# Patient Record
Sex: FEMALE | Race: WHITE | Employment: OTHER | URBAN - METROPOLITAN AREA
[De-identification: names, ages, dates, MRNs, and addresses within clinical notes are randomized per-mention and may not be internally consistent; named-entity substitution may affect disease eponyms.]

---

## 2017-01-09 ENCOUNTER — TELEPHONE (OUTPATIENT)
Dept: CARDIOLOGY CLINIC | Age: 67
End: 2017-01-09

## 2017-01-09 DIAGNOSIS — R06.02 SOB (SHORTNESS OF BREATH): ICD-10-CM

## 2017-01-09 DIAGNOSIS — I20.8 ANGINA AT REST (HCC): ICD-10-CM

## 2017-01-09 DIAGNOSIS — E78.2 MIXED HYPERLIPIDEMIA: ICD-10-CM

## 2017-01-09 DIAGNOSIS — I25.9 MYOCARDIAL ISCHEMIA: ICD-10-CM

## 2017-01-09 RX ORDER — METOPROLOL TARTRATE 25 MG/1
TABLET, FILM COATED ORAL
Qty: 90 TAB | Refills: 4 | Status: SHIPPED | COMMUNITY
Start: 2017-01-09 | End: 2017-06-09 | Stop reason: SDUPTHER

## 2017-01-09 RX ORDER — ISOSORBIDE MONONITRATE 30 MG/1
15 TABLET, EXTENDED RELEASE ORAL DAILY
Qty: 90 TAB | Refills: 1 | Status: SHIPPED | COMMUNITY
Start: 2017-01-09 | End: 2017-12-21 | Stop reason: SDUPTHER

## 2017-01-09 NOTE — TELEPHONE ENCOUNTER
Called pt,verified pt with two pt identifiers, told pt that I had received two refills from [facility] and just wanted to verify that she wants to get them refilled there. She verbalized she did and said she understood everything. I told her I would send those in.

## 2017-01-10 ENCOUNTER — SURGERY (OUTPATIENT)
Age: 67
End: 2017-01-10

## 2017-01-10 ENCOUNTER — ANESTHESIA EVENT (OUTPATIENT)
Dept: ENDOSCOPY | Age: 67
End: 2017-01-10
Payer: MEDICARE

## 2017-01-10 ENCOUNTER — ANESTHESIA (OUTPATIENT)
Dept: ENDOSCOPY | Age: 67
End: 2017-01-10
Payer: MEDICARE

## 2017-01-10 PROCEDURE — 74011000250 HC RX REV CODE- 250

## 2017-01-10 PROCEDURE — 74011250636 HC RX REV CODE- 250/636

## 2017-01-10 RX ORDER — PROPOFOL 10 MG/ML
INJECTION, EMULSION INTRAVENOUS AS NEEDED
Status: DISCONTINUED | OUTPATIENT
Start: 2017-01-10 | End: 2017-01-10 | Stop reason: HOSPADM

## 2017-01-10 RX ORDER — LIDOCAINE HYDROCHLORIDE 20 MG/ML
INJECTION, SOLUTION EPIDURAL; INFILTRATION; INTRACAUDAL; PERINEURAL AS NEEDED
Status: DISCONTINUED | OUTPATIENT
Start: 2017-01-10 | End: 2017-01-10 | Stop reason: HOSPADM

## 2017-01-10 RX ADMIN — PROPOFOL 50 MG: 10 INJECTION, EMULSION INTRAVENOUS at 11:33

## 2017-01-10 RX ADMIN — PROPOFOL 100 MG: 10 INJECTION, EMULSION INTRAVENOUS at 11:30

## 2017-01-10 RX ADMIN — PROPOFOL 50 MG: 10 INJECTION, EMULSION INTRAVENOUS at 11:36

## 2017-01-10 RX ADMIN — LIDOCAINE HYDROCHLORIDE 40 MG: 20 INJECTION, SOLUTION EPIDURAL; INFILTRATION; INTRACAUDAL; PERINEURAL at 11:30

## 2017-01-10 RX ADMIN — PROPOFOL 50 MG: 10 INJECTION, EMULSION INTRAVENOUS at 11:38

## 2017-01-10 NOTE — ANESTHESIA PREPROCEDURE EVALUATION
Anesthetic History     PONV          Review of Systems / Medical History  Patient summary reviewed, nursing notes reviewed and pertinent labs reviewed    Pulmonary          Shortness of breath (at baseline)  Asthma        Neuro/Psych   Within defined limits           Cardiovascular    Hypertension        Dysrhythmias       Exercise tolerance: >4 METS     GI/Hepatic/Renal     GERD           Endo/Other        Obesity and arthritis     Other Findings            Physical Exam    Airway  Mallampati: II  TM Distance: 4 - 6 cm  Neck ROM: normal range of motion   Mouth opening: Normal     Cardiovascular  Regular rate and rhythm,  S1 and S2 normal,  no murmur, click, rub, or gallop             Dental    Dentition: Edentulous     Pulmonary  Breath sounds clear to auscultation               Abdominal  GI exam deferred       Other Findings            Anesthetic Plan    ASA: 3  Anesthesia type: MAC            Anesthetic plan and risks discussed with: Patient

## 2017-01-10 NOTE — ANESTHESIA POSTPROCEDURE EVALUATION
Post-Anesthesia Evaluation and Assessment    Patient: Ronn Olp MRN: [id number]  SSN: [id number]    YOB: 1950  Age: 77 y.o. Sex: female       Cardiovascular Function/Vital Signs  Visit Vitals    /76    Pulse 64    Temp 36.6 °C (97.9 °F)    Resp 16    Ht 5' 6\" (1.676 m)    Wt 96.8 kg (213 lb 6 oz)    SpO2 96%    Breastfeeding No    BMI 34.44 kg/m2       Patient is status post MAC anesthesia for Procedure(s):  ESOPHAGOGASTRODUODENOSCOPY (EGD)  ESOPHAGEAL DILATION  ESOPHAGOGASTRODUODENAL (EGD) BIOPSY. Nausea/Vomiting: None    Postoperative hydration reviewed and adequate. Pain:  Pain Scale 1: Numeric (0 - 10) (01/10/17 1208)  Pain Intensity 1: 3 (01/10/17 1208)   Managed    Neurological Status: At baseline    Mental Status and Level of Consciousness: Arousable    Pulmonary Status:   O2 Device: Room air (01/10/17 1208)   Adequate oxygenation and airway patent    Complications related to anesthesia: None    Post-anesthesia assessment completed.  No concerns    Signed By: Kiki Smith MD     January 10, 2017

## 2017-01-16 ENCOUNTER — HOSPITAL ENCOUNTER (OUTPATIENT)
Dept: CT IMAGING | Age: 67
Discharge: HOME | End: 2017-01-16
Attending: SPECIALIST
Payer: MEDICARE

## 2017-01-16 DIAGNOSIS — R07.9 CHEST PAIN, UNSPECIFIED: ICD-10-CM

## 2017-01-16 DIAGNOSIS — K21.9 ESOPHAGEAL REFLUX: ICD-10-CM

## 2017-01-16 DIAGNOSIS — K44.9 HERNIA, HIATAL: ICD-10-CM

## 2017-01-16 DIAGNOSIS — R10.816 ABDOMINAL TENDERNESS, EPIGASTRIC: ICD-10-CM

## 2017-01-16 LAB — CREAT BLD-MCNC: 1 MG/DL (ref 0.6–1.3)

## 2017-01-16 PROCEDURE — 74177 CT ABD & PELVIS W/CONTRAST: CPT

## 2017-01-16 PROCEDURE — 74011250636 HC RX REV CODE- 250/636: Performed by: SPECIALIST

## 2017-01-16 PROCEDURE — 74011000255 HC RX REV CODE- 255: Performed by: SPECIALIST

## 2017-01-16 PROCEDURE — 74011636320 HC RX REV CODE- 636/320: Performed by: SPECIALIST

## 2017-01-16 PROCEDURE — 82565 ASSAY OF CREATININE: CPT

## 2017-01-16 RX ORDER — SODIUM CHLORIDE 0.9 % (FLUSH) 0.9 %
10 SYRINGE (ML) INJECTION
Status: COMPLETED | OUTPATIENT
Start: 2017-01-16 | End: 2017-01-16

## 2017-01-16 RX ORDER — SODIUM CHLORIDE 9 MG/ML
50 INJECTION, SOLUTION INTRAVENOUS
Status: COMPLETED | OUTPATIENT
Start: 2017-01-16 | End: 2017-01-16

## 2017-01-16 RX ORDER — BARIUM SULFATE 20 MG/ML
900 SUSPENSION ORAL
Status: COMPLETED | OUTPATIENT
Start: 2017-01-16 | End: 2017-01-16

## 2017-01-16 RX ADMIN — IOPAMIDOL 100 ML: 612 INJECTION, SOLUTION INTRAVENOUS at 12:05

## 2017-01-16 RX ADMIN — BARIUM SULFATE 900 ML: 21 SUSPENSION ORAL at 12:05

## 2017-01-16 RX ADMIN — Medication 10 ML: at 12:05

## 2017-01-16 RX ADMIN — SODIUM CHLORIDE 50 ML/HR: 900 INJECTION, SOLUTION INTRAVENOUS at 12:05

## 2017-02-06 ENCOUNTER — OFFICE VISIT (OUTPATIENT)
Dept: CARDIOLOGY CLINIC | Age: 67
End: 2017-02-06

## 2017-02-06 VITALS
DIASTOLIC BLOOD PRESSURE: 70 MMHG | HEIGHT: 66 IN | HEART RATE: 70 BPM | OXYGEN SATURATION: 97 % | RESPIRATION RATE: 16 BRPM | BODY MASS INDEX: 35.15 KG/M2 | SYSTOLIC BLOOD PRESSURE: 110 MMHG | WEIGHT: 218.7 LBS

## 2017-02-06 DIAGNOSIS — E78.2 MIXED HYPERLIPIDEMIA: ICD-10-CM

## 2017-02-06 DIAGNOSIS — Z01.818 PRE-OP EVALUATION: ICD-10-CM

## 2017-02-06 DIAGNOSIS — I87.322 CHRONIC VENOUS HYPERTENSION (IDIOPATHIC) WITH INFLAMMATION OF LEFT LOWER EXTREMITY: Primary | ICD-10-CM

## 2017-02-06 DIAGNOSIS — I87.2 VENOUS INSUFFICIENCY OF LEFT LEG: ICD-10-CM

## 2017-02-06 RX ORDER — OXYBUTYNIN CHLORIDE 10 MG/1
TABLET, EXTENDED RELEASE ORAL
Status: ON HOLD | COMMUNITY
Start: 2017-01-06 | End: 2018-05-11 | Stop reason: CLARIF

## 2017-02-06 NOTE — PROGRESS NOTES
2/6/2017 3:44 PM      Subjective:     Ariadna Nguyen is here for f/u of LE venous insufficiency and pre op clearance. Remains symptomatic in left leg despite conservative management for last 3 months. Schedule to undergo wrist surgery. She denies chest pain, chest pressure/discomfort, dyspnea, palpitations, irregular heart beats, near-syncope, syncope, fatigue, orthopnea, paroxysmal nocturnal dyspnea. Visit Vitals    /70  Comment: lt/rt/lg    Pulse 70    Resp 16    Ht 5' 6\" (1.676 m)    Wt 218 lb 11.2 oz (99.2 kg)    SpO2 97%    BMI 35.3 kg/m2       Current Outpatient Prescriptions   Medication Sig    oxybutynin chloride XL (DITROPAN XL) 10 mg CR tablet     CYCLOBENZAPRINE HCL (CYCLOBENZAPRINE PO) Take 40 mg by mouth daily.  isosorbide mononitrate ER (IMDUR) 30 mg tablet Take 0.5 Tabs by mouth daily. For Raynauds phenomenon    metoprolol tartrate (LOPRESSOR) 25 mg tablet TAKE ONE TABLET BY MOUTH TWICE DAILY    conjugated estrogens (PREMARIN) 0.625 mg/gram vaginal cream Insert 0.5 g into vagina daily.  raNITIdine (ZANTAC) 150 mg tablet Take 150 mg by mouth two (2) times a day.  aspirin delayed-release 81 mg tablet Take  by mouth daily.  acetaminophen (TYLENOL EXTRA STRENGTH) 500 mg tablet Take 1,000 mg by mouth every six (6) hours as needed for Pain.  omeprazole (PRILOSEC) 20 mg capsule Take 20 mg by mouth two (2) times a day.  OTHER,NON-FORMULARY, 2 Tabs daily. FIBER SUPPLEMENT    allopurinol (ZYLOPRIM) 100 mg tablet Take 100 mg by mouth daily. 100 mg in the AM, 200 mg at dinnertime.  telmisartan-hydrochlorothiazide (MICARDIS HCT) 40-12.5 mg per tablet Take 1 Tab by mouth daily.  pravastatin (PRAVACHOL) 40 mg tablet Take 40 mg by mouth nightly.  cetirizine (ZYRTEC) 10 mg tablet Take 10 mg by mouth daily.  montelukast (SINGULAIR) 10 mg tablet Take 10 mg by mouth nightly.     CARAFATE 100 mg/mL suspension Take 10 mL by mouth three (3) times daily.  meloxicam (MOBIC) 7.5 mg tablet Take 7.5 mg by mouth daily.  albuterol (PROVENTIL HFA, VENTOLIN HFA) 90 mcg/actuation inhaler Take 2 Puffs by inhalation every four (4) hours as needed. No current facility-administered medications for this visit.           Objective:      Visit Vitals    /70    Pulse 70    Resp 16    Ht 5' 6\" (1.676 m)    Wt 218 lb 11.2 oz (99.2 kg)    SpO2 97%    BMI 35.3 kg/m2       Data Review:     EKG: Normal sinus rhythm, no acute st/t changes    Reviewed and/or ordered active problem list, medication list tests    Past Medical History   Diagnosis Date    Adverse effect of anesthesia      woke up during hysterectomy    Arrhythmia      h/o palpitations normal work up 22/2015    Arthritis     Asthma      allergy to weather changing    Chronic pain      bulging disc c4/c5 l4/l5    GERD (gastroesophageal reflux disease)     H/O arthroscopic knee surgery     H/O: hysterectomy     Hypertension     Ill-defined condition      gout    Leg swelling     Bartlett's neuralgia     Musculoskeletal disorder      arthritis    Nausea & vomiting      surgery related    Other ill-defined conditions(799.89) 11/2013     RECTAL PROLASE    S/P appendectomy     Shortness of breath     Unspecified adverse effect of anesthesia      wakes up for anesthesia early      Past Surgical History   Procedure Laterality Date    Colonoscopy  4/20/2011          Hx appendectomy      Hx orthopaedic       left tkr x 2/numereous left knee surgeries    Hx orthopaedic       right knee partial meniscus     Hx orthopaedic       ganglion cyst removed rt wrist    Hx orthopaedic       bilateral CTR, and had ulna nerve release    Hx cholecystectomy      Hx hysterectomy       TOOK LEFT OVARY    Hx gi  11/19/13     Rectocele repair with enterocele repair     Allergies   Allergen Reactions    Benzene Other (comments)     Blisters and burn area Benzene found to be on steri-strips    Betadine [Povidone-Iodine] Other (comments)     Blisters and burning    Naproxen Itching    Percocet [Oxycodone-Acetaminophen] Rash and Itching     irritated    Sulfa (Sulfonamide Antibiotics) Rash    Adhesive Rash     Redness and rash      Family History   Problem Relation Age of Onset    Heart Disease Mother     Hypertension Mother     Diabetes Mother     Cancer Mother      BREAST, LUNG    Heart Disease Father     Hypertension Father     Thyroid Disease Father     Diabetes Brother     Psychiatric Disorder Son      Nancy MCNALLY [place] DEPRESSIVE    Anesth Problems Neg Hx       Social History     Social History    Marital status:      Spouse name: N/A    Number of children: N/A    Years of education: N/A     Occupational History    Not on file. Social History Main Topics    Smoking status: Never Smoker    Smokeless tobacco: Never Used    Alcohol use No    Drug use: Not on file    Sexual activity: Not on file     Other Topics Concern    Not on file     Social History Narrative          Review of Systems     General: Not Present- Anorexia, Chills, Dietary Changes, Fatigue, Fever, Medication Changes, Night Sweats, Weight Gain > 10lbs. and Weight Loss > 10lbs. .  Skin: Not Present- Bruising and Excessive Sweating. HEENT: Not Present- Headache, Visual Loss and Vertigo. Respiratory: Not Present- Cough, Decreased Exercise Tolerance, Difficulty Breathing, Snoring and Wheezing. Cardiovascular: Not Present- Abnormal Blood Pressure, Chest Pain, Difficulty Breathing On Exertion, Fainting / Blacking Out, Irregular Heart Beat, Orthopnea, Palpitations, Paroxysmal Nocturnal Dyspnea, Rapid Heart Rate, Shortness of Breath. Gastrointestinal: Not Present- Black, Tarry Stool, Bloody Stool, Diarrhea, Hematemesis, Rectal Bleeding and Vomiting. Musculoskeletal: Not Present- Muscle Pain and Muscle Weakness. Neurological: Not Present- Dizziness. Psychiatric: Not Present- Depression.   Endocrine: Not Present- Cold Intolerance, Heat Intolerance and Thyroid Problems. Hematology: Not Present- Abnormal Bleeding, Anemia, Blood Clots and Easy Bruising. Physical Exam   The physical exam findings are as follows:     General   Mental Status - Alert. General Appearance - Cooperative and Well groomed. Not in acute distress. Orientation - Oriented to time, Oriented to place and Oriented to person. Build & Nutrition - Well developed. Skin   General: - Normal.      HEENT  Head - Normal.  Eye - Normal.  Mouth & Throat - Normal.      Neck   Carotid Arteries - normal upstroke. No Bruits. Thyroid: Gland - Normal size and consistency. Chest and Lung Exam   Inspection:   Chest Wall: - Normal. Accessory muscles - No use of accessory muscles in breathing. Auscultation:   Breath sounds: - Normal.      Cardiovascular   Inspection: Jugular vein - Bilateral - Inspection Normal.  Palpation/Percussion:   Apical Impulse: - Normal.  Auscultation: Rhythm - Regular. Heart Sounds - S1 WNL and S2 WNL. No S3 or S4. Murmurs & Other Heart Sounds: Auscultation of the heart reveals - No Murmurs. Abdomen   Palpation/Percussion: Palpation and Percussion of the abdomen reveal - No Palpable abdominal masses. Liver: - Normal.  Spleen: - Normal.  Auscultation: Auscultation of the abdomen reveals - Bowel sounds normal.      Neurologic   Mental Status: Affect - normal.  Motor: - Normal. Gait - Normal.      PHYSICIAN TO COMPLETE    Date of Physician Reevaluation:___________11/22/2016___________  (To review results of trial of conservative therapy-lasting at least 3-6 months):  Patient is symptomatic with varicosities despite compliance with conservative therapy. Has failed conservative treatment.     Check all that apply:  [x] Other causes of patients leg(s) symptoms have been ruled out  [x] Completed conservative treatment to include: compression stockings, medication, leg elevation, mild exercise & weight         reduction (as appropriate). Time length of conservative treatment[de-identified] ________3 months_________    Patient is symptomatic with varicosities causing the following: (check all that apply):  [x] Has persistent aching, cramping, burning, pain, itching, and/or swelling during activity or after prolonged standing. [] Significant, recurrent superficial phlebitis  [] Hemorrhage from a ruptured varix  [] Non-healing skin ulceration of the leg  [] Other complications associated:  ___________________      Duplex or Doppler Ultrasound of the venous system demonstrate:  [x] Absence of deep venous thrombosis  [x] Greater and/or lesser saphenous vein or  valvular incompetence/reflux that correlates with        patients symptoms  []   valvular incompetence/reflux that correlates with patients symptoms         Assessment:       ICD-10-CM ICD-9-CM    1. Chronic venous hypertension (idiopathic) with inflammation of left lower extremity I87.322 459.32    2. Mixed hyperlipidemia E78.2 272.2 oxybutynin chloride XL (DITROPAN XL) 10 mg CR tablet      CYCLOBENZAPRINE HCL (CYCLOBENZAPRINE PO)      AMB POC EKG ROUTINE W/ 12 LEADS, INTER & REP   3. Venous insufficiency of left leg I87.2 459.81    4. Pre-op evaluation Z01.818 V72.84        Plan:     1. Venous insuff of left GSV: recommend RF ablation since remains symptomatic despite conservative management. Will schedule it after her ortho surgery and vacation plans. With respect to pevious right GSV RF ablation continues to do very well and has an excellent response. 2. On statin.    3. No further palpitations. Continue BB   4. Pre op: no further cardiac work up is needed prior to non cardiac surgery. She will be at low CV risk during non cardiac surgery. Continue current meds.      Tan Acuna MD  2/6/2017  3:44 PM

## 2017-02-06 NOTE — MR AVS SNAPSHOT
Visit Information Date & Time Provider Department Dept. Phone Encounter #  
 2/6/2017  3:30 PM Clau Verma, [address] [facility] [phone] [id number] Upcoming Health Maintenance Date Due Hepatitis C Screening 1950 DTaP/Tdap/Td series (1 - Tdap) 1/15/1971 FOBT Q 1 YEAR AGE 50-75 1/15/2000 ZOSTER VACCINE AGE 60> 1/15/2010 GLAUCOMA SCREENING Q2Y 1/15/2015 OSTEOPOROSIS SCREENING (DEXA) 1/15/2015 Pneumococcal 65+ Low/Medium Risk (1 of 2 - PCV13) 1/15/2015 MEDICARE YEARLY EXAM 1/15/2015 INFLUENZA AGE 9 TO ADULT 8/1/2016 BREAST CANCER SCRN MAMMOGRAM 5/9/2018 Allergies as of 2/6/2017  Review Complete On: 2/6/2017 By: Clau Verma MD  
  
 Severity Noted Reaction Type Reactions Benzene Medium 11/14/2013   Topical Other (comments) Blisters and burn area Benzene found to be on steri-strips Betadine [Povidone-iodine] Medium 11/14/2013   Topical Other (comments) Blisters and burning Naproxen  04/19/2011    Itching Percocet [Oxycodone-acetaminophen]  07/07/2014    Rash, Itching  
 irritated Sulfa (Sulfonamide Antibiotics)  04/19/2011    Rash Adhesive Low 11/19/2013   Topical Rash Redness and rash Current Immunizations  Never Reviewed No immunizations on file. Not reviewed this visit You Were Diagnosed With   
  
 Codes Comments Chronic venous hypertension (idiopathic) with inflammation of left lower extremity    -  Primary ICD-10-CM: W30.828 ICD-9-CM: 459.32 Mixed hyperlipidemia     ICD-10-CM: E78.2 ICD-9-CM: 272.2 Venous insufficiency of left leg     ICD-10-CM: I87.2 ICD-9-CM: 459.81 Pre-op evaluation     ICD-10-CM: W78.658 ICD-9-CM: V72.84 Vitals BP Pulse Resp Height(growth percentile) Weight(growth percentile) SpO2  
 110/70 70 16 5' 6\" (1.676 m) 218 lb 11.2 oz (99.2 kg) 97% BMI OB Status Smoking Status 35.3 kg/m2 Hysterectomy Never Smoker BMI and BSA Data Body Mass Index Body Surface Area  
 35.3 kg/m 2 2.15 m 2 Preferred Pharmacy Pharmacy Name Phone [address] [facility] [phone] Your Updated Medication List  
  
   
This list is accurate as of: 2/6/17  3:48 PM.  Always use your most recent med list.  
  
  
  
  
 albuterol 90 mcg/actuation inhaler Commonly known as:  PROVENTIL HFA, VENTOLIN HFA, PROAIR HFA Take 2 Puffs by inhalation every four (4) hours as needed. allopurinol 100 mg tablet Commonly known as:  Korina Late Take 100 mg by mouth daily. 100 mg in the AM, 200 mg at dinnertime. aspirin delayed-release 81 mg tablet Take  by mouth daily. CARAFATE 100 mg/mL suspension Generic drug:  sucralfate Take 10 mL by mouth three (3) times daily. CYCLOBENZAPRINE PO Take 40 mg by mouth daily. isosorbide mononitrate ER 30 mg tablet Commonly known as:  IMDUR Take 0.5 Tabs by mouth daily. For Raynauds phenomenon  
  
 meloxicam 7.5 mg tablet Commonly known as:  MOBIC Take 7.5 mg by mouth daily. metoprolol tartrate 25 mg tablet Commonly known as:  LOPRESSOR  
TAKE ONE TABLET BY MOUTH TWICE DAILY  
  
 omeprazole 20 mg capsule Commonly known as:  PRILOSEC Take 20 mg by mouth two (2) times a day. OTHER(NON-FORMULARY) 2 Tabs daily. FIBER SUPPLEMENT  
  
 oxybutynin chloride XL 10 mg CR tablet Commonly known as:  DITROPAN XL  
  
 pravastatin 40 mg tablet Commonly known as:  PRAVACHOL Take 40 mg by mouth nightly. PREMARIN 0.625 mg/gram vaginal cream  
Generic drug:  conjugated estrogens Insert 0.5 g into vagina daily. raNITIdine 150 mg tablet Commonly known as:  ZANTAC Take 150 mg by mouth two (2) times a day. SINGULAIR 10 mg tablet Generic drug:  montelukast  
Take 10 mg by mouth nightly. telmisartan-hydroCHLOROthiazide 40-12.5 mg per tablet Commonly known as:  MICARDIS HCT Take 1 Tab by mouth daily. TYLENOL EXTRA STRENGTH 500 mg tablet Generic drug:  acetaminophen Take 1,000 mg by mouth every six (6) hours as needed for Pain. ZyrTEC 10 mg tablet Generic drug:  cetirizine Take 10 mg by mouth daily. We Performed the Following AMB POC EKG ROUTINE W/ 12 LEADS, INTER & REP [23019 CPT(R)] Introducing [facility] SERVICES! Dear Chip Stevenson: Thank you for requesting a [employer] account. Our records indicate that you already have an active [email or web address] account. You can access your account anytime at [email or web address] Did you know that you can access your hospital and ER discharge instructions at any time in [employer]? You can also review all of your test results from your hospital stay or ER visit. Additional Information If you have questions, please visit the Frequently Asked Questions section of the [employer] website at [email or web address] Remember, [employer] is NOT to be used for urgent needs. For medical emergencies, dial 911. Now available from your iPhone and Android! Please provide this summary of care documentation to your next provider. Your primary care clinician is listed as Lars Quintero. If you have any questions after today's visit, please call [phone].

## 2017-02-06 NOTE — PROGRESS NOTES
Patient C/O increase heart beat at times with SOB this is not a new complaint. She is scheduled for surgery  Dr Minnie Julio (right wrist) 3-20-17.

## 2017-05-10 ENCOUNTER — HOSPITAL ENCOUNTER (OUTPATIENT)
Dept: MAMMOGRAPHY | Age: 67
Discharge: HOME | End: 2017-05-10
Attending: FAMILY MEDICINE
Payer: MEDICARE

## 2017-05-10 DIAGNOSIS — Z12.31 VISIT FOR SCREENING MAMMOGRAM: ICD-10-CM

## 2017-05-10 PROCEDURE — 77067 SCR MAMMO BI INCL CAD: CPT

## 2017-06-09 ENCOUNTER — TELEPHONE (OUTPATIENT)
Dept: CARDIOLOGY CLINIC | Age: 67
End: 2017-06-09

## 2017-06-09 DIAGNOSIS — R06.02 SOB (SHORTNESS OF BREATH): ICD-10-CM

## 2017-06-09 DIAGNOSIS — I25.9 MYOCARDIAL ISCHEMIA: ICD-10-CM

## 2017-06-09 DIAGNOSIS — I20.8 ANGINA AT REST (HCC): ICD-10-CM

## 2017-06-09 DIAGNOSIS — E78.2 MIXED HYPERLIPIDEMIA: ICD-10-CM

## 2017-06-09 RX ORDER — METOPROLOL TARTRATE 25 MG/1
TABLET, FILM COATED ORAL
Qty: 60 TAB | Refills: 6 | Status: SHIPPED | OUTPATIENT
Start: 2017-06-09 | End: 2017-10-04 | Stop reason: SDUPTHER

## 2017-06-09 NOTE — TELEPHONE ENCOUNTER
Pt need a 30 day supply of metoprolol sent to [facility], while waiting for mail order. Pt is completely out of meds.      Thanks

## 2017-07-27 ENCOUNTER — HOSPITAL ENCOUNTER (OUTPATIENT)
Dept: ULTRASOUND IMAGING | Age: 67
Discharge: HOME | End: 2017-07-27
Attending: SPECIALIST
Payer: MEDICARE

## 2017-07-27 ENCOUNTER — HOSPITAL ENCOUNTER (OUTPATIENT)
Dept: GENERAL RADIOLOGY | Age: 67
Discharge: HOME | End: 2017-07-27
Attending: SPECIALIST
Payer: MEDICARE

## 2017-07-27 DIAGNOSIS — K21.9 GERD (GASTROESOPHAGEAL REFLUX DISEASE): ICD-10-CM

## 2017-07-27 DIAGNOSIS — R07.9 CHEST PAIN: ICD-10-CM

## 2017-07-27 PROCEDURE — 74220 X-RAY XM ESOPHAGUS 1CNTRST: CPT

## 2017-07-27 PROCEDURE — 76700 US EXAM ABDOM COMPLETE: CPT

## 2017-08-08 ENCOUNTER — OFFICE VISIT (OUTPATIENT)
Dept: CARDIOLOGY CLINIC | Age: 67
End: 2017-08-08

## 2017-08-08 VITALS
BODY MASS INDEX: 34.18 KG/M2 | SYSTOLIC BLOOD PRESSURE: 128 MMHG | DIASTOLIC BLOOD PRESSURE: 60 MMHG | HEIGHT: 66 IN | WEIGHT: 212.7 LBS | HEART RATE: 72 BPM | RESPIRATION RATE: 16 BRPM

## 2017-08-08 DIAGNOSIS — E78.2 MIXED HYPERLIPIDEMIA: ICD-10-CM

## 2017-08-08 DIAGNOSIS — I87.2 VENOUS REFLUX: ICD-10-CM

## 2017-08-08 DIAGNOSIS — I10 ESSENTIAL HYPERTENSION: ICD-10-CM

## 2017-08-08 DIAGNOSIS — R06.02 SOB (SHORTNESS OF BREATH): Primary | ICD-10-CM

## 2017-08-08 DIAGNOSIS — R00.2 PALPITATIONS: ICD-10-CM

## 2017-08-08 NOTE — PROGRESS NOTES
8/8/2017 12:02 PM      Subjective:     Erica Reardon is seen in office today for f/u visit. C/o substernal chest discomfort and SOB. She denies palpitations, irregular heart beats, near-syncope, syncope, fatigue, orthopnea, paroxysmal nocturnal dyspnea, exertional chest pressure/discomfort. Visit Vitals    /60 (BP 1 Location: Right arm, BP Patient Position: Sitting)    Pulse 72    Resp 16    Ht 5' 6\" (1.676 m)    Wt 212 lb 11.2 oz (96.5 kg)    BMI 34.33 kg/m2     Current Outpatient Prescriptions   Medication Sig    metoprolol tartrate (LOPRESSOR) 25 mg tablet TAKE ONE TABLET BY MOUTH TWICE DAILY    oxybutynin chloride XL (DITROPAN XL) 10 mg CR tablet     CYCLOBENZAPRINE HCL (CYCLOBENZAPRINE PO) Take 40 mg by mouth daily.  isosorbide mononitrate ER (IMDUR) 30 mg tablet Take 0.5 Tabs by mouth daily. For Raynauds phenomenon    raNITIdine (ZANTAC) 150 mg tablet Take 150 mg by mouth two (2) times a day.  aspirin delayed-release 81 mg tablet Take  by mouth daily.  acetaminophen (TYLENOL EXTRA STRENGTH) 500 mg tablet Take 1,000 mg by mouth every six (6) hours as needed for Pain.  omeprazole (PRILOSEC) 20 mg capsule Take 20 mg by mouth two (2) times a day.  OTHER,NON-FORMULARY, 2 Tabs daily. FIBER SUPPLEMENT    allopurinol (ZYLOPRIM) 100 mg tablet Take 100 mg by mouth daily. 100 mg in the AM, 200 mg at dinnertime.  telmisartan-hydrochlorothiazide (MICARDIS HCT) 40-12.5 mg per tablet Take 1 Tab by mouth daily.  pravastatin (PRAVACHOL) 40 mg tablet Take 40 mg by mouth nightly.  cetirizine (ZYRTEC) 10 mg tablet Take 10 mg by mouth daily.  montelukast (SINGULAIR) 10 mg tablet Take 10 mg by mouth nightly.  conjugated estrogens (PREMARIN) 0.625 mg/gram vaginal cream Insert 0.5 g into vagina daily.  CARAFATE 100 mg/mL suspension Take 10 mL by mouth three (3) times daily.  meloxicam (MOBIC) 7.5 mg tablet Take 7.5 mg by mouth daily.     albuterol (PROVENTIL HFA, VENTOLIN HFA) 90 mcg/actuation inhaler Take 2 Puffs by inhalation every four (4) hours as needed. No current facility-administered medications for this visit.           Objective:      Visit Vitals    /60 (BP 1 Location: Right arm, BP Patient Position: Sitting)    Pulse 72    Resp 16    Ht 5' 6\" (1.676 m)    Wt 212 lb 11.2 oz (96.5 kg)    BMI 34.33 kg/m2       Data Review:    EKG: Normal sinus rhythm, no acute st/t changes    Reviewed and/or ordered active problem list, medication list tests    Past Medical History:   Diagnosis Date    Adverse effect of anesthesia     woke up during hysterectomy    Arrhythmia     h/o palpitations normal work up 22/2015    Arthritis     Asthma     allergy to weather changing    Chronic pain     bulging disc c4/c5 l4/l5    GERD (gastroesophageal reflux disease)     H/O arthroscopic knee surgery     H/O: hysterectomy     Hypertension     Ill-defined condition     gout    Leg swelling     Bartlett's neuralgia     Musculoskeletal disorder     arthritis    Nausea & vomiting     surgery related    Other ill-defined conditions 11/2013    RECTAL PROLASE    S/P appendectomy     Shortness of breath     Unspecified adverse effect of anesthesia     wakes up for anesthesia early      Past Surgical History:   Procedure Laterality Date    COLONOSCOPY  4/20/2011         HX APPENDECTOMY      HX CHOLECYSTECTOMY      HX GI  11/19/13    Rectocele repair with enterocele repair    HX HYSTERECTOMY      TOOK LEFT OVARY    HX ORTHOPAEDIC      left tkr x 2/numereous left knee surgeries    HX ORTHOPAEDIC      right knee partial meniscus     HX ORTHOPAEDIC      ganglion cyst removed rt wrist    HX ORTHOPAEDIC      bilateral CTR, and had ulna nerve release     Allergies   Allergen Reactions    Benzene Other (comments)     Blisters and burn area Benzene found to be on steri-strips    Betadine [Povidone-Iodine] Other (comments)     Blisters and burning    Naproxen Itching    Percocet [Oxycodone-Acetaminophen] Rash and Itching     irritated    Sulfa (Sulfonamide Antibiotics) Rash    Adhesive Rash     Redness and rash      Family History   Problem Relation Age of Onset    Heart Disease Mother     Hypertension Mother     Diabetes Mother     Cancer Mother      BREAST, LUNG    Breast Cancer Mother 61    Heart Disease Father     Hypertension Father     Thyroid Disease Father     Diabetes Brother     Psychiatric Disorder Son      Nancy MCNALLY [place] DEPRESSIVE    Anesth Problems Neg Hx       Social History     Social History    Marital status:      Spouse name: N/A    Number of children: N/A    Years of education: N/A     Occupational History    Not on file. Social History Main Topics    Smoking status: Never Smoker    Smokeless tobacco: Never Used    Alcohol use No    Drug use: Not on file    Sexual activity: Not on file     Other Topics Concern    Not on file     Social History Narrative         Review of Systems     General: Not Present- Anorexia, Chills, Dietary Changes, Fatigue, Fever, Medication Changes, Night Sweats, Weight Gain > 10lbs. and Weight Loss > 10lbs. .  Skin: Not Present- Bruising and Excessive Sweating. HEENT: Not Present- Headache, Visual Loss and Vertigo. Respiratory: Not Present- Cough, Decreased Exercise Tolerance, Snoring and Wheezing. Cardiovascular: Not Present- Abnormal Blood Pressure, Fainting / Blacking Out, Irregular Heart Beat, Orthopnea, Palpitations, Paroxysmal Nocturnal Dyspnea, Rapid Heart Rate. Gastrointestinal: Not Present- Black, Tarry Stool, Bloody Stool, Diarrhea, Hematemesis, Rectal Bleeding and Vomiting. Musculoskeletal: Not Present- Muscle Pain and Muscle Weakness. Neurological: Not Present- Dizziness. Psychiatric: Not Present- Depression. Endocrine: Not Present- Cold Intolerance, Heat Intolerance and Thyroid Problems.   Hematology: Not Present- Abnormal Bleeding, Anemia, Blood Clots and Easy Bruising.       Physical Exam   The physical exam findings are as follows:       General   Mental Status - Alert. General Appearance - Not in acute distress. Chest and Lung Exam   Inspection: Accessory muscles - No use of accessory muscles in breathing. Auscultation:   Breath sounds: - Normal.      Cardiovascular   Inspection: Jugular vein - Bilateral - Inspection Normal.  Palpation/Percussion:   Apical Impulse: - Normal.  Auscultation: Rhythm - Regular. Heart Sounds - S1 WNL and S2 WNL. No S3 or S4. Murmurs & Other Heart Sounds: Auscultation of the heart reveals - No Murmurs. Carotid arteries - No Carotid bruit. Peripheral Vascular   Upper Extremity: Inspection - Bilateral - No Cyanotic nailbeds or Digital clubbing. Lower Extremity:   Palpation: Edema - Trace left LE edema. Assessment:       ICD-10-CM ICD-9-CM    1. SOB (shortness of breath) R06.02 786.05 STRESS TEST CARDIOLITE   2. Palpitations R00.2 785.1 AMB POC EKG ROUTINE W/ 12 LEADS, INTER & REP      STRESS TEST CARDIOLITE   3. Mixed hyperlipidemia E78.2 272.2 STRESS TEST CARDIOLITE   4. Venous reflux I87.2 459.81 STRESS TEST CARDIOLITE   5. Essential hypertension I10 401.9        Plan:     1. SOB: no significant CAD at time of last cath in 2015. Check stress test. Also following up with GI.   2. Venous insuff of left GSV: recommend RF ablation since remains symptomatic despite conservative management. Will schedule it after above issues are addressed. With respect to pevious right GSV RF ablation continues to do very well and has an excellent response. 3. On statin.    4. No further palpitations. Continue BB   5. BP controlled.

## 2017-08-08 NOTE — PROGRESS NOTES
Chief Complaint   Patient presents with    Cholesterol Problem     6 month follow up, C/O off/on chest tightness thats worsening

## 2017-08-15 ENCOUNTER — CLINICAL SUPPORT (OUTPATIENT)
Dept: CARDIOLOGY CLINIC | Age: 67
End: 2017-08-15

## 2017-08-15 DIAGNOSIS — E78.2 MIXED HYPERLIPIDEMIA: ICD-10-CM

## 2017-08-15 DIAGNOSIS — R00.2 PALPITATIONS: ICD-10-CM

## 2017-08-15 DIAGNOSIS — R06.02 SOB (SHORTNESS OF BREATH): ICD-10-CM

## 2017-08-15 DIAGNOSIS — I87.2 VENOUS REFLUX: ICD-10-CM

## 2017-08-18 ENCOUNTER — TELEPHONE (OUTPATIENT)
Dept: CARDIOLOGY CLINIC | Age: 67
End: 2017-08-18

## 2017-08-18 NOTE — TELEPHONE ENCOUNTER
Spoke with patient  Verified patient with 2 patient identifier    Informed per Dr Melodi Landau stress test ok.

## 2017-08-18 NOTE — TELEPHONE ENCOUNTER
----- Message from Aide Martinez MD sent at 8/16/2017  5:54 PM EDT -----  Inform her stress test is ok

## 2017-08-21 ENCOUNTER — APPOINTMENT (OUTPATIENT)
Dept: GENERAL RADIOLOGY | Age: 67
End: 2017-08-21
Payer: MEDICARE

## 2017-08-21 ENCOUNTER — HOSPITAL ENCOUNTER (OUTPATIENT)
Age: 67
Setting detail: OUTPATIENT SURGERY
Discharge: HOME | End: 2017-08-21
Attending: SPECIALIST | Admitting: SPECIALIST
Payer: MEDICARE

## 2017-08-21 VITALS
SYSTOLIC BLOOD PRESSURE: 155 MMHG | DIASTOLIC BLOOD PRESSURE: 74 MMHG | OXYGEN SATURATION: 99 % | HEART RATE: 62 BPM | BODY MASS INDEX: 33.43 KG/M2 | RESPIRATION RATE: 16 BRPM | HEIGHT: 66 IN | WEIGHT: 208 LBS

## 2017-08-21 PROCEDURE — 76040000007: Performed by: SPECIALIST

## 2017-08-21 PROCEDURE — 74011000250 HC RX REV CODE- 250: Performed by: SPECIALIST

## 2017-08-21 PROCEDURE — 77030007009 HC CATH PH VRSFLX ALPN -C: Performed by: SPECIALIST

## 2017-08-21 RX ORDER — LIDOCAINE HYDROCHLORIDE 20 MG/ML
JELLY TOPICAL ONCE
Status: COMPLETED | OUTPATIENT
Start: 2017-08-21 | End: 2017-08-21

## 2017-08-21 RX ADMIN — LIDOCAINE HYDROCHLORIDE 5 MG: 20 JELLY TOPICAL at 08:28

## 2017-08-21 NOTE — IP AVS SNAPSHOT
[address] [facility] 
[phone] Patient: Ce Phillips MRN: [id number] QCH:[id number] You are allergic to the following Allergen Reactions Benzene Other (comments) Blisters and burn area Benzene found to be on steri-strips Betadine (Povidone-Iodine) Other (comments) Blisters and burning Naproxen Itching Percocet (Oxycodone-Acetaminophen) Rash Itching  
 irritated Sulfa (Sulfonamide Antibiotics) Rash Adhesive Rash Redness and rash Recent Documentation Height Weight Breastfeeding? BMI OB Status Smoking Status 1.676 m 94.3 kg No 33.57 kg/m2 Hysterectomy Never Smoker Emergency Contacts Name Discharge Info Relation Home Work Mobile [address] [facility] CAREGIVER [3] Spouse [3] [phone] About your hospitalization You were admitted on:  August 21, 2017 You last received care in the:  [facility] ENDOSCOPY You were discharged on:  August 21, 2017 Unit phone number:  [phone] Why you were hospitalized Your primary diagnosis was:  Not on File Providers Seen During Your Hospitalizations Provider Role Specialty Primary office phone Bennett Stratton MD Attending Provider Gastroenterology [phone] Your Primary Care Physician (PCP) Primary Care Physician Office Phone Office Fax [place] Castañeda, [phone] [phone] Follow-up Information None Current Discharge Medication List  
  
ASK your doctor about these medications Dose & Instructions Dispensing Information Comments Morning Noon Evening Bedtime  
 albuterol 90 mcg/actuation inhaler Commonly known as:  PROVENTIL HFA, VENTOLIN HFA, PROAIR HFA Your last dose was: Your next dose is:    
   
   
 Dose:  2 Puff Take 2 Puffs by inhalation every four (4) hours as needed. Refills:  0  
     
   
   
   
  
 allopurinol 100 mg tablet Commonly known as:  Keshia Valdes Your last dose was: Your next dose is:    
   
   
 Dose:  100 mg Take 100 mg by mouth daily. 100 mg in the AM, 200 mg at dinnertime. Refills:  0  
     
   
   
   
  
 aspirin delayed-release 81 mg tablet Your last dose was: Your next dose is: Take  by mouth daily. Refills:  0  
     
   
   
   
  
 CARAFATE 100 mg/mL suspension Generic drug:  sucralfate Your last dose was: Your next dose is:    
   
   
 Dose:  10 mL Take 10 mL by mouth three (3) times daily. Refills:  0 CYCLOBENZAPRINE PO Your last dose was: Your next dose is:    
   
   
 Dose:  40 mg Take 40 mg by mouth daily. Refills:  0  
     
   
   
   
  
 isosorbide mononitrate ER 30 mg tablet Commonly known as:  IMDUR Your last dose was: Your next dose is:    
   
   
 Dose:  15 mg Take 0.5 Tabs by mouth daily. For Raynauds phenomenon Quantity:  90 Tab Refills:  1  
     
   
   
   
  
 meloxicam 7.5 mg tablet Commonly known as:  MOBIC Your last dose was: Your next dose is:    
   
   
 Dose:  7.5 mg Take 7.5 mg by mouth daily. Refills:  1  
     
   
   
   
  
 metoprolol tartrate 25 mg tablet Commonly known as:  LOPRESSOR Your last dose was: Your next dose is: TAKE ONE TABLET BY MOUTH TWICE DAILY Quantity:  60 Tab Refills:  6  
     
   
   
   
  
 omeprazole 20 mg capsule Commonly known as:  PRILOSEC Your last dose was: Your next dose is:    
   
   
 Dose:  20 mg Take 20 mg by mouth two (2) times a day. Refills:  0  
     
   
   
   
  
 OTHER(NON-FORMULARY) Your last dose was: Your next dose is:    
   
   
 Dose:  2 Tab  
2 Tabs daily. FIBER SUPPLEMENT Refills:  0 oxybutynin chloride XL 10 mg CR tablet Commonly known as:  DITROPAN XL Your last dose was: Your next dose is:    
   
   
  Refills:  0  
     
   
   
   
  
 pravastatin 40 mg tablet Commonly known as:  PRAVACHOL Your last dose was: Your next dose is:    
   
   
 Dose:  40 mg Take 40 mg by mouth nightly. Refills:  0 PREMARIN 0.625 mg/gram vaginal cream  
Generic drug:  conjugated estrogens Your last dose was: Your next dose is:    
   
   
 Dose:  0.5 g Insert 0.5 g into vagina daily. Refills:  0  
     
   
   
   
  
 raNITIdine 150 mg tablet Commonly known as:  ZANTAC Your last dose was: Your next dose is:    
   
   
 Dose:  150 mg Take 150 mg by mouth two (2) times a day. Refills:  0 SINGULAIR 10 mg tablet Generic drug:  montelukast  
   
Your last dose was: Your next dose is:    
   
   
 Dose:  10 mg Take 10 mg by mouth nightly. Refills:  0  
     
   
   
   
  
 telmisartan-hydroCHLOROthiazide 40-12.5 mg per tablet Commonly known as:  MICARDIS HCT Your last dose was: Your next dose is:    
   
   
 Dose:  1 Tab Take 1 Tab by mouth daily. Refills:  0  
     
   
   
   
  
 TYLENOL EXTRA STRENGTH 500 mg tablet Generic drug:  acetaminophen Your last dose was: Your next dose is:    
   
   
 Dose:  1000 mg Take 1,000 mg by mouth every six (6) hours as needed for Pain. Refills:  0 ZyrTEC 10 mg tablet Generic drug:  cetirizine Your last dose was: Your next dose is:    
   
   
 Dose:  10 mg Take 10 mg by mouth daily. Refills:  0 Discharge Instructions Chriss Heredia [id number] 
1950 MANOMETRY DISCHARGE INSTRUCTION You may resume your regular diet as tolerated. You may resume your normal daily activities. If you develop a sore throat- throat lozenges or warm salt water gargles will help. Call your Physician if you have any complications or questions. Tess White [id number] 
1950 
 
[address] Please return to [facility] Endoscopy department at 0915 am tomorrow with your completed diary. ACTIVITY:  Avoid any activity that may get the data recorder wet. You may resume your normal daily activities. DIET:  You may resume your normal diet, HOWEVER avoid peanut butter and carbonated beverages for the next 24 hours. MEDICATIONS:  You may resume your normal medications with the exception of hold acid reflex medication till test is concluded PLEASE CALL tila vásquez rn  AT  [phone] IF YOU HAVE QUESTIONS, CONCERNS, OR TECHNICAL DIFFICULTIES DURING YOUR TEST. [employer]tyThrifty Activation Thank you for requesting access to [employer]iftyThrifty. Please follow the instructions below to securely access and download your online medical record. NiftyThrifty allows you to send messages to your doctor, view your test results, renew your prescriptions, schedule appointments, and more. How Do I Sign Up? 1. In your internet browser, go to [email or web address] 
2. Click on the First Time User? Click Here link in the Sign In box. You will be redirect to the New Member Sign Up page. 3. Enter your [employer]tyTh[employer]ty Access Code exactly as it appears below. You will not need to use this code after youve completed the sign-up process. If you do not sign up before the expiration date, you must request a new code. [employer]iftyThrifty Access Code: Activation code not generated Current NiftyThrifty Status: Active (This is the date your NiftyThrifty access code will ) 4. Enter the last four digits of your Social Security Number (xxxx) and Date of Birth (mm/dd/yyyy) as indicated and click Submit. You will be taken to the next sign-up page. 5. Create a "[employer] ID. This will be your "[employer] login ID and cannot be changed, so think of one that is secure and easy to remember. 6. Create a "[employer] password. You can change your password at any time. 7. Enter your Password Reset Question and Answer. This can be used at a later time if you forget your password. 8. Enter your e-mail address. You will receive e-mail notification when new information is available in [address] 9. Click Sign Up. You can now view and download portions of your medical record. 10. Click the Download Summary menu link to download a portable copy of your medical information. Additional Information If you have questions, please visit the Frequently Asked Questions section of the "[employer] website at [email or web address] Remember, "[employer] is NOT to be used for urgent needs. For medical emergencies, dial 911. Discharge Orders None [facility]! Dear Lino Gonzalez: Thank you for requesting a "[employer] account. Our records indicate that you already have an active "[employer] account. You can access your account anytime at [email or web address] Did you know that you can access your hospital and ER discharge instructions at any time in "[employer]? You can also review all of your test results from your hospital stay or ER visit. Additional Information If you have questions, please visit the Frequently Asked Questions section of the "[employer] website at [email or web address] Remember, "[employer] is NOT to be used for urgent needs. For medical emergencies, dial 911. Now available from your iPhone and Android! General Information Please provide this summary of care documentation to your next provider. Patient Signature:  ____________________________________________________________  Date:  ____________________________________________________________  
  
[facility]    
 Provider Signature:  ____________________________________________________________ Date:  ____________________________________________________________

## 2017-08-21 NOTE — DISCHARGE INSTRUCTIONS
Maria Luz Fuentes  [id number]  1950      MANOMETRY DISCHARGE INSTRUCTION    You may resume your regular diet as tolerated. You may resume your normal daily activities. If you develop a sore throat- throat lozenges or warm salt water gargles will help. Call your Physician if you have any complications or questions. Maria Luz Fuentes  [id number]  1950    24 HOUR [address] MONITORING DISCHARGE INSTRUCTIONS    Please return to [facility] Endoscopy department at 0915 am tomorrow with your completed diary. ACTIVITY:  Avoid any activity that may get the data recorder wet. You may resume your normal daily activities. DIET:  You may resume your normal diet, HOWEVER avoid peanut butter and carbonated beverages for the next 24 hours. MEDICATIONS:  You may resume your normal medications with the exception of hold acid reflex medication till test is concluded    PLEASE CALL tila vásquez rn  AT  [phone] IF YOU HAVE QUESTIONS, CONCERNS, OR TECHNICAL DIFFICULTIES DURING YOUR TEST. HealthSpring Activation    Thank you for requesting access to HealthSpring. Please follow the instructions below to securely access and download your online medical record. HealthSpring allows you to send messages to your doctor, view your test results, renew your prescriptions, schedule appointments, and more. How Do I Sign Up? 1. In your internet browser, go to [email or web address]  2. Click on the First Time User? Click Here link in the Sign In box. You will be redirect to the New Member Sign Up page. 3. Enter your HealthSpring Access Code exactly as it appears below. You will not need to use this code after youve completed the sign-up process. If you do not sign up before the expiration date, you must request a new code. HealthSpring Access Code: Activation code not generated  Current HealthSpring Status: Active (This is the date your HealthSpring access code will )    4.  Enter the last four digits of your Social Security Number (xxxx) and Date of Birth (mm/dd/yyyy) as indicated and click Submit. You will be taken to the next sign-up page. 5. Create a Duel ID. This will be your Duel login ID and cannot be changed, so think of one that is secure and easy to remember. 6. Create a Duel password. You can change your password at any time. 7. Enter your Password Reset Question and Answer. This can be used at a later time if you forget your password. 8. Enter your e-mail address. You will receive e-mail notification when new information is available in [address] 9. Click Sign Up. You can now view and download portions of your medical record. 10. Click the Download Summary menu link to download a portable copy of your medical information. Additional Information    If you have questions, please visit the Frequently Asked Questions section of the Duel website at [email or web address] Remember, Duel is NOT to be used for urgent needs. For medical emergencies, dial 911.

## 2017-08-21 NOTE — PROGRESS NOTES
5cc viscous lidocaine inhaled into right nare per MD orders. Probe inserted into  right nare without difficulty. Pt tolerated procedure well.

## 2017-08-21 NOTE — PROGRESS NOTES
PH inserted into right 5 cms proximal to the LES without difficulty. Pt tolerated well. Data recorder activated and recording. Pt given diary and instructions for use of recorder as well as contact information for assistance as needed.

## 2017-08-25 NOTE — OP NOTES
[facility] [address]   OP NOTE       Name:  Raysa Mendoza   MR#:  [id number]   :  1950   Account #:  [de-identified]    Surgery Date:  2017   Date of Adm:  2017       PREOPERATIVE DIAGNOS:      POSTOPERATIVE DIAGNOSES:     1. Abnormal Diego-DeMeester score. 2. No excess weakly acidic or non-acid reflux. 3. Symptom index and symptom associated probability for heartburn is   negative, negative/0.   4. All reflux events both weakly acidic and acid are associated with   proximal reflux (all 4 events). PROCEDURES PERFORMED:  A 24-hour esophageal pH impedance. ESTIMATED BLOOD LOSS: none    SPECIMENS REMOVED: none    ANESTHESIA:  Topical      DESCRIPTION OF PROCEDURE: A 24-hour pH impedance probe was   placed on 2017, and the patient was monitored for 24 hours. During the total study, there was 58 minutes of reflux. Total reflux   score was 4.4%, which is borderline abnormal. Diego-DeMeester   score is 16.8, normal less than 14.72. During upright monitoring, all 58 minutes of reflux occurs. There are 3   long refluxes. The longest reflux event is 17 minutes. There are a total   of 60 reflux events. There is no supine reflux. There are 3 episodes of heartburn during this time. None of these are   associated with reflux. Symptom index and   symptom sensitivity index and symptom associated probability are all   0. The computer scores one episode   of acid reflux whereas 55 are normal. It scores 4 episodes of weakly   acidic reflux whereas 26 are normal.   There is no non acid reflux. All of the 5 reported reflux events (1 acid, 4 weakly acidic) are   associated with proximal reflux. Total reflux score is 4.4% which is borderline abnormal. Upright reflux   score is 7.3%. This is abnormal.     COMMENT: The patient has modest excess reflux, upright only.           Coretta Wilkinson MD      [address] [place]   D:  08/25/2017   07:02   T:  08/25/2017   08:24   Job #:  [id number]

## 2017-08-25 NOTE — OP NOTES
[facility] [address] [address] [address]   OP NOTE       Name:  Vonnie Vann   MR#:  [id number]   :  1950   Account #:  [de-identified]    Surgery Date:  2017   Date of Adm:  2017       PREOPERATIVE DIAGNOSES    1. Heartburn. 2. Chest pain, unspecified. 3. Esophageal manometry. POSTOPERATIVE DIAGNOSIS: Esophagogastric junction outflow   obstruction. PROCEDURES PLANNED AND PERFORMED    1. Esophageal manometry. 2. Impedance esophageal manometry. SURGEON: Shilpa Frazier MD    SPECIMENS REMOVED: None. ANESTHESIA: Topical.    ESTIMATED BLOOD LOSS: None. DESCRIPTION OF PROCEDURE: High-resolution esophageal   manometry was performed by the nursing staff with subsequent   interpretation by Dr. Missy Wiley. The lower esophageal sphincter pressures   are normal at rest: Respiratory minimum 30.1, respiratory mean 35.5,   residual after swallowing is 15.7. Residual pressure is slightly elevated,   normal less than 15. The normal for the other values are 4.8-32 and   13-43, respectively. The upper esophageal sphincter pressure is low at 6.5 mmHg (34-  104). Residual pressure after swallowing is normal at 1.2 mmHg (less   than 12). Wet swallows are administered. I are peristaltic, 1 is simultaneous by   standard criteria. The amplitude in the distal esophagus is normal at   57.8 mmHg. The wave duration is normal at 4.2 seconds. There are   10% double-peaked waves and no triple-peaked waves. The velocity is   3.9 cm per second, which is normal.    HIGH-RESOLUTION SCORING IS AS FOLLOWS: DCI normal at   1091.2 contractile front velocity 4.7. Intrabolus pressure at LES normal   at 1.9. Intrabolus pressure average maximum body of the esophagus   19.1 (less than 17). [place] SCORING IS AS FOLLOWS: Distal latency 5.4%, failed   0%, pan esophageal pressurization 0%, premature 10%, rapid 10%,   large breaks 0%, small breaks 0.     Impedance manometry was performed with a flavored electrolyte   solution. All impedance boluses empty completely. [place] DIAGNOSES ARE AS FOLLOWS    1. Esophagogastric junction outflow obstruction with mean integrated   relaxation pressure at 15.7, greater than 15.   2. Some instances of intact or weak peristalsis in the esophageal body. I   note that impedance manometry shows that all swallows empty. This is a relatively low value, but does meet criteria for EGJ outflow   obstruction. This can be seen in infiltrative disease including achalasia,   Schatzki's ring, hiatal hernia and, malignancy. Consider CT chest,   abdomen. Consider endoscopic ultrasound of the lower sphincter. MD ALEXANDRA Farr / SYLVIA   D:  08/24/2017   13:32   T:  08/24/2017   13:53   Job #:  [id number]     Moraima Gamino?

## 2017-09-08 ENCOUNTER — HOSPITAL ENCOUNTER (OUTPATIENT)
Dept: CT IMAGING | Age: 67
Discharge: HOME | End: 2017-09-08
Attending: SPECIALIST
Payer: MEDICARE

## 2017-09-08 DIAGNOSIS — R10.816 ABDOMINAL TENDERNESS, EPIGASTRIC: ICD-10-CM

## 2017-09-08 DIAGNOSIS — K44.9 HIATAL HERNIA: ICD-10-CM

## 2017-09-08 DIAGNOSIS — R07.9 CHEST PAIN, UNSPECIFIED: ICD-10-CM

## 2017-09-08 DIAGNOSIS — K21.9 GASTROESOPHAGEAL REFLUX DISEASE: ICD-10-CM

## 2017-09-08 LAB — CREAT BLD-MCNC: 1 MG/DL (ref 0.6–1.3)

## 2017-09-08 PROCEDURE — 82565 ASSAY OF CREATININE: CPT

## 2017-09-08 PROCEDURE — 74011250636 HC RX REV CODE- 250/636: Performed by: SPECIALIST

## 2017-09-08 PROCEDURE — 74011636320 HC RX REV CODE- 636/320: Performed by: SPECIALIST

## 2017-09-08 PROCEDURE — 71260 CT THORAX DX C+: CPT

## 2017-09-08 RX ORDER — SODIUM CHLORIDE 9 MG/ML
50 INJECTION, SOLUTION INTRAVENOUS
Status: COMPLETED | OUTPATIENT
Start: 2017-09-08 | End: 2017-09-08

## 2017-09-08 RX ORDER — SODIUM CHLORIDE 0.9 % (FLUSH) 0.9 %
10 SYRINGE (ML) INJECTION
Status: COMPLETED | OUTPATIENT
Start: 2017-09-08 | End: 2017-09-08

## 2017-09-08 RX ADMIN — Medication 10 ML: at 15:54

## 2017-09-08 RX ADMIN — IOPAMIDOL 100 ML: 612 INJECTION, SOLUTION INTRAVENOUS at 15:54

## 2017-09-08 RX ADMIN — SODIUM CHLORIDE 50 ML/HR: 900 INJECTION, SOLUTION INTRAVENOUS at 15:54

## 2017-10-04 ENCOUNTER — TELEPHONE (OUTPATIENT)
Dept: CARDIOLOGY CLINIC | Age: 67
End: 2017-10-04

## 2017-10-04 DIAGNOSIS — I20.8 ANGINA AT REST (HCC): ICD-10-CM

## 2017-10-04 DIAGNOSIS — E78.2 MIXED HYPERLIPIDEMIA: ICD-10-CM

## 2017-10-04 DIAGNOSIS — R06.02 SOB (SHORTNESS OF BREATH): ICD-10-CM

## 2017-10-04 DIAGNOSIS — I25.9 MYOCARDIAL ISCHEMIA: ICD-10-CM

## 2017-10-05 RX ORDER — METOPROLOL TARTRATE 25 MG/1
TABLET, FILM COATED ORAL
Qty: 180 TAB | Refills: 2 | Status: SHIPPED | OUTPATIENT
Start: 2017-10-05 | End: 2018-06-26 | Stop reason: SDUPTHER

## 2017-12-21 RX ORDER — ISOSORBIDE MONONITRATE 30 MG/1
TABLET, EXTENDED RELEASE ORAL
Qty: 90 TAB | Refills: 1 | Status: SHIPPED | OUTPATIENT
Start: 2017-12-21 | End: 2018-07-11 | Stop reason: SDUPTHER

## 2018-03-09 ENCOUNTER — TELEPHONE (OUTPATIENT)
Dept: CARDIOLOGY CLINIC | Age: 68
End: 2018-03-09

## 2018-03-09 NOTE — TELEPHONE ENCOUNTER
Patient saw Dr. Karl Palumbo @ [facility] [phone] and he was faxing over information to Dr. Michael Lobo about having the right side of her heart checked- Checking the status-         Thanks!

## 2018-03-12 NOTE — TELEPHONE ENCOUNTER
Called pt,verified pt with two pt identifiers, told pt that I had received last office note from Zeinab Bhatti today. Advised her that looking in her chart her last office visit with  was on 8/8/17 and she was to f/u in 6 months. Advised her that I would send a note to our  and she will call and set that appt up with her. Advised her that  would assess her in office and determine what is needed at that time. She verbalized that she understood everything. Pt has been scheduled for 4/12/18 @ 2:45 pm with .

## 2018-05-07 ENCOUNTER — OFFICE VISIT (OUTPATIENT)
Dept: CARDIOLOGY CLINIC | Age: 68
End: 2018-05-07

## 2018-05-07 ENCOUNTER — HOSPITAL ENCOUNTER (OUTPATIENT)
Dept: LAB | Age: 68
Discharge: HOME | End: 2018-05-07
Payer: MEDICARE

## 2018-05-07 ENCOUNTER — CLINICAL SUPPORT (OUTPATIENT)
Dept: CARDIOLOGY CLINIC | Age: 68
End: 2018-05-07

## 2018-05-07 VITALS
HEART RATE: 87 BPM | RESPIRATION RATE: 16 BRPM | OXYGEN SATURATION: 97 % | HEIGHT: 66 IN | WEIGHT: 217.1 LBS | SYSTOLIC BLOOD PRESSURE: 128 MMHG | BODY MASS INDEX: 34.89 KG/M2 | DIASTOLIC BLOOD PRESSURE: 64 MMHG

## 2018-05-07 DIAGNOSIS — R06.02 SOB (SHORTNESS OF BREATH): Primary | ICD-10-CM

## 2018-05-07 DIAGNOSIS — R00.2 PALPITATIONS: ICD-10-CM

## 2018-05-07 DIAGNOSIS — I87.2 VENOUS INSUFFICIENCY OF LEFT LEG: ICD-10-CM

## 2018-05-07 DIAGNOSIS — E78.2 MIXED HYPERLIPIDEMIA: ICD-10-CM

## 2018-05-07 DIAGNOSIS — I87.2 VENOUS REFLUX: ICD-10-CM

## 2018-05-07 DIAGNOSIS — R06.02 SOB (SHORTNESS OF BREATH): ICD-10-CM

## 2018-05-07 PROCEDURE — 36415 COLL VENOUS BLD VENIPUNCTURE: CPT

## 2018-05-07 PROCEDURE — 85025 COMPLETE CBC W/AUTO DIFF WBC: CPT

## 2018-05-07 PROCEDURE — 85610 PROTHROMBIN TIME: CPT

## 2018-05-07 PROCEDURE — 80053 COMPREHEN METABOLIC PANEL: CPT

## 2018-05-07 RX ORDER — BISMUTH SUBSALICYLATE 262 MG
1 TABLET,CHEWABLE ORAL DAILY
COMMUNITY

## 2018-05-07 RX ORDER — BACLOFEN 10 MG/1
20 TABLET ORAL 2 TIMES DAILY
COMMUNITY
Start: 2018-03-03

## 2018-05-07 RX ORDER — TRAZODONE HYDROCHLORIDE 50 MG/1
TABLET ORAL
COMMUNITY
Start: 2018-03-09 | End: 2018-09-20

## 2018-05-07 NOTE — PROGRESS NOTES
Marlene Grimm DNP, ANP-BC  Subjective/HPI:     Jose Cruz Cabrera is a 76 y.o. female is here for routine f/u. Patient was seen recently by rheumatology, due to inflammatory labs and persistent dyspnea on exertion/shortness of breath it was recommended by Dr. Konstantin Abel the patient have an evaluation to rule out pulmonary hypertension in the setting of CREST syndrome. Additionally, patient reports she has nightly palpitations rapid heartbeats exacerbated by lying on her sides. She denies chest pain.       PCP Provider  Kelton Lr MD  Past Medical History:   Diagnosis Date    Adverse effect of anesthesia     woke up during hysterectomy    Arrhythmia     h/o palpitations normal work up 22/2015    Arthritis     Asthma     allergy to weather changing    Chronic pain     bulging disc c4/c5 l4/l5    GERD (gastroesophageal reflux disease)     H/O arthroscopic knee surgery     H/O: hysterectomy     Hypertension     Ill-defined condition     gout    Leg swelling     Bartlett's neuralgia     Musculoskeletal disorder     arthritis    Nausea & vomiting     surgery related    Other ill-defined conditions(799.89) 11/2013    RECTAL PROLASE    S/P appendectomy     Shortness of breath     Unspecified adverse effect of anesthesia     wakes up for anesthesia early      Past Surgical History:   Procedure Laterality Date    COLONOSCOPY  4/20/2011         HX APPENDECTOMY      HX CHOLECYSTECTOMY      HX GI  11/19/13    Rectocele repair with enterocele repair    HX HYSTERECTOMY      TOOK LEFT OVARY    HX ORTHOPAEDIC      left tkr x 2/numereous left knee surgeries    HX ORTHOPAEDIC      right knee partial meniscus     HX ORTHOPAEDIC      ganglion cyst removed rt wrist    HX ORTHOPAEDIC      bilateral CTR, and had ulna nerve release     Allergies   Allergen Reactions    Benzene Other (comments)     Blisters and burn area Benzene found to be on steri-strips    Betadine [Povidone-Iodine] Other (comments) Blisters and burning    Naproxen Itching    Percocet [Oxycodone-Acetaminophen] Rash and Itching     irritated    Sulfa (Sulfonamide Antibiotics) Rash    Adhesive Rash     Redness and rash      Family History   Problem Relation Age of Onset    Heart Disease Mother     Hypertension Mother     Diabetes Mother     Cancer Mother      BREAST, LUNG    Breast Cancer Mother 61    Heart Disease Father     Hypertension Father     Thyroid Disease Father     Diabetes Brother     Psychiatric Disorder Son      STAINS, [place], [place] DEPRESSIVE    Anesth Problems Neg Hx       Current Outpatient Prescriptions   Medication Sig    baclofen (LIORESAL) 10 mg tablet Take 10 mg by mouth daily.  multivitamin (ONE A DAY) tablet Take 1 Tab by mouth daily.  isosorbide mononitrate ER (IMDUR) 30 mg tablet TAKE ONE-HALF (1/2) TABLET DAILY FOR RAYNAUDS PHENOMENON    metoprolol tartrate (LOPRESSOR) 25 mg tablet TAKE ONE TABLET BY MOUTH TWICE DAILY    oxybutynin chloride XL (DITROPAN XL) 10 mg CR tablet     raNITIdine (ZANTAC) 150 mg tablet Take 150 mg by mouth two (2) times a day.  aspirin delayed-release 81 mg tablet Take  by mouth daily.  acetaminophen (TYLENOL EXTRA STRENGTH) 500 mg tablet Take 1,000 mg by mouth every six (6) hours as needed for Pain.  omeprazole (PRILOSEC) 20 mg capsule Take 20 mg by mouth two (2) times a day.  OTHER,NON-FORMULARY, 2 Tabs daily. FIBER SUPPLEMENT    allopurinol (ZYLOPRIM) 100 mg tablet Take 100 mg by mouth.  telmisartan-hydrochlorothiazide (MICARDIS HCT) 40-12.5 mg per tablet Take 1 Tab by mouth daily.  pravastatin (PRAVACHOL) 40 mg tablet Take 40 mg by mouth nightly.  cetirizine (ZYRTEC) 10 mg tablet Take 10 mg by mouth daily.  montelukast (SINGULAIR) 10 mg tablet Take 10 mg by mouth nightly.  traZODone (DESYREL) 50 mg tablet     CYCLOBENZAPRINE HCL (CYCLOBENZAPRINE PO) Take 40 mg by mouth daily.     conjugated estrogens (PREMARIN) 0.625 mg/gram vaginal cream Insert 0.5 g into vagina daily.  CARAFATE 100 mg/mL suspension Take 10 mL by mouth three (3) times daily.  meloxicam (MOBIC) 7.5 mg tablet Take 7.5 mg by mouth daily.  albuterol (PROVENTIL HFA, VENTOLIN HFA) 90 mcg/actuation inhaler Take 2 Puffs by inhalation every four (4) hours as needed. No current facility-administered medications for this visit. Vitals:    05/07/18 0859 05/07/18 0913   BP: 122/56 128/64   Pulse: 87    Resp: 16    SpO2: 97%    Weight: 217 lb 1.6 oz (98.5 kg)    Height: 5' 6\" (1.676 m)      Social History     Social History    Marital status:      Spouse name: N/A    Number of children: N/A    Years of education: N/A     Occupational History    Not on file. Social History Main Topics    Smoking status: Never Smoker    Smokeless tobacco: Never Used    Alcohol use No    Drug use: Not on file    Sexual activity: Not on file     Other Topics Concern    Not on file     Social History Narrative       I have reviewed the nurses notes, vitals, problem list, allergy list, medical history, family, social history and medications. Review of Symptoms:    General: Pt denies excessive weight gain or loss. Pt is able to conduct ADL's  HEENT: Denies blurred vision, headaches, epistaxis and difficulty swallowing. Respiratory: + Shortness of breath, + PAN, son occasional wheezing denies stridor. Cardiovascular: Denies precordial pain,  +Palpitations, edema or PND  Gastrointestinal: Denies poor appetite, indigestion, abdominal pain or blood in stool  Musculoskeletal: Denies pain or swelling from muscles or joints  Neurologic: Denies tremor, paresthesias, or sensory motor disturbance  Skin: Denies rash, itching or texture change. Physical Exam:      General: Well developed, in no acute distress, cooperative and alert  HEENT: No carotid bruits, no JVD, trach is midline. Neck Supple, PEERL, EOM intact. Heart:  Normal S1/S2 negative S3 or S4.  Regular, no murmur, gallop or rub.   Respiratory: Clear bilaterally x 4, no wheezing or rales  Abdomen:   Soft, non-tender, no masses, bowel sounds are active.   Extremities: Trace left lower extremity nonpitting edema normal cap refill, no cyanosis, atraumatic. Neuro: A&Ox3, speech clear, gait stable. Skin: Skin color is normal. No rashes or lesions. Non diaphoretic  Vascular: 2+ pulses symmetric in all extremities    Cardiographics    ECG: Normal sinus rhythm, this EKG will be scanned into the chart  No results found for this or any previous visit. Cardiology Labs:  No results found for: CHOL, CHOLX, CHLST, CHOLV, [address], HDL, LDL, LDLC, DLDLP, TGLX, TRIGL, TRIGP, CHHD, CHHDX    Lab Results   Component Value Date/Time    Sodium 141 01/11/2016 08:55 AM    Potassium 4.3 01/11/2016 08:55 AM    Chloride 106 01/11/2016 08:55 AM    CO2 30 01/11/2016 08:55 AM    Anion gap 5 01/11/2016 08:55 AM    Glucose 99 01/11/2016 08:55 AM    BUN 15 01/11/2016 08:55 AM    Creatinine 0.89 01/11/2016 08:55 AM    BUN/Creatinine ratio 17 01/11/2016 08:55 AM    GFR est AA >60 01/11/2016 08:55 AM    GFR est non-AA >60 01/11/2016 08:55 AM    Calcium 8.6 01/11/2016 08:55 AM    Bilirubin, total 0.6 05/14/2015 12:00 AM    AST (SGOT) 18 05/14/2015 12:00 AM    Alk. phosphatase 109 05/14/2015 12:00 AM    Protein, total 6.1 05/14/2015 12:00 AM    Albumin 3.9 05/14/2015 12:00 AM    Globulin 2.8 11/20/2013 05:30 AM    A-G Ratio 1.8 05/14/2015 12:00 AM    ALT (SGPT) 17 05/14/2015 12:00 AM           Assessment:     Assessment:     Diagnoses and all orders for this visit:    1. SOB (shortness of breath)  -     HOLTER MONITOR, 24 HOURS, Clinic Performed; Future  -     CARDIAC CATHETERIZATION; Future  -     CBC WITH AUTOMATED DIFF  -     METABOLIC PANEL, COMPREHENSIVE  -     PROTHROMBIN TIME + INR  -     2D ECHO COMPLETE ADULT (TTE) W OR WO CONTR    2.  Mixed hyperlipidemia  -     AMB POC EKG ROUTINE W/ 12 LEADS, INTER & REP  -     HOLTER MONITOR, 24 HOURS, Clinic Performed; Future  -     CARDIAC CATHETERIZATION; Future  -     CBC WITH AUTOMATED DIFF  -     METABOLIC PANEL, COMPREHENSIVE  -     PROTHROMBIN TIME + INR  -     2D ECHO COMPLETE ADULT (TTE) W OR WO CONTR    3. Venous insufficiency of left leg  -     HOLTER MONITOR, 24 HOURS, Clinic Performed; Future  -     CARDIAC CATHETERIZATION; Future  -     CBC WITH AUTOMATED DIFF  -     METABOLIC PANEL, COMPREHENSIVE  -     PROTHROMBIN TIME + INR  -     2D ECHO COMPLETE ADULT (TTE) W OR WO CONTR    4. Venous reflux  -     HOLTER MONITOR, 24 HOURS, Clinic Performed; Future  -     CARDIAC CATHETERIZATION; Future  -     CBC WITH AUTOMATED DIFF  -     METABOLIC PANEL, COMPREHENSIVE  -     PROTHROMBIN TIME + INR  -     2D ECHO COMPLETE ADULT (TTE) W OR WO CONTR    5. Palpitations  -     HOLTER MONITOR, 24 HOURS, Clinic Performed; Future  -     CARDIAC CATHETERIZATION; Future  -     CBC WITH AUTOMATED DIFF  -     METABOLIC PANEL, COMPREHENSIVE  -     PROTHROMBIN TIME + INR  -     2D ECHO COMPLETE ADULT (TTE) W OR WO CONTR        ICD-10-CM ICD-9-CM    1. SOB (shortness of breath) R06.02 786.05 CARDIAC HOLTER MONITOR, 24 HOURS      CARDIAC CATHETERIZATION      CBC WITH AUTOMATED DIFF      METABOLIC PANEL, COMPREHENSIVE      PROTHROMBIN TIME + INR      2D ECHO COMPLETE ADULT (TTE) W OR WO CONTR   2. Mixed hyperlipidemia E78.2 272.2 AMB POC EKG ROUTINE W/ 12 LEADS, INTER & REP      CARDIAC HOLTER MONITOR, 24 HOURS      CARDIAC CATHETERIZATION      CBC WITH AUTOMATED DIFF      METABOLIC PANEL, COMPREHENSIVE      PROTHROMBIN TIME + INR      2D ECHO COMPLETE ADULT (TTE) W OR WO CONTR   3.  Venous insufficiency of left leg I87.2 459.81 CARDIAC HOLTER MONITOR, 24 HOURS      CARDIAC CATHETERIZATION      CBC WITH AUTOMATED DIFF      METABOLIC PANEL, COMPREHENSIVE      PROTHROMBIN TIME + INR      2D ECHO COMPLETE ADULT (TTE) W OR WO CONTR   4. Venous reflux I87.2 459.81 CARDIAC HOLTER MONITOR, 24 HOURS      CARDIAC CATHETERIZATION CBC WITH AUTOMATED DIFF      METABOLIC PANEL, COMPREHENSIVE      PROTHROMBIN TIME + INR      2D ECHO COMPLETE ADULT (TTE) W OR WO CONTR   5. Palpitations R00.2 785.1 CARDIAC HOLTER MONITOR, 24 HOURS      CARDIAC CATHETERIZATION      CBC WITH AUTOMATED DIFF      METABOLIC PANEL, COMPREHENSIVE      PROTHROMBIN TIME + INR      2D ECHO COMPLETE ADULT (TTE) W OR WO CONTR     Orders Placed This Encounter    CBC WITH AUTOMATED DIFF    METABOLIC PANEL, COMPREHENSIVE    PROTHROMBIN TIME + INR    CARDIAC CATHETERIZATION     Standing Status:   Future     Standing Expiration Date:   11/7/2018     Order Specific Question:   Reason for Exam:     Answer:   RIGHT HEART CATH    AMB POC EKG ROUTINE W/ 12 LEADS, INTER & REP     Order Specific Question:   Reason for Exam:     Answer:   Routine    HOLTER MONITOR, 24 HOURS, Clinic Performed     Standing Status:   Future     Standing Expiration Date:   11/7/2018     Order Specific Question:   Reason for Exam:     Answer:   palps    2D ECHO COMPLETE ADULT (TTE) W OR WO CONTR     Order Specific Question:   Reason for Exam:     Answer:   PAN     Order Specific Question:   Contrast Enhancement (Bubble Study, Definity, Optison) may be used if criteria listed in established evidence-based protocol has been identified. Answer: Yes    traZODone (DESYREL) 50 mg tablet    baclofen (LIORESAL) 10 mg tablet     Sig: Take 10 mg by mouth daily.  multivitamin (ONE A DAY) tablet     Sig: Take 1 Tab by mouth daily. Plan:     1. Dyspnea on exertion: Negative ischemia on Lexiscan last year, will proceed with right heart cath for evaluation of pulmonary hypertension as recommended by rheumatology for potential diagnosis of CREST syndrome. Discussed risks and benefits of procedure patient is willing to proceed. 2.  Intermittent fluttering and palpitations occurring on a nightly basis: We will Place Holter monitor today for evaluation.   3.  Bilateral venous reflux: Right leg is doing well, left leg continues to have some mild dependent edema however we will continue conservative management at this point and address the above pending issues. Echocardiogram ordered  Follow up after testing complete. Deric Villagomez NP    This note was created using voice recognition software. Despite editing, there may be syntax errors. Pt seen and examined in details. Agree with NP A&P.      1. SOB: no significant CAD at time of last cath in 2015. As above. 2. On statin.    3. BP controlled.      Luis Bruner MD

## 2018-05-08 LAB
ALBUMIN SERPL-MCNC: 4.2 G/DL (ref 3.6–4.8)
ALBUMIN/GLOB SERPL: 1.9 {RATIO} (ref 1.2–2.2)
ALP SERPL-CCNC: 94 IU/L (ref 39–117)
ALT SERPL-CCNC: 18 IU/L (ref 0–32)
AST SERPL-CCNC: 20 IU/L (ref 0–40)
BASOPHILS # BLD AUTO: 0.1 X10E3/UL (ref 0–0.2)
BASOPHILS NFR BLD AUTO: 1 %
BILIRUB SERPL-MCNC: 0.9 MG/DL (ref 0–1.2)
BUN SERPL-MCNC: 15 MG/DL (ref 8–27)
BUN/CREAT SERPL: 15 (ref 12–28)
CALCIUM SERPL-MCNC: 9.3 MG/DL (ref 8.7–10.3)
CHLORIDE SERPL-SCNC: 101 MMOL/L (ref 96–106)
CO2 SERPL-SCNC: 27 MMOL/L (ref 18–29)
CREAT SERPL-MCNC: 1.02 MG/DL (ref 0.57–1)
EOSINOPHIL # BLD AUTO: 0.3 X10E3/UL (ref 0–0.4)
EOSINOPHIL NFR BLD AUTO: 4 %
ERYTHROCYTE [DISTWIDTH] IN BLOOD BY AUTOMATED COUNT: 16.3 % (ref 12.3–15.4)
GFR SERPLBLD CREATININE-BSD FMLA CKD-EPI: 57 ML/MIN/1.73
GFR SERPLBLD CREATININE-BSD FMLA CKD-EPI: 65 ML/MIN/1.73
GLOBULIN SER CALC-MCNC: 2.2 G/DL (ref 1.5–4.5)
GLUCOSE SERPL-MCNC: 101 MG/DL (ref 65–99)
HCT VFR BLD AUTO: 39.5 % (ref 34–46.6)
HGB BLD-MCNC: 12.8 G/DL (ref 11.1–15.9)
IMM GRANULOCYTES # BLD: 0 X10E3/UL (ref 0–0.1)
IMM GRANULOCYTES NFR BLD: 0 %
INR PPP: 0.9 (ref 0.8–1.2)
INTERPRETATION: NORMAL
LYMPHOCYTES # BLD AUTO: 1.2 X10E3/UL (ref 0.7–3.1)
LYMPHOCYTES NFR BLD AUTO: 16 %
MCH RBC QN AUTO: 28.6 PG (ref 26.6–33)
MCHC RBC AUTO-ENTMCNC: 32.4 G/DL (ref 31.5–35.7)
MCV RBC AUTO: 88 FL (ref 79–97)
MONOCYTES # BLD AUTO: 0.7 X10E3/UL (ref 0.1–0.9)
MONOCYTES NFR BLD AUTO: 10 %
NEUTROPHILS # BLD AUTO: 5.1 X10E3/UL (ref 1.4–7)
NEUTROPHILS NFR BLD AUTO: 69 %
PLATELET # BLD AUTO: 238 X10E3/UL (ref 150–379)
POTASSIUM SERPL-SCNC: 4.9 MMOL/L (ref 3.5–5.2)
PROT SERPL-MCNC: 6.4 G/DL (ref 6–8.5)
PROTHROMBIN TIME: 9.7 SEC (ref 9.1–12)
RBC # BLD AUTO: 4.48 X10E6/UL (ref 3.77–5.28)
SODIUM SERPL-SCNC: 143 MMOL/L (ref 134–144)
WBC # BLD AUTO: 7.3 X10E3/UL (ref 3.4–10.8)

## 2018-05-11 ENCOUNTER — TELEPHONE (OUTPATIENT)
Dept: CARDIOLOGY CLINIC | Age: 68
End: 2018-05-11

## 2018-05-11 ENCOUNTER — HOSPITAL ENCOUNTER (OUTPATIENT)
Dept: CARDIAC CATH/INVASIVE PROCEDURES | Age: 68
Discharge: HOME | End: 2018-05-11
Attending: INTERNAL MEDICINE | Admitting: INTERNAL MEDICINE
Payer: MEDICARE

## 2018-05-11 VITALS
SYSTOLIC BLOOD PRESSURE: 154 MMHG | HEIGHT: 66 IN | TEMPERATURE: 98 F | WEIGHT: 212 LBS | BODY MASS INDEX: 34.07 KG/M2 | OXYGEN SATURATION: 97 % | RESPIRATION RATE: 17 BRPM | HEART RATE: 82 BPM | DIASTOLIC BLOOD PRESSURE: 54 MMHG

## 2018-05-11 DIAGNOSIS — I87.2 VENOUS REFLUX: ICD-10-CM

## 2018-05-11 DIAGNOSIS — R00.2 PALPITATIONS: ICD-10-CM

## 2018-05-11 DIAGNOSIS — R06.02 SOB (SHORTNESS OF BREATH): ICD-10-CM

## 2018-05-11 DIAGNOSIS — E78.2 MIXED HYPERLIPIDEMIA: ICD-10-CM

## 2018-05-11 DIAGNOSIS — I87.2 VENOUS INSUFFICIENCY OF LEFT LEG: ICD-10-CM

## 2018-05-11 PROCEDURE — 74011250636 HC RX REV CODE- 250/636

## 2018-05-11 PROCEDURE — C1894 INTRO/SHEATH, NON-LASER: HCPCS

## 2018-05-11 PROCEDURE — 77030010221 HC SPLNT WR POS TELE -B

## 2018-05-11 PROCEDURE — 99152 MOD SED SAME PHYS/QHP 5/>YRS: CPT

## 2018-05-11 PROCEDURE — 74011250636 HC RX REV CODE- 250/636: Performed by: INTERNAL MEDICINE

## 2018-05-11 PROCEDURE — 77030029065 HC DRSG HEMO QCLOT ZMED -B

## 2018-05-11 PROCEDURE — C1751 CATH, INF, PER/CENT/MIDLINE: HCPCS

## 2018-05-11 PROCEDURE — 74011000250 HC RX REV CODE- 250

## 2018-05-11 RX ORDER — HEPARIN SODIUM 200 [USP'U]/100ML
500 INJECTION, SOLUTION INTRAVENOUS ONCE
Status: COMPLETED | OUTPATIENT
Start: 2018-05-11 | End: 2018-05-11

## 2018-05-11 RX ORDER — DIPHENHYDRAMINE HYDROCHLORIDE 50 MG/ML
50 INJECTION, SOLUTION INTRAMUSCULAR; INTRAVENOUS
Status: DISCONTINUED | OUTPATIENT
Start: 2018-05-11 | End: 2018-05-11 | Stop reason: HOSPADM

## 2018-05-11 RX ORDER — FENTANYL CITRATE 50 UG/ML
25-50 INJECTION, SOLUTION INTRAMUSCULAR; INTRAVENOUS
Status: DISCONTINUED | OUTPATIENT
Start: 2018-05-11 | End: 2018-05-11

## 2018-05-11 RX ORDER — HYDROCORTISONE SODIUM SUCCINATE 100 MG/2ML
100 INJECTION, POWDER, FOR SOLUTION INTRAMUSCULAR; INTRAVENOUS
Status: DISCONTINUED | OUTPATIENT
Start: 2018-05-11 | End: 2018-05-11 | Stop reason: HOSPADM

## 2018-05-11 RX ORDER — HEPARIN SODIUM 200 [USP'U]/100ML
INJECTION, SOLUTION INTRAVENOUS
Status: COMPLETED
Start: 2018-05-11 | End: 2018-05-11

## 2018-05-11 RX ORDER — MIDAZOLAM HYDROCHLORIDE 1 MG/ML
INJECTION, SOLUTION INTRAMUSCULAR; INTRAVENOUS
Status: COMPLETED
Start: 2018-05-11 | End: 2018-05-11

## 2018-05-11 RX ORDER — LIDOCAINE HYDROCHLORIDE 10 MG/ML
INJECTION, SOLUTION EPIDURAL; INFILTRATION; INTRACAUDAL; PERINEURAL
Status: COMPLETED
Start: 2018-05-11 | End: 2018-05-11

## 2018-05-11 RX ORDER — SODIUM CHLORIDE 0.9 % (FLUSH) 0.9 %
5-10 SYRINGE (ML) INJECTION EVERY 8 HOURS
Status: DISCONTINUED | OUTPATIENT
Start: 2018-05-11 | End: 2018-05-11 | Stop reason: HOSPADM

## 2018-05-11 RX ORDER — MIDAZOLAM HYDROCHLORIDE 1 MG/ML
.5-2 INJECTION, SOLUTION INTRAMUSCULAR; INTRAVENOUS
Status: DISCONTINUED | OUTPATIENT
Start: 2018-05-11 | End: 2018-05-11

## 2018-05-11 RX ORDER — FENTANYL CITRATE 50 UG/ML
INJECTION, SOLUTION INTRAMUSCULAR; INTRAVENOUS
Status: COMPLETED
Start: 2018-05-11 | End: 2018-05-11

## 2018-05-11 RX ORDER — LIDOCAINE HYDROCHLORIDE 10 MG/ML
1-30 INJECTION, SOLUTION EPIDURAL; INFILTRATION; INTRACAUDAL; PERINEURAL
Status: DISCONTINUED | OUTPATIENT
Start: 2018-05-11 | End: 2018-05-11

## 2018-05-11 RX ORDER — SODIUM CHLORIDE 0.9 % (FLUSH) 0.9 %
5-10 SYRINGE (ML) INJECTION AS NEEDED
Status: DISCONTINUED | OUTPATIENT
Start: 2018-05-11 | End: 2018-05-11 | Stop reason: HOSPADM

## 2018-05-11 RX ADMIN — LIDOCAINE HYDROCHLORIDE 2 ML: 10 INJECTION, SOLUTION EPIDURAL; INFILTRATION; INTRACAUDAL; PERINEURAL at 13:43

## 2018-05-11 RX ADMIN — HEPARIN SODIUM 1000 UNITS: 200 INJECTION, SOLUTION INTRAVENOUS at 13:42

## 2018-05-11 RX ADMIN — HEPARIN SODIUM 1000 UNITS: 200 INJECTION, SOLUTION INTRAVENOUS at 13:43

## 2018-05-11 RX ADMIN — FENTANYL CITRATE 25 MCG: 50 INJECTION, SOLUTION INTRAMUSCULAR; INTRAVENOUS at 13:27

## 2018-05-11 RX ADMIN — MIDAZOLAM 1 MG: 1 INJECTION INTRAMUSCULAR; INTRAVENOUS at 13:27

## 2018-05-11 RX ADMIN — MIDAZOLAM HYDROCHLORIDE 1 MG: 1 INJECTION, SOLUTION INTRAMUSCULAR; INTRAVENOUS at 13:27

## 2018-05-11 NOTE — DISCHARGE INSTRUCTIONS
[address] [place] [address] [address] [facility]  [phone]      CARDIOLOGY DISCHARGE INSTRUCTIONS      Patient ID:  Chriss Heredia  [id number]  56 y.o.  1950    Admit Date: 5/11/2018    Discharge Date: 5/11/2018     Admitting Physician: Radha Ornelas MD     Discharge Physician: Radha Ornelas MD    Admission Diagnoses:   Mixed hyperlipidemia [E78.2]  SOB (shortness of breath) [R06.02]  Venous insufficiency of left leg [I87.2]  Venous reflux [I87.2]  Palpitations [R00.2]    Discharge Diagnoses: Active Problems:    * No active hospital problems. *      Discharge Condition: Good    Cardiology Procedures this Admission:  Right heart cath    Disposition: home      Reference discharge instructions provided by nursing for diet and activity. Signed: Radha Ornelas MD  5/11/2018  3:27 PM    CARDIAC CATHETERIZATION    It is normal to feel tired the first couple days. Take it easy and follow the physicians instructions. CHECK THE CATHETER INSERTION SITE DAILY:    You may shower 24 hours after the procedure, remove the bandage during showering. Wash with soap and water and pat dry. Gentle cleaning of the site with soap and water is sufficient, cover with a dry clean dressing or bandage. Do not apply creams or powders to the area. Do not sit in a bathtub or pool of water for 7 days or until wound has completely healed. Temporary bruising and discomfort is normal and may last a few weeks. You may have a  formation of a small lump at the site which may last up to 6 weeks. CALL THE PHYSICIANS:    If the site becomes red, swollen or feels warm to the touch  If there is bleeding or drainage or if there is unusual pain at the groin or down the leg. If there is any bleeding, lie down, apply pressure or have someone apply pressure with a clean cloth until the bleeding stops.    If the bleeding continues, call 911 to be transported to the hospital.  DO NOT DRIVE YOURSELF, OR HAVE ANYONE ELSE DRIVE YOU - CALL [phone]. ACTIVITY:    For the first 24-48 hours or as instructed by the physician:  No lifting, pushing or pulling over 10 pounds and no straining the insertion site. Do not life grocery bags or the garbage can, do not run the vacuum  or  for 7 days. Start with short walks as in the hospital and gradually increase as tolerated each day. It is recommended to walk 30 minutes 5-7 days per week. Follow your physicians instructions on activity. Avoid walking outside in extremes of heat or cold. Walk inside when it is cold and windy or hot and humid. Things to keep in mind:  No driving for at least 24 hours, or as designated by your physician. Limit the number of times you go up and down the stairs  Take rests and pace yourself with activity. Be careful and do not strain with bowel movements. MEDICATIONS:    Take all medications as prescribed  Call your physician if you have any questions  Keep an updated list of your medications with you at all times and give a list to your physician and pharmacist        SIGNS AND SYMPTOMS:    Be cautious of symptoms of angina or recurrent symptoms such as chest discomfort, unusual shortness of breath or fatigue. These could be symptoms of restenosis, a new blockage or a heart attack. If your symptoms are relieved with rest it is still recommended that you notify your physician of recurrent chest pain or discomfort. FOR CHEST PAIN or symptoms of angina not relieved with rest:  If the discomfort is not relieved with rest, and you have been prescribed Nitroglycerin, take as directed (taken under the tongue, one at a time 5 minutes apart for a total of 3 doses). If the discomfort is not relieved after the 3rd nitroglycerin, call 911. If you have not been prescribed Nitroglycerin  and your chest discomfort is not relieved with rest, call 911.      AFTER CARE:    Follow up with your physician as instructed. Follow a heart healthy diet with proper portion control, daily stress management, daily exercise, blood pressure and cholesterol control , and smoking cessation.

## 2018-05-11 NOTE — TELEPHONE ENCOUNTER
----- Message from Janell Ashley MD sent at 5/10/2018  4:57 PM EDT -----  Inform her no significant dysrhythmias on monitor. Called pt,verified pt with two pt identifiers, told pt that there was no significant dysrhythmias noted on the monitor. Verified her cath today with Ronni. She verbalized that she understood everything and had no further questions.

## 2018-05-11 NOTE — IP AVS SNAPSHOT
[address] [facility] 
[phone] Patient: Krystal Ledezma MRN: [id number] ZYF:[id number] About your hospitalization You were admitted on:  May 11, 2018 You last received care in the:  MRM 2 INTRVNTNL CARDIO You were discharged on:  May 11, 2018 Why you were hospitalized Your primary diagnosis was:  Not on File Follow-up Information Follow up With Details Comments Contact Info Jacky Eden MD   [address] [place] [address] [id number] 
[phone] Mamta Stock MD Schedule an appointment as soon as possible for a visit in 6 weeks post cath [address] [facility] 
[phone] Your Scheduled Appointments Monday May 14, 2018 10:20 AM EDT  
[facility] MAMMO SCREENING with [address] [facility] 3 [facility] [place] [address]) [address] [place] [address]  
[phone] Shower or bathe using soap and water. Do not use deodorant, powder, perfumes, or lotion the day of your exam.  If your prior mammograms were not performed at [facility] 6 please bring films with you or forward prior images 2 days before your procedure. Check in at registration 15min before your appointment time unless you were instructed to do otherwise. A script is not necessary, but if you have one, please bring it on the day of the mammogram or have it faxed to the department. [facility] [phone] [facility]  [phone] [facility] [address] [place]  [phone] [facility] [phone] [facility] 9092 [facility] [phone] Patient should report to Outpatient Registration Located at the [address] [place] [address]. Physical Address:  [address] [facility], [address] [place] [address] Discharge Orders None A check duncan indicates which time of day the medication should be taken. My Medications CONTINUE taking these medications Instructions Each Dose to Equal  
 Morning Noon Evening Bedtime  
 allopurinol 100 mg tablet Commonly known as:  Merrily Schilder Your last dose was: Your next dose is: Take 100 mg by mouth. 100 mg  
    
   
   
   
  
 aspirin delayed-release 81 mg tablet Your last dose was: Your next dose is: Take  by mouth daily. baclofen 10 mg tablet Commonly known as:  LIORESAL Your last dose was: Your next dose is: Take 10 mg by mouth daily. 10 mg CYCLOBENZAPRINE PO Your last dose was: Your next dose is: Take 40 mg by mouth daily. 40 mg  
    
   
   
   
  
 isosorbide mononitrate ER 30 mg tablet Commonly known as:  IMDUR Your last dose was: Your next dose is: TAKE ONE-HALF (1/2) TABLET DAILY FOR RAYNAUDS PHENOMENON  
     
   
   
   
  
 metoprolol tartrate 25 mg tablet Commonly known as:  LOPRESSOR Your last dose was: Your next dose is: TAKE ONE TABLET BY MOUTH TWICE DAILY  
     
   
   
   
  
 multivitamin tablet Commonly known as:  ONE A DAY Your last dose was: Your next dose is: Take 1 Tab by mouth daily. 1 Tab  
    
   
   
   
  
 omeprazole 20 mg capsule Commonly known as:  PRILOSEC Your last dose was: Your next dose is: Take 20 mg by mouth two (2) times a day. 20 mg  
    
   
   
   
  
 OTHER(NON-FORMULARY) Your last dose was: Your next dose is:    
   
   
 2 Tabs daily. FIBER SUPPLEMENT  
 2 Tab  
    
   
   
   
  
 pravastatin 40 mg tablet Commonly known as:  PRAVACHOL Your last dose was: Your next dose is: Take 40 mg by mouth nightly. 40 mg  
    
   
   
   
  
 raNITIdine 150 mg tablet Commonly known as:  ZANTAC Your last dose was: Your next dose is: Take 150 mg by mouth two (2) times a day. 150 mg  
    
   
   
   
  
 SINGULAIR 10 mg tablet Generic drug:  montelukast  
   
Your last dose was: Your next dose is: Take 10 mg by mouth nightly. 10 mg  
    
   
   
   
  
 telmisartan-hydroCHLOROthiazide 40-12.5 mg per tablet Commonly known as:  MICARDIS HCT Your last dose was: Your next dose is: Take 1 Tab by mouth daily. 1 Tab  
    
   
   
   
  
 traZODone 50 mg tablet Commonly known as:  Dimas Spatz Your last dose was: Your next dose is:    
   
   
      
   
   
   
  
 TYLENOL EXTRA STRENGTH 500 mg tablet Generic drug:  acetaminophen Your last dose was: Your next dose is: Take 1,000 mg by mouth every six (6) hours as needed for Pain. 1000 mg  
    
   
   
   
  
 ZyrTEC 10 mg tablet Generic drug:  cetirizine Your last dose was: Your next dose is: Take 10 mg by mouth daily. 10 mg Discharge Instructions 2 [address]  [phone] CARDIOLOGY DISCHARGE INSTRUCTIONS Patient ID: 
Diane Francis [id number] 
76 y.o. 
1950 Admit Date: 5/11/2018 Discharge Date: 5/11/2018 Admitting Physician: Miguel Pedraza MD  
 
Discharge Physician: Miguel Pedraza MD 
 
Admission Diagnoses:  
Mixed hyperlipidemia [E78.2] SOB (shortness of breath) [R06.02] Venous insufficiency of left leg [I87.2] Venous reflux [I87.2] Palpitations [R00.2] Discharge Diagnoses: Active Problems: * No active hospital problems. * Discharge Condition: Good Cardiology Procedures this Admission:  Right heart cath Disposition: home Reference discharge instructions provided by nursing for diet and activity. Signed:  
Miguel Pedraza MD 
 5/11/2018 
3:27 PM 
 
CARDIAC CATHETERIZATION It is normal to feel tired the first couple days. Take it easy and follow the physicians instructions. CHECK THE CATHETER INSERTION SITE DAILY: 
 
You may shower 24 hours after the procedure, remove the bandage during showering. Wash with soap and water and pat dry. Gentle cleaning of the site with soap and water is sufficient, cover with a dry clean dressing or bandage. Do not apply creams or powders to the area. Do not sit in a bathtub or pool of water for 7 days or until wound has completely healed. Temporary bruising and discomfort is normal and may last a few weeks. You may have a  formation of a small lump at the site which may last up to 6 weeks. CALL THE PHYSICIANS: 
 
If the site becomes red, swollen or feels warm to the touch If there is bleeding or drainage or if there is unusual pain at the groin or down the leg. If there is any bleeding, lie down, apply pressure or have someone apply pressure with a clean cloth until the bleeding stops. If the bleeding continues, call 911 to be transported to the hospital. 
DO  [place] [address]. ACTIVITY: 
 
For the first 24-48 hours or as instructed by the physician: No lifting, pushing or pulling over 10 pounds and no straining the insertion site. Do not life grocery bags or the garbage can, do not run the vacuum  or  for 7 days. Start with short walks as in the hospital and gradually increase as tolerated each day. It is recommended to walk 30 minutes 5-7 days per week. Follow your physicians instructions on activity. Avoid walking outside in extremes of heat or cold. Walk inside when it is cold and windy or hot and humid. Things to keep in mind: 
No driving for at least 24 hours, or as designated by your physician. Limit the number of times you go up and down the stairs Take rests and pace yourself with activity. Be careful and do not strain with bowel movements. MEDICATIONS: 
 
Take all medications as prescribed Call your physician if you have any questions Keep an updated list of your medications with you at all times and give a list to your physician and pharmacist 
 
 
 
SIGNS AND SYMPTOMS: 
 
Be cautious of symptoms of angina or recurrent symptoms such as chest discomfort, unusual shortness of breath or fatigue. These could be symptoms of restenosis, a new blockage or a heart attack. If your symptoms are relieved with rest it is still recommended that you notify your physician of recurrent chest pain or discomfort. FOR CHEST PAIN or symptoms of angina not relieved with rest:  If the discomfort is not relieved with rest, and you have been prescribed Nitroglycerin, take as directed (taken under the tongue, one at a time 5 minutes apart for a total of 3 doses). If the discomfort is not relieved after the 3rd nitroglycerin, call 911. If you have not been prescribed Nitroglycerin  and your chest discomfort is not relieved with rest, call 911. AFTER CARE: 
 
Follow up with your physician as instructed. Follow a heart healthy diet with proper portion control, daily stress management, daily exercise, blood pressure and cholesterol control , and smoking cessation. Introducing [facility]! Dear Christina Foley: Thank you for requesting a Active[employer] account. Our records indicate that you already have an active ActivePath account. You can access your account anytime at [email or web address] Did you know that you can access your hospital and ER discharge instructions at any time in ActivePath? You can also review all of your test results from your hospital stay or ER visit. Additional Information If you have questions, please visit the Frequently Asked Questions section of the ActivePath website at [email or web address] Remember, MyChart is NOT to be used for urgent needs. For medical emergencies, dial 911. Now available from your iPhone and Android! Introducing Sony Perez As a [employer] patient, I wanted to make you aware of our electronic visit tool called Sony Perez. [employer] 24/7 allows you to connect within minutes with a medical provider 24 hours a day, seven days a week via a mobile device or tablet or logging into a secure website from your computer. You can access Sony Perez from anywhere in the [place]. A virtual visit might be right for you when you have a simple condition and feel like you just dont want to get out of bed, or cant get away from work for an appointment, when your regular [employer] provider is not available (evenings, weekends or holidays), or when youre out of town and need minor care. Electronic visits cost only $49 and if the [employer] 24/7 provider determines a prescription is needed to treat your condition, one can be electronically transmitted to a nearby pharmacy*. Please take a moment to enroll today if you have not already done so. The enrollment process is free and takes just a few minutes. To enroll, please download the [employer] 24/7 pritesh to your tablet or phone, or visit [email or web address] to enroll on your computer. And, as an [address] [place] [address] patient with a [facility] account, the results of your visits will be scanned into your electronic medical record and your primary care provider will be able to view the scanned results. We urge you to continue to see your regular [employer] provider for your ongoing medical care. And while your primary care provider may not be the one available when you seek a Sony Perez virtual visit, the peace of mind you get from getting a real diagnosis real time can be priceless.    
 
For more information on Sony Jonathanstephyfin, view our Frequently Asked Questions (FAQs) at [email or web address]. Sincerely, 
 
Shaji Payan MD 
Chief Medical Officer 50[address] Ryan *:  certain medications cannot be prescribed via Sony Perez Providers Seen During Your Hospitalization Provider Specialty Primary office phone Munir Domínguez MD Cardiology [phone] Your Primary Care Physician (PCP) Primary Care Physician Office Phone Office Fax Jefferson Potts, [address] [phone] You are allergic to the following Allergen Reactions Benzene Other (comments) Blisters and burn area Benzene found to be on steri-strips Betadine (Povidone-Iodine) Other (comments) Blisters and burning Naproxen Itching Percocet (Oxycodone-Acetaminophen) Rash Itching  
 irritated Sulfa (Sulfonamide Antibiotics) Rash Adhesive Rash Redness and rash Recent Documentation Height Weight BMI OB Status Smoking Status 1.676 m 96.2 kg 34.22 kg/m2 Hysterectomy Never Smoker Emergency Contacts Name Discharge Info Relation Home Work Mobile [address] [facility] CAREGIVER [3] Spouse [3] [phone] Patient Belongings The following personal items are in your possession at time of discharge: 
  Dental Appliances: At home, Uppers, Lowers         Home Medications: None   Jewelry: None  Clothing: At bedside    Other Valuables: None Please provide this summary of care documentation to your next provider. Signatures-by signing, you are acknowledging that this After Visit Summary has been reviewed with you and you have received a copy. Patient Signature:  ____________________________________________________________ Date:  ____________________________________________________________  
  
Tian Mcdermott Provider Signature:  ____________________________________________________________ Date:  ____________________________________________________________

## 2018-05-11 NOTE — PROGRESS NOTES
TRANSFER - OUT REPORT:    Verbal report given to Valdez Estrella  (name) on Ji Davis  being transferred to IVCU(unit) for routine progression of care       Report consisted of patients Situation, Background, Assessment and   Recommendations(SBAR). Information from the following report(s) Procedure Summary was reviewed with the receiving nurse. Lines:   Peripheral IV 05/11/18 Right Antecubital (Active)   Site Assessment Clean, dry, & intact 5/11/2018 11:00 AM   Phlebitis Assessment 0 5/11/2018 11:00 AM   Infiltration Assessment 0 5/11/2018 11:00 AM   Dressing Status Clean, dry, & intact 5/11/2018 11:00 AM   Dressing Type Tape;Transparent 5/11/2018 11:00 AM   Hub Color/Line Status Pink; Infusing 5/11/2018 11:00 AM        Opportunity for questions and clarification was provided.       Patient transported with:   Registered Nurse  Tech

## 2018-05-14 ENCOUNTER — HOSPITAL ENCOUNTER (OUTPATIENT)
Dept: MAMMOGRAPHY | Age: 68
Discharge: HOME | End: 2018-05-14
Attending: FAMILY MEDICINE
Payer: MEDICARE

## 2018-05-14 ENCOUNTER — TELEPHONE (OUTPATIENT)
Dept: CARDIOLOGY CLINIC | Age: 68
End: 2018-05-14

## 2018-05-14 DIAGNOSIS — Z12.39 BREAST SCREENING: ICD-10-CM

## 2018-05-14 PROCEDURE — 77067 SCR MAMMO BI INCL CAD: CPT

## 2018-05-14 NOTE — TELEPHONE ENCOUNTER
Pt came into office today and asked if her site where she had the cardiac cath is okay. Pt had red spot about the size of a quarter where the cath was inserted on rt arm. She had some red splotches around the site from the IV and where they took the tape off-it had tore her skin and was healing. I felt the site and it was not hot to touch, no swelling, lumps or bumps. She had no streaking up and the arm. Advised her of these symptoms and is she should develop them to head to the ER. She verbalized that she understood everything and would do that if needed.

## 2018-06-07 ENCOUNTER — HOSPITAL ENCOUNTER (OUTPATIENT)
Dept: NUCLEAR MEDICINE | Age: 68
Discharge: HOME | End: 2018-06-07
Attending: SPECIALIST
Payer: MEDICARE

## 2018-06-07 DIAGNOSIS — R10.826 EPIGASTRIC REBOUND ABDOMINAL TENDERNESS: ICD-10-CM

## 2018-06-07 PROCEDURE — 78264 GASTRIC EMPTYING IMG STUDY: CPT

## 2018-06-21 ENCOUNTER — OFFICE VISIT (OUTPATIENT)
Dept: CARDIOLOGY CLINIC | Age: 68
End: 2018-06-21

## 2018-06-21 VITALS
BODY MASS INDEX: 35.48 KG/M2 | SYSTOLIC BLOOD PRESSURE: 124 MMHG | OXYGEN SATURATION: 95 % | DIASTOLIC BLOOD PRESSURE: 72 MMHG | HEIGHT: 66 IN | WEIGHT: 220.8 LBS | RESPIRATION RATE: 18 BRPM | HEART RATE: 77 BPM

## 2018-06-21 DIAGNOSIS — I87.2 VENOUS INSUFFICIENCY OF LEFT LEG: ICD-10-CM

## 2018-06-21 DIAGNOSIS — E78.2 MIXED HYPERLIPIDEMIA: ICD-10-CM

## 2018-06-21 DIAGNOSIS — R06.02 SOB (SHORTNESS OF BREATH): Primary | ICD-10-CM

## 2018-06-21 DIAGNOSIS — I87.2 VENOUS REFLUX: ICD-10-CM

## 2018-06-21 RX ORDER — OXYBUTYNIN CHLORIDE 10 MG/1
10 TABLET, EXTENDED RELEASE ORAL DAILY
COMMUNITY
Start: 2018-05-29 | End: 2020-10-21

## 2018-06-21 NOTE — PROGRESS NOTES
6/21/2018 3:45 PM      Subjective:     Kaitlin Oro is here for f/u visit. Continues to c/o SOB. She denies chest pain, chest pressure/discomfort, near-syncope, syncope, fatigue, orthopnea, paroxysmal nocturnal dyspnea, tachypnea. Visit Vitals    /72 (BP 1 Location: Right arm, BP Patient Position: Sitting)    Pulse 77    Resp 18    Ht 5' 6\" (1.676 m)    Wt 220 lb 12.8 oz (100.2 kg)    SpO2 95%    BMI 35.64 kg/m2     Current Outpatient Prescriptions   Medication Sig    oxybutynin chloride XL (DITROPAN XL) 10 mg CR tablet Take 10 mg by mouth daily.  baclofen (LIORESAL) 10 mg tablet Take 10 mg by mouth daily.  multivitamin (ONE A DAY) tablet Take 1 Tab by mouth daily.  isosorbide mononitrate ER (IMDUR) 30 mg tablet TAKE ONE-HALF (1/2) TABLET DAILY FOR RAYNAUDS PHENOMENON    metoprolol tartrate (LOPRESSOR) 25 mg tablet TAKE ONE TABLET BY MOUTH TWICE DAILY    raNITIdine (ZANTAC) 150 mg tablet Take 150 mg by mouth daily.  aspirin delayed-release 81 mg tablet Take  by mouth daily.  acetaminophen (TYLENOL EXTRA STRENGTH) 500 mg tablet Take 1,000 mg by mouth every six (6) hours as needed for Pain.  omeprazole (PRILOSEC) 20 mg capsule Take 40 mg by mouth daily.  OTHER,NON-FORMULARY, 2 Tabs daily. FIBER SUPPLEMENT    allopurinol (ZYLOPRIM) 100 mg tablet Take 100 mg by mouth.  telmisartan-hydrochlorothiazide (MICARDIS HCT) 40-12.5 mg per tablet Take 1 Tab by mouth daily.  pravastatin (PRAVACHOL) 40 mg tablet Take 40 mg by mouth nightly.  cetirizine (ZYRTEC) 10 mg tablet Take 10 mg by mouth daily.  montelukast (SINGULAIR) 10 mg tablet Take 10 mg by mouth nightly.  traZODone (DESYREL) 50 mg tablet     CYCLOBENZAPRINE HCL (CYCLOBENZAPRINE PO) Take 40 mg by mouth daily. No current facility-administered medications for this visit.           Objective:      Visit Vitals    /72 (BP 1 Location: Right arm, BP Patient Position: Sitting)  Pulse 77    Resp 18     5' 6\" (1.676 m)    Wt 220 lb 12.8 oz (100.2 kg)    SpO2 95%    BMI 35.64 kg/m2       Data Review:     EKG: Normal sinus rhythm, no acute st/t changes    Reviewed and/or ordered active problem list, medication list tests    Past Medical History:   Diagnosis Date    Adverse effect of anesthesia     woke up during hysterectomy    Arrhythmia     h/o palpitations normal work up 22/2015    Arthritis     Asthma     allergy to weather changing    Chronic pain     bulging disc c4/c5 l4/l5    GERD (gastroesophageal reflux disease)     H/O arthroscopic knee surgery     H/O: hysterectomy     Hypertension     Ill-defined condition     gout    Leg swelling     Bartlett's neuralgia     Musculoskeletal disorder     arthritis    Nausea & vomiting     surgery related    Other ill-defined conditions(799.89) 11/2013    RECTAL PROLASE    S/P appendectomy     Shortness of breath     Unspecified adverse effect of anesthesia     wakes up for anesthesia early      Past Surgical History:   Procedure Laterality Date    COLONOSCOPY  4/20/2011         HX APPENDECTOMY      HX CHOLECYSTECTOMY      HX GI  11/19/13    Rectocele repair with enterocele repair    HX HYSTERECTOMY      TOOK LEFT OVARY    HX ORTHOPAEDIC      left tkr x 2/numereous left knee surgeries    HX ORTHOPAEDIC      right knee partial meniscus     HX ORTHOPAEDIC      ganglion cyst removed rt wrist    HX ORTHOPAEDIC      bilateral CTR, and had ulna nerve release     Allergies   Allergen Reactions    Benzene Other (comments)     Blisters and burn area Benzene found to be on steri-strips    Betadine [Povidone-Iodine] Other (comments)     Blisters and burning    Naproxen Itching    Percocet [Oxycodone-Acetaminophen] Rash and Itching     irritated    Sulfa (Sulfonamide Antibiotics) Rash    Adhesive Rash     Redness and rash      Family History   Problem Relation Age of Onset    Heart Disease Mother     Hypertension Mother     Diabetes Mother     Cancer Mother      BREAST, LUNG    Breast Cancer Mother 61    Heart Disease Father     Hypertension Father     Thyroid Disease Father     Diabetes Brother     Psychiatric Disorder Son      Nancy MCNALLY [place] DEPRESSIVE    Anesth Problems Neg Hx       Social History     Social History    Marital status:      Spouse name: N/A    Number of children: N/A    Years of education: N/A     Occupational History    Not on file. Social History Main Topics    Smoking status: Never Smoker    Smokeless tobacco: Never Used    Alcohol use No    Drug use: No    Sexual activity: Not on file     Other Topics Concern    Not on file     Social History Narrative         Review of Systems     General: Not Present- Anorexia, Chills, Dietary Changes, Fatigue, Fever, Medication Changes, Night Sweats, Weight Gain > 10lbs. and Weight Loss > 10lbs. .  Skin: Not Present- Bruising and Excessive Sweating. HEENT: Not Present- Headache, Visual Loss and Vertigo. Respiratory: Not Present- Cough, Snoring and Wheezing. Cardiovascular: Not Present- Abnormal Blood Pressure, Chest Pain, Fainting / Blacking Out, Irregular Heart Beat, Orthopnea, Paroxysmal Nocturnal Dyspnea. Gastrointestinal: Not Present- Black, Tarry Stool, Bloody Stool, Diarrhea, Hematemesis, Rectal Bleeding and Vomiting. Musculoskeletal: Not Present- Muscle Pain and Muscle Weakness. Neurological: Not Present- Dizziness. Psychiatric: Not Present- Depression. Endocrine: Not Present- Cold Intolerance, Heat Intolerance and Thyroid Problems. Hematology: Not Present- Abnormal Bleeding, Anemia, Blood Clots and Easy Bruising.       Physical Exam   The physical exam findings are as follows:       General   Mental Status - Alert. General Appearance - Not in acute distress. Chest and Lung Exam   Inspection: Accessory muscles - No use of accessory muscles in breathing.   Auscultation:   Breath sounds: - Normal.      Cardiovascular   Inspection: Jugular vein - Bilateral - Inspection Normal.  Palpation/Percussion:   Apical Impulse: - Normal.  Auscultation: Rhythm - Regular. Heart Sounds - S1 WNL and S2 WNL. No S3 or S4. Murmurs & Other Heart Sounds: Auscultation of the heart reveals - No Murmurs. Carotid arteries - No Carotid bruit. Peripheral Vascular   Upper Extremity: Inspection - Bilateral - No Cyanotic nailbeds or Digital clubbing. Lower Extremity:   Palpation: Edema - Bilateral - Trace edema. Assessment:       ICD-10-CM ICD-9-CM    1. SOB (shortness of breath) R06.02 786.05 CT CORONARY ART W STRUC MORPH FUNC      2D ECHO COMPLETE ADULT (TTE) W OR WO CONTR   2. Mixed hyperlipidemia E78.2 272.2 AMB POC EKG ROUTINE W/ 12 LEADS, INTER & REP      CT CORONARY ART W STRUC MORPH FUNC      2D ECHO COMPLETE ADULT (TTE) W OR WO CONTR   3. Venous insufficiency of left leg I87.2 459.81 CT CORONARY ART W STRUC MORPH FUNC      2D ECHO COMPLETE ADULT (TTE) W OR WO CONTR   4. Venous reflux I87.2 459.81 CT CORONARY ART W STRUC MORPH FUNC      2D ECHO COMPLETE ADULT (TTE) W OR WO CONTR       Plan:     1. Dyspnea on exertion: Negative ischemia on Lexiscan last year. Mild pulm HTN on right heart cath. Previously right heart cath was requested for evaluation pulmonary hypertension by rheumatology for potential diagnosis of CREST syndrome. no significant CAD at time of last cath in 2015. Check cardiac CTA. Check Echo. If cardiac work up -ve then consider pulmonary evaluation. H/o second hand exposure to smoking. 2. No significant dysrhythmias on monitor. 3. Superficial venous reflux: s/p right GSV RF closure. Left leg continues to have some dependent edema along with pain and cramps. Will address it once above acute issues resolved. 4. BP controlled.    5. On statin.

## 2018-06-21 NOTE — PROGRESS NOTES
1. Have you been to the ER, urgent care clinic since your last visit? Hospitalized since your last visit? Yes this is hospital f/u.    2. Have you seen or consulted any other health care providers outside of the [facility] since your last visit? Include any pap smears or colon screening.    No.      Chief Complaint   Patient presents with   [facility] Follow Up     f/u from cardiac cath-refill on Imdur sent to [facility] reports no new cardiac symptoms

## 2018-06-22 DIAGNOSIS — I20.9 ANGINA PECTORIS (HCC): Primary | ICD-10-CM

## 2018-06-22 DIAGNOSIS — E78.2 MIXED HYPERLIPIDEMIA: ICD-10-CM

## 2018-06-22 DIAGNOSIS — R06.02 SOB (SHORTNESS OF BREATH): ICD-10-CM

## 2018-06-26 ENCOUNTER — CLINICAL SUPPORT (OUTPATIENT)
Dept: CARDIOLOGY CLINIC | Age: 68
End: 2018-06-26

## 2018-06-26 ENCOUNTER — TELEPHONE (OUTPATIENT)
Dept: CARDIOLOGY CLINIC | Age: 68
End: 2018-06-26

## 2018-06-26 DIAGNOSIS — R06.02 SOB (SHORTNESS OF BREATH): ICD-10-CM

## 2018-06-26 DIAGNOSIS — I20.8 ANGINA AT REST (HCC): ICD-10-CM

## 2018-06-26 DIAGNOSIS — I27.20 PULMONARY HYPERTENSION (HCC): ICD-10-CM

## 2018-06-26 DIAGNOSIS — E78.2 MIXED HYPERLIPIDEMIA: ICD-10-CM

## 2018-06-26 DIAGNOSIS — I25.9 MYOCARDIAL ISCHEMIA: ICD-10-CM

## 2018-06-26 DIAGNOSIS — R00.2 PALPITATIONS: Primary | ICD-10-CM

## 2018-06-27 ENCOUNTER — HOSPITAL ENCOUNTER (OUTPATIENT)
Dept: CT IMAGING | Age: 68
Discharge: HOME | End: 2018-06-27
Attending: INTERNAL MEDICINE
Payer: MEDICARE

## 2018-06-27 DIAGNOSIS — E78.2 MIXED HYPERLIPIDEMIA: ICD-10-CM

## 2018-06-27 DIAGNOSIS — R06.02 SOB (SHORTNESS OF BREATH): ICD-10-CM

## 2018-06-27 DIAGNOSIS — I20.9 ANGINA PECTORIS (HCC): ICD-10-CM

## 2018-06-27 PROCEDURE — 74011000258 HC RX REV CODE- 258: Performed by: INTERNAL MEDICINE

## 2018-06-27 PROCEDURE — 75574 CT ANGIO HRT W/3D IMAGE: CPT

## 2018-06-27 PROCEDURE — 74011636320 HC RX REV CODE- 636/320: Performed by: INTERNAL MEDICINE

## 2018-06-27 RX ORDER — METOPROLOL TARTRATE 25 MG/1
TABLET, FILM COATED ORAL
Qty: 180 TAB | Refills: 2 | Status: SHIPPED | OUTPATIENT
Start: 2018-06-27 | End: 2018-07-11 | Stop reason: SDUPTHER

## 2018-06-27 RX ORDER — SODIUM CHLORIDE 9 MG/ML
50 INJECTION, SOLUTION INTRAVENOUS
Status: COMPLETED | OUTPATIENT
Start: 2018-06-27 | End: 2018-06-27

## 2018-06-27 RX ORDER — IODIXANOL 320 MG/ML
100 INJECTION, SOLUTION INTRAVASCULAR
Status: COMPLETED | OUTPATIENT
Start: 2018-06-27 | End: 2018-06-27

## 2018-06-27 RX ADMIN — SODIUM CHLORIDE 50 ML/HR: 900 INJECTION, SOLUTION INTRAVENOUS at 11:45

## 2018-06-27 RX ADMIN — IODIXANOL 100 ML: 320 INJECTION, SOLUTION INTRAVASCULAR at 11:44

## 2018-06-27 NOTE — PROGRESS NOTES
Pt receiving Coronary CTA    1109 - First BP and HR taken: 169/69, 69HR; consent and screening form completed and signed by patient. 1111 - Procedure started    8006 - Nitro given and explained to patient. Pt tolerating procedure well. 1122 - Procedure stopped. Pt tolerated well. No complaints at this time. 1124 - Last BP and HR taken: 151/76, 69HR; patient given discharge instructions and apple juice. Verbalized understanding. Ambulates out of radiology without difficulty.     Signed by: Christie Harding RN

## 2018-06-28 ENCOUNTER — TELEPHONE (OUTPATIENT)
Dept: CARDIOLOGY CLINIC | Age: 68
End: 2018-06-28

## 2018-06-28 NOTE — TELEPHONE ENCOUNTER
----- Message from Ravi Almanza MD sent at 6/28/2018  7:39 AM EDT -----  Inform her cardiac CTA is k

## 2018-07-02 ENCOUNTER — TELEPHONE (OUTPATIENT)
Dept: CARDIOLOGY CLINIC | Age: 68
End: 2018-07-02

## 2018-07-02 NOTE — TELEPHONE ENCOUNTER
----- Message from Olena Huitron MD sent at 6/29/2018  4:32 PM EDT -----  Inform her Echo is k      Called pt,verified pt with two pt identifiers, told pt her echo is normal. Looked in last office note-pt advised she had her CTA and someone called her and advised her that was normal also. Advised her since both test were normal  is advising a Pulmonary evaluation. Advised she can call any Pulmonary Dr. If her insurance does not need a referral. Advised her to f/u in 6 months with us. She verbalized that she understood everything.

## 2018-07-09 ENCOUNTER — TELEPHONE (OUTPATIENT)
Dept: CARDIOLOGY CLINIC | Age: 68
End: 2018-07-09

## 2018-07-09 NOTE — TELEPHONE ENCOUNTER
Pharmacy never received refill request for Metoprolol Tartrate that was sent 6/27/18,    Pt now also needs refill on isosorbide mononitrate er    If possible please call [address] - [phone]    Advised of 24 hour return call policy

## 2018-07-11 DIAGNOSIS — E78.2 MIXED HYPERLIPIDEMIA: ICD-10-CM

## 2018-07-11 DIAGNOSIS — I20.8 ANGINA AT REST (HCC): ICD-10-CM

## 2018-07-11 DIAGNOSIS — I25.9 MYOCARDIAL ISCHEMIA: ICD-10-CM

## 2018-07-11 DIAGNOSIS — R06.02 SOB (SHORTNESS OF BREATH): ICD-10-CM

## 2018-07-11 RX ORDER — METOPROLOL TARTRATE 25 MG/1
TABLET, FILM COATED ORAL
Qty: 180 TAB | Refills: 2 | Status: SHIPPED | COMMUNITY
Start: 2018-07-11 | End: 2019-04-08 | Stop reason: SDUPTHER

## 2018-07-11 RX ORDER — ISOSORBIDE MONONITRATE 30 MG/1
TABLET, EXTENDED RELEASE ORAL
Qty: 90 TAB | Refills: 2 | Status: SHIPPED | COMMUNITY
Start: 2018-07-11 | End: 2019-04-08 | Stop reason: SDUPTHER

## 2018-07-11 NOTE — TELEPHONE ENCOUNTER
Returned pt's call,verified pt with two pt identifiers, told pt that I had sent in her Metoprolol. Verified pharmacy in cc and the [facility] we have listed is wrong. It the [facility] in [place]. I could not put the pharmacy in cc so I advised the pt I would try to call again. Number is [phone]. Pt verbalized understanding. Called the pharmacy to call in a verbal order. She advised that they can not accept a verbal call in. It has to be e-scribed or hand written. She advised to search by zip code. Searched by zip code for pharmacy and find the correct [place]. Will add to pharmacy list and e-scribe to  for refills. Sent refills to  for approval and to be sent to the correct pharmacy.

## 2018-07-11 NOTE — TELEPHONE ENCOUNTER
She called again and ask that you call the home phone and she will also try back later today. Thanks!

## 2018-09-12 NOTE — PERIOP NOTES
Notified Rose Orta NP as well as called Dr. Lou Choi office regarding pt's KRYSTINA score of 6. She is being seen by Dr. Baltazar Leo tomorrow at 10:15 for pulmonary htn. Will call office for notes tomorrow afternoon.

## 2018-09-12 NOTE — PERIOP NOTES
[facility]  Ambulatory Surgery Unit  Pre-operative Instructions    Surgery/Procedure Date  Friday, Sept 21, 2018            Tentative Arrival Time 0700      1. On the day of your surgery/procedure, please report to the Ambulatory Surgery Unit Registration Desk and sign in at your designated time. The Ambulatory Surgery Unit is located in [facility] on the [facility] side of the [facility] across from the [address] [place] [address]. Please have all of your health insurance cards and a photo ID. 2. You must have someone with you to drive you home, as you should not drive a car for 24 hours following anesthesia. Please make arrangements for a responsible adult friend or family member to stay with you for at least the first 24 hours after your surgery. 3. Do not have anything to eat or drink (including water, gum, mints, coffee, juice) after 11:59 p.m., Thursday. This may not apply to medications prescribed by your physician. (Please note below the special instructions with medications to take the morning of surgery, if applicable.)    4. We recommend you do not drink any alcoholic beverages for 24 hours before and after your surgery. 5. Contact your surgeons office for instructions on the following medications: non-steroidal anti-inflammatory drugs (i.e. Advil, Aleve), vitamins, and supplements. (Some surgeons will want you to stop these medications prior to surgery and others may allow you to take them)   **If you are currently taking Plavix, Coumadin, Aspirin and/or other blood-thinning agents, contact your surgeon for instructions. ** Your surgeon will partner with the physician prescribing these medications to determine if it is safe to stop or if you need to continue taking. Please do not stop taking these medications without instructions from your surgeon.     6. In an effort to help prevent surgical site infection, we ask that you shower with an anti-bacterial soap (i.e. Dial or Safeguard) for 3 days prior to and on the morning of surgery, using a fresh towel after each shower. (Please begin this process with fresh bed linens.) Do not apply any lotions, powders, or deodorants after the shower on the day of your procedure. If applicable, please do not shave the operative site for 48 hours prior to surgery. 7. Wear comfortable clothes. Wear glasses instead of contacts. Do not bring any jewelry or money (other than copays or fees as instructed). Do not wear make-up, particularly mascara, the morning of your surgery. Do not wear nail polish, particularly if you are having foot /hand surgery. Wear your hair loose or down, no ponytails, buns, thomas pins or clips. All body piercings must be removed. 8. You should understand that if you do not follow these instructions your surgery may be cancelled. If your physical condition changes (i.e. fever, cold or flu) please contact your surgeon as soon as possible. 9. It is important that you be on time. If a situation occurs where you may be late, or if you have any questions or problems, please call [phone].    10. Your surgery time may be subject to change. You will receive a phone call the day prior to surgery to confirm your arrival time. 11. Pediatric patients: please bring a change of clothes, diapers, bottle/sippy cup, pacifier, etc.      Special Instructions: Take all medications and inhalers, as prescribed, on the morning of surgery with a sip of water EXCEPT: no over the counter medications day of surgery    I understand a pre-operative phone call will be made to verify my surgery time. In the event that I am not available, I give permission for a message to be left on my answering service and/or with another person?       yes    Preop instructions reviewed  Pt verbalized understanding.      ___________________      ___________________      ________________  (Signature of Patient)          (Witness) (Date and Time)

## 2018-09-13 NOTE — PERIOP NOTES
Called over and requested, Dr. Lou Choi notes from todays visit. Office will fax when dictated. 46 - Dr. Guille Brush reviewed chart with pt's CREST, pulmonary HTN history, as well as pulmonary notes. Okay to proceed with surgery, no orders received.

## 2018-09-18 ENCOUNTER — ANESTHESIA EVENT (OUTPATIENT)
Dept: SURGERY | Age: 68
End: 2018-09-18
Payer: MEDICARE

## 2018-09-18 NOTE — PERIOP NOTES
Called pulmonary office regarding notes from 9/13, notes still not done, they will fax when completed. Dr. Honorio Su reviewed chart, okay to proceed.

## 2018-09-20 NOTE — PERIOP NOTES
[facility]  Ambulatory Surgery Unit  Pre-operative Instructions for Endo Procedures    Procedure Date  9/28/18            Tentative Arrival Time 11:15am      1. On the day of your procedure, please report to the Ambulatory Surgery Unit Registration Desk and sign in at your designated time. The Ambulatory Surgery Unit is located in [facility] on the [facility] side [facility] the [facility] across from the [address] [place] [address]. Please have all of your health insurance cards and a photo ID. 2. You must have someone with you to drive you home, as you should not drive a car for 24 hours following anesthesia. Please make arrangements for a responsible adult friend or family member to stay with you for at least the first 24 hours after your procedure. 3. Do not have anything to eat or drink (including water, gum, mints, coffee, juice) after 11:59 PM 9/27/18. This may not apply to medications prescribed by your physician. (Please note below the special instructions with medications to take the morning of your procedure.)    4. If applicable, follow the clear liquid diet and bowel prep instructions provided by your physician's office. If you do not have this information, or have any questions, please contact your physician's office. 5. We recommend you do not drink any alcoholic beverages for 24 hours before and after your procedure. 6. Contact your surgeons office for instructions on the following medications: non-steroidal anti-inflammatory drugs (i.e. Advil, Aleve), vitamins, and supplements. (Some surgeons will want you to stop these medications prior to surgery and others may allow you to take them)   **If you are currently taking Plavix, Coumadin, Aspirin and/or other blood-thinning agents, contact your surgeon for instructions. ** Your surgeon will partner with the physician prescribing these medications to determine if it is safe to stop or if you need to continue taking.  Please do not stop taking these medications without instructions from your surgeon. 7. In an effort to help prevent surgical site infection, we ask that you shower with an anti-bacterial soap (i.e. Dial or Safeguard) on the morning of your procedure. Do not apply any lotions, powders, or deodorants after showering. 8. Wear comfortable clothes. Wear glasses instead of contacts. Do not bring any jewelry or money (other than copays or fees as instructed). Do not wear make-up, particularly mascara, the morning of your procedure. Wear your hair loose or down, no ponytails, buns, thomas pins or clips. All body piercings must be removed. 9. You should understand that if you do not follow these instructions your procedure may be cancelled. If your physical condition changes (i.e. fever, cold or flu) please contact your surgeon as soon as possible. 10. It is important that you be on time. If a situation occurs where you may be late, or if you have any questions or problems, please call [phone]. 11. Your procedure time may be subject to change. You will receive a phone call the day prior to confirm your arrival time. Special Instructions: patient to call Dr. Luis Alberto Heath for instructions concerning Aspirin, multi vitamin, fiber supplement    Take all medications and inhalers, as prescribed, on the morning of surgery with a sip of water EXCEPT: none      Insulin Dependent Diabetic patients: Take your diabetic medications as prescribed the day before surgery. Hold all diabetic medications the day of surgery. If you are scheduled to arrive for surgery after 8:00 AM, and your AM blood sugar is >200, please call Ambulatory Surgery. I understand a pre-operative phone call will be made to verify my procedure time. In the event that I am not available, I give permission for a message to be left on my answering service and/or with another person?       yes         ___________________      ___________________ ___________________reviewed with patient during phone assessment.   (Signature of Patient)          (Witness)                   (Date and Time)

## 2018-09-21 ENCOUNTER — ANESTHESIA (OUTPATIENT)
Dept: SURGERY | Age: 68
End: 2018-09-21
Payer: MEDICARE

## 2018-09-21 ENCOUNTER — HOSPITAL ENCOUNTER (OUTPATIENT)
Age: 68
Setting detail: OUTPATIENT SURGERY
Discharge: HOME | End: 2018-09-21
Attending: OPHTHALMOLOGY | Admitting: OPHTHALMOLOGY
Payer: MEDICARE

## 2018-09-21 VITALS
BODY MASS INDEX: 35.03 KG/M2 | SYSTOLIC BLOOD PRESSURE: 131 MMHG | DIASTOLIC BLOOD PRESSURE: 59 MMHG | RESPIRATION RATE: 22 BRPM | WEIGHT: 218 LBS | HEIGHT: 66 IN | OXYGEN SATURATION: 96 % | TEMPERATURE: 97.8 F | HEART RATE: 60 BPM

## 2018-09-21 PROCEDURE — 74011000250 HC RX REV CODE- 250

## 2018-09-21 PROCEDURE — 77030038831 HC SEAL SYNTH RESURE OCCULR OCEL -G: Performed by: OPHTHALMOLOGY

## 2018-09-21 PROCEDURE — 74011250636 HC RX REV CODE- 250/636: Performed by: OPHTHALMOLOGY

## 2018-09-21 PROCEDURE — 76210000046 HC AMBSU PH II REC FIRST 0.5 HR: Performed by: OPHTHALMOLOGY

## 2018-09-21 PROCEDURE — 74011000250 HC RX REV CODE- 250: Performed by: OPHTHALMOLOGY

## 2018-09-21 PROCEDURE — 76060000061 HC AMB SURG ANES 0.5 TO 1 HR: Performed by: OPHTHALMOLOGY

## 2018-09-21 PROCEDURE — 77030018846 HC SOL IRR STRL H20 ICUM -A: Performed by: OPHTHALMOLOGY

## 2018-09-21 PROCEDURE — 76030000000 HC AMB SURG OR TIME 0.5 TO 1: Performed by: OPHTHALMOLOGY

## 2018-09-21 PROCEDURE — 74011250636 HC RX REV CODE- 250/636

## 2018-09-21 PROCEDURE — V2632 POST CHMBR INTRAOCULAR LENS: HCPCS | Performed by: OPHTHALMOLOGY

## 2018-09-21 DEVICE — LENS IOL POST 1-PC 6X13 23.5 -- ACRYSOF: Type: IMPLANTABLE DEVICE | Site: EYE | Status: FUNCTIONAL

## 2018-09-21 RX ORDER — OFLOXACIN 3 MG/ML
SOLUTION/ DROPS OPHTHALMIC AS NEEDED
Status: DISCONTINUED | OUTPATIENT
Start: 2018-09-21 | End: 2018-09-21 | Stop reason: HOSPADM

## 2018-09-21 RX ORDER — TROPICAMIDE 10 MG/ML
1 SOLUTION/ DROPS OPHTHALMIC
Status: COMPLETED | OUTPATIENT
Start: 2018-09-21 | End: 2018-09-21

## 2018-09-21 RX ORDER — TIMOLOL MALEATE 5 MG/ML
SOLUTION/ DROPS OPHTHALMIC AS NEEDED
Status: DISCONTINUED | OUTPATIENT
Start: 2018-09-21 | End: 2018-09-21 | Stop reason: HOSPADM

## 2018-09-21 RX ORDER — SODIUM CHLORIDE 0.9 % (FLUSH) 0.9 %
5-10 SYRINGE (ML) INJECTION EVERY 8 HOURS
Status: DISCONTINUED | OUTPATIENT
Start: 2018-09-21 | End: 2018-09-21 | Stop reason: HOSPADM

## 2018-09-21 RX ORDER — TROPICAMIDE 10 MG/ML
SOLUTION/ DROPS OPHTHALMIC
Status: COMPLETED
Start: 2018-09-21 | End: 2018-09-21

## 2018-09-21 RX ORDER — FENTANYL CITRATE 50 UG/ML
25 INJECTION, SOLUTION INTRAMUSCULAR; INTRAVENOUS
Status: DISCONTINUED | OUTPATIENT
Start: 2018-09-21 | End: 2018-09-21 | Stop reason: HOSPADM

## 2018-09-21 RX ORDER — DIPHENHYDRAMINE HYDROCHLORIDE 50 MG/ML
12.5 INJECTION, SOLUTION INTRAMUSCULAR; INTRAVENOUS AS NEEDED
Status: DISCONTINUED | OUTPATIENT
Start: 2018-09-21 | End: 2018-09-21 | Stop reason: HOSPADM

## 2018-09-21 RX ORDER — LIDOCAINE HYDROCHLORIDE 10 MG/ML
0.1 INJECTION, SOLUTION EPIDURAL; INFILTRATION; INTRACAUDAL; PERINEURAL AS NEEDED
Status: DISCONTINUED | OUTPATIENT
Start: 2018-09-21 | End: 2018-09-21 | Stop reason: HOSPADM

## 2018-09-21 RX ORDER — ONDANSETRON 2 MG/ML
4 INJECTION INTRAMUSCULAR; INTRAVENOUS AS NEEDED
Status: DISCONTINUED | OUTPATIENT
Start: 2018-09-21 | End: 2018-09-21 | Stop reason: HOSPADM

## 2018-09-21 RX ORDER — SODIUM CHLORIDE 0.9 % (FLUSH) 0.9 %
5-10 SYRINGE (ML) INJECTION AS NEEDED
Status: DISCONTINUED | OUTPATIENT
Start: 2018-09-21 | End: 2018-09-21 | Stop reason: HOSPADM

## 2018-09-21 RX ORDER — MIDAZOLAM HYDROCHLORIDE 1 MG/ML
INJECTION, SOLUTION INTRAMUSCULAR; INTRAVENOUS AS NEEDED
Status: DISCONTINUED | OUTPATIENT
Start: 2018-09-21 | End: 2018-09-21 | Stop reason: HOSPADM

## 2018-09-21 RX ORDER — TETRACAINE HYDROCHLORIDE 5 MG/ML
SOLUTION OPHTHALMIC AS NEEDED
Status: DISCONTINUED | OUTPATIENT
Start: 2018-09-21 | End: 2018-09-21 | Stop reason: HOSPADM

## 2018-09-21 RX ORDER — SODIUM CHLORIDE 9 MG/ML
25 INJECTION, SOLUTION INTRAVENOUS CONTINUOUS
Status: DISCONTINUED | OUTPATIENT
Start: 2018-09-21 | End: 2018-09-21 | Stop reason: HOSPADM

## 2018-09-21 RX ORDER — SODIUM CHLORIDE, SODIUM LACTATE, POTASSIUM CHLORIDE, CALCIUM CHLORIDE 600; 310; 30; 20 MG/100ML; MG/100ML; MG/100ML; MG/100ML
25 INJECTION, SOLUTION INTRAVENOUS CONTINUOUS
Status: DISCONTINUED | OUTPATIENT
Start: 2018-09-21 | End: 2018-09-21 | Stop reason: HOSPADM

## 2018-09-21 RX ORDER — OFLOXACIN 3 MG/ML
1 SOLUTION/ DROPS OPHTHALMIC
Status: COMPLETED | OUTPATIENT
Start: 2018-09-21 | End: 2018-09-21

## 2018-09-21 RX ADMIN — OFLOXACIN 1 DROP: 3 SOLUTION OPHTHALMIC at 08:03

## 2018-09-21 RX ADMIN — TROPICAMIDE 1 DROP: 10 SOLUTION/ DROPS OPHTHALMIC at 07:58

## 2018-09-21 RX ADMIN — MIDAZOLAM HYDROCHLORIDE 1 MG: 1 INJECTION, SOLUTION INTRAMUSCULAR; INTRAVENOUS at 08:30

## 2018-09-21 RX ADMIN — OFLOXACIN 1 DROP: 3 SOLUTION OPHTHALMIC at 07:58

## 2018-09-21 RX ADMIN — MIDAZOLAM HYDROCHLORIDE 1 MG: 1 INJECTION, SOLUTION INTRAMUSCULAR; INTRAVENOUS at 08:35

## 2018-09-21 RX ADMIN — TROPICAMIDE 1 DROP: 10 SOLUTION/ DROPS OPHTHALMIC at 08:05

## 2018-09-21 RX ADMIN — SODIUM CHLORIDE 25 ML/HR: 900 INJECTION, SOLUTION INTRAVENOUS at 08:01

## 2018-09-21 RX ADMIN — TROPICAMIDE 1 DROP: 10 SOLUTION/ DROPS OPHTHALMIC at 08:03

## 2018-09-21 RX ADMIN — OFLOXACIN 1 DROP: 3 SOLUTION OPHTHALMIC at 08:05

## 2018-09-21 NOTE — BRIEF OP NOTE
BRIEF OPERATIVE NOTE    Date of Procedure: 9/21/2018   Preoperative Diagnosis: Nuclear Sclerotic cataract left eye H25.12  Postoperative Diagnosis: Nuclear Sclerotic cataract left eye H25.12   Procedure(s):  LEFT EYE CATARACT EXTRACTION WITH INTRA OCULAR LENS IMPLANT  Surgeon(s) and Role:     * Su Holloway MD - Primary         Surgical Assistant: none    Surgical Staff:  Circ-1: Gricel Herrera RN  Scrub Tech-1: Wendy Prescott  Event Time In   Incision Start 1559   Incision Close 0900     Anesthesia: MAC   Estimated Blood Loss: none  Specimens: * No specimens in log *   Findings: cataract left eye  Complications: none  Implants:   Implant Name Type Inv.  Item Serial No.  Lot No. LRB No. Used Action   LENS IOL POST 1-PC 6X13 23.5 -- [employer]YSOF - [id number]   LENS IOL POST 1-PC 6X13 23.5 -- [employer] [id number] [employer]   Left 1 Implanted

## 2018-09-21 NOTE — DISCHARGE INSTRUCTIONS
Alexis Scanlon MD  [facility]  [place], [address] [place] [address]  Phone: [phone]       Fax: [phone]  If you are unable to keep appointment, kindly give 24 hours notice please. REMOVE PATCH  START DROPS WHEN YOU GET HOME  PUT PATCH BACK ON AT BEDTIME    1. DO NOT RUB the eye that was operated on. 2. Do not strain excessively. It is all right to bend as long as you do not strain. 3. It is safe to take a shower, wash your face, and wash your hair. Just keep the eye closed. 4. Do not swim for 1 week after surgery. 5. If you have any problems or questions, do not hesitate to call. There is always a physician on call at [phone] ext. [phone].   6. Follow instructions on eye drops from office. 7. You may take Tylenol or Advil for discomfort. If it pressure not relieved by Tylenol or Advil, please call Dr. Shona Rodriguez office. If you were given prescriptions, please review the written information on the prescribed medications. DO NOT DRIVE WHILE TAKING NARCOTIC PAIN MEDICATIONS. DISCHARGE SUMMARY from Nurse    The following personal items collected during your admission are returned to you:   Dental Appliance: Dental Appliances: Lowers, Uppers, With patient  Vision: Visual Aid: Glasses (PACU)  Hearing Aid:     Lennox:    Clothing:    Other Valuables:    Valuables sent to safe:      PATIENT INSTRUCTIONS:    After general anesthesia or intravenous sedation, for 24 hours or while taking prescription Narcotics:  · Someone should be with you for the next 24 hours. · For your own safety, a responsible adult must drive you home. · Limit your activities  · Recommended activity: Rest today, Do not climb stairs or shower unattended for the next 24 hours.   · Do not drive and operate hazardous machinery  · Do not make important personal or business decisions  · Do  not drink alcoholic beverages  · If you have not urinated within 8 hours after discharge, please contact your surgeon on call.    Report the following to your surgeon:  · Excessive pain, swelling, redness or odor of or around the surgical area  · Temperature over 100.5  · Nausea and vomiting lasting longer than 4 hours or if unable to take medications  · Any signs of decreased circulation or nerve impairment to extremity: change in color, persistent  numbness, tingling, coldness or increase pain  ·   ·   · You will receive a Post Operative Call from one of the Recovery Room Nurses on the day after your surgery to check on you. It is very important for us to know how you are recovering after your surgery. · You may receive an e-mail or letter in the mail from [employer] regarding your experience with us in the Ambulatory Surgery Unit. Your feedback is valuable to us and we appreciate your participation in the survey. · We wish youre a speedy recovery ? What to do at Home:      *  Please give a list of your current medications to your Primary Care Provider. *  Please update this list whenever your medications are discontinued, doses are      changed, or new medications (including over-the-counter products) are added. *  Please carry medication information at all times in case of emergency situations. These are general instructions for a healthy lifestyle:    No smoking/ No tobacco products/ Avoid exposure to second hand smoke    Surgeon General's Warning:  Quitting smoking now greatly reduces serious risk to your health. Obesity, smoking, and sedentary lifestyle greatly increases your risk for illness    A healthy diet, regular physical exercise & weight monitoring are important for maintaining a healthy lifestyle    You may be retaining fluid if you have a history of heart failure or if you experience any of the following symptoms:  Weight gain of 3 pounds or more overnight or 5 pounds in a week, increased swelling in our hands or feet or shortness of breath while lying flat in bed.   Please call your doctor as soon as you notice any of these symptoms; do not wait until your next office visit. Recognize signs and symptoms of STROKE:    B - Balance  E - Eyes    F-face looks uneven    A-arms unable to move or move even    S-speech slurred or non-existent    T-time-call 911 as soon as signs and symptoms begin-DO NOT go       Back to bed or wait to see if you get better-TIME IS BRAIN. If you have not received your influenza and/or pneumococcal vaccine, please follow up with your primary care physician. The discharge information has been reviewed with the patient and caregiver. The patient and caregiver verbalized understanding.

## 2018-09-21 NOTE — PERIOP NOTES
Permission received to review discharge instructions and discuss private health information with Nohemi Lobo, .

## 2018-09-21 NOTE — OP NOTES
Date of Procedure: 9/21/18  Preoperative Diagnosis: Nuclear Sclerotic cataract left eye H25.12  Postoperative Diagnosis: Nuclear Sclerotic cataract left eye H25.12  Procedure: Extracapsular cataract extraction with lens implant left eye  Surgeon:  BJ Song MD  Assistants: None  Anesthesia: MAC with local  Estimated Blood Loss: None  Findings: Cataract left eye  Complications: None  Specimens: None  Prosthetic Devices: Intraocular lens implant    The patient's left eye was dilated with mydriacyl 1% and ofloxacin 0.3% for 3 doses preoperatively. The patient was taken to the operating room and was given sedation. Tetracaine was given topically to the left eye, and the eye was prepped and draped in the usual manner for sterile eye surgery, except that Betadine was not used due to allergy. In discussing this with the patient, it was determined that this was a true Betadine allergy causing blistering of the skin. Baby shampoo was used instead, and alcohol prep was also used when the tetracaine was administered. The eyelashes were isolated with a plastic drape. A lid speculum was placed. A #15 blade was used to make a paracentesis at the 5:00 location. The eye was flushed with a lidocaine / epinephrine mixture (\"Shugarcaine\"). The eye was filled with Viscoat (Duovisc), and a crescent blade was used to make a 2.5 mm incision at the limbus temporally. This was dissected 2 mm into clear cornea, and the eye was entered with a 2.4 mm keratome. A 0.12 forceps was used for fixation during the procedure. A capsulorhexis flap was started with a cystotome, and this was completed 360 degrees with Utrata forceps. The capsular piece was removed. Rosebush dissection was performed with the \"Shugarcaine\" mixture on a cannula. The lens nucleus was removed using phacoemulsification with a total phaco time of 2:18 minutes at 10.4%. The lens was cracked and manipulated with a Sinsky hook.   Residual cortex was removed using irrigation / aspiration. The capsule remained intact. The capsule was refilled with Provisc (Duovisc). An Ra Intraocular lens model [id number] power 23.50 was placed in a lens folding cartridge with Provisc. Special care was taken to avoid touching the lens implant cartridge to any ocular surface when inserted except for a copious amount of Provisc that was placed at the incision. The lens was unfolded into the capsular bag. The lens centered well. Residual Viscoat and Provisc were removed using I / A. The eye was flushed with BSS through the paracentesis. The Resure wound sealant was used to close the incision and paracentesis. BSS solution was irrigated on the conjunctival surface at the end of the case. The eye was left soft and formed at the end of the case. The incision site was water tight. The speculum was removed, and a drop of timolol 0.5% and jessica/poly/dex ointment was placed on the eye followed by a shield with paper tape. The patient tolerated the procedure well and is to follow-up in one day.

## 2018-09-21 NOTE — IP AVS SNAPSHOT
[address] [facility] 
[phone] Patient: Fareed Wen MRN: [id number] YSP:[id number] About your hospitalization You were admitted on:  September 21, 2018 You last received care in the:  \[facility]\"" ASU [facility]ING You were discharged on:  September 21, 2018 Why you were hospitalized Your primary diagnosis was:  Not on File Follow-up Information None Your Scheduled Appointments Friday September 21, 2018 CATARACT EXTRACTION WITH INTRA OCULAR LENS IMPLANT with Luis Elizabeth MD  
\[facility]\"" AMB SURGERY UNIT (RI OR PRE ASSESSMENT) [facility]  
[phone] Friday September 28, 2018 COLONOSCOPY with Makenna Worthington MD  
[facility] SURGERY UNIT (RI OR PRE ASSESSMENT) [facility]  
[phone] Discharge Orders None A check duncan indicates which time of day the medication should be taken. My Medications ASK your doctor about these medications Instructions Each Dose to Equal  
 Morning Noon Evening Bedtime  
 allopurinol 100 mg tablet Commonly known as:  Leah Washington Your last dose was: Your next dose is: Take 100 mg by mouth daily. 100 mg  
    
   
   
   
  
 aspirin delayed-release 81 mg tablet Your last dose was: Your next dose is: Take  by mouth daily. baclofen 10 mg tablet Commonly known as:  LIORESAL Your last dose was: Your next dose is: Take 10 mg by mouth daily. 10 mg  
    
   
   
   
  
 isosorbide mononitrate ER 30 mg tablet Commonly known as:  IMDUR Your last dose was: Your next dose is: TAKE ONE-HALF (1/2) TABLET DAILY FOR RAYNAUDS PHENOMENON  
     
   
   
   
  
 metoprolol tartrate 25 mg tablet Commonly known as:  LOPRESSOR  
   
 Your last dose was: Your next dose is: TAKE ONE TABLET BY MOUTH TWICE DAILY  
     
   
   
   
  
 multivitamin tablet Commonly known as:  ONE A DAY Your last dose was: Your next dose is: Take 1 Tab by mouth daily. 1 Tab OTHER(NON-FORMULARY) Your last dose was: Your next dose is: Take 2 Tabs by mouth daily. FIBER SUPPLEMENT  
 2 Tab  
    
   
   
   
  
 oxybutynin chloride XL 10 mg CR tablet Commonly known as:  DITROPAN XL Your last dose was: Your next dose is: Take 10 mg by mouth daily. 10 mg  
    
   
   
   
  
 pravastatin 40 mg tablet Commonly known as:  PRAVACHOL Your last dose was: Your next dose is: Take 40 mg by mouth nightly. 40 mg  
    
   
   
   
  
 raNITIdine 150 mg tablet Commonly known as:  ZANTAC Your last dose was: Your next dose is: Take 150 mg by mouth daily. 150 mg  
    
   
   
   
  
 SINGULAIR 10 mg tablet Generic drug:  montelukast  
   
Your last dose was: Your next dose is: Take 10 mg by mouth nightly. 10 mg  
    
   
   
   
  
 telmisartan-hydroCHLOROthiazide 40-12.5 mg per tablet Commonly known as:  MICARDIS HCT Your last dose was: Your next dose is: Take 1 Tab by mouth daily. 1 Tab TYLENOL EXTRA STRENGTH 500 mg tablet Generic drug:  acetaminophen Your last dose was: Your next dose is: Take 1,000 mg by mouth every six (6) hours as needed for Pain. 1000 mg  
    
   
   
   
  
 ZyrTEC 10 mg tablet Generic drug:  cetirizine Your last dose was: Your next dose is: Take 10 mg by mouth daily. 10 mg Discharge Instructions MD JERAMIE RamosAREN [address] Phone: [phone]       Fax: [phone] If you are unable to keep appointment, kindly give 24 hours notice please. REMOVE PATCH 
START DROPS WHEN YOU GET HOME 
PUT PATCH BACK ON AT BEDTIME 1. DO NOT RUB the eye that was operated on. 2. Do not strain excessively. It is all right to bend as long as you do not strain. 3. It is safe to take a shower, wash your face, and wash your hair. Just keep the eye closed. 4. Do not swim for 1 week after surgery. 5. If you have any problems or questions, do not hesitate to call. There is always a physician on call at [phone] ext. [phone].  
6. Follow instructions on eye drops from office. 7. You may take Tylenol or Advil for discomfort. If it pressure not relieved by Tylenol or Advil, please call Dr. Shea Hazard office. If you were given prescriptions, please review the written information on the prescribed medications. DO NOT DRIVE WHILE TAKING NARCOTIC PAIN MEDICATIONS. DISCHARGE SUMMARY from Nurse The following personal items collected during your admission are returned to you:  
Dental Appliance: Dental Appliances: Lowers, Uppers, With patient Vision: Visual Aid: Glasses (PACU) Hearing Aid:   
Jewelry:   
Clothing:   
Other Valuables:   
Valuables sent to safe:   
 
PATIENT INSTRUCTIONS: 
 
After general anesthesia or intravenous sedation, for 24 hours or while taking prescription Narcotics: · Someone should be with you for the next 24 hours. · For your own safety, a responsible adult must drive you home. · Limit your activities · Recommended activity: Rest today, Do not climb stairs or shower unattended for the next 24 hours. · Do not drive and operate hazardous machinery · Do not make important personal or business decisions · Do  not drink alcoholic beverages · If you have not urinated within 8 hours after discharge, please contact your surgeon on call. Report the following to your surgeon: · Excessive pain, swelling, redness or odor of or around the surgical area · Temperature over 100.5 · Nausea and vomiting lasting longer than 4 hours or if unable to take medications · Any signs of decreased circulation or nerve impairment to extremity: change in color, persistent  numbness, tingling, coldness or increase pain ·  
·  
· You will receive a Post Operative Call from one of the Recovery Room Nurses on the day after your surgery to check on you. It is very important for us to know how you are recovering after your surgery. · You may receive an e-mail or letter in the mail from [employer] regarding your experience with us in the Ambulatory Surgery Unit. Your feedback is valuable to us and we appreciate your participation in the survey. · We wish youre a speedy recovery ? What to do at Home: *  Please give a list of your current medications to your Primary Care Provider. *  Please update this list whenever your medications are discontinued, doses are 
    changed, or new medications (including over-the-counter products) are added. *  Please carry medication information at all times in case of emergency situations. These are general instructions for a healthy lifestyle: No smoking/ No tobacco products/ Avoid exposure to second hand smoke Surgeon General's Warning:  Quitting smoking now greatly reduces serious risk to your health. Obesity, smoking, and sedentary lifestyle greatly increases your risk for illness A healthy diet, regular physical exercise & weight monitoring are important for maintaining a healthy lifestyle You may be retaining fluid if you have a history of heart failure or if you experience any of the following symptoms:  Weight gain of 3 pounds or more overnight or 5 pounds in a week, increased swelling in our hands or feet or shortness of breath while lying flat in bed.   Please call your doctor as soon as you notice any of these symptoms; do not wait until your next office visit. Recognize signs and symptoms of STROKE: 
 
B - Balance E - Eyes F-face looks uneven A-arms unable to move or move even S-speech slurred or non-existent T-time-call 911 as soon as signs and symptoms begin-DO NOT go Back to bed or wait to see if you get better-TIME IS BRAIN. If you have not received your influenza and/or pneumococcal vaccine, please follow up with your primary care physician. The discharge information has been reviewed with the patient and caregiver. The patient and caregiver verbalized understanding. Introducing [facility]! Dear Jethro Contreras: Thank you for requesting a [employer] account. Our records indicate that you already have an active [employer] account. You can access your account anytime at [email or web address] Did you know that you can access your hospital and ER discharge instructions at any time in Aut[employer]? You can also review all of your test results from your hospital stay or ER visit. Additional Information If you have questions, please visit the Frequently Asked Questions section of the [employer] website at [email or web address] Remember, Auterra is NOT to be used for urgent needs. For medical emergencies, dial 911. Now available from your iPhone and Android! Introducing Sony Perez As a [employer] patient, I wanted to make you aware of our electronic visit tool called Sony Perez. [employer] 24/7 allows you to connect within minutes with a medical provider 24 hours a day, seven days a week via a mobile device or tablet or logging into a secure website from your computer. You can access Sony Perez from anywhere in the [place].  
 
A virtual visit might be right for you when you have a simple condition and feel like you just dont want to get out of bed, or cant get away from work for an appointment, when your regular [facility] provider is not available (evenings, weekends or holidays), or when youre out of town and need minor care. Electronic visits cost only $49 and if the [facility] 24/7 provider determines a prescription is needed to treat your condition, one can be electronically transmitted to a nearby pharmacy*. Please take a moment to enroll today if you have not already done so. The enrollment process is free and takes just a few minutes. To enroll, please download the [employer] 24/7 pritesh to your tablet or phone, or visit [email or web address] to enroll on your computer. And, as an [address] [place] [address] patient with a [employer] account, the results of your visits will be scanned into your electronic medical record and your primary care provider will be able to view the scanned results. We urge you to continue to see your regular [facility] provider for your ongoing medical care. And while your primary care provider may not be the one available when you seek a [facility] virtual visit, the peace of mind you get from getting a real diagnosis real time can be priceless. For more information on [employer], view our Frequently Asked Questions (FAQs) at [email or web address]. Sincerely, 
 
Lida Linton MD 
Chief Medical Officer [employer] *:  certain medications cannot be prescribed via [employer] Providers Seen During Your Hospitalization Provider Specialty Primary office phone Jennifer Montez MD Ophthalmology [phone] Your Primary Care Physician (PCP) Primary Care Physician Office Phone Office Fax Tess Agustin, [phone] [phone] You are allergic to the following Allergen Reactions Percocet (Oxycodone-Acetaminophen) Rash Itching  
 irritated Benzene Other (comments) Blisters and burn area Benzene found to be on steri-strips Betadine (Povidone-Iodine) Other (comments) Blisters and burning Naproxen Itching Sulfa (Sulfonamide Antibiotics) Rash Adhesive Rash Redness and rash Recent Documentation Height Weight BMI OB Status Smoking Status 1.676 m 100.2 kg 35.67 kg/m2 Hysterectomy Never Smoker Emergency Contacts Name Discharge Info Relation Home Work Mobile Nohemi Lobo DISCHARGE CAREGIVER [3] Spouse [3] [phone]  [phone] [address] [place] [address] CAREGIVER [3] Spouse [3] [phone] Patient Belongings The following personal items are in your possession at time of discharge: 
  Dental Appliances: Lowers, Uppers, With patient  Visual Aid: Glasses (PACU) Please provide this summary of care documentation to your next provider. Signatures-by signing, you are acknowledging that this After Visit Summary has been reviewed with you and you have received a copy. Patient Signature:  ____________________________________________________________ Date:  ____________________________________________________________  
  
[facility] Provider Signature:  ____________________________________________________________ Date:  ____________________________________________________________

## 2018-09-21 NOTE — ANESTHESIA POSTPROCEDURE EVALUATION
Post-Anesthesia Evaluation and Assessment    Patient: Link Palmer MRN: [id number]  SSN: [id number]    YOB: 1950  Age: 76 y.o. Sex: female       Cardiovascular Function/Vital Signs  Visit Vitals    /59    Pulse 60    Temp 36.6 °C (97.8 °F)    Resp 22    Ht 5' 6\" (1.676 m)    Wt 98.9 kg (218 lb)    SpO2 96%    BMI 35.19 kg/m2       Patient is status post MAC anesthesia for Procedure(s):  LEFT EYE CATARACT EXTRACTION WITH INTRA OCULAR LENS IMPLANT. Nausea/Vomiting: None    Postoperative hydration reviewed and adequate. Pain:  Pain Scale 1: Numeric (0 - 10) (09/21/18 0907)  Pain Intensity 1: 0 (09/21/18 0907)   Managed    Neurological Status:   Neuro (WDL): Within Defined Limits (09/21/18 0907)   At baseline    Mental Status and Level of Consciousness: Arousable    Pulmonary Status:   O2 Device: Room air (09/21/18 0907)   Adequate oxygenation and airway patent    Complications related to anesthesia: None    Post-anesthesia assessment completed.  No concerns    Signed By: Viktoria Barrientos MD     September 21, 2018

## 2018-09-21 NOTE — PERIOP NOTES
Jelly Spence  1950  [id number]    Situation:  Verbal report given from: GEORGE Ni and WILLIAN Del Rio RN  Procedure: Procedure(s):  LEFT EYE CATARACT EXTRACTION WITH INTRA OCULAR LENS IMPLANT    Background:    Preoperative diagnosis: CATARACT LEFT EYE    Postoperative diagnosis: CATARACT LEFT EYE    :  Dr. Briana Theodore    Assistant(s): Circ-1: Everardo Hughes RN  Scrub Tech-1: Abel Wilks    Specimens: * No specimens in log *    Assessment:  Intra-procedure medications         Anesthesia gave intra-procedure sedation and medications, see anesthesia flow sheet     Intravenous fluids: LR@ KVO     Vital signs stable       Recommendation:    Permission to share finding with  Miguel Gilliland

## 2018-09-21 NOTE — ANESTHESIA PREPROCEDURE EVALUATION
Anesthetic History     PONV          Review of Systems / Medical History  Patient summary reviewed, nursing notes reviewed and pertinent labs reviewed    Pulmonary            Asthma (seasonal; no inhaler)     Comments: Pulmonary Htn  Normal Spirometry  Stable SOB   Neuro/Psych   Within defined limits           Cardiovascular    Hypertension        Dysrhythmias ( h/o palpitations)       Exercise tolerance: >4 METS  Comments: 06/18 ECHO= EF 55-60%, no pulmonary Htn     GI/Hepatic/Renal     GERD: poorly controlled           Endo/Other        Arthritis     Other Findings   Comments: CREST syndrome:  Calcinosis  Raynaud's  Esophageal dyf  etc         Physical Exam    Airway  Mallampati: III    Neck ROM: normal range of motion   Mouth opening: Normal     Cardiovascular    Rhythm: regular  Rate: normal      Pertinent negatives: No murmur   Dental    Dentition: Full upper dentures and Full lower dentures     Pulmonary  Breath sounds clear to auscultation               Abdominal  GI exam deferred       Other Findings            Anesthetic Plan    ASA: 3  Anesthesia type: MAC          Induction: Intravenous  Anesthetic plan and risks discussed with: Patient and Spouse

## 2018-09-27 ENCOUNTER — ANESTHESIA EVENT (OUTPATIENT)
Dept: SURGERY | Age: 68
End: 2018-09-27
Payer: MEDICARE

## 2018-09-28 ENCOUNTER — ANESTHESIA (OUTPATIENT)
Dept: SURGERY | Age: 68
End: 2018-09-28
Payer: MEDICARE

## 2018-09-28 ENCOUNTER — HOSPITAL ENCOUNTER (OUTPATIENT)
Age: 68
Setting detail: OUTPATIENT SURGERY
Discharge: HOME | End: 2018-09-28
Attending: SPECIALIST | Admitting: SPECIALIST
Payer: MEDICARE

## 2018-09-28 VITALS
OXYGEN SATURATION: 99 % | BODY MASS INDEX: 35.03 KG/M2 | WEIGHT: 218 LBS | RESPIRATION RATE: 18 BRPM | HEART RATE: 67 BPM | SYSTOLIC BLOOD PRESSURE: 143 MMHG | DIASTOLIC BLOOD PRESSURE: 78 MMHG | TEMPERATURE: 98.4 F | HEIGHT: 66 IN

## 2018-09-28 PROCEDURE — 76210000046 HC AMBSU PH II REC FIRST 0.5 HR: Performed by: SPECIALIST

## 2018-09-28 PROCEDURE — 77030020255 HC SOL INJ LR 1000ML BG: Performed by: SPECIALIST

## 2018-09-28 PROCEDURE — 76030000002 HC AMB SURG OR TIME FIRST 0.: Performed by: SPECIALIST

## 2018-09-28 PROCEDURE — 74011250636 HC RX REV CODE- 250/636: Performed by: ANESTHESIOLOGY

## 2018-09-28 PROCEDURE — 77030021352 HC CBL LD SYS DISP COVD -B: Performed by: SPECIALIST

## 2018-09-28 PROCEDURE — 76210000040 HC AMBSU PH I REC FIRST 0.5 HR: Performed by: SPECIALIST

## 2018-09-28 PROCEDURE — 74011250636 HC RX REV CODE- 250/636

## 2018-09-28 PROCEDURE — 76060000073 HC AMB SURG ANES FIRST 0.5 HR: Performed by: SPECIALIST

## 2018-09-28 RX ORDER — ONDANSETRON 2 MG/ML
4 INJECTION INTRAMUSCULAR; INTRAVENOUS AS NEEDED
Status: DISCONTINUED | OUTPATIENT
Start: 2018-09-28 | End: 2018-09-28 | Stop reason: HOSPADM

## 2018-09-28 RX ORDER — DIPHENHYDRAMINE HYDROCHLORIDE 50 MG/ML
12.5 INJECTION, SOLUTION INTRAMUSCULAR; INTRAVENOUS AS NEEDED
Status: DISCONTINUED | OUTPATIENT
Start: 2018-09-28 | End: 2018-09-28 | Stop reason: HOSPADM

## 2018-09-28 RX ORDER — SODIUM CHLORIDE 0.9 % (FLUSH) 0.9 %
5-10 SYRINGE (ML) INJECTION AS NEEDED
Status: DISCONTINUED | OUTPATIENT
Start: 2018-09-28 | End: 2018-09-28 | Stop reason: HOSPADM

## 2018-09-28 RX ORDER — LIDOCAINE HYDROCHLORIDE 20 MG/ML
INJECTION, SOLUTION EPIDURAL; INFILTRATION; INTRACAUDAL; PERINEURAL AS NEEDED
Status: DISCONTINUED | OUTPATIENT
Start: 2018-09-28 | End: 2018-09-28 | Stop reason: HOSPADM

## 2018-09-28 RX ORDER — SODIUM CHLORIDE 0.9 % (FLUSH) 0.9 %
5-10 SYRINGE (ML) INJECTION EVERY 8 HOURS
Status: DISCONTINUED | OUTPATIENT
Start: 2018-09-28 | End: 2018-09-28 | Stop reason: HOSPADM

## 2018-09-28 RX ORDER — DEXTROMETHORPHAN/PSEUDOEPHED 2.5-7.5/.8
1.2 DROPS ORAL
Status: DISCONTINUED | OUTPATIENT
Start: 2018-09-28 | End: 2018-09-28 | Stop reason: HOSPADM

## 2018-09-28 RX ORDER — LIDOCAINE HYDROCHLORIDE 10 MG/ML
0.1 INJECTION, SOLUTION EPIDURAL; INFILTRATION; INTRACAUDAL; PERINEURAL AS NEEDED
Status: DISCONTINUED | OUTPATIENT
Start: 2018-09-28 | End: 2018-09-28 | Stop reason: HOSPADM

## 2018-09-28 RX ORDER — PROPOFOL 10 MG/ML
INJECTION, EMULSION INTRAVENOUS AS NEEDED
Status: DISCONTINUED | OUTPATIENT
Start: 2018-09-28 | End: 2018-09-28 | Stop reason: HOSPADM

## 2018-09-28 RX ORDER — FENTANYL CITRATE 50 UG/ML
25 INJECTION, SOLUTION INTRAMUSCULAR; INTRAVENOUS
Status: DISCONTINUED | OUTPATIENT
Start: 2018-09-28 | End: 2018-09-28 | Stop reason: HOSPADM

## 2018-09-28 RX ORDER — SODIUM CHLORIDE, SODIUM LACTATE, POTASSIUM CHLORIDE, CALCIUM CHLORIDE 600; 310; 30; 20 MG/100ML; MG/100ML; MG/100ML; MG/100ML
25 INJECTION, SOLUTION INTRAVENOUS CONTINUOUS
Status: DISCONTINUED | OUTPATIENT
Start: 2018-09-28 | End: 2018-09-28 | Stop reason: HOSPADM

## 2018-09-28 RX ORDER — SODIUM CHLORIDE 9 MG/ML
50 INJECTION, SOLUTION INTRAVENOUS CONTINUOUS
Status: DISCONTINUED | OUTPATIENT
Start: 2018-09-28 | End: 2018-09-28 | Stop reason: HOSPADM

## 2018-09-28 RX ADMIN — PROPOFOL 20 MG: 10 INJECTION, EMULSION INTRAVENOUS at 14:06

## 2018-09-28 RX ADMIN — LIDOCAINE HYDROCHLORIDE 50 MG: 20 INJECTION, SOLUTION EPIDURAL; INFILTRATION; INTRACAUDAL; PERINEURAL at 14:02

## 2018-09-28 RX ADMIN — PROPOFOL 10 MG: 10 INJECTION, EMULSION INTRAVENOUS at 14:12

## 2018-09-28 RX ADMIN — PROPOFOL 20 MG: 10 INJECTION, EMULSION INTRAVENOUS at 14:09

## 2018-09-28 RX ADMIN — PROPOFOL 100 MG: 10 INJECTION, EMULSION INTRAVENOUS at 14:02

## 2018-09-28 RX ADMIN — SODIUM CHLORIDE, SODIUM LACTATE, POTASSIUM CHLORIDE, AND CALCIUM CHLORIDE 25 ML/HR: 600; 310; 30; 20 INJECTION, SOLUTION INTRAVENOUS at 12:55

## 2018-09-28 NOTE — H&P
Gastroenterology Outpatient History and Physical    Patient: Perry Cannon    Physician: Evelyn Watters MD    Vital Signs: Blood pressure 146/80, pulse 65, temperature 98.6 °F (37 °C), resp. rate 20, height 5' 6\" (1.676 m), weight 98.9 kg (218 lb), SpO2 98 %, not currently breastfeeding. Allergies:    Allergies   Allergen Reactions    Percocet [Oxycodone-Acetaminophen] Rash and Itching     irritated    Benzene Other (comments)     Blisters and burn area Benzene found to be on steri-strips    Betadine [Povidone-Iodine] Other (comments)     Blisters and burning    Naproxen Itching    Sulfa (Sulfonamide Antibiotics) Rash    Adhesive Rash     Redness and rash       Chief Complaint: Screening colonoscopy ; constipation    History of Present Illness: 77 yo WF for screening colonoscopy. + Constipation    Justification for Procedure: above    History:  Past Medical History:   Diagnosis Date    Adverse effect of anesthesia     woke up during hysterectomy    Arrhythmia     h/o palpitations normal work up 22/2015 heart: Ronni    Arthritis     Asthma     allergy to weather changing    Chronic pain     bulging disc c4/c5 l4/l5 : as of 9/1018 no neck/head movement limitation says patient    Color blind     CREST (calcinosis, Raynaud's phenomenon, esophageal dysfunction, sclerodactyly, telangiectasia) ([address] 2018    GERD (gastroesophageal reflux disease)     Gout     H/O arthroscopic knee surgery     H/O: hysterectomy     Hypertension     Ill-defined condition     gout    Ill-defined condition     l4-5 hernia disc    Leg swelling 2016    unknown etiology    Bartlett's neuralgia 2001    from neuroma middle toe, left foot    Musculoskeletal disorder     arthritis    Nausea & vomiting     surgery related    Other ill-defined conditions(799.89) 11/2013    RECTAL PROLASE    Pulmonary hypertension ([address] 08/2018    Heart: Dr. Payal Ochoa S/P appendectomy     Shortness of breath 2016    Pulmonary Dr. Mikael Woods    Unspecified adverse effect of anesthesia     wakes up for anesthesia early      Past Surgical History:   Procedure Laterality Date    CARDIAC SURG PROCEDURE UNLIST  2015    no blockages    CARDIAC SURG PROCEDURE UNLIST  05/2018    no blockages    COLONOSCOPY  04/20/2011    negative    HX APPENDECTOMY  1955    HX CHOLECYSTECTOMY      HX GI  11/19/13    Rectocele repair with enterocele repair    HX HEENT  09/21/2018    left cataract    HX HYSTERECTOMY      TOOK LEFT OVARY    HX KNEE REPLACEMENT Right 2016    HX LUMBAR FUSION  2014    c4-5 fusion cervical fusion    HX ORTHOPAEDIC Left 2010    left tkr x 2/numereous left knee surgeries    HX ORTHOPAEDIC  1990's    right knee partial meniscus     HX ORTHOPAEDIC  1973    ganglion cyst removed rt wrist    HX ORTHOPAEDIC  2013, 1992    bilateral CTR, and had ulna nerve release    HX ORTHOPAEDIC Right 2018    thumb and ring finger trigger release    HX ORTHOPAEDIC Left 2010, 2016    TKR      Social History     Social History    Marital status:      Spouse name: N/A    Number of children: N/A    Years of education: N/A     Social History Main Topics    Smoking status: Never Smoker    Smokeless tobacco: Never Used    Alcohol use No    Drug use: No    Sexual activity: Not Asked     Other Topics Concern    None     Social History Narrative      Family History   Problem Relation Age of Onset    Heart Disease Mother     Hypertension Mother     Diabetes Mother     Cancer Mother      BREAST, LUNG    Breast Cancer Mother 61    Heart Disease Father     Hypertension Father     Thyroid Disease Father     Diabetes Brother     Psychiatric Disorder Son      STAINS, [place], [place] DEPRESSIVE    Anesth Problems Neg Hx        Medications:   Prior to Admission medications    Medication Sig Start Date End Date Taking?  Authorizing Provider   isosorbide mononitrate ER (IMDUR) 30 mg tablet TAKE ONE-HALF (1/2) TABLET DAILY FOR RAYNAUDS PHENOMENON 7/11/18  Yes Osiel Wasserman MD   metoprolol tartrate (LOPRESSOR) 25 mg tablet TAKE ONE TABLET BY MOUTH TWICE DAILY 7/11/18  Yes Osiel Wasserman MD   oxybutynin chloride XL (DITROPAN XL) 10 mg CR tablet Take 10 mg by mouth daily. 5/29/18  Yes Historical Provider   baclofen (LIORESAL) 10 mg tablet Take 10 mg by mouth daily. 3/3/18  Yes Historical Provider   multivitamin (ONE A DAY) tablet Take 1 Tab by mouth daily. Yes Historical Provider   raNITIdine (ZANTAC) 150 mg tablet Take 150 mg by mouth daily. 10/23/16  Yes Historical Provider   aspirin delayed-release 81 mg tablet Take  by mouth daily. Yes Historical Provider   acetaminophen (TYLENOL EXTRA STRENGTH) 500 mg tablet Take 1,000 mg by mouth every six (6) hours as needed for Pain. Yes Historical Provider   OTHER,NON-FORMULARY, Take 2 Tabs by mouth daily. FIBER SUPPLEMENT    Yes Historical Provider   allopurinol (ZYLOPRIM) 100 mg tablet Take 100 mg by mouth daily. Yes Historical Provider   telmisartan-hydrochlorothiazide (MICARDIS HCT) 40-12.5 mg per tablet Take 1 Tab by mouth daily. Yes Historical Provider   pravastatin (PRAVACHOL) 40 mg tablet Take 40 mg by mouth nightly. Yes Historical Provider   cetirizine (ZYRTEC) 10 mg tablet Take 10 mg by mouth daily. Yes Historical Provider   montelukast (SINGULAIR) 10 mg tablet Take 10 mg by mouth nightly. Yes Historical Provider       Physical Exam:   General: alert, no distress   HEENT: Head: Normocephalic, no lesions, without obvious abnormality.    Heart: regular rate and rhythm, S1, S2 normal, no murmur, click, rub or gallop   Lungs: chest clear, no wheezing, rales, normal symmetric air entry   Abdominal: soft, NT/ND BS present   Neurological: Grossly normal   Extremities: extremities normal, atraumatic, no cyanosis or edema     Findings/Diagnosis: Screening; Constipation    Plan of Care/Planned Procedure: colonoscopy    Signed By: Remy Krishnamurthy MD     September 28, 2018

## 2018-09-28 NOTE — IP AVS SNAPSHOT
[address] [facility] 
[phone] Patient: China Miranda MRN: [id number] LYP:[id number] About your hospitalization You were admitted on:  September 28, 2018 You last received care in the:  [facility] [place] [facility] You were discharged on:  September 28, 2018 Why you were hospitalized Your primary diagnosis was:  Not on File Follow-up Information None Discharge Orders None A check duncan indicates which time of day the medication should be taken. My Medications CONTINUE taking these medications Instructions Each Dose to Equal  
 Morning Noon Evening Bedtime  
 allopurinol 100 mg tablet Commonly known as:  Alex Cassette Your last dose was: Your next dose is: Take 100 mg by mouth daily. 100 mg  
    
   
   
   
  
 aspirin delayed-release 81 mg tablet Your last dose was: Your next dose is: Take  by mouth daily. baclofen 10 mg tablet Commonly known as:  LIORESAL Your last dose was: Your next dose is: Take 10 mg by mouth daily. 10 mg  
    
   
   
   
  
 isosorbide mononitrate ER 30 mg tablet Commonly known as:  IMDUR Your last dose was: Your next dose is: TAKE ONE-HALF (1/2) TABLET DAILY FOR RAYNAUDS PHENOMENON  
     
   
   
   
  
 metoprolol tartrate 25 mg tablet Commonly known as:  LOPRESSOR Your last dose was: Your next dose is: TAKE ONE TABLET BY MOUTH TWICE DAILY  
     
   
   
   
  
 multivitamin tablet Commonly known as:  ONE A DAY Your last dose was: Your next dose is: Take 1 Tab by mouth daily. 1 Tab OTHER(NON-FORMULARY) Your last dose was: Your next dose is: Take 2 Tabs by mouth daily. FIBER SUPPLEMENT  
 2 Tab oxybutynin chloride XL 10 mg CR tablet Commonly known as:  DITROPAN XL Your last dose was: Your next dose is: Take 10 mg by mouth daily. 10 mg  
    
   
   
   
  
 pravastatin 40 mg tablet Commonly known as:  PRAVACHOL Your last dose was: Your next dose is: Take 40 mg by mouth nightly. 40 mg  
    
   
   
   
  
 raNITIdine 150 mg tablet Commonly known as:  ZANTAC Your last dose was: Your next dose is: Take 150 mg by mouth daily. 150 mg  
    
   
   
   
  
 SINGULAIR 10 mg tablet Generic drug:  montelukast  
   
Your last dose was: Your next dose is: Take 10 mg by mouth nightly. 10 mg  
    
   
   
   
  
 telmisartan-hydroCHLOROthiazide 40-12.5 mg per tablet Commonly known as:  MICARDIS HCT Your last dose was: Your next dose is: Take 1 Tab by mouth daily. 1 Tab TYLENOL EXTRA STRENGTH 500 mg tablet Generic drug:  acetaminophen Your last dose was: Your next dose is: Take 1,000 mg by mouth every six (6) hours as needed for Pain. 1000 mg  
    
   
   
   
  
 ZyrTEC 10 mg tablet Generic drug:  cetirizine Your last dose was: Your next dose is: Take 10 mg by mouth daily. 10 mg Discharge Instructions Raymundo Jung [id number] 
1950 COLON DISCHARGE INSTRUCTIONS Discomfort: 
Redness at IV site- apply warm compress to area; if redness or soreness persist- contact your physician There may be a slight amount of blood passed from the rectum Gaseous discomfort- walking, belching will help relieve any discomfort You may not operate a vehicle for 12 hours You may not engage in an occupation involving machinery or appliances for rest of today You may not drink alcoholic beverages for at least 12 hours Avoid making any critical decisions for at least 24 hour DIET: 
 Regular diet.  however -  remember your colon is empty and a heavy meal will produce gas. Avoid these foods:  vegetables, fried / greasy foods, carbonated drinks for today. MEDICATIONS: 
  
 
 Regarding Aspirin or Nonsteroidal medications, please see below. ACTIVITY: 
You may resume your normal daily activities it is recommended that you spend the remainder of the day resting -  avoid any strenuous activity. CALL M.D. ANY SIGN OF: Increasing pain, nausea, vomiting Abdominal distension (swelling) New increased bleeding (oral or rectal) Fever (chills) Pain in chest area Bloody discharge from nose or mouth Shortness of breath ONLY  Tylenol as needed for pain. Follow-up Instructions: 
 Call Dr. Shagufta Almanza for questions about procedure at telephone #  [phone] DO NOT TAKE TYLENOL/ACETAMINOPHEN WITH PERCOCET, LORTAB, [address] TAKE NARCOTIC PAIN MEDICATIONS WITH FOOD If given 2 pain narcotics do NOT take together! Narcotics tend to be constipating, we suggest taking a stool softener such as Colace or Miralax (follow package instructions). DO NOT DRIVE WHILE TAKING NARCOTIC PAIN MEDICATIONS. DO NOT TAKE SLEEPING MEDICATIONS OR ANTIANXIETY MEDICATIONS WHILE TAKING NARCOTIC PAIN MEDICATIONS,  ESPECIALLY THE NIGHT OF ANESTHESIA! CPAP PATIENTS BE SURE TO WEAR MACHINE WHENEVER NAPPING OR SLEEPING! DISCHARGE SUMMARY from Nurse The following personal items collected during your admission are returned to you:  
Dental Appliance: Dental Appliances: At home Vision: Visual Aid: Glasses Hearing Aid:   
Jewelry:   
Clothing:   
Other Valuables:   
Valuables sent to safe:   
 
 
PATIENT INSTRUCTIONS: 
 
 
B - Balance E - Eyes F-  Face looks uneven A-  Arms unable to move or move even S-  Speech slurred or non-existent T-  Time-call 911 as soon as signs and symptoms begin-DO NOT go Back to bed or wait to see if you get better-TIME IS BRAIN. If you have not received your influenza and/or pneumococcal vaccine, please follow up with your primary care physician. The discharge information has been reviewed with the patient and caregiver. The patient and caregiver verbalized understanding. Introducing [facility]! Dear Shaquille Taylor: Thank you for requesting a [employer] account. Our records indicate that you already have an active [employer] account.   You can access your account anytime at [email or web address] Did you know that you can access your hospital and ER discharge instructions at any time in BUILD? You can also review all of your test results from your hospital stay or ER visit. Additional Information If you have questions, please visit the Frequently Asked Questions section of the BUILD website at [email or web address] Remember, BUILD is NOT to be used for urgent needs. For medical emergencies, dial 911. Now available from your iPhone and Android! Introducing Sony Perez As a [employer] patient, I wanted to make you aware of our electronic visit tool called Sony Perez. [employer] allows you to connect within minutes with a medical provider 24 hours a day, seven days a week via a mobile device or tablet or logging into a secure website from your computer. You can access Sony Perez from anywhere in the [place]. A virtual visit might be right for you when you have a simple condition and feel like you just dont want to get out of bed, or cant get away from work for an appointment, when your regular [employer] provider is not available (evenings, weekends or holidays), or when youre out of town and need minor care. Electronic visits cost only $49 and if the M[employer] provider determines a prescription is needed to treat your condition, one can be electronically transmitted to a nearby pharmacy*. Please take a moment to enroll today if you have not already done so. The enrollment process is free and takes just a few minutes. To enroll, please download the Mashed Pixel[employer] pritesh to your tablet or phone, or visit [email or web address] to enroll on your computer.    
And, as an [address] [place] [address] patient with a [employer] account, the results of your visits will be scanned into your electronic medical record and your primary care provider will be able to view the scanned results. We urge you to continue to see your regular [facility] provider for your ongoing medical care. And while your primary care provider may not be the one available when you seek a [employer] virtual visit, the peace of mind you get from getting a real diagnosis real time can be priceless. For more information on [employer], view our Frequently Asked Questions (FAQs) at [email or web address]. Sincerely, 
 
Sameer Marie MD 
Chief Medical Officer [employer] *:  certain medications cannot be prescribed via [employer] Providers Seen During Your Hospitalization Provider Specialty Primary office phone Lee Lawson MD Gastroenterology [phone] Your Primary Care Physician (PCP) Primary Care Physician Office Phone Office Fax Jaquan Zavala, [address] [phone] You are allergic to the following Allergen Reactions Percocet (Oxycodone-Acetaminophen) Rash Itching  
 irritated Benzene Other (comments) Blisters and burn area Benzene found to be on steri-strips Betadine (Povidone-Iodine) Other (comments) Blisters and burning Naproxen Itching Sulfa (Sulfonamide Antibiotics) Rash Adhesive Rash Redness and rash Recent Documentation Height Weight Breastfeeding? BMI OB Status Smoking Status 1.676 m 98.9 kg No 35.19 kg/m2 Hysterectomy Never Smoker Emergency Contacts Name Discharge Info Relation Home Work Mobile Chaidez,Arvvero DISCHARGE CAREGIVER [3] Spouse [3] [phone] Patient Belongings The following personal items are in your possession at time of discharge: 
  Dental Appliances: At home  Visual Aid: Glasses Please provide this summary of care documentation to your next provider. Signatures-by signing, you are acknowledging that this After Visit Summary has been reviewed with you and you have received a copy. Patient Signature:  ____________________________________________________________ Date:  ____________________________________________________________  
  
Mikayla Chris Provider Signature:  ____________________________________________________________ Date:  ____________________________________________________________

## 2018-09-28 NOTE — ANESTHESIA POSTPROCEDURE EVALUATION
Post-Anesthesia Evaluation and Assessment    Patient: Leni Guardado MRN: [id number]  SSN: [id number]    YOB: 1950  Age: 76 y.o. Sex: female       Cardiovascular Function/Vital Signs  Visit Vitals    /60    Pulse 70    Temp 36.9 °C (98.4 °F)    Resp 20    Ht 5' 6\" (1.676 m)    Wt 98.9 kg (218 lb)    SpO2 96%    Breastfeeding No    BMI 35.19 kg/m2       Patient is status post general, total IV anesthesia anesthesia for Procedure(s):  COLONOSCOPY. Nausea/Vomiting: None    Postoperative hydration reviewed and adequate. Pain:  Pain Scale 1: Numeric (0 - 10) (09/28/18 1244)  Pain Intensity 1: 0 (09/28/18 1244)   Managed    Neurological Status: At baseline    Mental Status and Level of Consciousness: Arousable    Pulmonary Status:   O2 Device: Room air (09/28/18 1418)   Adequate oxygenation and airway patent    Complications related to anesthesia: None    Post-anesthesia assessment completed.  No concerns    Signed By: Monico George MD     September 28, 2018

## 2018-09-28 NOTE — PERIOP NOTES
Miguelhank Pate  1950  [id number]    Situation:  Verbal report given from: Camryn Winters CRNA and ALLISON Richard RN  Procedure: Procedure(s):  COLONOSCOPY    Background:    Preoperative diagnosis: ABDOMINAL TENDERNESS, EPIGASTRIC, BLOATING SYMPTOM, EPIGASTRIC PAIN    Postoperative diagnosis: Diverticulosis    :  Dr. Garcia Ba): Circ-1: Manjeet Singh  Circ-2: Linda Esquivel RN  Scrub Tech-1: Elvia Nelson    Specimens: * No specimens in log *    Assessment:  Intra-procedure medications         Anesthesia gave intra-procedure sedation and medications, see anesthesia flow sheet     Intravenous fluids: LR@ KVO     Vital signs stable       Recommendation:    Permission to share finding with

## 2018-09-28 NOTE — ANESTHESIA PREPROCEDURE EVALUATION
Anesthetic History     PONV          Review of Systems / Medical History  Patient summary reviewed, nursing notes reviewed and pertinent labs reviewed    Pulmonary            Asthma (seasonal; no inhaler)     Comments: Pulmonary Htn  Normal Spirometry  Stable SOB   Neuro/Psych   Within defined limits           Cardiovascular    Hypertension        Dysrhythmias ( h/o palpitations)       Exercise tolerance: >4 METS  Comments: 06/18 ECHO= EF 55-60%, no pulmonary Htn     GI/Hepatic/Renal     GERD: poorly controlled           Endo/Other        Arthritis     Other Findings   Comments: CREST syndrome:  Calcinosis  Raynaud's  Esophageal dyf  etc         Physical Exam    Airway  Mallampati: III    Neck ROM: normal range of motion   Mouth opening: Normal     Cardiovascular    Rhythm: regular  Rate: normal      Pertinent negatives: No murmur   Dental    Dentition: Full upper dentures and Full lower dentures     Pulmonary  Breath sounds clear to auscultation               Abdominal  GI exam deferred       Other Findings            Anesthetic Plan    ASA: 3  Anesthesia type: general and total IV anesthesia          Induction: Intravenous  Anesthetic plan and risks discussed with: Patient and Spouse      Took BB at 8 am

## 2018-09-28 NOTE — PROCEDURES
Colonoscopy Procedure Note    Indications:   Screening colonoscopy    Referring Physician: Iris Clayton MD  Anesthesia/Sedation: MAC anesthesia Propofol  Endoscopist:  Dr. Stefani Foley    Procedure in Detail:  Informed consent was obtained for the procedure, including sedation. Risks of perforation, hemorrhage, adverse drug reaction, and aspiration were discussed. The patient was placed in the left lateral decubitus position. Based on the pre-procedure assessment, including review of the patient's medical history, medications, allergies, and review of systems, she had been deemed to be an appropriate candidate for moderate sedation; she was therefore sedated with the medications listed above. The patient was monitored continuously with ECG tracing, pulse oximetry, blood pressure monitoring, and direct observations. A rectal examination was performed. The [id number] was inserted into the rectum and advanced under direct vision to the cecum and terminal ileum, which was identified by the ileocecal valve and appendiceal orifice. The quality of the colonic preparation was adequate. A careful inspection was made as the colonoscope was withdrawn, including a retroflexed view of the rectum; findings and interventions are described below. Appropriate photodocumentation was obtained. Findings:     1. Scope advanced to the cecum and terminal ileum. 2.  There was normal mucosa throughout. 3.  Scattered wide mouthed diverticulosis of mild to moderate severity in the sigmoid and descending colon. 4.  No polyps seen. 5.  Small internal hemorrhoids. Therapies:  none    Specimen:  none     Complications: None were encountered during the procedure. EBL: < 10 ml.     Recommendations:   -repeat colonoscopy in 10 years  Signed By: Markel Sena MD                        September 28, 2018

## 2018-10-22 NOTE — PERIOP NOTES
[facility]  Ambulatory Surgery Unit  Pre-operative Instructions    Surgery/Procedure Date  Monday, Oct 29, 2018            Tentative Arrival Time 0700      1. On the day of your surgery/procedure, please report to the Ambulatory Surgery Unit Registration Desk and sign in at your designated time. The Ambulatory Surgery Unit is located in [facility] on the [facility] side of the [facility] across from the [address] [place] [address]. Please have all of your health insurance cards and a photo ID. 2. You must have someone with you to drive you home, as you should not drive a car for 24 hours following anesthesia. Please make arrangements for a responsible adult friend or family member to stay with you for at least the first 24 hours after your surgery. 3. Do not have anything to eat or drink (including water, gum, mints, coffee, juice) after 11:59 PM, Sunday. This may not apply to medications prescribed by your physician. (Please note below the special instructions with medications to take the morning of surgery, if applicable.)    4. We recommend you do not drink any alcoholic beverages for 24 hours before and after your surgery. 5. Contact your surgeons office for instructions on the following medications: non-steroidal anti-inflammatory drugs (i.e. Advil, Aleve), vitamins, and supplements. (Some surgeons will want you to stop these medications prior to surgery and others may allow you to take them)   **If you are currently taking Plavix, Coumadin, Aspirin and/or other blood-thinning agents, contact your surgeon for instructions. ** Your surgeon will partner with the physician prescribing these medications to determine if it is safe to stop or if you need to continue taking. Please do not stop taking these medications without instructions from your surgeon.     6. In an effort to help prevent surgical site infection, we ask that you shower with an anti-bacterial soap (i.e. Dial or Safeguard) for 3 days prior to and on the morning of surgery, using a fresh towel after each shower. (Please begin this process with fresh bed linens.) Do not apply any lotions, powders, or deodorants after the shower on the day of your procedure. If applicable, please do not shave the operative site for 48 hours prior to surgery. 7. Wear comfortable clothes. Wear glasses instead of contacts. Do not bring any jewelry or money (other than copays or fees as instructed). Do not wear make-up, particularly mascara, the morning of your surgery. Do not wear nail polish, particularly if you are having foot /hand surgery. Wear your hair loose or down, no ponytails, buns, thomas pins or clips. All body piercings must be removed. 8. You should understand that if you do not follow these instructions your surgery may be cancelled. If your physical condition changes (i.e. fever, cold or flu) please contact your surgeon as soon as possible. 9. It is important that you be on time. If a situation occurs where you may be late, or if you have any questions or problems, please call [phone].    10. Your surgery time may be subject to change. You will receive a phone call the day prior to surgery to confirm your arrival time. 11. Pediatric patients: please bring a change of clothes, diapers, bottle/sippy cup, pacifier, etc.      Special Instructions: Take all medications and inhalers, as prescribed, on the morning of surgery with a sip of water EXCEPT: no over the counter medications day of surgery    I understand a pre-operative phone call will be made to verify my surgery time. In the event that I am not available, I give permission for a message to be left on my answering service and/or with another person?       yes    Preop instructions reviewed  Pt verbalized understanding.      ___________________      ___________________      ________________  (Signature of Patient)          (Witness)                   (Date and Time)

## 2018-10-23 ENCOUNTER — OFFICE VISIT (OUTPATIENT)
Dept: SURGERY | Age: 68
End: 2018-10-23

## 2018-10-23 VITALS
HEIGHT: 66 IN | BODY MASS INDEX: 35.71 KG/M2 | TEMPERATURE: 97.9 F | WEIGHT: 222.2 LBS | RESPIRATION RATE: 16 BRPM | DIASTOLIC BLOOD PRESSURE: 70 MMHG | HEART RATE: 55 BPM | SYSTOLIC BLOOD PRESSURE: 112 MMHG | OXYGEN SATURATION: 95 %

## 2018-10-23 DIAGNOSIS — M62.08 DIASTASIS RECTI: Primary | ICD-10-CM

## 2018-10-23 PROBLEM — E66.01 SEVERE OBESITY (HCC): Status: ACTIVE | Noted: 2018-10-23
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
## 2018-10-23 NOTE — PROGRESS NOTES
Chief Complaint   Patient presents with    Possible Hernia     Seen at the request of Dr. Rebel Kim, eval hiatal hernia. 1. Have you been to the ER, urgent care clinic since your last visit? Hospitalized since your last visit? No    2. Have you seen or consulted any other health care providers outside of the [facility] since your last visit? Include any pap smears or colon screening.   Dr. Alva Ross,

## 2018-10-26 ENCOUNTER — ANESTHESIA EVENT (OUTPATIENT)
Dept: SURGERY | Age: 68
End: 2018-10-26
Payer: MEDICARE

## 2018-10-29 ENCOUNTER — ANESTHESIA (OUTPATIENT)
Dept: SURGERY | Age: 68
End: 2018-10-29
Payer: MEDICARE

## 2018-10-29 ENCOUNTER — HOSPITAL ENCOUNTER (OUTPATIENT)
Age: 68
Setting detail: OUTPATIENT SURGERY
Discharge: HOME | End: 2018-10-29
Attending: OPHTHALMOLOGY | Admitting: OPHTHALMOLOGY
Payer: MEDICARE

## 2018-10-29 VITALS
OXYGEN SATURATION: 98 % | HEART RATE: 62 BPM | SYSTOLIC BLOOD PRESSURE: 144 MMHG | BODY MASS INDEX: 35.68 KG/M2 | WEIGHT: 222 LBS | TEMPERATURE: 98 F | RESPIRATION RATE: 11 BRPM | HEIGHT: 66 IN | DIASTOLIC BLOOD PRESSURE: 52 MMHG

## 2018-10-29 PROCEDURE — 76210000046 HC AMBSU PH II REC FIRST 0.5 HR: Performed by: OPHTHALMOLOGY

## 2018-10-29 PROCEDURE — 77030021352 HC CBL LD SYS DISP COVD -B: Performed by: OPHTHALMOLOGY

## 2018-10-29 PROCEDURE — 76030000000 HC AMB SURG OR TIME 0.5 TO 1: Performed by: OPHTHALMOLOGY

## 2018-10-29 PROCEDURE — 74011250636 HC RX REV CODE- 250/636: Performed by: OPHTHALMOLOGY

## 2018-10-29 PROCEDURE — 76060000061 HC AMB SURG ANES 0.5 TO 1 HR: Performed by: OPHTHALMOLOGY

## 2018-10-29 PROCEDURE — 74011000250 HC RX REV CODE- 250: Performed by: OPHTHALMOLOGY

## 2018-10-29 PROCEDURE — V2632 POST CHMBR INTRAOCULAR LENS: HCPCS | Performed by: OPHTHALMOLOGY

## 2018-10-29 PROCEDURE — 77030018846 HC SOL IRR STRL H20 ICUM -A: Performed by: OPHTHALMOLOGY

## 2018-10-29 PROCEDURE — 74011250636 HC RX REV CODE- 250/636

## 2018-10-29 DEVICE — LENS IOL POST 1-PC 6X13 23.5 -- ACRYSOF: Type: IMPLANTABLE DEVICE | Site: EYE | Status: FUNCTIONAL

## 2018-10-29 RX ORDER — LIDOCAINE HYDROCHLORIDE 10 MG/ML
0.1 INJECTION, SOLUTION EPIDURAL; INFILTRATION; INTRACAUDAL; PERINEURAL AS NEEDED
Status: DISCONTINUED | OUTPATIENT
Start: 2018-10-29 | End: 2018-10-29 | Stop reason: HOSPADM

## 2018-10-29 RX ORDER — SODIUM CHLORIDE 9 MG/ML
25 INJECTION, SOLUTION INTRAVENOUS CONTINUOUS
Status: DISCONTINUED | OUTPATIENT
Start: 2018-10-29 | End: 2018-10-29 | Stop reason: HOSPADM

## 2018-10-29 RX ORDER — SODIUM CHLORIDE, SODIUM LACTATE, POTASSIUM CHLORIDE, CALCIUM CHLORIDE 600; 310; 30; 20 MG/100ML; MG/100ML; MG/100ML; MG/100ML
25 INJECTION, SOLUTION INTRAVENOUS CONTINUOUS
Status: DISCONTINUED | OUTPATIENT
Start: 2018-10-29 | End: 2018-10-29 | Stop reason: HOSPADM

## 2018-10-29 RX ORDER — ONDANSETRON 2 MG/ML
4 INJECTION INTRAMUSCULAR; INTRAVENOUS AS NEEDED
Status: DISCONTINUED | OUTPATIENT
Start: 2018-10-29 | End: 2018-10-29 | Stop reason: HOSPADM

## 2018-10-29 RX ORDER — TIMOLOL MALEATE 5 MG/ML
SOLUTION/ DROPS OPHTHALMIC AS NEEDED
Status: DISCONTINUED | OUTPATIENT
Start: 2018-10-29 | End: 2018-10-29 | Stop reason: HOSPADM

## 2018-10-29 RX ORDER — SODIUM CHLORIDE 0.9 % (FLUSH) 0.9 %
5-10 SYRINGE (ML) INJECTION EVERY 8 HOURS
Status: DISCONTINUED | OUTPATIENT
Start: 2018-10-29 | End: 2018-10-29 | Stop reason: HOSPADM

## 2018-10-29 RX ORDER — SODIUM CHLORIDE 0.9 % (FLUSH) 0.9 %
5-10 SYRINGE (ML) INJECTION AS NEEDED
Status: DISCONTINUED | OUTPATIENT
Start: 2018-10-29 | End: 2018-10-29 | Stop reason: HOSPADM

## 2018-10-29 RX ORDER — OFLOXACIN 3 MG/ML
1 SOLUTION/ DROPS OPHTHALMIC
Status: COMPLETED | OUTPATIENT
Start: 2018-10-29 | End: 2018-10-29

## 2018-10-29 RX ORDER — TROPICAMIDE 10 MG/ML
1 SOLUTION/ DROPS OPHTHALMIC
Status: COMPLETED | OUTPATIENT
Start: 2018-10-29 | End: 2018-10-29

## 2018-10-29 RX ORDER — FENTANYL CITRATE 50 UG/ML
25 INJECTION, SOLUTION INTRAMUSCULAR; INTRAVENOUS
Status: DISCONTINUED | OUTPATIENT
Start: 2018-10-29 | End: 2018-10-29 | Stop reason: HOSPADM

## 2018-10-29 RX ORDER — MIDAZOLAM HYDROCHLORIDE 1 MG/ML
INJECTION, SOLUTION INTRAMUSCULAR; INTRAVENOUS AS NEEDED
Status: DISCONTINUED | OUTPATIENT
Start: 2018-10-29 | End: 2018-10-29 | Stop reason: HOSPADM

## 2018-10-29 RX ORDER — TETRACAINE HYDROCHLORIDE 5 MG/ML
SOLUTION OPHTHALMIC AS NEEDED
Status: DISCONTINUED | OUTPATIENT
Start: 2018-10-29 | End: 2018-10-29 | Stop reason: HOSPADM

## 2018-10-29 RX ORDER — OFLOXACIN 3 MG/ML
SOLUTION/ DROPS OPHTHALMIC AS NEEDED
Status: DISCONTINUED | OUTPATIENT
Start: 2018-10-29 | End: 2018-10-29 | Stop reason: HOSPADM

## 2018-10-29 RX ORDER — DIPHENHYDRAMINE HYDROCHLORIDE 50 MG/ML
12.5 INJECTION, SOLUTION INTRAMUSCULAR; INTRAVENOUS AS NEEDED
Status: DISCONTINUED | OUTPATIENT
Start: 2018-10-29 | End: 2018-10-29 | Stop reason: HOSPADM

## 2018-10-29 RX ADMIN — OFLOXACIN 1 DROP: 3 SOLUTION OPHTHALMIC at 07:21

## 2018-10-29 RX ADMIN — SODIUM CHLORIDE 25 ML/HR: 900 INJECTION, SOLUTION INTRAVENOUS at 07:19

## 2018-10-29 RX ADMIN — OFLOXACIN 1 DROP: 3 SOLUTION OPHTHALMIC at 07:16

## 2018-10-29 RX ADMIN — TROPICAMIDE 1 DROP: 10 SOLUTION/ DROPS OPHTHALMIC at 07:15

## 2018-10-29 RX ADMIN — TROPICAMIDE 1 DROP: 10 SOLUTION/ DROPS OPHTHALMIC at 07:21

## 2018-10-29 RX ADMIN — OFLOXACIN 1 DROP: 3 SOLUTION OPHTHALMIC at 07:25

## 2018-10-29 RX ADMIN — TROPICAMIDE 1 DROP: 10 SOLUTION/ DROPS OPHTHALMIC at 07:24

## 2018-10-29 RX ADMIN — MIDAZOLAM HYDROCHLORIDE 1 MG: 1 INJECTION, SOLUTION INTRAMUSCULAR; INTRAVENOUS at 08:52

## 2018-10-29 RX ADMIN — MIDAZOLAM HYDROCHLORIDE 1 MG: 1 INJECTION, SOLUTION INTRAMUSCULAR; INTRAVENOUS at 08:47

## 2018-10-29 NOTE — OP NOTES
Date of Procedure: 10/29/18  Preoperative Diagnosis: Nuclear Sclerotic Cataract right eye H25.11  Postoperative Diagnosis: Nuclear Sclerotic Cataract right eye H25.11  Procedure: Extracapsular cataract extraction with lens implant right eye  Surgeon: Jeanie Pinto MD  Assistants: None  Anesthesia: MAC with local  Estimated Blood Loss: None  Complications: None  Findings: Cataract right eye  Specimens: None  Prosthetic devices implanted: Intraocular lens implant, right eye    The patient's right eye was dilated with mydriacyl 1% and ofloxacin 0.3% for 3 doses preoperatively. The patient was taken to the operating room and was given sedation. Tetracaine was given topically to the right eye, and the eye was prepped and draped in the usual manner for sterile eye surgery, however, Betadine was not used due to a severe Betadine allergy. Instead, a more extensive alcohol prep was used followed by baby shampoo. The eyelashes were isolated with a plastic drape. A lid speculum was placed. A #15 blade was used to make a paracentesis at the 10:30 location. The eye was flushed with a lidocaine / epinephrine mixture (\"Shugarcaine\"). The eye was filled with Viscoat (Duovisc), and a crescent blade was used to make a 2.5 mm incision at the limbus temporally. This was dissected 2 mm into clear cornea, and the eye was entered with a 2.4 mm keratome. A 0.12 forceps was used for fixation during the procedure. A capsulorhexis flap was started with a cystotome, and this was completed 360 degrees with Utrata forceps. The capsular piece was removed. [place] dissection was performed with the \"Shugarcaine\" mixture on a cannula. The lens nucleus was removed using phacoemulsification with a total phaco time of 1:59 minutes at 8.4%. The lens was cracked and manipulated with a Sinsky hook. Residual cortex was removed using irrigation / aspiration. The capsule remained intact.     The capsule was refilled with Provisc (Duovisc), and an Ra Intraocular lens model [id number] power 23.50 was placed in a lens folding cartridge with Provisc. The lens was unfolded into the capsular bag. The lens centered well. Residual Provisc and Viscoat were removed using I / A. The Resure wound sealant was used to close the incision. The eye was flushed with BSS through the paracentesis. The eye was left soft and formed at the end of the case. Betadine solution was placed on the conjunctival surface at the end of the case. The incision site was water tight. The speculum was removed, and a drop of timolol 0.5% and maxitrol ointment was placed on the eye followed by a shield. The patient tolerated the procedure well and is to follow-up in one day.

## 2018-10-29 NOTE — PERIOP NOTES
Jared Bravo  1950  [id number]    Situation:  Verbal report given from: ARI Ortega CRNA, Leonel Whitehead RN  Procedure: Procedure(s):  CATARACT EXTRACTION WITH INTRA OCULAR LENS IMPLANT RIGHT EYE    Background:    Preoperative diagnosis: Age-related nuclear cataract of right eye [H25.11]    Postoperative diagnosis: Age-related nuclear cataract of right eye [H25.11]    :  Dr. Kayla Draper    Assistant(s): Circ-1: Marie Harper RN  Scrub Tech-1: Abebe Roy    Specimens: * No specimens in log *    Assessment:  Intra-procedure medications         Anesthesia gave intra-procedure sedation and medications, see anesthesia flow sheet     Intravenous fluids: LR@ KVO     Vital signs stable       Recommendation:    Permission to share finding with  : yes

## 2018-10-29 NOTE — DISCHARGE INSTRUCTIONS
MD SAIMA [facility]  [place], [facility]  Phone: [phone]       Fax: [phone]  If you are unable to keep appointment, kindly give 24 hours notice please. REMOVE PATCH  START DROPS WHEN YOU GET HOME  PUT PATCH BACK ON AT BEDTIME    1. DO NOT RUB the eye that was operated on. 2. Do not strain excessively. It is all right to bend as long as you do not strain. 3. It is safe to take a shower, wash your face, and wash your hair. Just keep the eye closed. 4. Do not swim for 1 week after surgery. 5. If you have any problems or questions, do not hesitate to call. There is always a physician on call at [phone] ext. [phone].   6. Follow instructions on eye drops from office. 7. You may take Tylenol or Advil for discomfort. If it pressure not relieved by Tylenol or Advil, please call Dr. Gamal Abrams office. If you were given prescriptions, please review the written information on the prescribed medications. DO NOT DRIVE WHILE TAKING NARCOTIC PAIN MEDICATIONS. DISCHARGE SUMMARY from Nurse    The following personal items collected during your admission are returned to you:   Dental Appliance: Dental Appliances: Lowers, Uppers, With patient  Vision: Visual Aid: Glasses  Hearing Aid:    Jewelry: Jewelry: None  Clothing: Clothing: With patient  Other Valuables: Other Valuables: None  Valuables sent to safe:      PATIENT INSTRUCTIONS:    After general anesthesia or intravenous sedation, for 24 hours or while taking prescription Narcotics:  · Someone should be with you for the next 24 hours. · For your own safety, a responsible adult must drive you home. · Limit your activities  · Recommended activity: Rest today, Do not climb stairs or shower unattended for the next 24 hours.   · Do not drive and operate hazardous machinery  · Do not make important personal or business decisions  · Do  not drink alcoholic beverages  · If you have not urinated within 8 hours after discharge, please contact your surgeon on call. Report the following to your surgeon:  · Excessive pain, swelling, redness or odor of or around the surgical area  · Temperature over 100.5  · Nausea and vomiting lasting longer than 4 hours or if unable to take medications  · Any signs of decreased circulation or nerve impairment to extremity: change in color, persistent  numbness, tingling, coldness or increase pain  ·   ·   · You will receive a Post Operative Call from one of the Recovery Room Nurses on the day after your surgery to check on you. It is very important for us to know how you are recovering after your surgery. · You may receive an e-mail or letter in the mail from [employer] regarding your experience with us in the Ambulatory Surgery Unit. Your feedback is valuable to us and we appreciate your participation in the survey. · We wish youre a speedy recovery ? What to do at Home:      *  Please give a list of your current medications to your Primary Care Provider. *  Please update this list whenever your medications are discontinued, doses are      changed, or new medications (including over-the-counter products) are added. *  Please carry medication information at all times in case of emergency situations. These are general instructions for a healthy lifestyle:    No smoking/ No tobacco products/ Avoid exposure to second hand smoke    Surgeon General's Warning:  Quitting smoking now greatly reduces serious risk to your health.     Obesity, smoking, and sedentary lifestyle greatly increases your risk for illness    A healthy diet, regular physical exercise & weight monitoring are important for maintaining a healthy lifestyle    You may be retaining fluid if you have a history of heart failure or if you experience any of the following symptoms:  Weight gain of 3 pounds or more overnight or 5 pounds in a week, increased swelling in our hands or feet or shortness of breath while lying flat in bed. Please call your doctor as soon as you notice any of these symptoms; do not wait until your next office visit. Recognize signs and symptoms of STROKE:    B - Balance  E - Eyes    F-face looks uneven    A-arms unable to move or move even    S-speech slurred or non-existent    T-time-call 911 as soon as signs and symptoms begin-DO NOT go       Back to bed or wait to see if you get better-TIME IS BRAIN. If you have not received your influenza and/or pneumococcal vaccine, please follow up with your primary care physician. The discharge information has been reviewed with the patient and caregiver. The patient and caregiver verbalized understanding.

## 2018-10-29 NOTE — H&P
Surgery History and Physcial    Subjective:      Alissa Mariano is a 76 y.o. female with visually significant cataract right eye for cataract extraction, lens implant right eye.     Patient Active Problem List    Diagnosis Date Noted    Severe obesity ([address] 10/23/2018    Chronic venous hypertension (idiopathic) with inflammation of left lower extremity 02/06/2017    Venous insufficiency of left leg 11/22/2016    Venous reflux 08/16/2016    Osteoarthritis 01/21/2016    OA (osteoarthritis) of knee 01/21/2016    Palpitations 06/16/2015    Abnormal ankle brachial index 04/08/2015    SOB (shortness of breath) 02/24/2015    Right leg pain 02/24/2015    HNP (herniated nucleus pulposus), cervical 07/07/2014    Esophageal reflux 06/27/2012    Mixed hyperlipidemia 06/27/2012    Foot pain 07/28/2011    Asthma 07/28/2011     Past Medical History:   Diagnosis Date    Adverse effect of anesthesia     woke up during hysterectomy    Arrhythmia     h/o palpitations normal work up 22/2015 heart: Ronni    Arthritis     Asthma     allergy to weather changing    Cataract     Chronic pain     bulging disc c4/c5 l4/l5 : as of 9/1018 no neck/head movement limitation says patient    Color blind     CREST (calcinosis, Raynaud's phenomenon, esophageal dysfunction, sclerodactyly, telangiectasia) ([address] 2018    GERD (gastroesophageal reflux disease)     Gout     H/O arthroscopic knee surgery     H/O: hysterectomy     Hypertension     Ill-defined condition     gout    Ill-defined condition     l4-5 hernia disc    Leg swelling 2016    unknown etiology    Bartlett's neuralgia 2001    from neuroma middle toe, left foot    Musculoskeletal disorder     arthritis    Nausea & vomiting     surgery related    Other ill-defined conditions(799.89) 11/2013    RECTAL PROLASE    Pulmonary hypertension ([address] 08/2018    Heart: Dr. Letty Brown S/P appendectomy     Shortness of breath 2016    Pulmonary Dr. Elieser Ortega  Unspecified adverse effect of anesthesia     wakes up for anesthesia early      Past Surgical History:   Procedure Laterality Date    CARDIAC SURG PROCEDURE UNLIST  2015    no blockages    CARDIAC SURG PROCEDURE UNLIST  05/2018    no blockages x 2 procedures    COLONOSCOPY  04/20/2011    negative    HX APPENDECTOMY  1955    HX CHOLECYSTECTOMY      HX GI  11/19/13    Rectocele repair with enterocele repair    HX HEENT  09/21/2018    left cataract    HX HYSTERECTOMY      TOOK LEFT OVARY    HX KNEE ARTHROSCOPY  1990's    right knee partial meniscus     HX KNEE REPLACEMENT Right 2016    HX KNEE REPLACEMENT Left 2010, 2016    TKR    HX LUMBAR FUSION  2014    c4-5 fusion cervical fusion    HX ORTHOPAEDIC  1973    ganglion cyst removed rt wrist    HX ORTHOPAEDIC  2013, 1992    bilateral CTR, and had ulna nerve release    HX ORTHOPAEDIC Right 2018    thumb and ring finger trigger release      Social History     Tobacco Use    Smoking status: Never Smoker    Smokeless tobacco: Never Used   Substance Use Topics    Alcohol use: No      Family History   Problem Relation Age of Onset    Heart Disease Mother     Hypertension Mother     Diabetes Mother     Cancer Mother         BREAST, LUNG    Breast Cancer Mother 61    Heart Disease Father     Hypertension Father     Thyroid Disease Father     Diabetes Brother     Psychiatric Disorder Son         STAINS, SCHIZO, [place] DEPRESSIVE    Anesth Problems Neg Hx       Prior to Admission medications    Medication Sig Start Date End Date Taking? Authorizing Provider   isosorbide mononitrate ER (IMDUR) 30 mg tablet TAKE ONE-HALF (1/2) TABLET DAILY FOR RAYNAUDS PHENOMENON 7/11/18  Yes Jojo Richards MD   metoprolol tartrate (LOPRESSOR) 25 mg tablet TAKE ONE TABLET BY MOUTH TWICE DAILY 7/11/18  Yes Jojo Richards MD   oxybutynin chloride XL (DITROPAN XL) 10 mg CR tablet Take 10 mg by mouth daily.  5/29/18  Yes Provider, Historical baclofen (LIORESAL) 10 mg tablet Take 10 mg by mouth daily. 3/3/18  Yes Provider, Historical   multivitamin (ONE A DAY) tablet Take 1 Tab by mouth daily. Yes Provider, Historical   raNITIdine (ZANTAC) 150 mg tablet Take 150 mg by mouth daily. 10/23/16  Yes Provider, Historical   aspirin delayed-release 81 mg tablet Take  by mouth daily. Yes Provider, Historical   OTHER,NON-FORMULARY, Take 2 Tabs by mouth daily. FIBER SUPPLEMENT    Yes Provider, Historical   allopurinol (ZYLOPRIM) 100 mg tablet Take 100 mg by mouth daily. Yes Provider, Historical   telmisartan-hydrochlorothiazide (MICARDIS HCT) 40-12.5 mg per tablet Take 1 Tab by mouth daily. Yes Provider, Historical   pravastatin (PRAVACHOL) 40 mg tablet Take 40 mg by mouth nightly. Yes Provider, Historical   cetirizine (ZYRTEC) 10 mg tablet Take 10 mg by mouth daily. Yes Provider, Historical   montelukast (SINGULAIR) 10 mg tablet Take 10 mg by mouth nightly. Yes Provider, Historical   acetaminophen (TYLENOL EXTRA STRENGTH) 500 mg tablet Take 1,000 mg by mouth every six (6) hours as needed for Pain. Provider, Historical     Allergies   Allergen Reactions    Percocet [Oxycodone-Acetaminophen] Rash and Itching     irritated    Benzene Other (comments)     Blisters and burn area Benzene found to be on steri-strips    Betadine [Povidone-Iodine] Other (comments)     Blisters and burning    Naproxen Itching    Sulfa (Sulfonamide Antibiotics) Rash    Adhesive Rash     Redness and rash         Review of Systems   All other systems reviewed and are negative. Objective:     Visit Vitals  /63 (BP 1 Location: Right arm, BP Patient Position: At rest)   Pulse 62   Temp 98.6 °F (37 °C)   Resp 16   Ht 5' 6\" (1.676 m)   Wt 100.7 kg (222 lb)   SpO2 97%   BMI 35.83 kg/m²       Physical Exam   Constitutional: She is oriented to person, place, and time. She appears well-developed and well-nourished. HENT:   Head: Normocephalic and atraumatic. Cardiovascular: Normal heart sounds. Pulmonary/Chest: Breath sounds normal.   Abdominal: Bowel sounds are normal.   Musculoskeletal: Normal range of motion. Neurological: She is alert and oriented to person, place, and time. Psychiatric: She has a normal mood and affect. Imaging:      Lab Review:  No results found for this or any previous visit (from the past 24 hour(s)). Assessment:     Visually significant cataract right eye for cataract extraction, lens implant right eye. Plan:     Visually significant cataract right eye for cataract extraction, lens implant right eye.     Signed By: Edmond Cormier MD     October 29, 2018

## 2018-10-29 NOTE — ANESTHESIA POSTPROCEDURE EVALUATION
Procedure(s):  CATARACT EXTRACTION WITH INTRA OCULAR LENS IMPLANT RIGHT EYE.     Anesthesia Post Evaluation      Multimodal analgesia: multimodal analgesia not used between 6 hours prior to anesthesia start to PACU discharge  Patient location during evaluation: bedside  Patient participation: complete - patient participated  Level of consciousness: awake and alert  Pain score: 0  Airway patency: patent  Anesthetic complications: no  Cardiovascular status: hemodynamically stable  Respiratory status: spontaneous ventilation and room air  Hydration status: acceptable        Visit Vitals  /52   Pulse 62   Temp 36.7 °C (98 °F)   Resp 11   Ht 5' 6\" (1.676 m)   Wt 100.7 kg (222 lb)   SpO2 98%   BMI 35.83 kg/m²

## 2018-10-29 NOTE — BRIEF OP NOTE
BRIEF OPERATIVE NOTE    Date of Procedure: 10/29/2018   Preoperative Diagnosis: Age-related nuclear cataract of right eye [H25.11]  Postoperative Diagnosis: Age-related nuclear cataract of right eye [H25.11]    Procedure(s):  CATARACT EXTRACTION WITH INTRA OCULAR LENS IMPLANT RIGHT EYE  Surgeon(s) and Role:     * Kirk Shaffer MD - Primary         Surgical Assistant: none    Surgical Staff:  Circ-1: Lucy Ruvalcaba RN  Scrub Tech-1: Ahmet Fonseca  Event Time In Time Out   Incision Start 0908    Incision Close 0924      Anesthesia: MAC   Estimated Blood Loss: none  Specimens: * No specimens in log *   Findings: cataract right eye  Complications: none  Implants:   Implant Name Type Inv.  Item Serial No.  Lot No. LRB No. Used Action   LENS IOL POST 1-PC 6X13 23.5 -- [employer] - [id number]  LENS IOL POST 1-PC 6X13 23.5 -- [employer] [id number] [id number]  Right 1 Implanted

## 2018-10-29 NOTE — PERIOP NOTES
Permission received to review discharge instructions and discuss private health information with , Nadine Avitia.

## 2018-10-29 NOTE — ANESTHESIA PREPROCEDURE EVALUATION
Anesthetic History     PONV          Review of Systems / Medical History  Patient summary reviewed, nursing notes reviewed and pertinent labs reviewed    Pulmonary            Asthma (seasonal; no inhaler)     Comments: Pulmonary Htn  Normal Spirometry  Stable SOB   Neuro/Psych   Within defined limits           Cardiovascular    Hypertension        Dysrhythmias ( h/o palpitations)       Exercise tolerance: >4 METS  Comments: 06/18 ECHO= EF 55-60%, no pulmonary Htn     GI/Hepatic/Renal     GERD: well controlled           Endo/Other        Arthritis     Other Findings   Comments: CREST syndrome:  Calcinosis  Raynaud's  Esophageal dyf  etc         Physical Exam    Airway  Mallampati: III    Neck ROM: normal range of motion   Mouth opening: Normal     Cardiovascular    Rhythm: regular  Rate: normal      Pertinent negatives: No murmur   Dental    Dentition: Full upper dentures and Full lower dentures     Pulmonary  Breath sounds clear to auscultation               Abdominal  GI exam deferred       Other Findings            Anesthetic Plan    ASA: 3  Anesthesia type: MAC          Induction: Intravenous  Anesthetic plan and risks discussed with: Patient and Spouse      Took BB at 6 am

## 2018-12-06 ENCOUNTER — OFFICE VISIT (OUTPATIENT)
Dept: CARDIOLOGY CLINIC | Age: 68
End: 2018-12-06

## 2018-12-06 VITALS
HEIGHT: 66 IN | RESPIRATION RATE: 18 BRPM | OXYGEN SATURATION: 98 % | SYSTOLIC BLOOD PRESSURE: 136 MMHG | WEIGHT: 225.3 LBS | BODY MASS INDEX: 36.21 KG/M2 | DIASTOLIC BLOOD PRESSURE: 70 MMHG | HEART RATE: 65 BPM

## 2018-12-06 DIAGNOSIS — I87.2 VENOUS INSUFFICIENCY OF LEFT LEG: ICD-10-CM

## 2018-12-06 DIAGNOSIS — I27.20 PULMONARY HYPERTENSION (HCC): Primary | ICD-10-CM

## 2018-12-06 DIAGNOSIS — E78.2 MIXED HYPERLIPIDEMIA: ICD-10-CM

## 2018-12-06 DIAGNOSIS — I10 ESSENTIAL HYPERTENSION: ICD-10-CM

## 2018-12-06 RX ORDER — OMEPRAZOLE 20 MG/1
20 CAPSULE, DELAYED RELEASE ORAL DAILY
COMMUNITY
Start: 2018-10-26 | End: 2021-08-11

## 2018-12-06 RX ORDER — OFLOXACIN 3 MG/ML
SOLUTION/ DROPS OPHTHALMIC
COMMUNITY
Start: 2018-10-27 | End: 2018-12-06 | Stop reason: ALTCHOICE

## 2018-12-06 RX ORDER — BENZONATATE 100 MG/1
100 CAPSULE ORAL
COMMUNITY
End: 2020-12-03

## 2018-12-06 RX ORDER — SUCRALFATE 1 G/1
1 TABLET ORAL DAILY
COMMUNITY
Start: 2018-11-28 | End: 2020-03-06

## 2018-12-06 NOTE — PROGRESS NOTES
Erica Lorenz DNP, ANP-BC  Subjective/HPI:     Vinod Dyer is a 76 y.o. female is here for routine f/u. Patient reports intermittent dyspnea, has been seen by Dr. Bryce Donaldson diagnosed with pulmonary hypertension is pending 6-minute walk test, obstructive sleep apnea consult and follow-up in January. Patient reports intermittent palpitations which we have worked up in the past she feels it is been nightly and triggered depending on her position in bed.    2018  CALCIUM SCORING:  Left main: 26. Left anterior descendin. Left circumflex:  0. Right coronary:  0. Posterior descendin.         Echocardiogram:  LEFT VENTRICLE: Size was normal. Systolic function was normal. Ejection  fraction was estimated in the range of 55 % to 60 %. There were no  regional wall motion abnormalities. Wall thickness was normal. DOPPLER:  Left ventricular diastolic function parameters were normal.    RIGHT VENTRICLE: The size was normal. Systolic function was normal.    LEFT ATRIUM: Size was normal.    RIGHT ATRIUM: Size was normal.    MITRAL VALVE: Normal valve structure. There was normal leaflet separation. DOPPLER: There was no evidence for stenosis. There was no regurgitation. AORTIC VALVE: Normal valve structure. The valve was trileaflet. Leaflets  exhibited normal cuspal separation. DOPPLER: There was no stenosis. There  was no regurgitation. TRICUSPID VALVE: Normal valve structure. There was normal leaflet  separation. DOPPLER: There was trivial regurgitation. Pulmonary artery  systolic pressure was within the normal range. PULMONIC VALVE: Normal valve structure. DOPPLER: There was no  regurgitation. AORTA: The root exhibited normal size.  stress test    SPECT images demonstrate a medium, fixed abnormality of mild severity in the inferior and apical regions on the stress and rest images, due to attenuation artifact. Normal wall motion and thickening makes infarct unlikely.  Gated SPECT images reveals normal myocardial thickening and wall motion. The left ventricular ejection fraction was calculated to be >75 %.     Impression:   Myocardial perfusion imaging is normal. Overall left ventricular systolic function was normal.     These test results indicate low likelihood for the presence of angiographically significant coronary artery disease.         PCP Provider  Maximo Thibodeaux MD  Past Medical History:   Diagnosis Date    Adverse effect of anesthesia     woke up during hysterectomy    Arrhythmia     h/o palpitations normal work up 22/2015 heart: Ronni    Arthritis     Asthma     allergy to weather changing    Cataract     Chronic pain     bulging disc c4/c5 l4/l5 : as of 9/1018 no neck/head movement limitation says patient    Color blind     CREST (calcinosis, Raynaud's phenomenon, esophageal dysfunction, sclerodactyly, telangiectasia) ([facility] 2018    GERD (gastroesophageal reflux disease)     Gout     H/O arthroscopic knee surgery     H/O: hysterectomy     Hypertension     Ill-defined condition     gout    Ill-defined condition     l4-5 hernia disc    Leg swelling 2016    unknown etiology    Bartlett's neuralgia 2001    from neuroma middle toe, left foot    Musculoskeletal disorder     arthritis    Nausea & vomiting     surgery related    Other ill-defined conditions(799.89) 11/2013    RECTAL PROLASE    Pulmonary hypertension ([facility] [address] [facility] 08/2018    Heart: Dr. Neyda Dash S/P appendectomy     Shortness of breath 2016    Pulmonary Dr. Ly Medina    Unspecified adverse effect of anesthesia     wakes up for anesthesia early      Past Surgical History:   Procedure Laterality Date   [address] UNLIST  2015    no blockages    CARDIAC SURG PROCEDURE UNLIST  05/2018    no blockages x 2 procedures    COLONOSCOPY  04/20/2011    negative    COLONOSCOPY N/A 9/28/2018    COLONOS[facility] performed by [facility] at [facility] CHOLECYSTECTOMY      HX GI  11/19/13    Rectocele repair with enterocele repair    HX HEENT  09/21/2018    left cataract    HX HYSTERECTOMY      TOOK LEFT OVARY    HX KNEE ARTHROSCOPY  1990's    right knee partial meniscus     HX KNEE REPLACEMENT Right 2016    HX KNEE REPLACEMENT Left 2010, 2016    TKR    HX LUMBAR FUSION  2014    c4-5 fusion cervical fusion    HX ORTHOPAEDIC  1973    ganglion cyst removed rt wrist    HX ORTHOPAEDIC  2013, 1992    bilateral CTR, and had ulna nerve release    HX ORTHOPAEDIC Right 2018    thumb and ring finger trigger release     Allergies   Allergen Reactions    Percocet [Oxycodone-Acetaminophen] Rash and Itching     irritated    Benzene Other (comments)     Blisters and burn area Benzene found to be on steri-strips    Betadine [Povidone-Iodine] Other (comments)     Blisters and burning    Naproxen Itching    Sulfa (Sulfonamide Antibiotics) Rash    Adhesive Rash     Redness and rash      Family History   Problem Relation Age of Onset    Heart Disease Mother     Hypertension Mother     Diabetes Mother     Cancer Mother         BREAST, LUNG    Breast Cancer Mother 61    Heart Disease Father     Hypertension Father     Thyroid Disease Father     Diabetes Brother     Psychiatric Disorder Son         STAINS, [place], [place] DEPRESSIVE    Anesth Problems Neg Hx       Current Outpatient Medications   Medication Sig    sucralfate (CARAFATE) 1 gram tablet     omeprazole (PRILOSEC) 20 mg capsule Take 20 mg by mouth daily.  benzonatate (TESSALON) 100 mg capsule Take 100 mg by mouth three (3) times daily as needed for Cough.  isosorbide mononitrate ER (IMDUR) 30 mg tablet TAKE ONE-HALF (1/2) TABLET DAILY FOR RAYNAUDS PHENOMENON    metoprolol tartrate (LOPRESSOR) 25 mg tablet TAKE ONE TABLET BY MOUTH TWICE DAILY    oxybutynin chloride XL (DITROPAN XL) 10 mg CR tablet Take 10 mg by mouth daily.  baclofen (LIORESAL) 10 mg tablet Take 10 mg by mouth daily.     multivitamin (ONE A DAY) tablet Take 1 Tab by mouth daily.  raNITIdine (ZANTAC) 150 mg tablet Take 150 mg by mouth daily.  aspirin delayed-release 81 mg tablet Take  by mouth daily.  acetaminophen (TYLENOL EXTRA STRENGTH) 500 mg tablet Take 1,000 mg by mouth every six (6) hours as needed for Pain.  OTHER,NON-FORMULARY, Take 2 Tabs by mouth daily. FIBER SUPPLEMENT     allopurinol (ZYLOPRIM) 100 mg tablet Take 100 mg by mouth daily.  telmisartan-hydrochlorothiazide (MICARDIS HCT) 40-12.5 mg per tablet Take 1 Tab by mouth daily.  pravastatin (PRAVACHOL) 40 mg tablet Take 40 mg by mouth nightly.  cetirizine (ZYRTEC) 10 mg tablet Take 10 mg by mouth daily.  montelukast (SINGULAIR) 10 mg tablet Take 10 mg by mouth nightly. No current facility-administered medications for this visit. Vitals:    12/06/18 1306 12/06/18 1319   BP: 134/64 136/70   Pulse: 65    Resp: 18    SpO2: 98%    Weight: 225 lb 4.8 oz (102.2 kg)    Height: 5' 6\" (1.676 m)      Social History     Socioeconomic History    Marital status:      Spouse name: Not on file    Number of children: Not on file    Years of education: Not on file    Highest education level: Not on file   Social Needs    Financial resource strain: Not on file    Food insecurity - worry: Not on file    Food insecurity - inability: Not on file   [employer] needs - medical: Not on file   [employer] needs - non-medical: Not on file   Occupational History    Not on file   Tobacco Use    Smoking status: Never Smoker    Smokeless tobacco: Never Used   Substance and Sexual Activity    Alcohol use: No    Drug use: No    Sexual activity: Not on file   Other Topics Concern    Not on file   Social History Narrative    Not on file       I have reviewed the nurses notes, vitals, problem list, allergy list, medical history, family, social history and medications.     Review of Symptoms:    General: Pt denies excessive weight gain or loss. Pt is able to conduct ADL's  HEENT: Denies blurred vision, headaches, epistaxis and difficulty swallowing. Respiratory: Denies shortness of breath, + PAN, denies wheezing or stridor. Cardiovascular: Denies precordial pain, + palpitations, denies edema or PND  Gastrointestinal: Denies poor appetite, indigestion, abdominal pain or blood in stool  Musculoskeletal: Denies pain or swelling from muscles or joints  Neurologic: Denies tremor, paresthesias, or sensory motor disturbance  Skin: Denies rash, itching or texture change. Physical Exam:      General: Well developed, in no acute distress, cooperative and alert  HEENT: No carotid bruits, no JVD, trach is midline. Neck Supple, PEERL, EOM intact. Heart:  Normal S1/S2 negative S3 or S4. Regular, no murmur, gallop or rub.   Respiratory: Clear bilaterally x 4, no wheezing or rales  Abdomen:   Soft, non-tender, no masses, bowel sounds are active.   Extremities:  No edema, normal cap refill, no cyanosis, atraumatic. Neuro: A&Ox3, speech clear, gait stable. Skin: Skin color is normal. No rashes or lesions. Non diaphoretic  Vascular: 2+ pulses symmetric in all extremities    Cardiographics    ECG: Sinus rhythm  Results for orders placed or performed in visit on 05/07/18   CARDIAC HOLTER MONITOR, 24 HOURS    Narrative    ECG Monitor/24 hours, Complete    Reason for Holter Monitor   PALPITATIONS    Heartbeat    Slowest 48  Average 70  Fastest  118      Results:   Underlying Rhythm: Normal sinus rhythm      Atrial Arrhythmias: premature atrial contractions; rare            AV Conduction: normal    Ventricular Arrhythmias: premature ventricular contractions; rare     ST Segment Analysis:normal     Symptom Correlation:  None. Comment:   No significant dysrhythmias noted.       Pratibha Neff MD                   Cardiology Labs:  No results found for: CHOL, CHOLX, [address] [facility], [address] [place] [address] Rd, [id number], HDL, LDL, LDLC, DLDLP, TGLX, TRIGL, [address], [address], [address]  [facility]    Lab Results   Component Value Date/Time    Sodium 143 05/07/2018 10:23 AM    Potassium 4.9 05/07/2018 10:23 AM    Chloride 101 05/07/2018 10:23 AM    CO2 27 05/07/2018 10:23 AM    Anion gap 5 01/11/2016 08:55 AM    Glucose 101 (H) 05/07/2018 10:23 AM    BUN 15 05/07/2018 10:23 AM    Creatinine 1.02 (H) 05/07/2018 10:23 AM    BUN/Creatinine ratio 15 05/07/2018 10:23 AM    GFR est AA 65 05/07/2018 10:23 AM    GFR est non-AA 57 (L) 05/07/2018 10:23 AM    Calcium 9.3 05/07/2018 10:23 AM    Bilirubin, total 0.9 05/07/2018 10:23 AM    AST (SGOT) 20 05/07/2018 10:23 AM    Alk. phosphatase 94 05/07/2018 10:23 AM    Protein, total 6.4 05/07/2018 10:23 AM    Albumin 4.2 05/07/2018 10:23 AM    Globulin 2.8 11/20/2013 05:30 AM    A-G Ratio 1.9 05/07/2018 10:23 AM    ALT (SGPT) 18 05/07/2018 10:23 AM           Assessment:     Assessment:     Diagnoses and all orders for this visit:    1. Pulmonary hypertension ([address]    2. Mixed hyperlipidemia  -     AMB POC EKG ROUTINE W/ 12 LEADS, INTER & REP    3. Venous insufficiency of left leg    4. Essential hypertension        ICD-10-CM ICD-9-CM    1. Pulmonary hypertension (HCC) I27.20 416.8    2. Mixed hyperlipidemia E78.2 272.2 AMB POC EKG ROUTINE W/ 12 LEADS, INTER & REP   3. Venous insufficiency of left leg I87.2 459.81    4. Essential hypertension I10 401.9      Orders Placed This Encounter    AMB POC EKG ROUTINE W/ 12 LEADS, INTER & REP     Order Specific Question:   Reason for Exam:     Answer:   Routine    sucralfate (CARAFATE) 1 gram tablet    DISCONTD: ofloxacin (FLOXIN) 0.3 % ophthalmic solution    omeprazole (PRILOSEC) 20 mg capsule     Sig: Take 20 mg by mouth daily.  benzonatate (TESSALON) 100 mg capsule     Sig: Take 100 mg by mouth three (3) times daily as needed for Cough. Plan:     Patient is a 71-year-old female with a history of crest syndrome with now clinical diagnosis made by pulmonary associates of pulmonary hypertension.   Previous cardiac workup including nuclear stress test negative for ischemia, coronary calcium score with minimal deposits, echocardiogram demonstrating structurally normal heart, Holter monitor during times of reported fluttering in her abdomen and chest negative for arrhythmia. Stable from cardiac perspective she can follow-up in 1 year. 1.  Pulmonary hypertension: Followed by Dr. Alison Mancuso, pending 6-minute walk test and obstructive sleep apnea home take home test.  2.  Hypertension: 136/70 continue current medications  3. Hyperlipidemia: Followed by primary care, recent LDL [id number] [facility],     This note was created using voice recognition software. Despite editing, there may be syntax errors. Patient seen and examined by me with nurse practitioner. Mandy Zaman personally performed all components of the history, physical, and medical decision making and agree with the assessment and plan with minor modifications as noted. 1. No significant dysrhythmias on monitor previously. 2. Superficial venous reflux: s/p right GSV RF closure. Left leg continues to have some dependent edema along with pain and cramps but stable with conservative management.      Dominique Richardson MD

## 2018-12-06 NOTE — PROGRESS NOTES
1. Have you been to the ER, urgent care clinic since your last visit? Hospitalized since your last visit? Yes, on 10/29/18 for eye surgery    2. Have you seen or consulted any other health care providers outside of the [address] [place] [address] since your last visit? Include any pap smears or colon screening.   Yes, Dr. Griffin Eubanks    Per pt, she saw Dr. Maude Branham, pulmonologist and was dx with pulmonary hypertension, class 2 x 5-6 mos ago    Chief Complaint   Patient presents with    Cholesterol Problem     6  mo f/fu    Hypertension     \"    Palpitations     pt reports nightly palpitations lasting about 4 - 5 minutes

## 2019-03-01 ENCOUNTER — ANESTHESIA (OUTPATIENT)
Dept: ENDOSCOPY | Age: 69
End: 2019-03-01
Payer: MEDICARE

## 2019-03-01 ENCOUNTER — ANESTHESIA EVENT (OUTPATIENT)
Dept: ENDOSCOPY | Age: 69
End: 2019-03-01
Payer: MEDICARE

## 2019-03-01 ENCOUNTER — HOSPITAL ENCOUNTER (OUTPATIENT)
Age: 69
Setting detail: OUTPATIENT SURGERY
Discharge: HOME | End: 2019-03-01
Attending: SPECIALIST | Admitting: SPECIALIST
Payer: MEDICARE

## 2019-03-01 VITALS
SYSTOLIC BLOOD PRESSURE: 135 MMHG | RESPIRATION RATE: 16 BRPM | OXYGEN SATURATION: 97 % | WEIGHT: 222 LBS | DIASTOLIC BLOOD PRESSURE: 63 MMHG | HEART RATE: 62 BPM | HEIGHT: 66 IN | BODY MASS INDEX: 35.68 KG/M2 | TEMPERATURE: 97.7 F

## 2019-03-01 PROCEDURE — 76060000031 HC ANESTHESIA FIRST 0.5 HR: Performed by: SPECIALIST

## 2019-03-01 PROCEDURE — 77030019988 HC FCPS ENDOSC DISP BSC -B: Performed by: SPECIALIST

## 2019-03-01 PROCEDURE — 77030039825 HC MSK NSL PAP SUPERNO2VA VYRM -B: Performed by: ANESTHESIOLOGY

## 2019-03-01 PROCEDURE — 88305 TISSUE EXAM BY PATHOLOGIST: CPT

## 2019-03-01 PROCEDURE — 74011250636 HC RX REV CODE- 250/636

## 2019-03-01 PROCEDURE — 76040000019: Performed by: SPECIALIST

## 2019-03-01 PROCEDURE — 74011250636 HC RX REV CODE- 250/636: Performed by: SPECIALIST

## 2019-03-01 RX ORDER — DEXTROMETHORPHAN/PSEUDOEPHED 2.5-7.5/.8
1.2 DROPS ORAL
Status: DISCONTINUED | OUTPATIENT
Start: 2019-03-01 | End: 2019-03-01 | Stop reason: SDUPTHER

## 2019-03-01 RX ORDER — FLUMAZENIL 0.1 MG/ML
0.2 INJECTION INTRAVENOUS
Status: DISCONTINUED | OUTPATIENT
Start: 2019-03-01 | End: 2019-03-01 | Stop reason: SDUPTHER

## 2019-03-01 RX ORDER — SODIUM CHLORIDE 0.9 % (FLUSH) 0.9 %
5-40 SYRINGE (ML) INJECTION AS NEEDED
Status: DISCONTINUED | OUTPATIENT
Start: 2019-03-01 | End: 2019-03-01 | Stop reason: SDUPTHER

## 2019-03-01 RX ORDER — SODIUM CHLORIDE 0.9 % (FLUSH) 0.9 %
5-40 SYRINGE (ML) INJECTION EVERY 8 HOURS
Status: DISCONTINUED | OUTPATIENT
Start: 2019-03-01 | End: 2019-03-01 | Stop reason: SDUPTHER

## 2019-03-01 RX ORDER — NALOXONE HYDROCHLORIDE 0.4 MG/ML
0.4 INJECTION, SOLUTION INTRAMUSCULAR; INTRAVENOUS; SUBCUTANEOUS
Status: DISCONTINUED | OUTPATIENT
Start: 2019-03-01 | End: 2019-03-01 | Stop reason: SDUPTHER

## 2019-03-01 RX ORDER — ATROPINE SULFATE 0.1 MG/ML
0.5 INJECTION INTRAVENOUS
Status: DISCONTINUED | OUTPATIENT
Start: 2019-03-01 | End: 2019-03-01 | Stop reason: SDUPTHER

## 2019-03-01 RX ORDER — SODIUM CHLORIDE 0.9 % (FLUSH) 0.9 %
5-40 SYRINGE (ML) INJECTION AS NEEDED
Status: DISCONTINUED | OUTPATIENT
Start: 2019-03-01 | End: 2019-03-01 | Stop reason: HOSPADM

## 2019-03-01 RX ORDER — PROPOFOL 10 MG/ML
INJECTION, EMULSION INTRAVENOUS AS NEEDED
Status: DISCONTINUED | OUTPATIENT
Start: 2019-03-01 | End: 2019-03-01 | Stop reason: HOSPADM

## 2019-03-01 RX ORDER — EPINEPHRINE 0.1 MG/ML
1 INJECTION INTRACARDIAC; INTRAVENOUS
Status: DISCONTINUED | OUTPATIENT
Start: 2019-03-01 | End: 2019-03-01 | Stop reason: SDUPTHER

## 2019-03-01 RX ORDER — LIDOCAINE HYDROCHLORIDE 20 MG/ML
INJECTION, SOLUTION EPIDURAL; INFILTRATION; INTRACAUDAL; PERINEURAL AS NEEDED
Status: DISCONTINUED | OUTPATIENT
Start: 2019-03-01 | End: 2019-03-01 | Stop reason: HOSPADM

## 2019-03-01 RX ORDER — NALOXONE HYDROCHLORIDE 0.4 MG/ML
0.4 INJECTION, SOLUTION INTRAMUSCULAR; INTRAVENOUS; SUBCUTANEOUS
Status: DISCONTINUED | OUTPATIENT
Start: 2019-03-01 | End: 2019-03-01 | Stop reason: HOSPADM

## 2019-03-01 RX ORDER — ATROPINE SULFATE 0.1 MG/ML
0.5 INJECTION INTRAVENOUS
Status: DISCONTINUED | OUTPATIENT
Start: 2019-03-01 | End: 2019-03-01 | Stop reason: HOSPADM

## 2019-03-01 RX ORDER — DEXTROMETHORPHAN/PSEUDOEPHED 2.5-7.5/.8
1.2 DROPS ORAL
Status: DISCONTINUED | OUTPATIENT
Start: 2019-03-01 | End: 2019-03-01 | Stop reason: HOSPADM

## 2019-03-01 RX ORDER — SODIUM CHLORIDE 9 MG/ML
50 INJECTION, SOLUTION INTRAVENOUS CONTINUOUS
Status: DISCONTINUED | OUTPATIENT
Start: 2019-03-01 | End: 2019-03-01 | Stop reason: HOSPADM

## 2019-03-01 RX ORDER — FLUMAZENIL 0.1 MG/ML
0.2 INJECTION INTRAVENOUS
Status: DISCONTINUED | OUTPATIENT
Start: 2019-03-01 | End: 2019-03-01 | Stop reason: HOSPADM

## 2019-03-01 RX ORDER — EPINEPHRINE 0.1 MG/ML
1 INJECTION INTRACARDIAC; INTRAVENOUS
Status: DISCONTINUED | OUTPATIENT
Start: 2019-03-01 | End: 2019-03-01 | Stop reason: HOSPADM

## 2019-03-01 RX ORDER — SODIUM CHLORIDE 0.9 % (FLUSH) 0.9 %
5-40 SYRINGE (ML) INJECTION EVERY 8 HOURS
Status: DISCONTINUED | OUTPATIENT
Start: 2019-03-01 | End: 2019-03-01 | Stop reason: HOSPADM

## 2019-03-01 RX ADMIN — SODIUM CHLORIDE 50 ML/HR: 900 INJECTION, SOLUTION INTRAVENOUS at 10:45

## 2019-03-01 RX ADMIN — PROPOFOL 180 MG: 10 INJECTION, EMULSION INTRAVENOUS at 11:10

## 2019-03-01 RX ADMIN — LIDOCAINE HYDROCHLORIDE 50 MG: 20 INJECTION, SOLUTION EPIDURAL; INFILTRATION; INTRACAUDAL; PERINEURAL at 11:00

## 2019-03-01 NOTE — PROCEDURES
Esophagogastroduodenoscopy Procedure Note      Desiree Jasmine  1950  [id number]    Indication:  Globus, dysphagia     Endoscopist: Randal Lou MD    Referring Provider:  Ja Frye MD    Sedation:  MAC anesthesia Propofol    Procedure Details:  After infomed consent was obtained for the procedure, with all risks and benefits of procedure explained the patient was taken to the endoscopy suite and placed in the left lateral decubitus position. Following sequential administration of sedation as per above, the endoscope was inserted into the mouth and advanced under direct vision to second portion of the duodenum. A careful inspection was made as the gastroscope was withdrawn, including a retroflexed view of the proximal stomach; findings and interventions are described below. Findings:     Esophagus:   + Esophagus with patent lumen. Normal mucosa visualized throughout.  + Mild resistance at UES s/p dilation with 54 FR savary over wire  +  S/P mid esophageal bx.   + + copious mucus in esophagus    Stomach:   + Mild erythema in the antrum s/p Bx    Duodenum:   - The bulb and post bulbar mucosa is normal in appearance to the second portion. The duodenal folds appeared normal.  Cold forceps biopsies to r/o celiac. Therapies: as above    Specimen: Specimens were collected as described and send to the laboratory. Complications:   None were encountered during the procedure. EBL: < 10 ml.           Recommendations:   -continue acid supression  -f/u path      Randal Lou MD  3/1/2019  11:12 AM

## 2019-03-01 NOTE — PERIOP NOTES
Anesthesia reports 180mg Propofol, 50mg Lidocaine and 200mL NS given during procedure. Received report from anesthesia staff on vital signs and status of patient.

## 2019-03-01 NOTE — ANESTHESIA PREPROCEDURE EVALUATION
Anesthetic History     PONV          Review of Systems / Medical History  Patient summary reviewed, nursing notes reviewed and pertinent labs reviewed    Pulmonary          Shortness of breath  Asthma (seasonal; no inhaler) : well controlled    Comments: Pulmonary Htn  Normal Spirometry  Stable SOB   Neuro/Psych   Within defined limits           Cardiovascular    Hypertension: well controlled        Dysrhythmias ( h/o palpitations)   Hyperlipidemia    Exercise tolerance: >4 METS  Comments: 06/18 ECHO= EF 55-60%, no pulmonary Htn  Palpitations    Took her beta blocker around 7 pm yesterday     GI/Hepatic/Renal     GERD: well controlled           Endo/Other        Obesity and arthritis     Other Findings   Comments: CREST syndrome:  Calcinosis  Raynaud's  Esophageal dyf  Etc  Chronic pain   HNP (herniated nucleus pulposus), cervical         Physical Exam    Airway  Mallampati: III    Neck ROM: normal range of motion   Mouth opening: Normal     Cardiovascular    Rhythm: regular  Rate: normal      Pertinent negatives: No murmur   Dental    Dentition: Full upper dentures, Full lower dentures and Edentulous     Pulmonary  Breath sounds clear to auscultation               Abdominal  GI exam deferred       Other Findings            Anesthetic Plan    ASA: 3  Anesthesia type: total IV anesthesia          Induction: Intravenous  Anesthetic plan and risks discussed with: Patient and Spouse

## 2019-03-01 NOTE — DISCHARGE INSTRUCTIONS
Ce Jacqueline  [id number]  1950    EGD DISCHARGE INSTRUCTIONS  Discomfort:  Sore throat- throat lozenges or warm salt water gargle  redness at IV site- apply warm compress to area; if redness or soreness persist- contact your physician  Gaseous discomfort- walking, belching will help relieve any discomfort  You may not operate a vehicle for 12 hours  You may not engage in an occupation involving machinery or appliances for rest of today. You may not drink alcoholic beverages for at least 12 hours  Avoid making any critical decisions for at least 24 hour  DIET  You may resume your regular diet - however -  remember your colon is empty and a heavy meal will produce gas. Avoid these foods:  vegetables, fried / greasy foods, carbonated drinks  MEDICATIONS        Regarding Aspirin or Nonsteroidal medications specifically, please see below. ACTIVITY  You may resume your normal daily activities. Spend the remainder of the day resting -  avoid any strenuous activity. CALL M.D. ANY SIGN OF   Increasing pain, nausea, vomiting  Abdominal distension (swelling)  New increased bleeding (oral or rectal)  Fever (chills)  Pain in chest area  Bloody discharge from nose or mouth  Shortness of breath     ONLY  Tylenol as needed for pain.     Follow-up Instructions:   Call Dr. Lebron Arshad for results of procedure / biopsy in 4-5 days at telephone #  [phone]

## 2019-03-01 NOTE — H&P
Gastroenterology Outpatient History and Physical    Patient: Rabia Flores    Physician: Lev Ospina MD    Vital Signs: Blood pressure 154/69, pulse 63, temperature 98.2 °F (36.8 °C), resp. rate 16, height 5' 6\" (1.676 m), weight 100.7 kg (222 lb), SpO2 99 %, not currently breastfeeding. Allergies: Allergies   Allergen Reactions    Percocet [Oxycodone-Acetaminophen] Rash and Itching     irritated    Benzene Other (comments)     Blisters and burn area Benzene found to be on steri-strips    Betadine [Povidone-Iodine] Other (comments)     Blisters and burning    Naproxen Itching    Sulfa (Sulfonamide Antibiotics) Rash    Adhesive Rash     Redness and rash       Chief Complaint: abdominal pain    History of Present Illness:   Pt discovered that she gets a pain in LLQ that radiates upward to LUQ. She has a BM at least once sometimes twice a day but if she is not as regular with her BM's, the pain starts. After a BM, it goes away. The stool is black after she gets the pain. Not taking pepto bismol or iron. Stool goes from normal to stringy to explosive \"mud. \" Denies NSAID use. She doesn't feel the loose stools are triggered by a certain food. EGD 1/10/17:   Esophagus:+ There was normal mucosa but the esophagus had cathie resistance throughout itscourse but no true narrowing. Some mild ridging in the distal esophagus r/oEoE. Bx performed of mid esophagus to evaluate for EoE. Bx from GE junction aswell.+ S/P empiric dilation with 54 FR Savary over wire. Stomach:+ Mild erythema diffusely in antrum with some bile seen r/o bile gastritis s/pBx. Duodenum:- The bulb and post bulbar mucosa is normal in appearance to the second portion. The duodenal folds appeared normal. Cold forceps biopsies to r/o celiac. FINAL PATHOLOGIC DIAGNOSIS  1. Duodenum, biopsy:No pathologic diagnosis; no blunting of villi or increasedintraepithelial lymphocytes. 2. Gastric, biopsy:Reactive gastropathy.   3. Distal esophagus, biopsy:Squamous and gastric junctional mucosa with mild chronic activeinflammation and reactive changes, suggestive of reflux. Negative for specialized metaplastic epithelium and dysplasia.   4. Mid esophagus, biopsy:No pathologic diagnosis; intramucosal eosinophils are not increased      Justification for Procedure: above    History:  Past Medical History:   Diagnosis Date    Adverse effect of anesthesia     woke up during hysterectomy    Arrhythmia     h/o palpitations normal work up 22/2015 heart: Ronni    Arthritis     Asthma     allergy to weather changing    Cataract     Chronic pain     bulging disc c4/c5 l4/l5 : as of 9/1018 no neck/head movement limitation says patient    Color blind     CREST (calcinosis, Raynaud's phenomenon, esophageal dysfunction, sclerodactyly, telangiectasia) ([address] 2018    GERD (gastroesophageal reflux disease)     Gout     H/O arthroscopic knee surgery     H/O: hysterectomy     Hypertension     Ill-defined condition     gout    Ill-defined condition     l4-5 hernia disc    Leg swelling 2016    unknown etiology    Bartlett's neuralgia 2001    from neuroma middle toe, left foot    Musculoskeletal disorder     arthritis    Nausea & vomiting     surgery related    Other ill-defined conditions(799.89) 11/2013    RECTAL PROLASE    Pulmonary hypertension ([address] 08/2018    Heart: Dr. Darvin Jones S/P appendectomy     Shortness of breath 2016    Pulmonary Dr. Duvall Monday    Unspecified adverse effect of anesthesia     wakes up for anesthesia early      Past Surgical History:   Procedure Laterality Date   [address] UNLIST  2015    no blockages    CARDIAC SURG PROCEDURE UNLIST  05/2018    no blockages x 2 procedures    COLONOSCOPY  04/20/2011    negative    COLONOSCOPY N/A 9/28/2018    COLONOSCOPY performed by Xuan Lackey MD at [facility] HX APPENDECTOMY  1955    HX CHOLECYSTECTOMY      HX GI  11/19/13    Rectocele repair with enterocele repair    HX HEENT  09/21/2018    left cataract    HX HYSTERECTOMY      TOOK LEFT OVARY    HX KNEE ARTHROSCOPY  1990's    right knee partial meniscus     HX KNEE REPLACEMENT Right 2016    HX KNEE REPLACEMENT Left 2010, 2016    TKR    HX LUMBAR FUSION  2014    c4-5 fusion cervical fusion    HX ORTHOPAEDIC  1973    ganglion cyst removed rt wrist    HX ORTHOPAEDIC  2013, 1992    bilateral CTR, and had ulna nerve release    HX ORTHOPAEDIC Right 2018    thumb and ring finger trigger release      Social History     Socioeconomic History    Marital status:      Spouse name: Not on file    Number of children: Not on file    Years of education: Not on file    Highest education level: Not on file   Tobacco Use    Smoking status: Never Smoker    Smokeless tobacco: Never Used   Substance and Sexual Activity    Alcohol use: No    Drug use: No      Family History   Problem Relation Age of Onset    Heart Disease Mother     Hypertension Mother     Diabetes Mother     Cancer Mother         BREAST, LUNG    Breast Cancer Mother 61    Heart Disease Father     Hypertension Father     Thyroid Disease Father     Diabetes Brother     Psychiatric Disorder Son         STAINS, SCHIZO, [place] DEPRESSIVE    Anesth Problems Neg Hx        Medications:   Prior to Admission medications    Medication Sig Start Date End Date Taking? Authorizing Provider   sucralfate (CARAFATE) 1 gram tablet  11/28/18  Yes Provider, Historical   omeprazole (PRILOSEC) 20 mg capsule Take 20 mg by mouth daily. 10/26/18  Yes Provider, Historical   isosorbide mononitrate ER (IMDUR) 30 mg tablet TAKE ONE-HALF (1/2) TABLET DAILY FOR RAYNAUDS PHENOMENON 7/11/18  Yes Vivian Min MD   metoprolol tartrate (LOPRESSOR) 25 mg tablet TAKE ONE TABLET BY MOUTH TWICE DAILY 7/11/18  Yes Vivian Min MD   baclofen (LIORESAL) 10 mg tablet Take 10 mg by mouth daily.  3/3/18  Yes Provider, Historical multivitamin (ONE A DAY) tablet Take 1 Tab by mouth daily. Yes Provider, Historical   raNITIdine (ZANTAC) 150 mg tablet Take 150 mg by mouth daily. 10/23/16  Yes Provider, Historical   aspirin delayed-release 81 mg tablet Take  by mouth daily. Yes Provider, Historical   OTHER,NON-FORMULARY, Take 2 Tabs by mouth daily. FIBER SUPPLEMENT    Yes Provider, Historical   allopurinol (ZYLOPRIM) 100 mg tablet Take 100 mg by mouth daily. Yes Provider, Historical   telmisartan-hydrochlorothiazide (MICARDIS HCT) 40-12.5 mg per tablet Take 1 Tab by mouth daily. Yes Provider, Historical   pravastatin (PRAVACHOL) 40 mg tablet Take 40 mg by mouth nightly. Yes Provider, Historical   cetirizine (ZYRTEC) 10 mg tablet Take 10 mg by mouth daily. Yes Provider, Historical   montelukast (SINGULAIR) 10 mg tablet Take 10 mg by mouth nightly. Yes Provider, Historical   benzonatate (TESSALON) 100 mg capsule Take 100 mg by mouth three (3) times daily as needed for Cough. Provider, Historical   oxybutynin chloride XL (DITROPAN XL) 10 mg CR tablet Take 10 mg by mouth daily. 5/29/18   Provider, Historical   acetaminophen (TYLENOL EXTRA STRENGTH) 500 mg tablet Take 1,000 mg by mouth every six (6) hours as needed for Pain. Provider, Historical       Physical Exam:   General: alert, no distress   HEENT: Head: Normocephalic, no lesions, without obvious abnormality.    Heart: regular rate and rhythm, S1, S2 normal, no murmur, click, rub or gallop   Lungs: chest clear, no wheezing, rales, normal symmetric air entry   Abdominal: soft, nt/nd + BS   Neurological: Grossly normal   Extremities: extremities normal, atraumatic, no cyanosis or edema     Findings/Diagnosis: Epigastric pain    Plan of Care/Planned Procedure: EGD    Signed By: Lev Ospina MD     March 1, 2019

## 2019-03-01 NOTE — ROUTINE PROCESS
Yeny Patricio  1950  [id number]    Situation:  Verbal report received from: Gorman Burkitt, RN  Procedure: Procedure(s):  ESOPHAGOGASTRODUODENOSCOPY (EGD)  ESOPHAGOGASTRODUODENAL (EGD) BIOPSY  ESOPHAGEAL DILATION    Background:    Preoperative diagnosis: abdominal tenderness, dark stool  Postoperative diagnosis: Globus, Gastritis, GERD    :  Dr. Reshma Mariscal  Assistant(s): Endoscopy Technician-1: Frandy HINDS  Endoscopy RN-1: Kourtney KAUR    Specimens:   ID Type Source Tests Collected by Time Destination   1 : Duodenum Biopsy Preservative Duodenum  Jayant Quinones MD 3/1/2019 1102 Pathology   2 : Gastric Biopsy Preservative Gastric  Jayant Quinones MD 3/1/2019 1105 Pathology   3 : Mid Esophageal Biopsy Preservative Esophagus, Mid  Jayant Quinones MD 3/1/2019 1106 Pathology     H. Pylori  no    Assessment:  Intra-procedure medications     Anesthesia gave intra-procedure sedation and medications, see anesthesia flow sheet yes    Intravenous fluids: NS@ KVO     Vital signs stable     Abdominal assessment: round and soft     Recommendation:  Discharge patient per MD order.   Return to floor  Family or Friend   Permission to share finding with family or friend yes

## 2019-03-11 ENCOUNTER — HOSPITAL ENCOUNTER (OUTPATIENT)
Dept: CT IMAGING | Age: 69
Discharge: HOME | End: 2019-03-11
Attending: PHYSICIAN ASSISTANT
Payer: MEDICARE

## 2019-03-11 DIAGNOSIS — R19.5 DARK STOOLS: ICD-10-CM

## 2019-03-11 DIAGNOSIS — R10.812 LEFT UPPER QUADRANT ABDOMINAL TENDERNESS: ICD-10-CM

## 2019-03-11 DIAGNOSIS — R10.816 ABDOMINAL TENDERNESS, EPIGASTRIC: ICD-10-CM

## 2019-03-11 DIAGNOSIS — R10.814 LEFT LOWER QUADRANT ABDOMINAL TENDERNESS: ICD-10-CM

## 2019-03-11 LAB — CREAT BLD-MCNC: 0.9 MG/DL (ref 0.6–1.3)

## 2019-03-11 PROCEDURE — 74011000255 HC RX REV CODE- 255: Performed by: PHYSICIAN ASSISTANT

## 2019-03-11 PROCEDURE — 82565 ASSAY OF CREATININE: CPT

## 2019-03-11 PROCEDURE — 74177 CT ABD & PELVIS W/CONTRAST: CPT

## 2019-03-11 PROCEDURE — 74011636320 HC RX REV CODE- 636/320: Performed by: PHYSICIAN ASSISTANT

## 2019-03-11 RX ORDER — SODIUM CHLORIDE 0.9 % (FLUSH) 0.9 %
10 SYRINGE (ML) INJECTION
Status: COMPLETED | OUTPATIENT
Start: 2019-03-11 | End: 2019-03-11

## 2019-03-11 RX ORDER — BARIUM SULFATE 20 MG/ML
900 SUSPENSION ORAL
Status: COMPLETED | OUTPATIENT
Start: 2019-03-11 | End: 2019-03-11

## 2019-03-11 RX ADMIN — IOPAMIDOL 100 ML: 755 INJECTION, SOLUTION INTRAVENOUS at 09:06

## 2019-03-11 RX ADMIN — BARIUM SULFATE 450 ML: 21 SUSPENSION ORAL at 09:06

## 2019-03-11 RX ADMIN — Medication 10 ML: at 09:06

## 2019-04-08 DIAGNOSIS — I20.8 ANGINA AT REST (HCC): ICD-10-CM

## 2019-04-08 DIAGNOSIS — I25.9 MYOCARDIAL ISCHEMIA: ICD-10-CM

## 2019-04-08 DIAGNOSIS — E78.2 MIXED HYPERLIPIDEMIA: ICD-10-CM

## 2019-04-08 DIAGNOSIS — R06.02 SOB (SHORTNESS OF BREATH): ICD-10-CM

## 2019-04-08 NOTE — TELEPHONE ENCOUNTER
Pt is requesting a refill on metoprolol and imdur sent to [address] [place] [address]      thanks

## 2019-04-09 RX ORDER — METOPROLOL TARTRATE 25 MG/1
TABLET, FILM COATED ORAL
Qty: 180 TAB | Refills: 2 | Status: SHIPPED | OUTPATIENT
Start: 2019-04-09

## 2019-04-09 RX ORDER — ISOSORBIDE MONONITRATE 30 MG/1
TABLET, EXTENDED RELEASE ORAL
Qty: 90 TAB | Refills: 2 | Status: SHIPPED | OUTPATIENT
Start: 2019-04-09

## 2019-04-17 ENCOUNTER — HOSPITAL ENCOUNTER (OUTPATIENT)
Dept: CT IMAGING | Age: 69
Discharge: HOME | End: 2019-04-17
Attending: ORTHOPAEDIC SURGERY
Payer: MEDICARE

## 2019-04-17 ENCOUNTER — HOSPITAL ENCOUNTER (OUTPATIENT)
Dept: GENERAL RADIOLOGY | Age: 69
Discharge: HOME | End: 2019-04-17
Attending: ORTHOPAEDIC SURGERY
Payer: MEDICARE

## 2019-04-17 VITALS
SYSTOLIC BLOOD PRESSURE: 134 MMHG | OXYGEN SATURATION: 98 % | TEMPERATURE: 98.6 F | DIASTOLIC BLOOD PRESSURE: 67 MMHG | HEART RATE: 88 BPM

## 2019-04-17 DIAGNOSIS — M54.50 LOW BACK PAIN: ICD-10-CM

## 2019-04-17 PROCEDURE — 62304 MYELOGRAPHY LUMBAR INJECTION: CPT

## 2019-04-17 PROCEDURE — 74011250636 HC RX REV CODE- 250/636: Performed by: RADIOLOGY

## 2019-04-17 PROCEDURE — 72132 CT LUMBAR SPINE W/DYE: CPT

## 2019-04-17 PROCEDURE — 74011636320 HC RX REV CODE- 636/320: Performed by: RADIOLOGY

## 2019-04-17 RX ORDER — LIDOCAINE HYDROCHLORIDE 10 MG/ML
4 INJECTION, SOLUTION EPIDURAL; INFILTRATION; INTRACAUDAL; PERINEURAL
Status: COMPLETED | OUTPATIENT
Start: 2019-04-17 | End: 2019-04-17

## 2019-04-17 RX ADMIN — IOHEXOL 20 ML: 180 INJECTION INTRAVENOUS at 09:25

## 2019-04-17 RX ADMIN — LIDOCAINE HYDROCHLORIDE 4 ML: 10 INJECTION, SOLUTION EPIDURAL; INFILTRATION; INTRACAUDAL; PERINEURAL at 09:25

## 2019-04-17 NOTE — DISCHARGE INSTRUCTIONS
[address] [place] [address],[address]  Radiology Department  [phone]    Radiologist:Dr.Jean Chayo Yusuf    Date:  4/17/2019    Myelogram Discharge Instructions    Go home and rest and restrict your activity the next 24 - 48 hours. Rest in a reclined position, keep your head elevated to minimize post procedure complications. Resume your previous diet and medications. Increase your fluid intake over the next 1-2 days to help the kidneys flush out the dye that was injected during your procedure. You may take Tylenol, as directed on the label, for pain or discomfort. Avoid Ibuprofen (Advil, Motrin etc.) and Aspirin today as they may increase your risk of bleeding. Avoid heavy lifting (nothing greater than 5 pounds),  excessive bending, pushing or pulling movements for 2 days to minimize your risk of post procedure headache. You may shower in 24 hours. Do not soak or swim until the site has healed completely. Results will be sent to your physician as soon as they become available. Follow up with your physician as previously discussed and be sure to bring the CD to that was provided for you today to your appointment.

## 2019-05-15 ENCOUNTER — HOSPITAL ENCOUNTER (OUTPATIENT)
Dept: VASCULAR SURGERY | Age: 69
Discharge: HOME | End: 2019-05-15
Attending: FAMILY MEDICINE
Payer: MEDICARE

## 2019-05-15 DIAGNOSIS — I82.409 DVT (DEEP VENOUS THROMBOSIS) (HCC): ICD-10-CM

## 2019-05-15 PROCEDURE — 93971 EXTREMITY STUDY: CPT

## 2019-05-16 ENCOUNTER — HOSPITAL ENCOUNTER (OUTPATIENT)
Dept: MAMMOGRAPHY | Age: 69
Discharge: HOME | End: 2019-05-16
Attending: FAMILY MEDICINE
Payer: MEDICARE

## 2019-05-16 ENCOUNTER — OFFICE VISIT (OUTPATIENT)
Dept: CARDIOLOGY CLINIC | Age: 69
End: 2019-05-16

## 2019-05-16 VITALS
HEIGHT: 66 IN | WEIGHT: 218.2 LBS | DIASTOLIC BLOOD PRESSURE: 54 MMHG | SYSTOLIC BLOOD PRESSURE: 112 MMHG | BODY MASS INDEX: 35.07 KG/M2 | RESPIRATION RATE: 18 BRPM | OXYGEN SATURATION: 97 % | HEART RATE: 73 BPM

## 2019-05-16 DIAGNOSIS — I87.2 VENOUS INSUFFICIENCY OF LEFT LEG: ICD-10-CM

## 2019-05-16 DIAGNOSIS — I87.322 CHRONIC VENOUS HYPERTENSION (IDIOPATHIC) WITH INFLAMMATION OF LEFT LOWER EXTREMITY: ICD-10-CM

## 2019-05-16 DIAGNOSIS — Z99.89 OSA ON CPAP: ICD-10-CM

## 2019-05-16 DIAGNOSIS — E78.2 MIXED HYPERLIPIDEMIA: ICD-10-CM

## 2019-05-16 DIAGNOSIS — R00.2 PALPITATIONS: Primary | ICD-10-CM

## 2019-05-16 DIAGNOSIS — G47.33 OSA ON CPAP: ICD-10-CM

## 2019-05-16 DIAGNOSIS — Z12.39 SCREENING BREAST EXAMINATION: ICD-10-CM

## 2019-05-16 PROCEDURE — 77067 SCR MAMMO BI INCL CAD: CPT

## 2019-05-16 RX ORDER — LIDOCAINE 50 MG/G
PATCH TOPICAL EVERY 24 HOURS
COMMUNITY
End: 2020-03-06

## 2019-05-16 RX ORDER — FUROSEMIDE 40 MG/1
20 TABLET ORAL DAILY
COMMUNITY
Start: 2019-03-26 | End: 2020-10-30

## 2019-05-16 RX ORDER — TELMISARTAN 40 MG/1
40 TABLET ORAL DAILY
COMMUNITY

## 2019-05-16 NOTE — PROGRESS NOTES
5/16/2019 4:04 PM      Subjective:     Leni Guardado is here for f/u visit. Per pt SOB is unchanged. Lasix dose was increased. Per pt she was told by pulmonary that she had diastolic heart failure and her lungs are ok. She denies chest pain, chest pressure/discomfort, palpitations, irregular heart beats, near-syncope, syncope, orthopnea, paroxysmal nocturnal dyspnea, exertional chest pressure/discomfort, claudication, lower extremity edema, tachypnea. Visit Vitals  /54 (BP 1 Location: Right arm, BP Patient Position: Sitting)   Pulse 73   Resp 18   Ht 5' 6\" (1.676 m)   Wt 218 lb 3.2 oz (99 kg)   SpO2 97%   BMI 35.22 kg/m²     Current Outpatient Medications   Medication Sig    telmisartan (MICARDIS) 40 mg tablet Take 40 mg by mouth daily.  aluminum hydrox-magnesium carb (GAVISCON)  mg/15 mL suspension Take 15 mL by mouth every six (6) hours as needed for Indigestion.  lidocaine (LIDODERM) 5 % by TransDERmal route every twenty-four (24) hours. Apply patch to the affected area for 12 hours a day and remove for 12 hours a day.  metoprolol tartrate (LOPRESSOR) 25 mg tablet TAKE ONE TABLET BY MOUTH TWICE DAILY    isosorbide mononitrate ER (IMDUR) 30 mg tablet TAKE ONE-HALF (1/2) TABLET DAILY FOR RAYNAUDS PHENOMENON    omeprazole (PRILOSEC) 20 mg capsule Take 20 mg by mouth daily.  benzonatate (TESSALON) 100 mg capsule Take 100 mg by mouth three (3) times daily as needed for Cough.  baclofen (LIORESAL) 10 mg tablet Take 10 mg by mouth two (2) times a day.  multivitamin (ONE A DAY) tablet Take 1 Tab by mouth daily.  raNITIdine (ZANTAC) 150 mg tablet Take 150 mg by mouth daily.  aspirin delayed-release 81 mg tablet Take  by mouth daily.  acetaminophen (TYLENOL EXTRA STRENGTH) 500 mg tablet Take 1,000 mg by mouth every six (6) hours as needed for Pain.  OTHER,NON-FORMULARY, Take 2 Tabs by mouth daily.  FIBER SUPPLEMENT     allopurinol (ZYLOPRIM) 100 mg tablet Take 100 mg by mouth daily.  pravastatin (PRAVACHOL) 40 mg tablet Take 40 mg by mouth nightly.  cetirizine (ZYRTEC) 10 mg tablet Take 10 mg by mouth daily.  montelukast (SINGULAIR) 10 mg tablet Take 10 mg by mouth nightly.  furosemide (LASIX) 40 mg tablet     sucralfate (CARAFATE) 1 gram tablet     oxybutynin chloride XL (DITROPAN XL) 10 mg CR tablet Take 10 mg by mouth daily.  telmisartan-hydrochlorothiazide (MICARDIS HCT) 40-12.5 mg per tablet Take 1 Tab by mouth daily. No current facility-administered medications for this visit.           Objective:      Visit Vitals  /54 (BP 1 Location: Right arm, BP Patient Position: Sitting)   Pulse 73   Resp 18   Ht 5' 6\" (1.676 m)   Wt 218 lb 3.2 oz (99 kg)   SpO2 97%   BMI 35.22 kg/m²       Data Review:     EKG: Normal sinus rhythm, no acute st/t changes    Reviewed and/or ordered active problem list, medication list tests    Past Medical History:   Diagnosis Date    Adverse effect of anesthesia     woke up during hysterectomy    Arrhythmia     h/o palpitations normal work up 22/2015 heart: Ronni    Arthritis     Asthma     allergy to weather changing    Cataract     Chronic pain     bulging disc c4/c5 l4/l5 : as of 9/1018 no neck/head movement limitation says patient    Color blind     CREST (calcinosis, Raynaud's phenomenon, esophageal dysfunction, sclerodactyly, telangiectasia) ([facility] 2018    GERD (gastroesophageal reflux disease)     Gout     H/O arthroscopic knee surgery     H/O: hysterectomy     Hypertension     Ill-defined condition     gout    Ill-defined condition     l4-5 hernia disc    Leg swelling 2016    unknown etiology    Bartlett's neuralgia 2001    from neuroma middle toe, left foot    Musculoskeletal disorder     arthritis    Nausea & vomiting     surgery related    Other ill-defined conditions(799.89) 11/2013    RECTAL PROLASE    Pulmonary hypertension ([address] 08/2018    Heart: Dr. Silvia Draper S/P appendectomy     Shortness of breath 2016    Pulmonary  Unknown Helms Unspecified adverse effect of anesthesia     wakes up for anesthesia early      Past Surgical History:   Procedure Laterality Date    CARDIAC SURG PROCEDURE UNLIST  2015    no blockages    CARDIAC SURG PROCEDURE UNLIST  05/2018    no blockages x 2 procedures    COLONOSCOPY  04/20/2011    negative    COLONOSCOPY N/A 9/28/2018    COL[facility] R[facility]apleton [facility] at [facility] HX CATARACT REMOVAL Left 09/20/2018    HX CATARACT REMOVAL Right 10/20/2018    HX CHOLECYSTECTOMY      HX GI  11/19/13    Rectocele repair with enterocele repair    HX HEENT  09/21/2018    left cataract    HX HYSTERECTOMY      TOOK LEFT OVARY    HX KNEE ARTHROSCOPY  1990's    right knee partial meniscus     HX KNEE REPLACEMENT Right 2016    HX KNEE REPLACEMENT Left 2010, 2016    TKR    HX LUMBAR FUSION  2014    c4-5 fusion cervical fusion    HX ORTHOPAEDIC  1973    ganglion cyst removed rt wrist    HX ORTHOPAEDIC  2013, 1992    bilateral CTR, and had ulna nerve release    HX ORTHOPAEDIC Right 2018    thumb and ring finger trigger release     Allergies   Allergen Reactions    Ciprofloxacin Shortness of Breath    Flagyl [Metronidazole] Shortness of Breath    Percocet [Oxycodone-Acetaminophen] Rash and Itching     irritated    Benzene Other (comments)     Blisters and burn area Benzene found to be on steri-strips    Betadine [Povidone-Iodine] Other (comments)     Blisters and burning    Naproxen Itching    Sulfa (Sulfonamide Antibiotics) Rash    Adhesive Rash     Redness and rash      Family History   Problem Relation Age of Onset    Heart Disease Mother     Hypertension Mother     Diabetes Mother     Cancer Mother         BREAST, LUNG    Breast Cancer Mother 61    Heart Disease Father     Hypertension Father     Thyroid Disease Father     Diabetes Brother     Psychiatric Disorder Son BIPOLAR, SCHIZO, [place] DEPRESSIVE    Anesth Problems Neg Hx       Social History     Socioeconomic History    Marital status:      Spouse name: Not on file    Number of children: Not on file    Years of education: Not on file    Highest education level: Not on file   Occupational History    Not on file   Social Needs    Financial resource strain: Not on file    Food insecurity:     Worry: Not on file     Inability: Not on file    Transportation needs:     Medical: Not on file     Non-medical: Not on file   Tobacco Use    Smoking status: Never Smoker    Smokeless tobacco: Never Used   Substance and Sexual Activity    Alcohol use: No    Drug use: No    Sexual activity: Not on file   Lifestyle    Physical activity:     Days per week: Not on file     Minutes per session: Not on file    Stress: Not on file   Relationships    Social connections:     Talks on phone: Not on file     Gets together: Not on file     Attends Judaism service: Not on file     Active member of club or organization: Not on file     Attends meetings of clubs or organizations: Not on file     Relationship status: Not on file    Intimate partner violence:     Fear of current or ex partner: Not on file     Emotionally abused: Not on file     Physically abused: Not on file     Forced sexual activity: Not on file   Other Topics Concern    Not on file   Social History Narrative    Not on file         Review of Systems     General: Not Present- Anorexia, Chills, Dietary Changes, Fatigue, Fever, Medication Changes, Night Sweats, Weight Gain > 10lbs. and Weight Loss > 10lbs. .  Skin: Not Present- Bruising and Excessive Sweating. HEENT: Not Present- Headache, Visual Loss and Vertigo. Respiratory: Not Present- Cough, Decreased Exercise Tolerance, Difficulty Breathing, Snoring and Wheezing.   Cardiovascular: Not Present- Abnormal Blood Pressure, Chest Pain, Claudications, Fainting / Blacking Out, Irregular Heart Beat, Night Cramps, Orthopnea, Palpitations, Paroxysmal Nocturnal Dyspnea, Rapid Heart Rate. Gastrointestinal: Not Present- Black, Tarry Stool, Bloody Stool, Diarrhea, Hematemesis, Rectal Bleeding and Vomiting. Musculoskeletal: Not Present- Muscle Pain and Muscle Weakness. Neurological: Not Present- Dizziness. Psychiatric: Not Present- Depression. Endocrine: Not Present- Cold Intolerance, Heat Intolerance and Thyroid Problems. Hematology: Not Present- Abnormal Bleeding, Anemia, Blood Clots and Easy Bruising.       Physical Exam   The physical exam findings are as follows:       General   Mental Status - Alert. General Appearance - Not in acute distress. Chest and Lung Exam   Inspection: Accessory muscles - No use of accessory muscles in breathing. Auscultation:   Breath sounds: - Normal.      Cardiovascular   Inspection: Jugular vein - Bilateral - Inspection Normal.  Palpation/Percussion:   Apical Impulse: - Normal.  Auscultation: Rhythm - Regular. Heart Sounds - S1 WNL and S2 WNL. No S3 or S4. Murmurs & Other Heart Sounds: Auscultation of the heart reveals - No Murmurs. Carotid arteries - No Carotid bruit. Peripheral Vascular   Upper Extremity: Inspection - Bilateral - No Cyanotic nailbeds or Digital clubbing. Lower Extremity:   Palpation: Edema - Bilateral - No edema. Assessment:       ICD-10-CM ICD-9-CM    1. Palpitations R00.2 785.1 AMB POC EKG ROUTINE W/ 12 LEADS, INTER & REP   2. Chronic venous hypertension (idiopathic) with inflammation of left lower extremity I87.322 459.32    3. Venous insufficiency of left leg I87.2 459.81    4. Mixed hyperlipidemia E78.2 272.2    5. KRYSTINA on CPAP G47.33 327.23     Z99.89 V46.8        Plan:     1. SOB: etiology? Based upon last Echo she has normal diastolic function. On last right heart cath had very mild pulmonary hypertension wedge was upper normal to mildly elevated. Other previous heart work up is unimpressive.  Clinically don't think she has any significant component of DHF. However dose of lasix has already been increased, so assess response. Exercise. Advised her to f/u with her sleep doctor for KRYSTINA f/u as that may be contributing to symptoms. She has f/u with pulmonary in a months. Cardiac MRI to rule out any pericardial pathology or myocardial fibrosis due to her h/o CREST syndrome, was recommended and d/w pt. However she has severe claustrophobia and not interested, even with sedation. 2. Hypertension: 136/70 continue current medications  3. Hyperlipidemia: on statin. 4. Superficial venous reflux: s/p right GSV RF closure. Left leg continues to have some dependent edema along with pain and cramps but stable with conservative management.      Amado Espinal MD

## 2019-05-16 NOTE — PROGRESS NOTES
Chief Complaint   Patient presents with    Hypertension     Dr Benjamine Cogan- Pulmonary - was referred from here for pulmonary htn-Dr Ching Pereira think it is a lung problem but a  stiff heart     Shortness of Breath     on exertion     Palpitations     gets flutters multiple times daily      1. Have you been to the ER, urgent care clinic since your last visit? Hospitalized since your last visit? No     2. Have you seen or consulted any other health care providers outside of the [facility] since your last visit? Include any pap smears or colon screening.   No

## 2019-09-20 ENCOUNTER — HOSPITAL ENCOUNTER (OUTPATIENT)
Dept: CT IMAGING | Age: 69
Discharge: HOME | End: 2019-09-20
Attending: SPECIALIST
Payer: MEDICARE

## 2019-09-20 DIAGNOSIS — K57.32 DIVERTICULITIS OF LARGE INTESTINE WITHOUT PERFORATION OR ABSCESS WITHOUT BLEEDING: ICD-10-CM

## 2019-09-20 DIAGNOSIS — R10.814 LEFT LOWER QUADRANT ABDOMINAL TENDERNESS: ICD-10-CM

## 2019-09-20 PROCEDURE — 82565 ASSAY OF CREATININE: CPT

## 2019-09-20 PROCEDURE — 74177 CT ABD & PELVIS W/CONTRAST: CPT

## 2019-09-20 PROCEDURE — 74011636320 HC RX REV CODE- 636/320: Performed by: SPECIALIST

## 2019-09-20 PROCEDURE — 74011000255 HC RX REV CODE- 255: Performed by: SPECIALIST

## 2019-09-20 RX ORDER — BARIUM SULFATE 20 MG/ML
900 SUSPENSION ORAL
Status: COMPLETED | OUTPATIENT
Start: 2019-09-20 | End: 2019-09-20

## 2019-09-20 RX ORDER — SODIUM CHLORIDE 0.9 % (FLUSH) 0.9 %
10 SYRINGE (ML) INJECTION
Status: COMPLETED | OUTPATIENT
Start: 2019-09-20 | End: 2019-09-20

## 2019-09-20 RX ADMIN — BARIUM SULFATE 900 ML: 21 SUSPENSION ORAL at 09:13

## 2019-09-20 RX ADMIN — Medication 10 ML: at 09:13

## 2019-09-20 RX ADMIN — IOPAMIDOL 100 ML: 755 INJECTION, SOLUTION INTRAVENOUS at 09:13

## 2019-09-21 LAB — CREAT BLD-MCNC: 1 MG/DL (ref 0.6–1.3)

## 2019-10-29 ENCOUNTER — OFFICE VISIT (OUTPATIENT)
Dept: CARDIOLOGY CLINIC | Age: 69
End: 2019-10-29

## 2019-10-29 VITALS
HEART RATE: 72 BPM | HEIGHT: 66 IN | OXYGEN SATURATION: 92 % | BODY MASS INDEX: 36.88 KG/M2 | DIASTOLIC BLOOD PRESSURE: 64 MMHG | RESPIRATION RATE: 18 BRPM | SYSTOLIC BLOOD PRESSURE: 104 MMHG | WEIGHT: 229.5 LBS

## 2019-10-29 DIAGNOSIS — I87.2 VENOUS INSUFFICIENCY OF LEFT LEG: ICD-10-CM

## 2019-10-29 DIAGNOSIS — E78.2 MIXED HYPERLIPIDEMIA: ICD-10-CM

## 2019-10-29 DIAGNOSIS — Z99.89 OSA ON CPAP: ICD-10-CM

## 2019-10-29 DIAGNOSIS — R06.02 SOB (SHORTNESS OF BREATH): Primary | ICD-10-CM

## 2019-10-29 DIAGNOSIS — G47.33 OSA ON CPAP: ICD-10-CM

## 2019-10-29 DIAGNOSIS — I10 ESSENTIAL HYPERTENSION: ICD-10-CM

## 2019-10-29 NOTE — PROGRESS NOTES
1. Have you been to the ER, urgent care clinic since your last visit? Hospitalized since your last visit? No    2. Have you seen or consulted any other health care providers outside of the [address] [place] [address] since your last visit? Include any pap smears or colon screening. No    Chief Complaint   Patient presents with    Shortness of Breath     6 mo appt. C/O SOB with exertion.

## 2019-10-29 NOTE — PROGRESS NOTES
Tino Phillips, MARINAP-BC    Subjective/HPI:     Ms. Kimberly Joseph is a 71 y.o. female is here for routine f/u. She has a PMHx of CREST syndrome, HTN, HLD, GERD, and KRYSTINA on CPAP therapy. She reports unchanged symptoms of shortness of breath with exertion  She did get sleep study done and has been using CPAP mask but reports she is still having about 1-6 episodes of apnea a night. She continues to follow with PAR for PAP titrations. She reports ongoing back and neck issues that may require surgery. She is scheduled for MRI with sedation in December for further evaluation. She has been under increased anxiety and stress due to her 's recent diagnosis of skin cancer, requiring surgery. She has had to increase her caregiver duties, and as a result has been eating more and being less active.        PCP Provider  Rachelle John MD  Past Medical History:   Diagnosis Date    Adverse effect of anesthesia     woke up during hysterectomy    Arrhythmia     h/o palpitations normal work up 22/2015 heart: Ronni    Arthritis     Asthma     allergy to weather changing    Cataract     Chronic pain     bulging disc c4/c5 l4/l5 : as of 9/1018 no neck/head movement limitation says patient    Color blind     CREST (calcinosis, Raynaud's phenomenon, esophageal dysfunction, sclerodactyly, telangiectasia) ([address] 2018    GERD (gastroesophageal reflux disease)     Gout     H/O arthroscopic knee surgery     H/O: hysterectomy     Hypertension     Ill-defined condition     gout    Ill-defined condition     l4-5 hernia disc    Leg swelling 2016    unknown etiology    Bartlett's neuralgia 2001    from neuroma middle toe, left foot    Musculoskeletal disorder     arthritis    Nausea & vomiting     surgery related    Other ill-defined conditions(799.89) 11/2013    RECTAL PROLASE    Pulmonary hypertension ([address] 08/2018    Heart: Dr. Violet Thurman S/P appendectomy     Shortness of breath 2016 Pulmonary Dr. Chon Guzman    Unspecified adverse effect of anesthesia     wakes up for anesthesia early      Past Surgical History:   Procedure Laterality Date    CARDIAC SURG PROCEDURE UNLIST  2015    no blockages    CARDIAC SURG PROCEDURE UNLIST  05/2018    no blockages x 2 procedures    COLONOSCOPY  04/20/2011    negative    COLONOSCOPY N/A 9/28/2018    COLONOSCOPY performed by Y[facility] Myers MD at [facility] HX CATARACT REMOVAL Left 09/20/2018    HX CATARACT REMOVAL Right 10/20/2018    HX CHOLECYSTECTOMY      HX GI  11/19/13    Rectocele repair with enterocele repair    HX HEENT  09/21/2018    left cataract    HX HYSTERECTOMY      TOOK LEFT OVARY    HX KNEE ARTHROSCOPY  1990's    right knee partial meniscus     HX KNEE REPLACEMENT Right 2016    HX KNEE REPLACEMENT Left 2010, 2016    TKR    HX LUMBAR FUSION  2014    c4-5 fusion cervical fusion    HX ORTHOPAEDIC  1973    ganglion cyst removed rt wrist    HX ORTHOPAEDIC  2013, 1992    bilateral CTR, and had ulna nerve release    HX ORTHOPAEDIC Right 2018    thumb and ring finger trigger release     Family History   Problem Relation Age of Onset    Heart Disease Mother     Hypertension Mother     Diabetes Mother     Cancer Mother         BREAST, LUNG    Breast Cancer Mother 61    Heart Disease Father     Hypertension Father     Thyroid Disease Father     Diabetes Brother     Psychiatric Disorder Son         STAINS, [place], [place] DEPRESSIVE    Anesth Problems Neg Hx      Social History     Socioeconomic History    Marital status:      Spouse name: Not on file    Number of children: Not on file    Years of education: Not on file    Highest education level: Not on file   Occupational History    Not on file   Social Needs    Financial resource strain: Not on file    Food insecurity:     Worry: Not on file     Inability: Not on file    Transportation needs:     Medical: Not on file Non-medical: Not on file   Tobacco Use    Smoking status: Never Smoker    Smokeless tobacco: Never Used   Substance and Sexual Activity    Alcohol use: No    Drug use: No    Sexual activity: Not on file   Lifestyle    Physical activity:     Days per week: Not on file     Minutes per session: Not on file    Stress: Not on file   Relationships    Social connections:     Talks on phone: Not on file     Gets together: Not on file     Attends Orthodox service: Not on file     Active member of club or organization: Not on file     Attends meetings of clubs or organizations: Not on file     Relationship status: Not on file    Intimate partner violence:     Fear of current or ex partner: Not on file     Emotionally abused: Not on file     Physically abused: Not on file     Forced sexual activity: Not on file   Other Topics Concern    Not on file   Social History Narrative    Not on file       Allergies   Allergen Reactions    Ciprofloxacin Shortness of Breath    Flagyl [Metronidazole] Shortness of Breath    Percocet [Oxycodone-Acetaminophen] Rash and Itching     irritated    Benzene Other (comments)     Blisters and burn area Benzene found to be on steri-strips    Betadine [Povidone-Iodine] Other (comments)     Blisters and burning    Naproxen Itching    Sulfa (Sulfonamide Antibiotics) Rash    Adhesive Rash     Redness and rash        Current Outpatient Medications   Medication Sig    furosemide (LASIX) 40 mg tablet Take 20 mg by mouth daily.  telmisartan (MICARDIS) 40 mg tablet Take 40 mg by mouth daily.  aluminum hydrox-magnesium carb (GAVISCON)  mg/15 mL suspension Take 15 mL by mouth every six (6) hours as needed for Indigestion.  lidocaine (LIDODERM) 5 % by TransDERmal route every twenty-four (24) hours. Apply patch to the affected area for 12 hours a day and remove for 12 hours a day.     metoprolol tartrate (LOPRESSOR) 25 mg tablet TAKE ONE TABLET BY MOUTH TWICE DAILY    isosorbide mononitrate ER (IMDUR) 30 mg tablet TAKE ONE-HALF (1/2) TABLET DAILY FOR RAYNAUDS PHENOMENON    sucralfate (CARAFATE) 1 gram tablet Take 1 g by mouth daily.  omeprazole (PRILOSEC) 20 mg capsule Take 20 mg by mouth daily.  benzonatate (TESSALON) 100 mg capsule Take 100 mg by mouth three (3) times daily as needed for Cough.  oxybutynin chloride XL (DITROPAN XL) 10 mg CR tablet Take 10 mg by mouth daily.  baclofen (LIORESAL) 10 mg tablet Take 10 mg by mouth daily.  multivitamin (ONE A DAY) tablet Take 1 Tab by mouth daily.  raNITIdine (ZANTAC) 150 mg tablet Take 150 mg by mouth daily.  aspirin delayed-release 81 mg tablet Take  by mouth daily.  acetaminophen (TYLENOL EXTRA STRENGTH) 500 mg tablet Take 1,000 mg by mouth every six (6) hours as needed for Pain.  OTHER,NON-FORMULARY, Take 2 Tabs by mouth daily. FIBER SUPPLEMENT     allopurinol (ZYLOPRIM) 100 mg tablet Take 100 mg by mouth daily.  pravastatin (PRAVACHOL) 40 mg tablet Take 40 mg by mouth nightly.  cetirizine (ZYRTEC) 10 mg tablet Take 10 mg by mouth daily.  montelukast (SINGULAIR) 10 mg tablet Take 10 mg by mouth nightly. No current facility-administered medications for this visit. I have reviewed the problem list, allergy list, medical history, family, social history and medications. Review of Symptoms:    Review of Systems   Constitutional: Negative for chills, fever and weight loss. HENT: Negative for nosebleeds. Eyes: Negative for blurred vision and double vision. Respiratory: Positive for shortness of breath. Negative for cough and wheezing. Cardiovascular: Negative for chest pain, palpitations, orthopnea, leg swelling and PND. Gastrointestinal: Negative for abdominal pain, blood in stool, diarrhea, nausea and vomiting. Musculoskeletal: Negative for joint pain. Skin: Negative for rash. Neurological: Negative for dizziness, tingling and loss of consciousness. Endo/Heme/Allergies: Does not bruise/bleed easily. Physical Exam:      General: Well developed, in no acute distress, cooperative and alert  HEENT: No carotid bruits, no JVD, trach is midline. Neck Supple, PEERL, EOM intact. Heart:  reg rate and rhythm; normal S1/S2; no murmurs, gallops or rubs. Respiratory: Clear bilaterally x 4, no wheezing or rales  Abdomen:   Soft, non-tender, no distention, no masses. + BS. Extremities:  Normal cap refill, no cyanosis, atraumatic. No edema. Neuro: A&Ox3, speech clear, gait stable. Skin: Skin color is normal. No rashes or lesions. Non diaphoretic  Vascular: 2+ pulses symmetric in all extremities    Vitals:    10/29/19 0850 10/29/19 0902   BP: 100/60 104/64   Pulse: 72    Resp: 18    SpO2: 92%    Weight: 229 lb 8 oz (104.1 kg)    Height: 5' 6\" (1.676 m)        Cardiographics    ECG: sinus rhythm  Results for orders placed or performed in visit on 05/07/18   CARDIAC HOLTER MONITOR, 24 HOURS    Narrative    ECG Monitor/24 hours, Complete    Reason for Holter Monitor   PALPITATIONS    Heartbeat    Slowest 48  Average 70  Fastest  118      Results:   Underlying Rhythm: Normal sinus rhythm      Atrial Arrhythmias: premature atrial contractions; rare            AV Conduction: normal    Ventricular Arrhythmias: premature ventricular contractions; rare     ST Segment Analysis:normal     Symptom Correlation:  None. Comment:   No significant dysrhythmias noted.       Latonia Sauceda MD                 Cardiology Labs:  No results found for: CHOL, CHOLX, CHLST, CHOLV, 562276, HDL, HDLP, LDL, LDLC, DLDLP, TGLX, TRIGL, TRIGP, CHHD, CHHDX    Lab Results   Component Value Date/Time    Sodium 143 05/07/2018 10:23 AM    Potassium 4.9 05/07/2018 10:23 AM    Chloride 101 05/07/2018 10:23 AM    CO2 27 05/07/2018 10:23 AM    Anion gap 5 01/11/2016 08:55 AM    Glucose 101 (H) 05/07/2018 10:23 AM    BUN 15 05/07/2018 10:23 AM    Creatinine 1.02 (H) 05/07/2018 10:23 AM BUN/Creatinine ratio 15 05/07/2018 10:23 AM    GFR est AA 65 05/07/2018 10:23 AM    GFR est non-AA 57 (L) 05/07/2018 10:23 AM    Calcium 9.3 05/07/2018 10:23 AM    Bilirubin, total 0.9 05/07/2018 10:23 AM    AST (SGOT) 20 05/07/2018 10:23 AM    Alk. phosphatase 94 05/07/2018 10:23 AM    Protein, total 6.4 05/07/2018 10:23 AM    Albumin 4.2 05/07/2018 10:23 AM    Globulin 2.8 11/20/2013 05:30 AM    A-G Ratio 1.9 05/07/2018 10:23 AM    ALT (SGPT) 18 05/07/2018 10:23 AM           Assessment:     Assessment:       ICD-10-CM ICD-9-CM    1. SOB (shortness of breath) R06.02 786.05 AMB POC EKG ROUTINE W/ 12 LEADS, INTER & REP   2. Essential hypertension I10 401.9    3. Mixed hyperlipidemia E78.2 272.2         Plan:     1. SOB (shortness of breath)  Unchanged from previous visit  Cardiac workup essentially normal -- stress test in 2017 was negative for ischemia; right heart catheterization with mild pulmonary HTN, echo done 6/2018 with preserved ejection fraction 73-51%, no diastolic impairment, with normal valvular function  Has gained 10 lbs since last visit -- eating more, less active due to increased caregiver responsibilities for her   Previously discussed cardiac MRI given hx of CREST syndrome, however patient has severe claustrophobia and declined at that time. Is now interested in cardiac MRI under sedation, but would like to defer until she has completed workup for her back issues. Continue to encourage increased activity, weight loss. Sleep apnea treatment per pulmonary. 2. Essential hypertension  BP controlled. Continue anti-hypertensive therapy and low sodium diet    3. Mixed hyperlipidemia  Continue statin therapy and low fat, low cholesterol diet    F/u in 6 months    Isadora Naranjo NP       Patient seen and examined by me with nurse practitioner.   I personally performed all components of the history, physical, and medical decision making and agree with the assessment and plan as noted.    Isaias Burris MD

## 2019-12-05 ENCOUNTER — HOSPITAL ENCOUNTER (OUTPATIENT)
Dept: MRI IMAGING | Age: 69
Discharge: HOME | End: 2019-12-05
Attending: ORTHOPAEDIC SURGERY
Payer: MEDICARE

## 2019-12-05 VITALS
HEIGHT: 66 IN | DIASTOLIC BLOOD PRESSURE: 75 MMHG | RESPIRATION RATE: 20 BRPM | TEMPERATURE: 98.6 F | WEIGHT: 227 LBS | BODY MASS INDEX: 36.48 KG/M2 | OXYGEN SATURATION: 96 % | HEART RATE: 81 BPM | SYSTOLIC BLOOD PRESSURE: 144 MMHG

## 2019-12-05 DIAGNOSIS — M54.50 LOWER BACK PAIN: ICD-10-CM

## 2019-12-05 DIAGNOSIS — S22.080D T12 COMPRESSION FRACTURE, WITH ROUTINE HEALING, SUBSEQUENT ENCOUNTER: ICD-10-CM

## 2019-12-05 DIAGNOSIS — M51.36 DDD (DEGENERATIVE DISC DISEASE), LUMBAR: ICD-10-CM

## 2019-12-05 DIAGNOSIS — Z98.1 S/P CERVICAL SPINAL FUSION: ICD-10-CM

## 2019-12-05 PROCEDURE — 74011250636 HC RX REV CODE- 250/636: Performed by: ORTHOPAEDIC SURGERY

## 2019-12-05 PROCEDURE — 72141 MRI NECK SPINE W/O DYE: CPT

## 2019-12-05 PROCEDURE — 72148 MRI LUMBAR SPINE W/O DYE: CPT

## 2019-12-05 RX ORDER — SODIUM CHLORIDE 9 MG/ML
25 INJECTION, SOLUTION INTRAVENOUS CONTINUOUS
Status: DISCONTINUED | OUTPATIENT
Start: 2019-12-05 | End: 2019-12-09 | Stop reason: HOSPADM

## 2019-12-05 RX ORDER — MIDAZOLAM HYDROCHLORIDE 1 MG/ML
5 INJECTION, SOLUTION INTRAMUSCULAR; INTRAVENOUS
Status: DISCONTINUED | OUTPATIENT
Start: 2019-12-05 | End: 2019-12-09 | Stop reason: HOSPADM

## 2019-12-05 RX ORDER — FENTANYL CITRATE 50 UG/ML
100 INJECTION, SOLUTION INTRAMUSCULAR; INTRAVENOUS
Status: DISCONTINUED | OUTPATIENT
Start: 2019-12-05 | End: 2019-12-09 | Stop reason: HOSPADM

## 2019-12-05 RX ADMIN — SODIUM CHLORIDE 25 ML/HR: 900 INJECTION, SOLUTION INTRAVENOUS at 12:35

## 2019-12-05 RX ADMIN — MIDAZOLAM 1 MG: 1 INJECTION INTRAMUSCULAR; INTRAVENOUS at 13:17

## 2019-12-05 RX ADMIN — MIDAZOLAM 2 MG: 1 INJECTION INTRAMUSCULAR; INTRAVENOUS at 13:10

## 2019-12-05 RX ADMIN — MIDAZOLAM 1 MG: 1 INJECTION INTRAMUSCULAR; INTRAVENOUS at 13:22

## 2019-12-05 RX ADMIN — FENTANYL CITRATE 50 MCG: 50 INJECTION, SOLUTION INTRAMUSCULAR; INTRAVENOUS at 13:12

## 2019-12-05 RX ADMIN — MIDAZOLAM 1 MG: 1 INJECTION INTRAMUSCULAR; INTRAVENOUS at 13:14

## 2019-12-05 RX ADMIN — FENTANYL CITRATE 25 MCG: 50 INJECTION, SOLUTION INTRAMUSCULAR; INTRAVENOUS at 13:16

## 2019-12-05 RX ADMIN — FENTANYL CITRATE 25 MCG: 50 INJECTION, SOLUTION INTRAMUSCULAR; INTRAVENOUS at 13:20

## 2019-12-05 NOTE — ROUTINE PROCESS
Procedure reviewed with patient by Dr. West Handler. Opportunity to verbalize questions and concerns. Consent obtained.

## 2019-12-05 NOTE — DISCHARGE INSTRUCTIONS
[address]  Radiology Department  [phone]    Radiologist:  Dr. Sofiya Vazquez    Date:12/5/2019        MRI With Sedation Discharge Instructions      Go home and rest and restrict your activity the next 24 hours. You have been given sedating medications, so do not drive or drink alcohol today. Resume your previous diet and medications. You may return to work and resume normal activities tomorrow. Results of your MRI will be sent to your physician as soon as they become available.

## 2019-12-05 NOTE — PROGRESS NOTES
Name of procedure:MRI sedation      Complications, if any, r/t procedure:none      Sedation medications given:5 mg of versed, 100 mcg of fentanyl      Sedation tolerated: yes      VS : Stable vs Unstable stable      [facility]      Any other specific needs to pt.  Care:start time 1310, end time 1345

## 2019-12-19 ENCOUNTER — HOSPITAL ENCOUNTER (OUTPATIENT)
Dept: INTERVENTIONAL RADIOLOGY/VASCULAR | Age: 69
Discharge: HOME | End: 2019-12-19
Attending: ORTHOPAEDIC SURGERY
Payer: MEDICARE

## 2019-12-19 VITALS
HEART RATE: 64 BPM | SYSTOLIC BLOOD PRESSURE: 172 MMHG | BODY MASS INDEX: 36.48 KG/M2 | DIASTOLIC BLOOD PRESSURE: 58 MMHG | OXYGEN SATURATION: 98 % | RESPIRATION RATE: 18 BRPM | HEIGHT: 66 IN | TEMPERATURE: 98.1 F | WEIGHT: 227 LBS

## 2019-12-19 DIAGNOSIS — M47.816 SPONDYLOSIS OF LUMBAR SPINE: ICD-10-CM

## 2019-12-19 PROCEDURE — 74011000250 HC RX REV CODE- 250: Performed by: RADIOLOGY

## 2019-12-19 PROCEDURE — 62323 NJX INTERLAMINAR LMBR/SAC: CPT

## 2019-12-19 PROCEDURE — 74011000250 HC RX REV CODE- 250

## 2019-12-19 PROCEDURE — 74011250636 HC RX REV CODE- 250/636: Performed by: RADIOLOGY

## 2019-12-19 RX ORDER — DEXAMETHASONE SODIUM PHOSPHATE 10 MG/ML
14 INJECTION INTRAMUSCULAR; INTRAVENOUS ONCE
Status: COMPLETED | OUTPATIENT
Start: 2019-12-19 | End: 2019-12-19

## 2019-12-19 RX ORDER — LIDOCAINE HYDROCHLORIDE 10 MG/ML
4 INJECTION, SOLUTION EPIDURAL; INFILTRATION; INTRACAUDAL; PERINEURAL ONCE
Status: COMPLETED | OUTPATIENT
Start: 2019-12-19 | End: 2019-12-19

## 2019-12-19 RX ORDER — LIDOCAINE HYDROCHLORIDE 20 MG/ML
20 INJECTION, SOLUTION INFILTRATION; PERINEURAL ONCE
Status: COMPLETED | OUTPATIENT
Start: 2019-12-19 | End: 2019-12-19

## 2019-12-19 RX ORDER — SODIUM CHLORIDE 9 MG/ML
INJECTION INTRAMUSCULAR; INTRAVENOUS; SUBCUTANEOUS
Status: COMPLETED
Start: 2019-12-19 | End: 2019-12-19

## 2019-12-19 RX ORDER — SODIUM CHLORIDE 9 MG/ML
3 INJECTION INTRAMUSCULAR; INTRAVENOUS; SUBCUTANEOUS
Status: COMPLETED | OUTPATIENT
Start: 2019-12-19 | End: 2019-12-19

## 2019-12-19 RX ADMIN — SODIUM CHLORIDE 3 ML: 9 INJECTION, SOLUTION INTRAMUSCULAR; INTRAVENOUS; SUBCUTANEOUS at 08:08

## 2019-12-19 RX ADMIN — LIDOCAINE HYDROCHLORIDE 20 MG: 20 INJECTION, SOLUTION INFILTRATION; PERINEURAL at 08:09

## 2019-12-19 RX ADMIN — SODIUM CHLORIDE 3 ML: 9 INJECTION INTRAMUSCULAR; INTRAVENOUS; SUBCUTANEOUS at 08:08

## 2019-12-19 RX ADMIN — DEXAMETHASONE SODIUM PHOSPHATE 10 MG: 10 INJECTION, SOLUTION INTRAMUSCULAR; INTRAVENOUS at 08:08

## 2019-12-19 RX ADMIN — LIDOCAINE HYDROCHLORIDE 2 ML: 10 INJECTION, SOLUTION EPIDURAL; INFILTRATION; INTRACAUDAL; PERINEURAL at 08:08

## 2019-12-19 NOTE — ROUTINE PROCESS
6697  Patient arrived ambulatory to Radiology Recovery, alert and oriented x 4.    0750  Dr. Brielle Couch at bedside explaining procedure to patient and providing patient with risks and benefits of procedure. Patient verbalized understanding and signed consent for procedure. 3528  Patient alert and oriented x 4, denies numbness/tingling to lower extremities, able to move all extremities without difficulty. Patient provided with verbal and written discharge instructions. Patient discharged via wheelchair with  to transport home.

## 2019-12-19 NOTE — DISCHARGE INSTRUCTIONS
[address] [facility]  Special Procedures/Radiology Department    Radiologist:  Dr. Candy Saini    Date:  12/19/2019      Steroidal Injection      Go home and rest.     No vigorous physical activity today. Be aware that numbness and/or tingling can occur up to 24 hours after the injection. No driving today. Resume your previous diet. Resume your previous medications. Depending on your job, you may return to work in 25 to 48 hours. It may take up to one week after the injection to see a change or an improvement in your symptoms. Be sure to follow up with your physician. Tell your physician if the injection helped with your symptoms or if the injection did nothing for your symptoms. For minor discomfort, you can take Tylenol, as directed on the label.       If you have any questions or concerns, please call [phone] and ask for the nurse on-call    Other:

## 2020-02-07 ENCOUNTER — HOSPITAL ENCOUNTER (OUTPATIENT)
Dept: GENERAL RADIOLOGY | Age: 70
Discharge: HOME | End: 2020-02-07
Payer: MEDICARE

## 2020-02-07 DIAGNOSIS — R14.0 BLOATING SYMPTOM: ICD-10-CM

## 2020-02-07 DIAGNOSIS — M34.1 CREST SYNDROME (HCC): ICD-10-CM

## 2020-02-07 DIAGNOSIS — R10.814 ABDOMINAL TENDERNESS, LEFT LOWER QUADRANT: ICD-10-CM

## 2020-02-07 DIAGNOSIS — K58.9 IRRITABLE BOWEL SYNDROME: ICD-10-CM

## 2020-02-07 PROCEDURE — 74018 RADEX ABDOMEN 1 VIEW: CPT

## 2020-03-05 NOTE — H&P
Wilber Mckenna  Location: - [facility]  Patient #: [id number]  : 1950   / Language: Rozetta Quivers / Race: White  Female      History of Present Illness   The patient is a 79year old female who presents with back pain. Note for \"Back pain\":  Patient presents with complaint of low back pain.  She was ultimately sent to us as she recently had a CT of the abdomen and pelvis which revealed a T12 compression fracture per report. Sawyer Swartz is currently in a Quick Draw and was started on Miacalcin. She was recently seen in our office and given findings on the C-spine images as well as ongoing random falls a C-spine MRI was ordered. Sawyer Swartz has that scheduled for 2019.   MRI of the lumbar spine was going to be ordered at another date, however patient is in a lot of discomfort and is questioning whether she can have the 2 MRIs at the same time.  The MRI has been ordered with general anesthesia. She denies loss of bowel or bladder control.   2 view x-rays of lumbar spine obtained today. She continues with neck pain and bilateral shoulder pain.  She has had a history of a prior cervical fusion from C5-C6.  Has bilateral radicular symptoms as well as weakness and signs of early myelopathy. Diagnostic Studies History   MRI, Cervical Spine   Date: 3/4/2020, Results: MRI confirms a prior anterior cervical discectomy and fusion at C5-6. She is developed persistent spondylosis at C4-5 and C6-7 with foraminal stenosis. She has a moderate central stenosis at those levels. Adequate decompression at C5-6. Review of Systems   General Not Present- Chills and Fatigue. Skin Not Present- Bruising, Pallor and Skin Color Changes. Respiratory Not Present- Cough and Difficulty Breathing. Cardiovascular Not Present- Chest Pain, Fainting / Blacking Out and Rapid Heart Rate. Musculoskeletal Present- Back Pain and Joint Pain. Not Present- Decreased Range of Motion and Joint Swelling.   Neurological Not Present- Dysesthesia, Paresthesias and Weakness In Extremities. Hematology Not Present- Abnormal Bleeding, Blood Clots and Petechiae. Physical Exam   Neurologic  Overall Assessment of Muscle Strength and Tone reveals  Upper Extremities - Right Deltoid - 5/5. Left Deltoid - 4/5. Right Bicep - 4/5. Left Bicep - 5/5. Right Tricep - 4/5. Left Tricep - 4/5. Right Wrist Extensors - 5/5. Left Wrist Extensors - 5/5. Right Wrist Flexors - 5/5. Left Wrist Flexors - 5/5. Right Intrinsics - 5/5. Left Intrinsics - 5/5. General Assessment of Reflexes  Right Hand - Valle's sign is positive in the right hand. Left Hand - Valle's sign is negative in the left hand. Reflexes (Dermatomes)  2/2 Normal - Left Bicep (C5-6), Left Tricep (C7-8), Left Brachioradialis (C5-6), Right Bicep (C5-6), Right Tricep (C7-8) and Right Brachioradialis (C5-6). Musculoskeletal  Global Assessment  Examination of related systems reveals - well-developed, well-nourished, in no acute distress, alert and oriented x 3 and normal coordination. Gait and Station - normal gait and station and normal posture. Spine/Ribs/Pelvis  Cervical Spine - Evaluation of related systems reveals - no lymphadenopathy and neurovascularly intact bilaterally. Inspection and Palpation - Tenderness - moderate and localized. Assessment of pain reveals the following findings: - Location - cervical area. ROJM - Flexion - 85 °. Right Lateral Flexion - 35 °. Left Lateral Flexion - 35 °. Extension - 70 °. Right Rotation - 80 °. Left Rotation - 80 °. Cervical Spine - Functional Testing - Foraminal Compression/Spurling's Test negative, Shoulder Depression Test negative, Upper Limb Tension Test negative. Lumbosacral Spine - Examination of the lumbosacral spine reveals - no tenderness to palpation, no pain, normal strength and tone, no laxity or crepitus and normal lumbosacral spine movements.         Assessment & Plan   CERVICAL SPINAL FUSION (V45.4  Z98.1)  Current Plans  Pt Education - How to [address] using Patient Portal and 3rd Party Apps: discussed with patient and provided information. Pt Education - Educational materials were provided.: discussed with patient and provided information. CERVICALGIA (723.1  M54.2)  Cervical spinal stenosis (723.0  M48.02)  Impression: She has developed adjacent level cervical spondylosis both above and below her prior cervical fusion. She has radicular symptoms as well as signs of myelopathy. She is a candidate for a revision ACDF C4-5 and C6-7 with a revision anterior cervical fusion C4-7. Risk and benefits were discussed with her. The risks and benefits were discussed at length with the patient and the patient has elected to proceed. Indications for surgery include failed conservative treatment. Alternative treatments, risks and the perioperative course were discussed with the patient. All questions were answered. The risks and benefits of the procedure were explained. Benefits include definitive diagnosis, relief of pain, elimination of deformity and improved function. Risks of surgery including bleeding, infection, weakness, numbness, CSF leak, failure to improve symptoms, exacerbation of medical co-morbidities and even death were discussed with the patient.   Treatment options were discussed with the patient in full.(V65.49)  Current Plans  Presurgical planning was preformed with the patient today  Surgery to be scheduled  Procedure: C4-7 cervical revision ACDF        Signed by Rebecca Leblanc MD

## 2020-03-06 NOTE — PERIOP NOTES
PRE-OP INSTRUCTIONS GIVEN OVER TELEPHONE , WHICH INCLUDED INSTRUCTIONS ON MEDICATIONS TO TAKE DAY OF SURGERY AND MEDICATIONS TO STOP PRIOR TO SURGERY. VERBAL INSTRUCTIONS ON NEW DIET PROTOCOL TO FOLLOW, UNLESS OTHERWISE INSTRUCTED BY SURGEON'S OFFICE TO BE NPO AFTER MIDNIGHT. VERBAL  INSTRUCTIONS GIVEN ON USE OF DIAL SOAP  PRIOR TO SURGERY AND USE OF CHLORHEXIDINE SOLUTION THE EVENING BEFORE SURGERY AND THE MORNING OF SURGERY. PATIENT VOICED UNDERSTANDING OF ABOVE.

## 2020-03-09 ENCOUNTER — ANESTHESIA (OUTPATIENT)
Dept: SURGERY | Age: 70
End: 2020-03-09
Payer: MEDICARE

## 2020-03-09 ENCOUNTER — APPOINTMENT (OUTPATIENT)
Dept: GENERAL RADIOLOGY | Age: 70
End: 2020-03-09
Attending: ORTHOPAEDIC SURGERY
Payer: MEDICARE

## 2020-03-09 ENCOUNTER — ANESTHESIA EVENT (OUTPATIENT)
Dept: SURGERY | Age: 70
End: 2020-03-09
Payer: MEDICARE

## 2020-03-09 ENCOUNTER — HOSPITAL ENCOUNTER (OUTPATIENT)
Age: 70
Discharge: HOME | End: 2020-03-11
Attending: ORTHOPAEDIC SURGERY | Admitting: ORTHOPAEDIC SURGERY
Payer: MEDICARE

## 2020-03-09 DIAGNOSIS — M48.02 CERVICAL STENOSIS OF SPINAL CANAL: Primary | ICD-10-CM

## 2020-03-09 LAB
ABO + RH BLD: NORMAL
BLOOD GROUP ANTIBODIES SERPL: NORMAL
GLUCOSE BLD STRIP.AUTO-MCNC: 114 MG/DL (ref 65–100)
SERVICE CMNT-IMP: ABNORMAL
SPECIMEN EXP DATE BLD: NORMAL

## 2020-03-09 PROCEDURE — 77030003832 HC BIT DRL ATLNTS MEDT -B: Performed by: ORTHOPAEDIC SURGERY

## 2020-03-09 PROCEDURE — 76210000016 HC OR PH I REC 1 TO 1.5 HR: Performed by: ORTHOPAEDIC SURGERY

## 2020-03-09 PROCEDURE — 77030014650 HC SEAL MTRX FLOSEL BAXT -C: Performed by: ORTHOPAEDIC SURGERY

## 2020-03-09 PROCEDURE — 74011000250 HC RX REV CODE- 250: Performed by: NURSE ANESTHETIST, CERTIFIED REGISTERED

## 2020-03-09 PROCEDURE — 74011250636 HC RX REV CODE- 250/636: Performed by: PHYSICIAN ASSISTANT

## 2020-03-09 PROCEDURE — 77030018836 HC SOL IRR NACL ICUM -A: Performed by: ORTHOPAEDIC SURGERY

## 2020-03-09 PROCEDURE — 77030008684 HC TU ET CUF COVD -B: Performed by: ANESTHESIOLOGY

## 2020-03-09 PROCEDURE — 74011250636 HC RX REV CODE- 250/636: Performed by: NURSE ANESTHETIST, CERTIFIED REGISTERED

## 2020-03-09 PROCEDURE — 77030026438 HC STYL ET INTUB CARD -A: Performed by: ANESTHESIOLOGY

## 2020-03-09 PROCEDURE — 74011250637 HC RX REV CODE- 250/637: Performed by: PHYSICIAN ASSISTANT

## 2020-03-09 PROCEDURE — 77030012890

## 2020-03-09 PROCEDURE — 77030031139 HC SUT VCRL2 J&J -A: Performed by: ORTHOPAEDIC SURGERY

## 2020-03-09 PROCEDURE — 72020 X-RAY EXAM OF SPINE 1 VIEW: CPT

## 2020-03-09 PROCEDURE — 77030040361 HC SLV COMPR DVT MDII -B: Performed by: ORTHOPAEDIC SURGERY

## 2020-03-09 PROCEDURE — 77030003666 HC NDL SPINAL BD -A: Performed by: ORTHOPAEDIC SURGERY

## 2020-03-09 PROCEDURE — 77030004391 HC BUR FLUT MEDT -C: Performed by: ORTHOPAEDIC SURGERY

## 2020-03-09 PROCEDURE — 77030038600 HC TU BPLR IRR DISP STRY -B: Performed by: ORTHOPAEDIC SURGERY

## 2020-03-09 PROCEDURE — 76060000035 HC ANESTHESIA 2 TO 2.5 HR: Performed by: ORTHOPAEDIC SURGERY

## 2020-03-09 PROCEDURE — 76010000162 HC OR TIME 1.5 TO 2 HR INTENSV-TIER 1: Performed by: ORTHOPAEDIC SURGERY

## 2020-03-09 PROCEDURE — 86900 BLOOD TYPING SEROLOGIC ABO: CPT

## 2020-03-09 PROCEDURE — 82962 GLUCOSE BLOOD TEST: CPT

## 2020-03-09 PROCEDURE — 74011000272 HC RX REV CODE- 272: Performed by: ORTHOPAEDIC SURGERY

## 2020-03-09 PROCEDURE — 77030032834 HC GRFT BN SUB MUSC -F: Performed by: ORTHOPAEDIC SURGERY

## 2020-03-09 PROCEDURE — 77030029099 HC BN WAX SSPC -A: Performed by: ORTHOPAEDIC SURGERY

## 2020-03-09 PROCEDURE — 36415 COLL VENOUS BLD VENIPUNCTURE: CPT

## 2020-03-09 PROCEDURE — 77030011267 HC ELECTRD BLD COVD -A: Performed by: ORTHOPAEDIC SURGERY

## 2020-03-09 PROCEDURE — 74011000258 HC RX REV CODE- 258: Performed by: PHYSICIAN ASSISTANT

## 2020-03-09 PROCEDURE — L0172 CERV COL SR FOAM 2PC PRE OTS: HCPCS | Performed by: ORTHOPAEDIC SURGERY

## 2020-03-09 PROCEDURE — C1713 ANCHOR/SCREW BN/BN,TIS/BN: HCPCS | Performed by: ORTHOPAEDIC SURGERY

## 2020-03-09 PROCEDURE — 74011000250 HC RX REV CODE- 250: Performed by: ANESTHESIOLOGY

## 2020-03-09 PROCEDURE — 77030040424 HC CGE CERV SPN ANATOMIC PTC MEDT -G: Performed by: ORTHOPAEDIC SURGERY

## 2020-03-09 PROCEDURE — 77030040922 HC BLNKT HYPOTHRM STRY -A

## 2020-03-09 PROCEDURE — 74011000250 HC RX REV CODE- 250: Performed by: ORTHOPAEDIC SURGERY

## 2020-03-09 PROCEDURE — 74011250636 HC RX REV CODE- 250/636: Performed by: ANESTHESIOLOGY

## 2020-03-09 DEVICE — PLATE 3003051 ZEVO 51MM 3 LVL
Type: IMPLANTABLE DEVICE | Site: SPINE CERVICAL | Status: FUNCTIONAL
Brand: ZEVO™ ANTERIOR CERVICAL PLATE SYSTEM

## 2020-03-09 DEVICE — CAGE 5030741 ANATOMIC PTC 14X11X7MM
Type: IMPLANTABLE DEVICE | Site: SPINE CERVICAL | Status: FUNCTIONAL
Brand: ANATOMIC PEEK PTC CERVICAL FUSION SYSTEM

## 2020-03-09 DEVICE — GRAFT BNE SUB SM CANC FRZN MORSELIZED W/ VIABLE CELL: Type: IMPLANTABLE DEVICE | Site: SPINE CERVICAL | Status: FUNCTIONAL

## 2020-03-09 RX ORDER — ALLOPURINOL 100 MG/1
100 TABLET ORAL DAILY
Status: DISCONTINUED | OUTPATIENT
Start: 2020-03-10 | End: 2020-03-11 | Stop reason: HOSPADM

## 2020-03-09 RX ORDER — PROPOFOL 10 MG/ML
INJECTION, EMULSION INTRAVENOUS AS NEEDED
Status: DISCONTINUED | OUTPATIENT
Start: 2020-03-09 | End: 2020-03-09 | Stop reason: HOSPADM

## 2020-03-09 RX ORDER — SODIUM CHLORIDE 9 MG/ML
50 INJECTION, SOLUTION INTRAVENOUS CONTINUOUS
Status: DISCONTINUED | OUTPATIENT
Start: 2020-03-09 | End: 2020-03-09 | Stop reason: HOSPADM

## 2020-03-09 RX ORDER — LIDOCAINE HYDROCHLORIDE 20 MG/ML
INJECTION, SOLUTION EPIDURAL; INFILTRATION; INTRACAUDAL; PERINEURAL AS NEEDED
Status: DISCONTINUED | OUTPATIENT
Start: 2020-03-09 | End: 2020-03-09 | Stop reason: HOSPADM

## 2020-03-09 RX ORDER — LABETALOL HYDROCHLORIDE 5 MG/ML
5 INJECTION, SOLUTION INTRAVENOUS ONCE
Status: COMPLETED | OUTPATIENT
Start: 2020-03-09 | End: 2020-03-09

## 2020-03-09 RX ORDER — DIPHENHYDRAMINE HYDROCHLORIDE 50 MG/ML
12.5 INJECTION, SOLUTION INTRAMUSCULAR; INTRAVENOUS AS NEEDED
Status: DISCONTINUED | OUTPATIENT
Start: 2020-03-09 | End: 2020-03-09 | Stop reason: HOSPADM

## 2020-03-09 RX ORDER — HYDROMORPHONE HYDROCHLORIDE 1 MG/ML
0.2 INJECTION, SOLUTION INTRAMUSCULAR; INTRAVENOUS; SUBCUTANEOUS
Status: DISCONTINUED | OUTPATIENT
Start: 2020-03-09 | End: 2020-03-09 | Stop reason: HOSPADM

## 2020-03-09 RX ORDER — MIDAZOLAM HYDROCHLORIDE 1 MG/ML
0.5 INJECTION, SOLUTION INTRAMUSCULAR; INTRAVENOUS
Status: DISCONTINUED | OUTPATIENT
Start: 2020-03-09 | End: 2020-03-09 | Stop reason: HOSPADM

## 2020-03-09 RX ORDER — OXYCODONE HYDROCHLORIDE 5 MG/1
5 TABLET ORAL
Status: DISCONTINUED | OUTPATIENT
Start: 2020-03-09 | End: 2020-03-11 | Stop reason: HOSPADM

## 2020-03-09 RX ORDER — SODIUM CHLORIDE 0.9 % (FLUSH) 0.9 %
5-40 SYRINGE (ML) INJECTION EVERY 8 HOURS
Status: DISCONTINUED | OUTPATIENT
Start: 2020-03-09 | End: 2020-03-11 | Stop reason: HOSPADM

## 2020-03-09 RX ORDER — SODIUM CHLORIDE 9 MG/ML
125 INJECTION, SOLUTION INTRAVENOUS CONTINUOUS
Status: DISPENSED | OUTPATIENT
Start: 2020-03-09 | End: 2020-03-10

## 2020-03-09 RX ORDER — SODIUM CHLORIDE 0.9 % (FLUSH) 0.9 %
5-40 SYRINGE (ML) INJECTION AS NEEDED
Status: DISCONTINUED | OUTPATIENT
Start: 2020-03-09 | End: 2020-03-11 | Stop reason: HOSPADM

## 2020-03-09 RX ORDER — ONDANSETRON 2 MG/ML
INJECTION INTRAMUSCULAR; INTRAVENOUS AS NEEDED
Status: DISCONTINUED | OUTPATIENT
Start: 2020-03-09 | End: 2020-03-09 | Stop reason: HOSPADM

## 2020-03-09 RX ORDER — FENTANYL CITRATE 50 UG/ML
50 INJECTION, SOLUTION INTRAMUSCULAR; INTRAVENOUS AS NEEDED
Status: DISCONTINUED | OUTPATIENT
Start: 2020-03-09 | End: 2020-03-09 | Stop reason: HOSPADM

## 2020-03-09 RX ORDER — FENTANYL CITRATE 50 UG/ML
25 INJECTION, SOLUTION INTRAMUSCULAR; INTRAVENOUS
Status: DISCONTINUED | OUTPATIENT
Start: 2020-03-09 | End: 2020-03-09 | Stop reason: HOSPADM

## 2020-03-09 RX ORDER — SODIUM CHLORIDE, SODIUM LACTATE, POTASSIUM CHLORIDE, CALCIUM CHLORIDE 600; 310; 30; 20 MG/100ML; MG/100ML; MG/100ML; MG/100ML
75 INJECTION, SOLUTION INTRAVENOUS CONTINUOUS
Status: DISCONTINUED | OUTPATIENT
Start: 2020-03-09 | End: 2020-03-09 | Stop reason: HOSPADM

## 2020-03-09 RX ORDER — ISOSORBIDE MONONITRATE 30 MG/1
15 TABLET, EXTENDED RELEASE ORAL
Status: DISCONTINUED | OUTPATIENT
Start: 2020-03-09 | End: 2020-03-11 | Stop reason: HOSPADM

## 2020-03-09 RX ORDER — THERA TABS 400 MCG
1 TAB ORAL DAILY
Status: DISCONTINUED | OUTPATIENT
Start: 2020-03-10 | End: 2020-03-11 | Stop reason: HOSPADM

## 2020-03-09 RX ORDER — ONDANSETRON 2 MG/ML
4 INJECTION INTRAMUSCULAR; INTRAVENOUS AS NEEDED
Status: DISCONTINUED | OUTPATIENT
Start: 2020-03-09 | End: 2020-03-09 | Stop reason: HOSPADM

## 2020-03-09 RX ORDER — HYDROMORPHONE HYDROCHLORIDE 1 MG/ML
INJECTION, SOLUTION INTRAMUSCULAR; INTRAVENOUS; SUBCUTANEOUS
Status: DISPENSED
Start: 2020-03-09 | End: 2020-03-10

## 2020-03-09 RX ORDER — FUROSEMIDE 20 MG/1
20 TABLET ORAL DAILY
Status: DISCONTINUED | OUTPATIENT
Start: 2020-03-10 | End: 2020-03-11 | Stop reason: HOSPADM

## 2020-03-09 RX ORDER — AMOXICILLIN 250 MG
1 CAPSULE ORAL 2 TIMES DAILY
Status: DISCONTINUED | OUTPATIENT
Start: 2020-03-09 | End: 2020-03-11 | Stop reason: HOSPADM

## 2020-03-09 RX ORDER — CETIRIZINE HCL 10 MG
10 TABLET ORAL DAILY
Status: DISCONTINUED | OUTPATIENT
Start: 2020-03-10 | End: 2020-03-11 | Stop reason: HOSPADM

## 2020-03-09 RX ORDER — MIDAZOLAM HYDROCHLORIDE 1 MG/ML
1 INJECTION, SOLUTION INTRAMUSCULAR; INTRAVENOUS AS NEEDED
Status: DISCONTINUED | OUTPATIENT
Start: 2020-03-09 | End: 2020-03-09 | Stop reason: HOSPADM

## 2020-03-09 RX ORDER — DIPHENHYDRAMINE HYDROCHLORIDE 50 MG/ML
12.5 INJECTION, SOLUTION INTRAMUSCULAR; INTRAVENOUS
Status: ACTIVE | OUTPATIENT
Start: 2020-03-09 | End: 2020-03-10

## 2020-03-09 RX ORDER — POLYETHYLENE GLYCOL 3350 17 G/17G
17 POWDER, FOR SOLUTION ORAL DAILY
Status: DISCONTINUED | OUTPATIENT
Start: 2020-03-10 | End: 2020-03-11 | Stop reason: HOSPADM

## 2020-03-09 RX ORDER — CEFAZOLIN SODIUM/WATER 2 G/20 ML
2 SYRINGE (ML) INTRAVENOUS EVERY 8 HOURS
Status: COMPLETED | OUTPATIENT
Start: 2020-03-09 | End: 2020-03-10

## 2020-03-09 RX ORDER — MORPHINE SULFATE IN 0.9 % NACL 150MG/30ML
PATIENT CONTROLLED ANALGESIA SYRINGE INTRAVENOUS
Status: DISCONTINUED | OUTPATIENT
Start: 2020-03-09 | End: 2020-03-11 | Stop reason: HOSPADM

## 2020-03-09 RX ORDER — OXYBUTYNIN CHLORIDE 5 MG/1
10 TABLET, EXTENDED RELEASE ORAL DAILY
Status: DISCONTINUED | OUTPATIENT
Start: 2020-03-10 | End: 2020-03-11 | Stop reason: HOSPADM

## 2020-03-09 RX ORDER — CEFAZOLIN SODIUM/WATER 2 G/20 ML
2 SYRINGE (ML) INTRAVENOUS ONCE
Status: COMPLETED | OUTPATIENT
Start: 2020-03-09 | End: 2020-03-09

## 2020-03-09 RX ORDER — MIDAZOLAM HYDROCHLORIDE 1 MG/ML
INJECTION, SOLUTION INTRAMUSCULAR; INTRAVENOUS AS NEEDED
Status: DISCONTINUED | OUTPATIENT
Start: 2020-03-09 | End: 2020-03-09 | Stop reason: HOSPADM

## 2020-03-09 RX ORDER — ONDANSETRON 2 MG/ML
4 INJECTION INTRAMUSCULAR; INTRAVENOUS
Status: ACTIVE | OUTPATIENT
Start: 2020-03-09 | End: 2020-03-10

## 2020-03-09 RX ORDER — PANTOPRAZOLE SODIUM 40 MG/1
40 TABLET, DELAYED RELEASE ORAL
Status: DISCONTINUED | OUTPATIENT
Start: 2020-03-10 | End: 2020-03-11 | Stop reason: HOSPADM

## 2020-03-09 RX ORDER — SODIUM CHLORIDE 0.9 % (FLUSH) 0.9 %
5-40 SYRINGE (ML) INJECTION AS NEEDED
Status: DISCONTINUED | OUTPATIENT
Start: 2020-03-09 | End: 2020-03-09 | Stop reason: HOSPADM

## 2020-03-09 RX ORDER — MONTELUKAST SODIUM 10 MG/1
10 TABLET ORAL DAILY
Status: DISCONTINUED | OUTPATIENT
Start: 2020-03-10 | End: 2020-03-11 | Stop reason: HOSPADM

## 2020-03-09 RX ORDER — ACETAMINOPHEN 10 MG/ML
INJECTION, SOLUTION INTRAVENOUS AS NEEDED
Status: DISCONTINUED | OUTPATIENT
Start: 2020-03-09 | End: 2020-03-09 | Stop reason: HOSPADM

## 2020-03-09 RX ORDER — MORPHINE SULFATE 2 MG/ML
2 INJECTION, SOLUTION INTRAMUSCULAR; INTRAVENOUS
Status: ACTIVE | OUTPATIENT
Start: 2020-03-09 | End: 2020-03-10

## 2020-03-09 RX ORDER — HYDROMORPHONE HYDROCHLORIDE 2 MG/ML
INJECTION, SOLUTION INTRAMUSCULAR; INTRAVENOUS; SUBCUTANEOUS AS NEEDED
Status: DISCONTINUED | OUTPATIENT
Start: 2020-03-09 | End: 2020-03-09 | Stop reason: HOSPADM

## 2020-03-09 RX ORDER — BENZONATATE 100 MG/1
100 CAPSULE ORAL
Status: DISCONTINUED | OUTPATIENT
Start: 2020-03-09 | End: 2020-03-11 | Stop reason: HOSPADM

## 2020-03-09 RX ORDER — OXYCODONE HYDROCHLORIDE 5 MG/1
10 TABLET ORAL
Status: DISCONTINUED | OUTPATIENT
Start: 2020-03-09 | End: 2020-03-11 | Stop reason: HOSPADM

## 2020-03-09 RX ORDER — LOSARTAN POTASSIUM 25 MG/1
50 TABLET ORAL DAILY
Status: DISCONTINUED | OUTPATIENT
Start: 2020-03-10 | End: 2020-03-11 | Stop reason: HOSPADM

## 2020-03-09 RX ORDER — PROPOFOL 10 MG/ML
INJECTION, EMULSION INTRAVENOUS
Status: DISCONTINUED | OUTPATIENT
Start: 2020-03-09 | End: 2020-03-09 | Stop reason: HOSPADM

## 2020-03-09 RX ORDER — FACIAL-BODY WIPES
10 EACH TOPICAL DAILY PRN
Status: DISCONTINUED | OUTPATIENT
Start: 2020-03-11 | End: 2020-03-11 | Stop reason: HOSPADM

## 2020-03-09 RX ORDER — SUCCINYLCHOLINE CHLORIDE 20 MG/ML
INJECTION INTRAMUSCULAR; INTRAVENOUS AS NEEDED
Status: DISCONTINUED | OUTPATIENT
Start: 2020-03-09 | End: 2020-03-09 | Stop reason: HOSPADM

## 2020-03-09 RX ORDER — FAMOTIDINE 20 MG/1
20 TABLET, FILM COATED ORAL DAILY
Status: DISCONTINUED | OUTPATIENT
Start: 2020-03-10 | End: 2020-03-11 | Stop reason: HOSPADM

## 2020-03-09 RX ORDER — SODIUM CHLORIDE 0.9 % (FLUSH) 0.9 %
5-40 SYRINGE (ML) INJECTION EVERY 8 HOURS
Status: DISCONTINUED | OUTPATIENT
Start: 2020-03-09 | End: 2020-03-09 | Stop reason: HOSPADM

## 2020-03-09 RX ORDER — KETOROLAC TROMETHAMINE 30 MG/ML
15 INJECTION, SOLUTION INTRAMUSCULAR; INTRAVENOUS EVERY 6 HOURS
Status: DISCONTINUED | OUTPATIENT
Start: 2020-03-09 | End: 2020-03-09

## 2020-03-09 RX ORDER — NALOXONE HYDROCHLORIDE 0.4 MG/ML
0.4 INJECTION, SOLUTION INTRAMUSCULAR; INTRAVENOUS; SUBCUTANEOUS AS NEEDED
Status: DISCONTINUED | OUTPATIENT
Start: 2020-03-09 | End: 2020-03-11 | Stop reason: HOSPADM

## 2020-03-09 RX ORDER — ASPIRIN 81 MG/1
81 TABLET ORAL DAILY
Status: DISCONTINUED | OUTPATIENT
Start: 2020-03-10 | End: 2020-03-11 | Stop reason: HOSPADM

## 2020-03-09 RX ORDER — ACETAMINOPHEN 500 MG
1000 TABLET ORAL EVERY 6 HOURS
Status: DISCONTINUED | OUTPATIENT
Start: 2020-03-09 | End: 2020-03-11 | Stop reason: HOSPADM

## 2020-03-09 RX ORDER — BACLOFEN 10 MG/1
10 TABLET ORAL
Status: DISCONTINUED | OUTPATIENT
Start: 2020-03-09 | End: 2020-03-11 | Stop reason: HOSPADM

## 2020-03-09 RX ORDER — LIDOCAINE HYDROCHLORIDE 10 MG/ML
0.1 INJECTION, SOLUTION EPIDURAL; INFILTRATION; INTRACAUDAL; PERINEURAL AS NEEDED
Status: DISCONTINUED | OUTPATIENT
Start: 2020-03-09 | End: 2020-03-09 | Stop reason: HOSPADM

## 2020-03-09 RX ORDER — DEXAMETHASONE SODIUM PHOSPHATE 4 MG/ML
INJECTION, SOLUTION INTRA-ARTICULAR; INTRALESIONAL; INTRAMUSCULAR; INTRAVENOUS; SOFT TISSUE AS NEEDED
Status: DISCONTINUED | OUTPATIENT
Start: 2020-03-09 | End: 2020-03-09 | Stop reason: HOSPADM

## 2020-03-09 RX ORDER — DEXAMETHASONE SODIUM PHOSPHATE 4 MG/ML
10 INJECTION, SOLUTION INTRA-ARTICULAR; INTRALESIONAL; INTRAMUSCULAR; INTRAVENOUS; SOFT TISSUE EVERY 6 HOURS
Status: COMPLETED | OUTPATIENT
Start: 2020-03-09 | End: 2020-03-10

## 2020-03-09 RX ORDER — EPHEDRINE SULFATE/0.9% NACL/PF 50 MG/5 ML
SYRINGE (ML) INTRAVENOUS AS NEEDED
Status: DISCONTINUED | OUTPATIENT
Start: 2020-03-09 | End: 2020-03-09 | Stop reason: HOSPADM

## 2020-03-09 RX ORDER — METOPROLOL TARTRATE 25 MG/1
25 TABLET, FILM COATED ORAL 2 TIMES DAILY
Status: DISCONTINUED | OUTPATIENT
Start: 2020-03-09 | End: 2020-03-11 | Stop reason: HOSPADM

## 2020-03-09 RX ORDER — MORPHINE SULFATE 10 MG/ML
2 INJECTION, SOLUTION INTRAMUSCULAR; INTRAVENOUS
Status: DISCONTINUED | OUTPATIENT
Start: 2020-03-09 | End: 2020-03-09 | Stop reason: HOSPADM

## 2020-03-09 RX ORDER — PRAVASTATIN SODIUM 40 MG/1
40 TABLET ORAL
Status: DISCONTINUED | OUTPATIENT
Start: 2020-03-09 | End: 2020-03-11 | Stop reason: HOSPADM

## 2020-03-09 RX ADMIN — PRAVASTATIN SODIUM 40 MG: 40 TABLET ORAL at 23:38

## 2020-03-09 RX ADMIN — ACETAMINOPHEN 1000 MG: 10 INJECTION, SOLUTION INTRAVENOUS at 12:52

## 2020-03-09 RX ADMIN — BACLOFEN 10 MG: 10 TABLET ORAL at 23:38

## 2020-03-09 RX ADMIN — HYDROMORPHONE HYDROCHLORIDE 1 MG: 2 INJECTION, SOLUTION INTRAMUSCULAR; INTRAVENOUS; SUBCUTANEOUS at 12:52

## 2020-03-09 RX ADMIN — DEXAMETHASONE SODIUM PHOSPHATE 10 MG: 4 INJECTION, SOLUTION INTRAMUSCULAR; INTRAVENOUS at 19:01

## 2020-03-09 RX ADMIN — DEXAMETHASONE SODIUM PHOSPHATE 4 MG: 4 INJECTION, SOLUTION INTRAMUSCULAR; INTRAVENOUS at 11:02

## 2020-03-09 RX ADMIN — SUCCINYLCHOLINE CHLORIDE 140 MG: 20 INJECTION, SOLUTION INTRAMUSCULAR; INTRAVENOUS at 10:50

## 2020-03-09 RX ADMIN — PROPOFOL 150 MG: 10 INJECTION, EMULSION INTRAVENOUS at 10:50

## 2020-03-09 RX ADMIN — ONDANSETRON HYDROCHLORIDE 4 MG: 2 INJECTION, SOLUTION INTRAMUSCULAR; INTRAVENOUS at 11:02

## 2020-03-09 RX ADMIN — ACETAMINOPHEN 1000 MG: 500 TABLET ORAL at 18:57

## 2020-03-09 RX ADMIN — Medication 10 MG: at 10:57

## 2020-03-09 RX ADMIN — SENNOSIDES AND DOCUSATE SODIUM 1 TABLET: 8.6; 5 TABLET ORAL at 17:14

## 2020-03-09 RX ADMIN — LABETALOL HYDROCHLORIDE 5 MG: 5 INJECTION INTRAVENOUS at 13:45

## 2020-03-09 RX ADMIN — LIDOCAINE HYDROCHLORIDE 100 MG: 20 INJECTION, SOLUTION EPIDURAL; INFILTRATION; INTRACAUDAL; PERINEURAL at 10:50

## 2020-03-09 RX ADMIN — DEXAMETHASONE SODIUM PHOSPHATE 10 MG: 4 INJECTION, SOLUTION INTRAMUSCULAR; INTRAVENOUS at 13:45

## 2020-03-09 RX ADMIN — MIDAZOLAM 2 MG: 1 INJECTION INTRAMUSCULAR; INTRAVENOUS at 10:43

## 2020-03-09 RX ADMIN — PROPOFOL 50 MG: 10 INJECTION, EMULSION INTRAVENOUS at 12:32

## 2020-03-09 RX ADMIN — Medication 2 G: at 18:57

## 2020-03-09 RX ADMIN — Medication 2 G: at 11:05

## 2020-03-09 RX ADMIN — ISOSORBIDE MONONITRATE 15 MG: 30 TABLET, EXTENDED RELEASE ORAL at 23:38

## 2020-03-09 RX ADMIN — HYDROMORPHONE HYDROCHLORIDE 1 MG: 2 INJECTION, SOLUTION INTRAMUSCULAR; INTRAVENOUS; SUBCUTANEOUS at 10:50

## 2020-03-09 RX ADMIN — SODIUM CHLORIDE 125 ML/HR: 900 INJECTION, SOLUTION INTRAVENOUS at 14:15

## 2020-03-09 RX ADMIN — PROPOFOL 100 MCG/KG/MIN: 10 INJECTION, EMULSION INTRAVENOUS at 11:02

## 2020-03-09 RX ADMIN — SODIUM CHLORIDE, SODIUM LACTATE, POTASSIUM CHLORIDE, AND CALCIUM CHLORIDE 75 ML/HR: 600; 310; 30; 20 INJECTION, SOLUTION INTRAVENOUS at 10:26

## 2020-03-09 RX ADMIN — METOPROLOL TARTRATE 25 MG: 25 TABLET, FILM COATED ORAL at 17:14

## 2020-03-09 RX ADMIN — PROPOFOL 50 MG: 10 INJECTION, EMULSION INTRAVENOUS at 12:35

## 2020-03-09 RX ADMIN — Medication 10 ML: at 23:40

## 2020-03-09 RX ADMIN — Medication 10 ML: at 16:51

## 2020-03-09 NOTE — PERIOP NOTES
TRANSFER - OUT REPORT:    Verbal report given to Ashleigh Mckoy on Elizabet Linares  being transferred to [facility](unit) for routine post - op       Report consisted of patients Situation, Background, Assessment and   Recommendations(SBAR). Time Pre op antibiotic given:[email or web address]  Anesthesia Stop time: 1250  La Present on Transfer to [address] for La on Chart:NO  Discharge Prescriptions with Chart:NO    Information from the following report(s) SBAR, OR Summary, Intake/Output, MAR and Procedure Verification was reviewed with the receiving nurse. Opportunity for questions and clarification was provided. Is the patient on 02? YES       L/Min 2     Is the patient on a monitor? NO    Is the nurse transporting with the patient? NO    Surgical Waiting Area notified of patient's transfer from PACU? YES      The following personal items collected during your admission accompanied patient upon transfer:   Dental Appliance: Dental Appliances: Lowers, Uppers(DENTURES TO PACU)  Vision: Visual Aid: Glasses  Hearing Aid:    Jewelry: Jewelry: None  Clothing: Clothing: Footwear, Pants, Shirt(CLOTHES TO PACU)  Other Valuables:  Other Valuables: None  Valuables sent to safe:

## 2020-03-09 NOTE — BRIEF OP NOTE
BRIEF OPERATIVE NOTE    Date of Procedure: 3/9/2020   Preoperative Diagnosis: CERVICAL FUSION  Postoperative Diagnosis: CERVICAL FUSION    Procedure(s):  C4-5 AND C6-7 ANTERIOR CERVICAL DISCECTOMY AND FUSION  Surgeon(s) and Role: Tasha Robles MD - Primary         Surgical Assistant: Taylor Adams PA-C    Surgical Staff:  Circ-1: Randal Jansen RN  Circ-Relief: Shadi Akins  Physician Assistant: Ericka Herrera [facility]  Scrub Tech-1: Antwon Cano  Scrub Tech-Relief: Felicia CRAIN  Event Time In Time Out   Incision Start 1122    Incision Close 1242      Anesthesia: General   Estimated Blood Loss: 30cc  Specimens: * No specimens in log *   Findings: Cervical stenosis   Complications: None  Implants:   Implant Name Type Inv.  Item Serial No.  Lot No. LRB No. Used Action   GRAFT BNE ELITE MARTÍNEZ  --  - [id number]  GRAFT [employer]  --  [id number] MUSCULOSKELETAL TRANS N/A N/A 1 Implanted   GRAFT BNE [employer]  --  - [id number]  GRAFT [employer]  --  [id number] MUSCULOSKELETAL TRANS N/A N/A 1 Implanted   CAGE CERV [id number] STRL -- ANATOMIC PTC - SN/A  CAGE CERV [id number] STRL -- ANATOMIC PTC N/A [employer] 59JL N/A 1 Implanted   CAGE CERV [id number] STRL -- ANATOMIC PTC - SN/A  CAGE CERV [id number] STRL -- ANATOMIC PTC N/A [employer] 55JL N/A 1 Implanted   PLATE ANTR CERV 14ZL 3 LVL -- ZEVO - SN/A  PLATE ANTR CERV 58VV 3 LVL -- ZEVO N/A [employer] N/A N/A 1 Implanted   SCR ANTR CERV FRANCO SD 3.5X13MM -- ZEVO - SN/A  SCR ANTR CERV FRANCO SD 3.5X13MM -- ZEVO N/A [employer] SOFAM[employer]ANEK N/A N/A 8 Implanted

## 2020-03-09 NOTE — ANESTHESIA PREPROCEDURE EVALUATION
Anesthetic History     PONV and history of awareness of surgery under anesthesia          Review of Systems / Medical History  Patient summary reviewed, nursing notes reviewed and pertinent labs reviewed    Pulmonary        Sleep apnea: CPAP    Asthma        Neuro/Psych             Comments: Chronic pain (G89.29)  Cervical stenosis of spinal canal Cardiovascular    Hypertension          Hyperlipidemia    Exercise tolerance: >4 METS  Comments: Pulmonary HTN   GI/Hepatic/Renal     GERD: well controlled           Endo/Other        Obesity and arthritis     Other Findings   Comments: CREST (calcinosis, Raynaud's phenomenon, esophageal dysfunction, sclerodactyly, telangiectasia) ([facility]) (M34.1)           Physical Exam    Airway  Mallampati: II  TM Distance: 4 - 6 cm  Neck ROM: normal range of motion        Cardiovascular    Rhythm: regular  Rate: normal         Dental    Dentition: Edentulous     Pulmonary  Breath sounds clear to auscultation               Abdominal  GI exam deferred       Other Findings            Anesthetic Plan    ASA: 3  Anesthesia type: general          Induction: Intravenous  Anesthetic plan and risks discussed with: Patient

## 2020-03-09 NOTE — PROGRESS NOTES
TRANSFER - IN REPORT:    Verbal report received from Soni(name) on Davion Montez  being received from [employer]) for routine post - op      Report consisted of patients Situation, Background, Assessment and   Recommendations(SBAR). Information from the following report(s) SBAR, Kardex, OR Summary, Procedure Summary, Intake/Output and MAR was reviewed with the receiving nurse. Opportunity for questions and clarification was provided. Assessment completed upon patients arrival to unit and care assumed.

## 2020-03-09 NOTE — PROGRESS NOTES
Problem: Falls - Risk of  Goal: *Absence of Falls  Description  Document Rhonda Kelley Fall Risk and appropriate interventions in the flowsheet. Outcome: Progressing Towards Goal  Note: Fall Risk Interventions:  Mobility Interventions: Patient to call before getting OOB, OT consult for ADLs, PT Consult for mobility concerns, PT Consult for assist device competence         Medication Interventions: Patient to call before getting OOB, Teach patient to arise slowly, Evaluate medications/consider consulting pharmacy    Elimination Interventions: Patient to call for help with toileting needs, Stay With Me (per policy)              Problem: Patient Education: Go to Patient Education Activity  Goal: Patient/Family Education  Outcome: Progressing Towards Goal     Problem: Pressure Injury - Risk of  Goal: *Prevention of pressure injury  Description  Document Germain Scale and appropriate interventions in the flowsheet.   Outcome: Progressing Towards Goal  Note: Pressure Injury Interventions:  Sensory Interventions: Minimize linen layers, Maintain/enhance activity level, Keep linens dry and wrinkle-free, Discuss PT/OT consult with provider    Moisture Interventions: Minimize layers, Offer toileting Q_hr, Moisture barrier    Activity Interventions: PT/OT evaluation, Increase time out of bed    Mobility Interventions: PT/OT evaluation, HOB 30 degrees or less    Nutrition Interventions: Document food/fluid/supplement intake                     Problem: Patient Education: Go to Patient Education Activity  Goal: Patient/Family Education  Outcome: Progressing Towards Goal

## 2020-03-10 PROCEDURE — 74011250637 HC RX REV CODE- 250/637: Performed by: PHYSICIAN ASSISTANT

## 2020-03-10 PROCEDURE — 77010033678 HC OXYGEN DAILY

## 2020-03-10 PROCEDURE — 74011250636 HC RX REV CODE- 250/636: Performed by: PHYSICIAN ASSISTANT

## 2020-03-10 PROCEDURE — 94760 N-INVAS EAR/PLS OXIMETRY 1: CPT

## 2020-03-10 RX ADMIN — ACETAMINOPHEN 1000 MG: 500 TABLET ORAL at 13:52

## 2020-03-10 RX ADMIN — ACETAMINOPHEN 1000 MG: 500 TABLET ORAL at 07:17

## 2020-03-10 RX ADMIN — SENNOSIDES AND DOCUSATE SODIUM 1 TABLET: 8.6; 5 TABLET ORAL at 17:50

## 2020-03-10 RX ADMIN — OXYCODONE 5 MG: 5 TABLET ORAL at 09:23

## 2020-03-10 RX ADMIN — ALLOPURINOL 100 MG: 100 TABLET ORAL at 09:23

## 2020-03-10 RX ADMIN — BACLOFEN 10 MG: 10 TABLET ORAL at 22:23

## 2020-03-10 RX ADMIN — Medication 10 ML: at 13:52

## 2020-03-10 RX ADMIN — THERA TABS 1 TABLET: TAB at 09:23

## 2020-03-10 RX ADMIN — METOPROLOL TARTRATE 25 MG: 25 TABLET, FILM COATED ORAL at 17:50

## 2020-03-10 RX ADMIN — LOSARTAN POTASSIUM 50 MG: 25 TABLET, FILM COATED ORAL at 09:23

## 2020-03-10 RX ADMIN — FUROSEMIDE 20 MG: 20 TABLET ORAL at 09:23

## 2020-03-10 RX ADMIN — PANTOPRAZOLE SODIUM 40 MG: 40 TABLET, DELAYED RELEASE ORAL at 07:17

## 2020-03-10 RX ADMIN — ASPIRIN 81 MG: 81 TABLET, COATED ORAL at 09:23

## 2020-03-10 RX ADMIN — DEXAMETHASONE SODIUM PHOSPHATE 10 MG: 4 INJECTION, SOLUTION INTRAMUSCULAR; INTRAVENOUS at 07:18

## 2020-03-10 RX ADMIN — OXYCODONE 5 MG: 5 TABLET ORAL at 22:23

## 2020-03-10 RX ADMIN — METOPROLOL TARTRATE 25 MG: 25 TABLET, FILM COATED ORAL at 09:23

## 2020-03-10 RX ADMIN — FAMOTIDINE 20 MG: 20 TABLET ORAL at 09:23

## 2020-03-10 RX ADMIN — OXYCODONE 5 MG: 5 TABLET ORAL at 15:27

## 2020-03-10 RX ADMIN — ISOSORBIDE MONONITRATE 15 MG: 30 TABLET, EXTENDED RELEASE ORAL at 22:22

## 2020-03-10 RX ADMIN — Medication 10 ML: at 22:28

## 2020-03-10 RX ADMIN — OXYCODONE 5 MG: 5 TABLET ORAL at 12:30

## 2020-03-10 RX ADMIN — Medication 5 ML: at 06:00

## 2020-03-10 RX ADMIN — SODIUM CHLORIDE 125 ML/HR: 900 INJECTION, SOLUTION INTRAVENOUS at 00:24

## 2020-03-10 RX ADMIN — Medication 2 G: at 04:36

## 2020-03-10 RX ADMIN — ACETAMINOPHEN 1000 MG: 500 TABLET ORAL at 19:17

## 2020-03-10 RX ADMIN — DEXAMETHASONE SODIUM PHOSPHATE 10 MG: 4 INJECTION, SOLUTION INTRAMUSCULAR; INTRAVENOUS at 01:47

## 2020-03-10 RX ADMIN — SENNOSIDES AND DOCUSATE SODIUM 1 TABLET: 8.6; 5 TABLET ORAL at 09:23

## 2020-03-10 RX ADMIN — CETIRIZINE HYDROCHLORIDE 10 MG: 10 TABLET, FILM COATED ORAL at 09:23

## 2020-03-10 RX ADMIN — OXYCODONE 5 MG: 5 TABLET ORAL at 04:53

## 2020-03-10 RX ADMIN — OXYBUTYNIN CHLORIDE 10 MG: 5 TABLET, EXTENDED RELEASE ORAL at 09:23

## 2020-03-10 RX ADMIN — MONTELUKAST SODIUM 10 MG: 10 TABLET, FILM COATED ORAL at 09:23

## 2020-03-10 RX ADMIN — POLYETHYLENE GLYCOL 3350 17 G: 17 POWDER, FOR SOLUTION ORAL at 09:24

## 2020-03-10 RX ADMIN — PRAVASTATIN SODIUM 40 MG: 40 TABLET ORAL at 22:22

## 2020-03-10 RX ADMIN — ACETAMINOPHEN 1000 MG: 500 TABLET ORAL at 01:47

## 2020-03-10 NOTE — PROGRESS NOTES
Fall Risk Interventions:  Mobility Interventions: Patient to call before getting OOB, Utilize walker, cane, or other assistive device        Medication Interventions: Evaluate medications/consider consulting pharmacy, Patient to call before getting OOB, Teach patient to arise slowly     Elimination Interventions: Call light in reach, Patient to call for help with toileting needs

## 2020-03-10 NOTE — ANESTHESIA POSTPROCEDURE EVALUATION
Procedure(s):  C4-5 AND C6-7 ANTERIOR CERVICAL DISCECTOMY AND FUSION. general    Anesthesia Post Evaluation        Patient location during evaluation: PACU  Patient participation: complete - patient participated  Level of consciousness: awake and alert  Pain management: adequate  Airway patency: patent  Anesthetic complications: no  Cardiovascular status: acceptable  Respiratory status: acceptable  Hydration status: acceptable  Comments: Seen, no complaints  Post anesthesia nausea and vomiting:  none      Vitals Value Taken Time   /58 3/9/2020  3:00 PM   Temp 36.4 °C (97.6 °F) 3/9/2020  2:00 PM   Pulse 72 3/9/2020  3:01 PM   Resp 23 3/9/2020  3:01 PM   SpO2 98 % 3/9/2020  3:01 PM   Vitals shown include unvalidated device data.

## 2020-03-10 NOTE — PROGRESS NOTES
Transition of Care Plan:   -Home with family   -Tentative discharge tomorrow  -Family will transport                   Reason for Admission:   Back pain. Lives with spouse. Uses no assisting devices. No home oxygen. Patient drives private vehicle, spouse will transport at discharge. Patient confirmed PCP Dr. Brianna Gaines and uses [facility]. RUR Score:   10/low                  Plan for utilizing home health:     No plan for HH at this time. Current Advanced Directive/Advance Care Plan: Not on file, Full Code    Care Management Interventions  PCP Verified by CM: Yes  Mode of Transport at Discharge: Other (see comment)(Spouse)  Transition of Care Consult (CM Consult):  Other(Home with family )  Discharge Durable Medical Equipment: No  Physical Therapy Consult: No  Occupational Therapy Consult: No  Current Support Network: Lives with Spouse  Confirm Follow Up Transport: Self  Discharge Location  Discharge Placement: Home    CM will follow     YAW Qureshi/TAO

## 2020-03-10 NOTE — OP NOTES
[address]   OPERATIVE REPORT    Name:  Yovanny Castaneda  MR#:  [id number]  :  1950  ACCOUNT #:  [de-identified]  DATE OF SERVICE:  2020      PREOPERATIVE DIAGNOSES:  1.  Status post anterior cervical diskectomy and fusion, C5-6.  2.  Cervical stenosis and spondylosis C4-5, C6-7. POSTOPERATIVE DIAGNOSIS:  Same . PROCEDURE PERFORMED:  Exploration of fusion, revision anterior cervical discktomy C4-5 and C6-7 and revision anterior instrumented fusion C4-7. SURGEON:  Mayda Campos MD    ASSISTANT:  Oh Bahena PA-C    ANESTHESIA:  General anesthesia. COMPLICATIONS:  None. SPECIMENS REMOVED:  None. IMPLANTS:  Instrumentation includes Medtronic Zevo plate and Medtronic PTC interbody. ESTIMATED BLOOD LOSS:  Minimal.    INDICATIONS:  A 70-year-old female who is approximately 10-12 years out from anterior cervical diskectomy and fusion at C5-6. She has done well, but now developed cervical spondylosis at C4-5 and C6-7 above and below her fusion, stenosis with radicular symptoms particularly on the right hand side. The patient had failed conservative treatment, understood the risks and benefits and elected to proceed. PROCEDURE:  The patient was identified, brought to the operative suite, and underwent general anesthesia without difficulty. Placed in the supine position. Preoperative antibiotics were given to the patient. Preoperative neuromonitoring was placed, baselines were obtained, and these remained stable throughout the surgical procedure. The patient's neck was placed in the neutral position and 10 pounds traction across the head. After prepping and draping, time-out was performed. We made a slightly oblique incision after confirming time-out. Dissection was carried down to the previous surgical scar, which was carefully released. We then bluntly dissected medially to sternocleidomastoid down to the deep cervical fascia.   We identified the previous cervical plate and identified the C4-5 and C5-6 level above. We confirmed this on lateral fluoroscopy. At which time, we had placed radiolucent retractors and started at C6-7 disk space, we took down the anterior osteophytes followed by annulotomy. Complete diskectomy was performed with removal of disk material and cartilaginous endplates down to the posterior vertebral osteophytes which were under loupe magnification taken down with the Midas farzaneh. Antibody disc retractors were placed and this allowed access to the posterior vertebral osteophytes, which were taken down further until we could access to the posterior longitudinal ligament. This was elevated and resected for central canal decompression. We also did bilateral foraminotomies, particularly on the right hand side undercutting the uncinate process with #1 and #2 Kerrisons. After satisfactory central and bilateral foraminal decompression, we addressed the C4-5 level similarly, annulotomy followed by complete diskectomy what was left to the disk. Cartilaginous endplates removed with Midas farzaneh, curved curette, pituitary rongeurs. Sequential distraction of the disk space back out to normal disk space height. Vertebral osteophytes posteriorly were taken down with the Midas farzaneh under loupe magnification. Posterior longitudinal ligament was elevated and we under cut the vertebral osteophytes and the posterior vertebral body as well as for central canal decompression, we did bilateral foraminotomies for decompression of the canal there. At which time, we then did trial spacers of Ascendify PTC graft, used 14 x 11 footprints. 7 mm height grafts provided good anterior column reconstruction as well as restoration of foraminal height. We rasped the endplates to lightly bleeding bone, each graft was packed with Cassandra allograft and then impacted in place with 2 mm countersinking. Neuromonitoring was stable after each graft was impacted. Weight was removed off the head. We then contoured a 51 mm Medtronic Zevo plate to the anterior cervical spine. Temporary fixation with a threaded pin followed by 13 mm x 3.5 mm variable angle screws into C4, C5, C6 and C7. Solid fusion was noted at C5-6 when evaluated. Final x-rays demonstrated stable fixation. Locking mechanisms were all engaged. #7 flat CLOVIS drain was placed. 2-0 Vicryl on the subcutaneous layer and 3-0 Vicryl on the skin. Soft collar was placed. The patient returned to the PACU in stable condition. The physician assistant was present during the entire operative procedure and assisted in all critical elements of the surgery. No surgical assist or resident was available.      Luciano Campo MD      [id number]  D:  03/09/2020 12:32  T:  03/09/2020 22:32  JOB #:  [id number]

## 2020-03-10 NOTE — PROGRESS NOTES
Orthopedic Spine Progress Note  Post Op day: 1 Day Post-Op    March 10, 2020 8:00 AM   Admit Date: 3/9/2020  Procedure: Procedure(s):  C4-5 AND C6-7 ANTERIOR CERVICAL DISCECTOMY AND FUSION    Subjective:     Dale Cogan has no complaints. Pain is well controlled. Tolerating soft collar much better. Tolerating diet. No N/V. Pain Control:   Pain Assessment  Pain Scale 1: Numeric (0 - 10)  Pain Intensity 1: 5  Pain Onset 1: post op  Pain Location 1: Neck  Pain Orientation 1: Posterior  Pain Description 1: Aching  Pain Intervention(s) 1: Medication (see MAR)    Objective:          Physical Exam:  General:  Alert and oriented. No acute distress. Heart:  Respirations unlabored. Abdomen:   Extremities: Soft, non-tender. No evidence of cyanosis. Pulses palpable in both upper and lower extremities. Neurologic:  Musculoskeletal:  No new motor deficits. Neurovascular exam within normal limits. Sensation stable. Motor: unchanged C5-T1 and L2-S1. Ricardo's sign negative in bilateral lower extremities. Calves soft, nontender upon palpation and with passive twitch. Moves both upper and lower extremities. Incision: clean, dry, and intact. No significant erythema or swelling. No active drainage noted. Vital Signs:    Blood pressure 114/65, pulse 77, temperature 98.3 °F (36.8 °C), resp. rate 16, height 5' 6\" (1.676 m), weight 103.9 kg (229 lb), SpO2 93 %. Temp (24hrs), Av.1 °F (36.7 °C), Min:97.6 °F (36.4 °C), Max:98.7 °F (37.1 °C)      LAB:    No results for input(s): HCT, HGB, PLT, HCTEXT, HGBEXT, PLTEXT in the last 72 hours.   Lab Results   Component Value Date/Time    Sodium 143 2018 10:23 AM    Potassium 4.9 2018 10:23 AM    Chloride 101 2018 10:23 AM    CO2 27 2018 10:23 AM    Glucose 101 (H) 2018 10:23 AM    BUN 15 2018 10:23 AM    Creatinine 1.02 (H) 2018 10:23 AM    Calcium 9.3 2018 10:23 AM       Intake/Output:No intake/output data recorded. 03/08 1901 - 03/10 0700  In: 950 [I.V.:950]  Out: 36 [Urine:650; Drains:40]    PT/OT:   Gait:                    Assessment:   Patient is 1 Day Post-Op s/p Procedure(s):  C4-5 AND C6-7 ANTERIOR CERVICAL DISCECTOMY AND FUSION    Plan:     1. Continue PT/OT  2. Continue established methods of pain control  3. VTE Prophylaxes - TEDS &/or SCDs   4.  Discharge pending-likely home tomorrow  5.  6.       Signed By: DIANA Haddad

## 2020-03-11 VITALS
RESPIRATION RATE: 16 BRPM | SYSTOLIC BLOOD PRESSURE: 130 MMHG | HEART RATE: 66 BPM | DIASTOLIC BLOOD PRESSURE: 68 MMHG | OXYGEN SATURATION: 93 % | BODY MASS INDEX: 36.8 KG/M2 | HEIGHT: 66 IN | TEMPERATURE: 98.3 F | WEIGHT: 229 LBS

## 2020-03-11 PROCEDURE — 74011250637 HC RX REV CODE- 250/637: Performed by: PHYSICIAN ASSISTANT

## 2020-03-11 RX ORDER — NALOXONE HYDROCHLORIDE 4 MG/.1ML
SPRAY NASAL
Qty: 1 EACH | Refills: 0 | Status: SHIPPED | OUTPATIENT
Start: 2020-03-11 | End: 2020-10-30

## 2020-03-11 RX ORDER — OXYCODONE HYDROCHLORIDE 5 MG/1
5-10 TABLET ORAL
Qty: 60 TAB | Refills: 0 | Status: SHIPPED | OUTPATIENT
Start: 2020-03-11 | End: 2020-03-18

## 2020-03-11 RX ADMIN — OXYCODONE 5 MG: 5 TABLET ORAL at 08:41

## 2020-03-11 RX ADMIN — ASPIRIN 81 MG: 81 TABLET, COATED ORAL at 08:41

## 2020-03-11 RX ADMIN — ALLOPURINOL 100 MG: 100 TABLET ORAL at 08:41

## 2020-03-11 RX ADMIN — SENNOSIDES AND DOCUSATE SODIUM 1 TABLET: 8.6; 5 TABLET ORAL at 08:41

## 2020-03-11 RX ADMIN — ACETAMINOPHEN 1000 MG: 500 TABLET ORAL at 06:36

## 2020-03-11 RX ADMIN — FAMOTIDINE 20 MG: 20 TABLET ORAL at 08:41

## 2020-03-11 RX ADMIN — FUROSEMIDE 20 MG: 20 TABLET ORAL at 08:41

## 2020-03-11 RX ADMIN — MONTELUKAST SODIUM 10 MG: 10 TABLET, FILM COATED ORAL at 08:41

## 2020-03-11 RX ADMIN — PANTOPRAZOLE SODIUM 40 MG: 40 TABLET, DELAYED RELEASE ORAL at 06:37

## 2020-03-11 RX ADMIN — METOPROLOL TARTRATE 25 MG: 25 TABLET, FILM COATED ORAL at 08:40

## 2020-03-11 RX ADMIN — Medication 10 ML: at 06:35

## 2020-03-11 RX ADMIN — POLYETHYLENE GLYCOL 3350 17 G: 17 POWDER, FOR SOLUTION ORAL at 08:40

## 2020-03-11 RX ADMIN — OXYBUTYNIN CHLORIDE 10 MG: 5 TABLET, EXTENDED RELEASE ORAL at 08:40

## 2020-03-11 RX ADMIN — LOSARTAN POTASSIUM 50 MG: 25 TABLET, FILM COATED ORAL at 08:40

## 2020-03-11 RX ADMIN — OXYCODONE 5 MG: 5 TABLET ORAL at 11:42

## 2020-03-11 RX ADMIN — THERA TABS 1 TABLET: TAB at 08:41

## 2020-03-11 RX ADMIN — CETIRIZINE HYDROCHLORIDE 10 MG: 10 TABLET, FILM COATED ORAL at 08:41

## 2020-03-11 NOTE — PROGRESS NOTES
Orthopedic Spine Progress Note  Post Op day: 2 Days Post-Op    2020 8:55 AM   Admit Date: 3/9/2020  Procedure: Procedure(s):  C4-5 AND C6-7 ANTERIOR CERVICAL DISCECTOMY AND FUSION    Subjective:     Silvia Dockery has no complaints. Pain is well controlled. Ready to go home. Tolerating diet. No N/V. Pain Control:   Pain Assessment  Pain Scale 1: Numeric (0 - 10)  Pain Intensity 1: 6  Pain Onset 1: S/P post op surgical pain and related pain  Pain Location 1: Neck  Pain Orientation 1: Anterior, Posterior  Pain Description 1: Aching  Pain Intervention(s) 1: Medication (see MAR)    Objective:          Physical Exam:  General:  Alert and oriented. No acute distress. Heart:  Respirations unlabored. Abdomen:   Extremities: Soft, non-tender. No evidence of cyanosis. Pulses palpable in both upper and lower extremities. Neurologic:  Musculoskeletal:  No new motor deficits. Neurovascular exam within normal limits. Sensation stable. Motor: unchanged C5-T1 and L2-S1. Ricardo's sign negative in bilateral lower extremities. Calves soft, nontender upon palpation and with passive twitch. Moves both upper and lower extremities. Incision: clean, dry, and intact. No significant erythema or swelling. No active drainage noted. Vital Signs:    Blood pressure 130/68, pulse 66, temperature 98.3 °F (36.8 °C), resp. rate 16, height 5' 6\" (1.676 m), weight 103.9 kg (229 lb), SpO2 93 %. Temp (24hrs), Av.4 °F (36.9 °C), Min:98.3 °F (36.8 °C), Max:98.7 °F (37.1 °C)      LAB:    No results for input(s): HCT, HGB, PLT, HCTEXT, HGBEXT, PLTEXT in the last 72 hours.   Lab Results   Component Value Date/Time    Sodium 143 2018 10:23 AM    Potassium 4.9 2018 10:23 AM    Chloride 101 2018 10:23 AM    CO2 27 2018 10:23 AM    Glucose 101 (H) 2018 10:23 AM    BUN 15 2018 10:23 AM    Creatinine 1.02 (H) 2018 10:23 AM    Calcium 9.3 2018 10:23 AM Intake/Output:No intake/output data recorded. 03/09 1901 - 03/11 0700  In: -   Out: 0 [Urine:650; Drains:45]    PT/OT:   Gait:                    Assessment:   Patient is 2 Days Post-Op s/p Procedure(s):  C4-5 AND C6-7 ANTERIOR CERVICAL DISCECTOMY AND FUSION    Plan:     1. Continue PT/OT  2. Continue established methods of pain control  3. VTE Prophylaxes - TEDS &/or SCDs   4.  Discharge to home today  5.  6.       Signed By: DIANA Rick

## 2020-03-11 NOTE — PROGRESS NOTES
Bedside and Verbal shift change report given to Judy Kurtz (oncoming nurse) by Paige Hernandez RN (offgoing nurse). Report included the following information SBAR, Kardex, OR Summary, Intake/Output and MAR.

## 2020-03-11 NOTE — PROGRESS NOTES
Transition of Care Plan:   -Ready for discharge, has orders   -Home with family   -Family will transport       CM will follow     YAW Clayton/TAO

## 2020-03-11 NOTE — DISCHARGE INSTRUCTIONS
After Hospital Care Plan:  Discharge Instructions Cervical (Neck) Spine Surgery Dr. Rosemary Da Silva    Patient Name: Jelly Spence    Date of procedure: 3/9/2020  Date of discharge: 3/11/2020    Procedure: Procedure(s):  C4-5 AND C6-7 ANTERIOR CERVICAL DISCECTOMY AND FUSION  PCP: Guero Eldridge MD    Follow up appointments   -follow up with Dr. Rosemary Da Silva in 2 weeks. Call [phone] to make an appointment as soon as you get home from the hospital.    When to call your Orthopaedic Surgeon:  -Difficulty swallowing that is worse than when you left the hospital.  -Signs of infection-if your incision is red; continues to have drainage; drainage has a foul odor or if you have a persistent fever over 101 degrees for 24 hours  -nausea or vomiting, severe headache  -changes in sensation in your arms or legs (numbness, tingling, loss of color)  -increased weakness-greater than before your surgery  -severe pain or pain not relieved by medications  -Signs of a blood clot in your leg-calf pain, tenderness, redness, swelling of lower leg    When to call your Primary Care Physician:  -Concerns about medical conditions such as diabetes, high blood pressure, asthma, congestive heart failure  -Call if blood sugars are elevated, persistent headache or dizziness, coughing or congestion, constipation or diarrhea, burning with urination, abnormal heart rate    When to call 911 and go to the nearest emergency room:  -acute onset of chest pain, shortness of breath, difficulty breathing    Activity  - You are going home a well person, be as active as possible. Your only exercise should be walking. Start with short frequent walks and increase your walking distance each day.  -Limit the amount of time you sit to 20-30 minute intervals. Sitting for prolonged periods of time will be uncomfortable for you following surgery.   - Do NOT lift anything over 5 pounds  -From now on, even when lifting light weight, bend with your knees and not your back.  -Do NOT do any neck exercises until you have been instructed by your doctor  -When you are in bed, you may lay on your back or on either side. Do NOT lie on your stomach    Cervical Collar (Aspen Collar)  -You are required to wear your cervical collar at all times; except when showering. You may remove the collar long enough to change the pads when needed and to change your dressing each day. -Do not bend or twist when your collar is off. It is best to have someone assist you when changing the pads and your dressing to prevent you from bending your neck. - Clean the pads on your neck collar every day by hand washing with a mild soap and water. Pat them dry with a towel and lay out to air dry. Do not use heat to dry the pads. Driving  -You may not drive or return to work until instructed by your physician. However, you may ride in the car for short periods of time. Incision Care  Your incision has been closed with absorbable sutures and the Zipline skin closure system. This will assist with healing. The Alexei Pila is to remain on your incision for 2 weeks. A dry dressing (ABD and tape) will be placed over it and should be changed daily, for at least the first several days after your surgery. If you have no incisional drainage, you may leave the incision open to air if you wish, still leaving the Zipline in place. Please make sure to wash your hands prior to touching your dressing. You may take brief showers but do not run the water directly onto the wound. After your shower, blot your incision dry with a soft towel and replace the dry dressing. Do not allow the tape to come in contact with the Zipline. Do not rub or apply any lotions or ointments to your incision site. Do not soak or scrub your wound. The Zipline dressing will be removed during your two week follow-up appointment.  If you experience drainage leaking from underneath the Zipline or if it peels off before 2 weeks, please contact your orthopedic surgeons office. Showering  -You may shower in approximately 2 days after your surgery.    -Leave the dressing on during your shower. Do NOT allow the water to run directly onto your dressing. Once you get out of the shower, put on a dry dressing.  -Reminder- Make sure you put clean pads on your collar after your shower.    -Do not take a tub bath. Preventing blood clots  -You have been given T.E.D. stockings to wear. Continue to wear these for 7 days after your discharge. Put them on in the morning and take them off at night.    -They are used to increase your circulation and prevent blood clots from forming in your legs  -T. E.D. stockings can be machine washed, temperature not to exceed 160° F (71°C) and machine dried for 15 to 20 minutes, temperature not to exceed 250° F (121°C). Pain management  -Take pain medication as prescribed; decrease the amount you use as your pain lessens  -Do not wait until you are in extreme pain to take your medication.  -Avoid alcoholic beverages while taking pain medication    Pain Medication Safety  DO:  -Read the Medication Guide   -Take your medicine exactly as prescribed   -Store your medicine away from children and in a safe place   -Flush unused medicine down the toilet   -Call your healthcare provider for medical advice about side effects. You may report side effects to FDA at 4-505-FDA-2327.   -Please be aware that many medications contain Tylenol. We do not want you to over medicate so please read the information below as a guide. Do not take more than 4 Grams of Tylenol in a 24 hour period.   (There are 1000 milligrams in one Gram)                                                                                                                                                                                                    Percocet contains 325 mg of Tylenol per tablet (do not take more than 12 tablets in 24 hours)  Lortab contains 500 mg of Tylenol per tablet (do not take more than 8 tablets in 24 hours)  Norco contains 325 mg of Tylenol per tablet (do not take more than 12 tablets in 24 hours). DO NOT:  -Do not give your medicine to others   -Do not take medicine unless it was prescribed for you   -Do not stop taking your medicine without talking to your healthcare provider   -Do not break, chew, crush, dissolve, or inject your medicine. If you cannot  swallow your medicine whole, talk to your healthcare provider.  -Do not drink alcohol while taking this medicine  -Do not take anti-inflammatory medications or aspirin unless instructed by your     Physician. Diet  -resume usual diet; drink plenty of fluids; eat foods high in fiber  -It is important to have regular bowel movements. Pain medications may cause constipation. You may want to take a stool softener (such as Senokot-S or Colace) to prevent constipation. If constipation occurs, take a laxative (such as Dulcolax tablets, Milk of Magnesia, or a suppository). Laxatives should only be used if the above preventable measures have failed and you still have not had a bowel movement after three days. After Hospital Care Plan:  Discharge Instructions Cervical (Neck) Spine Surgery Dr. Justin Arceo    Patient Name: Kristin Valencia    Date of procedure: 3/9/2020  Date of discharge: 3/11/2020    Procedure: Procedure(s):  C4-5 AND C6-7 ANTERIOR CERVICAL DISCECTOMY AND FUSION  PCP: Ciara Godfrey MD    Follow up appointments   -follow up with Dr. Justin Arceo in 2 weeks.   Call [phone] to make an appointment as soon as you get home from the hospital.    When to call your Orthopaedic Surgeon:  -Difficulty swallowing that is worse than when you left the hospital.  -Signs of infection-if your incision is red; continues to have drainage; drainage has a foul odor or if you have a persistent fever over 101 degrees for 24 hours  -nausea or vomiting, severe headache  -changes in sensation in your arms or legs (numbness, tingling, loss of color)  -increased weakness-greater than before your surgery  -severe pain or pain not relieved by medications  -Signs of a blood clot in your leg-calf pain, tenderness, redness, swelling of lower leg    When to call your Primary Care Physician:  -Concerns about medical conditions such as diabetes, high blood pressure, asthma, congestive heart failure  -Call if blood sugars are elevated, persistent headache or dizziness, coughing or congestion, constipation or diarrhea, burning with urination, abnormal heart rate    When to call 911 and go to the nearest emergency room:  -acute onset of chest pain, shortness of breath, difficulty breathing    Activity  - You are going home a well person, be as active as possible. Your only exercise should be walking. Start with short frequent walks and increase your walking distance each day.  -Limit the amount of time you sit to 20-30 minute intervals. Sitting for prolonged periods of time will be uncomfortable for you following surgery. - Do NOT lift anything over 5 pounds  -From now on, even when lifting light weight, bend with your knees and not your back.  -Do NOT do any neck exercises until you have been instructed by your doctor  -When you are in bed, you may lay on your back or on either side. Do NOT lie on your stomach    Cervical Collar (Aspen Collar)  -You are required to wear your cervical collar at all times; except when showering. You may remove the collar long enough to change the pads when needed and to change your dressing each day. -Do not bend or twist when your collar is off. It is best to have someone assist you when changing the pads and your dressing to prevent you from bending your neck. - Clean the pads on your neck collar every day by hand washing with a mild soap and water. Pat them dry with a towel and lay out to air dry.  Do not use heat to dry the pads.    Driving  -You may not drive or return to work until instructed by your physician. However, you may ride in the car for short periods of time. Incision Care  Your incision has been closed with absorbable sutures and the Zipline skin closure system. This will assist with healing. The Deborah Sees is to remain on your incision for 2 weeks. A dry dressing (ABD and tape) will be placed over it and should be changed daily, for at least the first several days after your surgery. If you have no incisional drainage, you may leave the incision open to air if you wish, still leaving the Zipline in place. Please make sure to wash your hands prior to touching your dressing. You may take brief showers but do not run the water directly onto the wound. After your shower, blot your incision dry with a soft towel and replace the dry dressing. Do not allow the tape to come in contact with the Zipline. Do not rub or apply any lotions or ointments to your incision site. Do not soak or scrub your wound. The Zipline dressing will be removed during your two week follow-up appointment. If you experience drainage leaking from underneath the Zipline or if it peels off before 2 weeks, please contact your orthopedic surgeons office. Showering  -You may shower in approximately 2 days after your surgery.    -Leave the dressing on during your shower. Do NOT allow the water to run directly onto your dressing. Once you get out of the shower, put on a dry dressing.  -Reminder- Make sure you put clean pads on your collar after your shower.    -Do not take a tub bath. Preventing blood clots  -You have been given T.E.D. stockings to wear. Continue to wear these for 7 days after your discharge. Put them on in the morning and take them off at night.    -They are used to increase your circulation and prevent blood clots from forming in your legs  -T. E.D. stockings can be machine washed, temperature not to exceed 160° F (71°C) and machine dried for 15 to 20 minutes, temperature not to exceed 250° F (121°C). Pain management  -Take pain medication as prescribed; decrease the amount you use as your pain lessens  -Do not wait until you are in extreme pain to take your medication.  -Avoid alcoholic beverages while taking pain medication    Pain Medication Safety  DO:  -Read the Medication Guide   -Take your medicine exactly as prescribed   -Store your medicine away from children and in a safe place   -Flush unused medicine down the toilet   -Call your healthcare provider for medical advice about side effects. You may report side effects to FDA at 1-865-FDA-7504.   -Please be aware that many medications contain Tylenol. We do not want you to over medicate so please read the information below as a guide. Do not take more than 4 Grams of Tylenol in a 24 hour period. (There are 1000 milligrams in one Gram)                                                                                                                                                                                                    Percocet contains 325 mg of Tylenol per tablet (do not take more than 12 tablets in 24 hours)  Lortab contains 500 mg of Tylenol per tablet (do not take more than 8 tablets in 24 hours)  Norco contains 325 mg of Tylenol per tablet (do not take more than 12 tablets in 24 hours). DO NOT:  -Do not give your medicine to others   -Do not take medicine unless it was prescribed for you   -Do not stop taking your medicine without talking to your healthcare provider   -Do not break, chew, crush, dissolve, or inject your medicine. If you cannot  swallow your medicine whole, talk to your healthcare provider.  -Do not drink alcohol while taking this medicine  -Do not take anti-inflammatory medications or aspirin unless instructed by your     Physician.     Diet  -resume usual diet; drink plenty of fluids; eat foods high in fiber  -It is important to have regular bowel movements. Pain medications may cause constipation. You may want to take a stool softener (such as Senokot-S or Colace) to prevent constipation. If constipation occurs, take a laxative (such as Dulcolax tablets, Milk of Magnesia, or a suppository). Laxatives should only be used if the above preventable measures have failed and you still have not had a bowel movement after three days.

## 2020-03-11 NOTE — PROGRESS NOTES
Bedside and Verbal shift change report given to Eder Gonzales RN (oncoming nurse) by Kavya Sarabia (offgoing nurse). Report included the following information SBAR, Kardex, OR Summary, Intake/Output and MAR.

## 2020-04-27 ENCOUNTER — VIRTUAL VISIT (OUTPATIENT)
Dept: CARDIOLOGY CLINIC | Age: 70
End: 2020-04-27

## 2020-04-27 ENCOUNTER — TELEPHONE (OUTPATIENT)
Dept: CARDIOLOGY CLINIC | Age: 70
End: 2020-04-27

## 2020-04-27 VITALS — WEIGHT: 222 LBS | BODY MASS INDEX: 35.68 KG/M2 | HEIGHT: 66 IN

## 2020-04-27 DIAGNOSIS — R07.89 LEFT CHEST PRESSURE: ICD-10-CM

## 2020-04-27 DIAGNOSIS — I10 ESSENTIAL HYPERTENSION: Primary | ICD-10-CM

## 2020-04-27 DIAGNOSIS — G47.33 OSA ON CPAP: ICD-10-CM

## 2020-04-27 DIAGNOSIS — R06.02 SOB (SHORTNESS OF BREATH): ICD-10-CM

## 2020-04-27 DIAGNOSIS — E78.2 MIXED HYPERLIPIDEMIA: ICD-10-CM

## 2020-04-27 DIAGNOSIS — Z99.89 OSA ON CPAP: ICD-10-CM

## 2020-04-27 NOTE — PROGRESS NOTES
Graylin Epley is a 79 y.o. female who as seen by synchronous (real-time) audio-video technology on 4/27/2020 4/27/2020 11:13 AM      Subjective:     Graylin Epley is reporting episodes of left sided chest pressure for last couple of months. Last episode few wks ago. During walking. No symptoms now. Chronic SOB has been unchanged. She denies palpitations, irregular heart beats, near-syncope, syncope, orthopnea, paroxysmal nocturnal dyspnea, claudication, lower extremity edema. Visit Vitals  Ht 5' 6\" (1.676 m)   Wt 222 lb (100.7 kg) Comment: at home   BMI 35.83 kg/m²     Current Outpatient Medications   Medication Sig    furosemide (LASIX) 40 mg tablet Take 20 mg by mouth daily.  telmisartan (MICARDIS) 40 mg tablet Take 40 mg by mouth daily.  metoprolol tartrate (LOPRESSOR) 25 mg tablet TAKE ONE TABLET BY MOUTH TWICE DAILY    isosorbide mononitrate ER (IMDUR) 30 mg tablet TAKE ONE-HALF (1/2) TABLET DAILY FOR RAYNAUDS PHENOMENON (Patient taking differently: Take 15 mg by mouth nightly. TAKE ONE-HALF (1/2) TABLET DAILY FOR RAYNAUDS PHENOMENON)    benzonatate (TESSALON) 100 mg capsule Take 100 mg by mouth three (3) times daily as needed for Cough.  baclofen (LIORESAL) 10 mg tablet Take 10 mg by mouth nightly.  multivitamin (ONE A DAY) tablet Take 1 Tab by mouth daily.  aspirin delayed-release 81 mg tablet Take  by mouth daily.  acetaminophen (TYLENOL EXTRA STRENGTH) 500 mg tablet Take 1,000 mg by mouth every six (6) hours as needed for Pain.  OTHER,NON-FORMULARY, Take 2 Tabs by mouth daily. FIBER SUPPLEMENT     allopurinol (ZYLOPRIM) 100 mg tablet Take 200 mg by mouth daily.  pravastatin (PRAVACHOL) 40 mg tablet Take 40 mg by mouth nightly.  cetirizine (ZYRTEC) 10 mg tablet Take 10 mg by mouth daily.  montelukast (SINGULAIR) 10 mg tablet Take 10 mg by mouth nightly.     naloxone (NARCAN) 4 mg/actuation nasal spray Use 1 spray intranasally, then discard. Repeat with new spray every 2 min as needed for opioid overdose symptoms, alternating nostrils.  aluminum hydrox-magnesium carb (GAVISCON)  mg/15 mL suspension Take 15 mL by mouth every six (6) hours as needed for Indigestion.  omeprazole (PRILOSEC) 20 mg capsule Take 40 mg by mouth daily.  oxybutynin chloride XL (DITROPAN XL) 10 mg CR tablet Take 10 mg by mouth daily.  raNITIdine (ZANTAC) 150 mg tablet Take 150 mg by mouth daily. No current facility-administered medications for this visit. Objective:      Visit Vitals  Ht 5' 6\" (1.676 m)   Wt 222 lb (100.7 kg)   BMI 35.83 kg/m²       Data Review:   Labs:  No results found for this or any previous visit (from the past 24 hour(s)).     Reviewed and/or ordered active problem list, medication list tests    Past Medical History:   Diagnosis Date    Adverse effect of anesthesia     woke up during hysterectomy    Arrhythmia     h/o palpitations normal work up 22/2015 heart: Ronni    Arthritis     Asthma     allergy to weather changing    Cataract     Chronic pain     bulging disc c4/c5 l4/l5 : as of 9/1018 no neck/head movement limitation says patient    Claustrophobia     Color blind     CREST (calcinosis, Raynaud's phenomenon, esophageal dysfunction, sclerodactyly, telangiectasia) ([address] 2018    GERD (gastroesophageal reflux disease)     Gout     H/O arthroscopic knee surgery     H/O: hysterectomy     Hypertension     Ill-defined condition     gout    Ill-defined condition     l4-5 hernia disc    Leg swelling 2016    unknown etiology    Bartlett's neuralgia 2001    from neuroma middle toe, left foot    Musculoskeletal disorder     arthritis    Nausea & vomiting     surgery related    Other ill-defined conditions(799.89) 11/2013    RECTAL PROLASE    Pulmonary hypertension ([address] 08/2018    Heart: Dr. Wilmar Donovan S/P appendectomy     Shortness of breath 2016    Pulmonary Dr. Cristopher Mccormick    Sleep apnea  Unspecified adverse effect of anesthesia     wakes up for anesthesia early      Past Surgical History:   Procedure Laterality Date    CARDIAC SURG PROCEDURE UNLIST  2015    no blockages    CARDIAC SURG PROCEDURE UNLIST  05/2018    no blockages x 2 procedures    COLONOSCOPY  04/20/2011    negative    COLONOSCOPY N/A 9/28/2018    COLONOSCOPY performed by Jerson Agosto MD at [address] HX CATARACT REMOVAL Left 09/20/2018    HX CATARACT REMOVAL Right 10/20/2018    HX CHOLECYSTECTOMY      HX GI  11/19/13    Rectocele repair with enterocele repair    HX HEENT  09/21/2018    left cataract    HX HYSTERECTOMY      TOOK LEFT OVARY    HX KNEE ARTHROSCOPY  1990's    right knee partial meniscus     HX KNEE REPLACEMENT Right 2016    HX KNEE REPLACEMENT Left 2010, 2016    TKR    HX LUMBAR FUSION  2014    c4-5 fusion cervical fusion    HX ORTHOPAEDIC  1973    ganglion cyst removed rt wrist    HX ORTHOPAEDIC  2013, 1992    bilateral CTR, and had ulna nerve release    HX ORTHOPAEDIC Right 2018    thumb and ring finger trigger release    IR INJ SPINE THER SUBST LUM/SAC W IMG  12/19/2019     Allergies   Allergen Reactions    Ciprofloxacin Shortness of Breath    Flagyl [Metronidazole] Shortness of Breath    Percocet [Oxycodone-Acetaminophen] Rash and Itching     irritated    Benzene Other (comments)     Blisters and burn area Benzene found to be on steri-strips    Betadine [Povidone-Iodine] Other (comments)     Blisters and burning    Naproxen Itching    Sulfa (Sulfonamide Antibiotics) Rash    Adhesive Rash     Redness and rash      Family History   Problem Relation Age of Onset    Heart Disease Mother     Hypertension Mother     Diabetes Mother     Cancer Mother         BREAST, LUNG    Breast Cancer Mother 61    Heart Disease Father     Hypertension Father     Thyroid Disease Father     Diabetes Brother     Psychiatric Disorder Son         Nancy MCNALLY, MANIC DEPRESSIVE    Anesth Problems Neg Hx       Social History     Socioeconomic History    Marital status:      Spouse name: Not on file    Number of children: Not on file    Years of education: Not on file    Highest education level: Not on file   Occupational History    Not on file   Social Needs    Financial resource strain: Not on file    Food insecurity     Worry: Not on file     Inability: Not on file    Transportation needs     Medical: Not on file     Non-medical: Not on file   Tobacco Use    Smoking status: Never Smoker    Smokeless tobacco: Never Used   Substance and Sexual Activity    Alcohol use: No    Drug use: No    Sexual activity: Not on file   Lifestyle    Physical activity     Days per week: Not on file     Minutes per session: Not on file    Stress: Not on file   Relationships    Social connections     Talks on phone: Not on file     Gets together: Not on file     Attends Protestant service: Not on file     Active member of club or organization: Not on file     Attends meetings of clubs or organizations: Not on file     Relationship status: Not on file    Intimate partner violence     Fear of current or ex partner: Not on file     Emotionally abused: Not on file     Physically abused: Not on file     Forced sexual activity: Not on file   Other Topics Concern    Not on file   Social History Narrative    Not on file         Review of Systems     General: Not Present- Anorexia, Chills, Dietary Changes, Fatigue, Fever, Medication Changes, Night Sweats, Weight Gain > 10lbs. and Weight Loss > 10lbs. .  Skin: Not Present- Bruising and Excessive Sweating. HEENT: Not Present- Headache, Visual Loss and Vertigo. Respiratory: Not Present- Cough, Snoring and Wheezing.   Cardiovascular: Not Present- Abnormal Blood Pressure, Claudications, Edema, Fainting / Blacking Out, Irregular Heart Beat, Night Cramps, Orthopnea, Palpitations, Paroxysmal Nocturnal Dyspnea, Rapid Heart Rate, and Swelling of Extremities. Gastrointestinal: Not Present- Black, Tarry Stool, Bloody Stool, Diarrhea, Hematemesis, Rectal Bleeding and Vomiting. Musculoskeletal: Not Present- Muscle Pain and Muscle Weakness. Neurological: Not Present- Dizziness. Psychiatric: Not Present- Depression. Endocrine: Not Present- Cold Intolerance, Heat Intolerance and Thyroid Problems. Hematology: Not Present- Abnormal Bleeding, Anemia, Blood Clots and Easy Bruising. Physical Exam   The physical exam findings are as follows:       General   Mental Status - Alert. General Appearance - Not in acute distress. Chest and Lung Exam   Inspection: Accessory muscles - No use of accessory muscles in breathing. No audible wheezing. Peripheral Vascular   Upper Extremity: Inspection - Bilateral - No Cyanotic nailbeds or Digital clubbing. Lower Extremity:   Palpation: Edema - Bilateral - No edema. Assessment:       ICD-10-CM ICD-9-CM    1. Essential hypertension I10 401.9 ECHO ADULT COMPLETE      NUCLEAR CARDIAC STRESS TEST   2. KRYSTINA on CPAP G47.33 327.23 ECHO ADULT COMPLETE    Z99.89 V46.8 NUCLEAR CARDIAC STRESS TEST   3. Mixed hyperlipidemia E78.2 272.2 ECHO ADULT COMPLETE      NUCLEAR CARDIAC STRESS TEST   4. SOB (shortness of breath) R06.02 786.05 ECHO ADULT COMPLETE      NUCLEAR CARDIAC STRESS TEST   5. Left chest pressure R07.89 786.59 ECHO ADULT COMPLETE      NUCLEAR CARDIAC STRESS TEST       Plan:     1. Chest pressure and discomfort: denies any symptoms. Last stress test in 2017 was alright. Check Echo and stress test. Chronic SOB has been unchanged. 2. Essential hypertension  Continue current meds.    3. Mixed hyperlipidemia  Continue statin therapy and low fat, low cholesterol diet  4. KRYSTINA: on CPAP. 5. H/o CREST syndrome. F/u with rheumatology.        Pursuant to the emergency declaration under the [facility] waiver authority and the [place] [employer] Supplemental Appropriations Act, this Virtual Visit was conducted, with patient's consent, to reduce the patient's risk of exposure to COVID-19 and provide continuity of care for an established patient. Services were provided through a video synchronous discussion virtually to substitute for in-person clinic visit.

## 2020-04-27 NOTE — TELEPHONE ENCOUNTER
Called pcp office for 2nd time to get most recent labs/lipid panel faxed over to us. She advised she would get that faxed over to us.

## 2020-04-27 NOTE — TELEPHONE ENCOUNTER
----- Message from Houston Jackson MD sent at 4/27/2020 11:17 AM EDT -----  Can you get copy of her recent labs from PCP office. Called pcp office to get most recent labs/lipids faxed over but no answer. Was on hold for 10 minutes. Will try later.

## 2020-05-08 ENCOUNTER — TELEPHONE (OUTPATIENT)
Dept: CARDIOLOGY CLINIC | Age: 70
End: 2020-05-08

## 2020-07-01 ENCOUNTER — HOSPITAL ENCOUNTER (OUTPATIENT)
Dept: MAMMOGRAPHY | Age: 70
Discharge: HOME | End: 2020-07-01
Attending: FAMILY MEDICINE
Payer: MEDICARE

## 2020-07-01 DIAGNOSIS — Z12.31 ENCOUNTER FOR SCREENING MAMMOGRAM FOR MALIGNANT NEOPLASM OF BREAST: ICD-10-CM

## 2020-07-01 PROCEDURE — 77067 SCR MAMMO BI INCL CAD: CPT

## 2020-10-12 ENCOUNTER — TELEPHONE (OUTPATIENT)
Dept: CARDIOLOGY CLINIC | Age: 70
End: 2020-10-12

## 2020-10-12 NOTE — TELEPHONE ENCOUNTER
Patient needs cardiac clearance.   Patient having back surgery on 10/29/20.      [phone]    Thanks  Anita Barnes

## 2020-10-12 NOTE — TELEPHONE ENCOUNTER
Pt had virtual visit on 4/27/20. She had stress test and echo done and was okay. She has upcoming appt on 11/3/20 with you. Her back surgery is on on 10/29/20 with Sharath Tijerina. she is needing clearance for this and needing to stop her ASA first. Do you want to see the pt first or can she be cleared for the procedure? Please advise, thanks.

## 2020-10-12 NOTE — TELEPHONE ENCOUNTER
Message   Received: Today   Message Contents   MD Mindi Bartlett LPN    Caller: Unspecified (Today,  9:08 AM)               No need to see me if no symptoms. She can proceed. K to hold aspirin. How many days before, thanks. Message   Received: Today   Message Contents   MD Mindi Bartlett LPN    Caller: Unspecified (Today,  9:08 AM)               5          Called pt,verified pt with two pt identifiers, pt advised she was having the same chest discomfort last month and f/u with her PCP office and he did EKG and everything was fine. It has not happened since. Her stress and echo testing came back normal in spring when having the same symptoms. She thinks it is just stress or anxiety that is causing the episodes. She did have f/u with Pulmonary also and that turned out good. He will be f/u in office for pcp clearance. Advised per  if she is not having any symptoms then she may proceed with her surgery and hold ASA 5 days before. She advised she is doing okay , no further cardiac issues and thinks it is anxiety related. She advised she would call the office if she started to have symptoms again before her surgery on 10/29/20. She advised that if she lied or didn't call in that would only harm her. Advised I will fax in the clearance for her. Pt verbalized understanding. Faxed note to ATTN: Azar Melton at [phone] stating per  if having no symptoms she can proceed with surgery and hold ASA 5 days before. Pt reports no new or different symptoms in past month. Received fax confirmation.

## 2020-10-20 NOTE — H&P
Laura Older  Location: [place] [address] [address]  Patient #: [id number]  : 1950   / Language: [place] / Race: White  Female    History of Present Illness   The patient is a 79year old female who presents with a complaint of Low Back Pain. Note for \"Low Back Pain\":  The patient comes in today for follow-up. Darien Mackenzie is doing well in terms of her neck surgery.  She had persistent lower back pain and right leg symptoms. Darien Mackenzie is had 4 rounds of injections without long-term improvement.  The pain is predominantly with prolonged standing and walking.  She comes in today to discuss possible surgical intervention.  Is [facility] urgent care    Problem List/Past Medical   Essential Hypertension    Hand pain, right (729.5  M79.641)    Arthralgia of hip, left (719.45  M25.552)    Chronic knee pain after total replacement of left knee joint (719.46  M25.562)   PT  Instability of prosthetic knee, initial encounter (996.42  T84.028A)    OA, KNEE (715.16)    Contusion of right knee, initial encounter (924.11  S80.01XA)    SCIATICA (724.3  M54.30)    Primary osteoarthritis of both hands (715.14  M19.041, M19.042)    Primary osteoarthritis of first carpometacarpal joint of right hand (715.14  M18.11)    Wrist pain, chronic, right (719.43  M25.531)    CERVICALGIA (723.1) (723.1  M54.2)    DDD (722.4)    REVIEW OF SYSTEMS: Systems were reviewed by the provider.    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    Status post total right knee replacement (V43.65  Z96.651)    BURSITIS / TENDONITIS (726.10)    Status post total left knee replacement (V43.65  Z96.652)    Trigger thumb, right thumb (727.03  M65.311)    Post-traumatic arthritis DRUJ (distal radioulnar joint), right (716.13  M19.131)    IMPINGMENT SYNDROME (726.2)    Chronic knee instability, left (718.86  M23.52)   PT  Arthritis of left hip (716.95  M16.12)    Apophysitis of iliac crest (732.9  M93.959)    Leg pain, diffuse, right (729.5  M79.604)    RADICULITIS (723.4)    Hip strain (843.9  S76.019A)    T12 compression fracture, with routine healing, subsequent encounter (V54.17  S22.080D)    DIETARY COUNSELING (V65.3)    Hypertension, goal below 140/90 (401.9  I10)    Status post trigger finger release (V45.89  Z98.890)    Localized osteoarthritis of right knee (715.36  M17.11)    DDD (degenerative disc disease), lumbar (722.52  M51.36)    Lumbar back pain (724.2  M54.5)    Spondylosis of lumbar spine (721.3  M47.816)    Lumbar radiculopathy (724.4  M54.16)    T12 compression fracture, initial encounter (805.2  S22.080A)    S/P cervical spinal fusion (V45.4  Z98.1)    Weight above 97th percentile (V49.89  Z78.9)    Cervical spinal stenosis (723.0  M48.02)      Allergies   Codeine/Codeine Derivatives   [09/17/2020]:  Sulfa Drugs   [09/17/2020]:  Betadine *ANTISEPTICS & DISINFECTANTS*   [09/17/2020 03:33 PM]:  Benzoin *DERMATOLOGICALS*   [09/17/2020 03:33 PM]: Blistering  Iodinated Contrast   [09/17/2020]:    Family History  Hypercholesterolemia    Thyroid problems    Heart Disease    Breast cancer    Respiratory Condition    Diabetes Mellitus    Hypertension      Social History   Exercise   08/14/2013: Walks 1-2 times a week. Sun Exposure   08/14/2013: rarely  HIV risk factors   08/14/2013: no  Tobacco use   Never smoker. Seat Belt Use   08/14/2013: always  Alcohol use   08/14/2013: Never drinks. Caffeine use   08/14/2013: Drinks coffee 1-2 times a day. Medication History   Telmisartan  (40MG Tablet, Oral) Active. Tolterodine Tartrate ER  (2MG Capsule ER 24HR, Oral) Active. Furosemide  (40MG Tablet, Oral) Active. Metoprolol Succinate ER  (25MG Tablet ER 24HR, Oral) Active. Multi Vitamin Daily  (Oral) Active.   Amoxicillin  (875MG Tablet, Oral) Active. Benzonatate  (100MG Capsule, Oral) Active. Doxepin HCl  (25MG Capsule, Oral) Active. Famotidine  (20MG Tablet, Oral) Active. Fluorouracil  (5% Cream, External) Active. Fluad  (0.5ML Susp Pref Syr, Intramuscular) Active. metroNIDAZOLE  (0.75% Cream, External) Active. Omeprazole  (10MG Capsule DR, Oral) Active. Narcan  (4MG/0.1ML Liquid, Nasal) Active. Omeprazole  (20MG Capsule DR, Oral) Active. Oseltamivir Phosphate  (75MG Capsule, Oral) Active. Metoprolol Tartrate  (25MG Tablet, Oral) Active. Tylenol Extra Strength  (Oral) Specific strength unknown - Active. Allopurinol  (100MG Tablet, Oral) Active. Fiber  (Oral) Specific strength unknown - Active. Aspirin  (81MG Capsule, Oral) Active. RaNITidine HCl  (150MG Tablet, Oral) Active. Isosorbide Mononitrate ER  (30MG Tablet ER 24HR, Oral) Active. Omeprazole  (20MG Tablet DR, Oral) Active. Pravastatin Sodium  (40MG Tablet, Oral) Active. Montelukast Sodium  (10MG Tablet, Oral) Active. Medications Reconciled     Past Surgical History   Carpal Tunnel Repair   right  Other Joint Surgery    Total Knee Replacement   left  Appendectomy    Gallbladder Surgery   laparoscopic  Arthroscopy of Knee   bilateral  Foot Surgery   left  Hysterectomy   non-cancerous: complete and abdominal    Diagnostic Studies History   Lumbar Spine X-ray   Date: 9/17/2020, Results:  Cervical Spine X-ray   Date: 8/6/2020. Stable ACDF C4-7. MRI, Cervical Spine   Date: 3/4/2020, Results: MRI confirms a prior anterior cervical discectomy and fusion at C5-6. She is developed persistent spondylosis at C4-5 and C6-7 with foraminal stenosis. She has a moderate central stenosis at those levels. Adequate decompression at C5-6. CT Scan   Date: 9/20/2019, Results:Abdomen: A T12 superior endplate Schmorl's node with mild left lateral  compression deformity (601-88, 601-85) is new from 3/11/2019. The lung bases are  clear. The heart size is normal. The liver is fatty infiltrated. Post  cholecystectomy. Incidental note is made of a periampullary duodenal  diverticulum and a small right renal cyst. 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 The distal esophagus, stomach, pancreas, spleen, adrenals, and  kidneys are otherwise normal.     Pelvis: Sigmoid diverticulosis is moderate. The small bowel, ileocecal junction,  colon, and bladder are otherwise normal. The appendix is not visualized; no  pericecal inflammatory process. Post hysterectomy. No free air or fluid, and no  abdominopelvic lymphadenopathy.     IMPRESSION  IMPRESSION:  1. T12 Schmorl's node compression fracture, new from 3/11/2019.  2. Hepatic steatosis. 3. No acute process in the abdomen and pelvis. MRI   MRI evaluation is consistent with the x-rays. She has arthritis of the joint particularly a lateral compartment. There is consistent and predictable tearing of the lateral meniscus in this compartment. X-ray   Date: 10/2/2019, Results: 4 views of cervical spine demonstrate a stable C5/6 cervical fusion. Her his progressive degeneration above and below the hardware. No new acute processes or instability noted. Other Problems   Hypercholesterolemia    Gout    Gastroesophageal Reflux Disease    Asthma    Arthritis    Unspecified Diagnosis    Unspecified Diagnosis    Acute pain of right knee (719.46  M25.561)    Treatment options were discussed with the patient in full.    Postoperative pain (338.18  G89.18)      Physical Exam   Neurologic  Sensory  Light Touch - Intact - Globally. Overall Assessment of Muscle Strength and Tone reveals  Lower Extremities - Right Iliopsoas - 5/5. Left Iliopsoas - 5/5. Right Tibialis Anterior - 5/5. Left Tibialis Anterior - 5/5. Right Gastroc-Soleus - 5/5. Left Gastroc-Soleus - 5/5. Right EHL - 3+/5. Left EHL - 5/5. General Assessment of Reflexes  Right Ankle - Clonus is not present.  Left Ankle - Clonus is not present. Reflexes (Dermatomes)  2/2 Normal - Left Achilles (L5-S2), Left Knee (L2-4), Right Achilles (L5-S2) and Right Knee (L2-4). Musculoskeletal  Global Assessment  Examination of related systems reveals - well-developed, well-nourished, in no acute distress, alert and oriented x 3. Gait and Station - normal gait and station and normal posture. Right Lower Extremity - normal strength and tone, normal range of motion without pain and no instability, subluxation or laxity. Left Lower Extremity - normal strength and tone, normal range of motion without pain and no instability, subluxation or laxity. Spine/Ribs/Pelvis  Cervical Spine - Examination of the cervical spine reveals - no tenderness to palpation, no pain, no swelling, edema or erythema, normal cervical spine movements and normal sensation. Thoracic (Dorsal) Spine - Examination of the thoracic spine reveals - no tenderness over thoracic vertebrae, no pain, normal sensation and normal thoracic spine movements. Lumbosacral Spine - Examination of the lumbosacral spine reveals - no known fractures or deformities. Inspection and Palpation - Tenderness - moderate. Assessment of pain reveals the following findings - The pain is characterized as - moderate. Location - pain refers to lower back bilaterally. ROJM - Trunk Extension - 15 degrees. Lumbar Spine Flexion - 35 °. Lumbosacral Spine - Functional Testing - Babinski Test negative, Prone Knee Bending Test negative, Slump Test negative, Straight Leg Raising Test negative. Assessment & Plan    REVIEW OF SYSTEMS: Systems were reviewed by the provider.(V49.9)    Current Plans  Pt Education - How to [facility] using Patient Portal and 3rd Party Apps: discussed with patient and provided information.   X-RAY EXAM OF LUMBAR SPINE, 4 OR MORE VIEWS (89602) (AP, Lateral, Flexion and Extension views were taken today using Digital Radiography.)    DDD (degenerative disc disease), lumbar (722.52  M51.36)    Lumbar stenosis with neurogenic claudication (724.03  M48.062)  Impression: She has chronic lower back pain and right leg symptoms. She has severe retrolisthesis at L5-S1 with severe facet hypertrophy causing severe right-sided foraminal stenosis. She has had 4 rounds of injections without any long-term relief. I think she is a candidate for a lumbar laminectomy at L5-S1 with a posterior lumbar fusion L5-S1 with interbody's for indirect foraminal decompression. Risk and benefits were discussed with her. The risks and benefits were discussed at length with the patient and the patient has elected to proceed. Indications for surgery include failed conservative treatment. Alternative treatments, risks and the perioperative course were discussed with the patient. All questions were answered. The risks and benefits of the procedure were explained. Benefits include definitive diagnosis, relief of pain, elimination of deformity and improved function. Risks of surgery including bleeding, infection, weakness, numbness, CSF leak, failure to improve symptoms, exacerbation of medical co-morbidities and even death were discussed with the patient.     Treatment options were discussed with the patient in full.(V65.49)    Current Plans  Presurgical planning was preformed with the patient today  Surgery to be scheduled  Procedure: Lumbar laminectomy and fusion L5-S1    Signed by Makayla Otero MD

## 2020-10-21 ENCOUNTER — HOSPITAL ENCOUNTER (OUTPATIENT)
Dept: PREADMISSION TESTING | Age: 70
Discharge: HOME | End: 2020-10-21
Attending: ORTHOPAEDIC SURGERY
Payer: MEDICARE

## 2020-10-21 VITALS
BODY MASS INDEX: 37.03 KG/M2 | RESPIRATION RATE: 16 BRPM | DIASTOLIC BLOOD PRESSURE: 58 MMHG | HEIGHT: 66 IN | WEIGHT: 230.38 LBS | OXYGEN SATURATION: 99 % | SYSTOLIC BLOOD PRESSURE: 135 MMHG | TEMPERATURE: 97.4 F

## 2020-10-21 LAB
ABO + RH BLD: NORMAL
ALBUMIN SERPL-MCNC: 3.4 G/DL (ref 3.5–5)
ALBUMIN/GLOB SERPL: 1.1 {RATIO} (ref 1.1–2.2)
ALP SERPL-CCNC: 91 U/L (ref 45–117)
ALT SERPL-CCNC: 20 U/L (ref 12–78)
ANION GAP SERPL CALC-SCNC: 5 MMOL/L (ref 5–15)
APPEARANCE UR: CLEAR
AST SERPL-CCNC: 18 U/L (ref 15–37)
ATRIAL RATE: 53 BPM
BACTERIA URNS QL MICRO: NEGATIVE /HPF
BILIRUB SERPL-MCNC: 0.7 MG/DL (ref 0.2–1)
BILIRUB UR QL: NEGATIVE
BLOOD GROUP ANTIBODIES SERPL: NORMAL
BUN SERPL-MCNC: 17 MG/DL (ref 6–20)
BUN/CREAT SERPL: 18 (ref 12–20)
CALCIUM SERPL-MCNC: 9.2 MG/DL (ref 8.5–10.1)
CALCULATED P AXIS, ECG09: 48 DEGREES
CALCULATED R AXIS, ECG10: -14 DEGREES
CALCULATED T AXIS, ECG11: 29 DEGREES
CHLORIDE SERPL-SCNC: 112 MMOL/L (ref 97–108)
CO2 SERPL-SCNC: 27 MMOL/L (ref 21–32)
COLOR UR: NORMAL
CREAT SERPL-MCNC: 0.96 MG/DL (ref 0.55–1.02)
DIAGNOSIS, 93000: NORMAL
EPITH CASTS URNS QL MICRO: NORMAL /LPF
ERYTHROCYTE [DISTWIDTH] IN BLOOD BY AUTOMATED COUNT: 14.5 % (ref 11.5–14.5)
GLOBULIN SER CALC-MCNC: 3.2 G/DL (ref 2–4)
GLUCOSE SERPL-MCNC: 95 MG/DL (ref 65–100)
GLUCOSE UR STRIP.AUTO-MCNC: NEGATIVE MG/DL
HCT VFR BLD AUTO: 35.7 % (ref 35–47)
HGB BLD-MCNC: 11.4 G/DL (ref 11.5–16)
HGB UR QL STRIP: NEGATIVE
HYALINE CASTS URNS QL MICRO: NORMAL /LPF (ref 0–5)
INR PPP: 1 (ref 0.9–1.1)
KETONES UR QL STRIP.AUTO: NEGATIVE MG/DL
LEUKOCYTE ESTERASE UR QL STRIP.AUTO: NEGATIVE
MCH RBC QN AUTO: 29.6 PG (ref 26–34)
MCHC RBC AUTO-ENTMCNC: 31.9 G/DL (ref 30–36.5)
MCV RBC AUTO: 92.7 FL (ref 80–99)
NITRITE UR QL STRIP.AUTO: NEGATIVE
NRBC # BLD: 0 K/UL (ref 0–0.01)
NRBC BLD-RTO: 0 PER 100 WBC
P-R INTERVAL, ECG05: 212 MS
PH UR STRIP: 5.5 [PH] (ref 5–8)
PLATELET # BLD AUTO: 196 K/UL (ref 150–400)
PMV BLD AUTO: 10.1 FL (ref 8.9–12.9)
POTASSIUM SERPL-SCNC: 4.3 MMOL/L (ref 3.5–5.1)
PROT SERPL-MCNC: 6.6 G/DL (ref 6.4–8.2)
PROT UR STRIP-MCNC: NEGATIVE MG/DL
PROTHROMBIN TIME: 10.3 SEC (ref 9–11.1)
Q-T INTERVAL, ECG07: 406 MS
QRS DURATION, ECG06: 94 MS
QTC CALCULATION (BEZET), ECG08: 380 MS
RBC # BLD AUTO: 3.85 M/UL (ref 3.8–5.2)
RBC #/AREA URNS HPF: NORMAL /HPF (ref 0–5)
SODIUM SERPL-SCNC: 144 MMOL/L (ref 136–145)
SP GR UR REFRACTOMETRY: 1.02 (ref 1–1.03)
SPECIMEN EXP DATE BLD: NORMAL
UA: UC IF INDICATED,UAUC: NORMAL
UROBILINOGEN UR QL STRIP.AUTO: 0.2 EU/DL (ref 0.2–1)
VENTRICULAR RATE, ECG03: 53 BPM
WBC # BLD AUTO: 6.2 K/UL (ref 3.6–11)
WBC URNS QL MICRO: NORMAL /HPF (ref 0–4)

## 2020-10-21 PROCEDURE — 93005 ELECTROCARDIOGRAM TRACING: CPT

## 2020-10-21 PROCEDURE — 86900 BLOOD TYPING SEROLOGIC ABO: CPT

## 2020-10-21 PROCEDURE — 85027 COMPLETE CBC AUTOMATED: CPT

## 2020-10-21 PROCEDURE — 97116 GAIT TRAINING THERAPY: CPT

## 2020-10-21 PROCEDURE — 85610 PROTHROMBIN TIME: CPT

## 2020-10-21 PROCEDURE — 81001 URINALYSIS AUTO W/SCOPE: CPT

## 2020-10-21 PROCEDURE — 83036 HEMOGLOBIN GLYCOSYLATED A1C: CPT

## 2020-10-21 PROCEDURE — 80053 COMPREHEN METABOLIC PANEL: CPT

## 2020-10-21 PROCEDURE — 36415 COLL VENOUS BLD VENIPUNCTURE: CPT

## 2020-10-21 PROCEDURE — 97161 PT EVAL LOW COMPLEX 20 MIN: CPT

## 2020-10-21 RX ORDER — FAMOTIDINE 20 MG/1
40 TABLET, FILM COATED ORAL
COMMUNITY
End: 2021-08-11

## 2020-10-21 RX ORDER — SODIUM CHLORIDE, SODIUM LACTATE, POTASSIUM CHLORIDE, CALCIUM CHLORIDE 600; 310; 30; 20 MG/100ML; MG/100ML; MG/100ML; MG/100ML
25 INJECTION, SOLUTION INTRAVENOUS CONTINUOUS
Status: CANCELLED | OUTPATIENT
Start: 2020-10-29

## 2020-10-21 RX ORDER — PRAVASTATIN SODIUM 40 MG/1
40 TABLET ORAL
COMMUNITY

## 2020-10-21 NOTE — ADVANCED PRACTICE NURSE
PAT Nurse Practitioner   Pre-Operative Chart Review/Assessment:-ORTHOPEDIC/NEUROSURGICAL SPINE                Patient Name:  Laura Older                                                         Age:   79 y.o.    :  1950     Today's Date:  10/21/2020     Date of PAT:   10/21/20      Date of Surgery:    10/29/20     Procedure(s):    L5-S1 lumbar laminectomy with instrumentation     Surgeon:   Dr. Lev [place] Clearance:   Dr. Susannah Olmos:      1)  Cardiac Clearance:  Dr. Dominique Pfeiffer       2)  Diabetic Treatment Consult:  Not indicated-A1C 5.9      3)  Sleep Apnea evaluation:   Has dx of KRYSTINA-compliant w/ CPAP       4) Treatment for MRSA/Staph Aureus:  Negative       5) Additional Concerns:  Chronic pain, CREST, asthma, pulmonary hypertension,  PONV                Vital Signs:         Vitals:    10/21/20 0920   BP: (!) 135/58   Resp: 16   Temp: 97.4 °F (36.3 °C)   SpO2: 99%   Weight: 104.5 kg (230 lb 6.1 oz)   Height: 5' 6\" (1.676 m)            ____________________________________________  PAST MEDICAL HISTORY  Past Medical History:   Diagnosis Date    Adverse effect of anesthesia     woke up during hysterectomy    Arrhythmia     h/o palpitations normal work up  heart: Ronni    Arthritis     Asthma     allergy to weather changing    Cataract     Chronic pain     bulging disc c4/c5 l4/l5 : as of 1018 no neck/head movement limitation says patient    Claustrophobia     Color blind     CREST (calcinosis, Raynaud's phenomenon, esophageal dysfunction, sclerodactyly, telangiectasia) [facility] 2018    [facility], Dr. Rajani Parikh GERD (gastroesophageal reflux disease)     Gout     H/O arthroscopic knee surgery     H/O: hysterectomy     Hypertension     Ill-defined condition     gout    Ill-defined condition     l4-5 hernia disc    Leg swelling 2016    unknown etiology    Bartlett's neuralgia 2001    from neuroma middle toe, left foot    Musculoskeletal disorder arthritis    Nausea & vomiting     surgery related    Other ill-defined conditions(799.89) 11/2013    RECTAL PROLASE    Pulmonary hypertension ([address] 08/2018    Heart: Dr. Sharmaine Anguiano S/P appendectomy     Shortness of breath 2016    Pulmonary Dr. Yazmin Caban Sleep apnea     Unspecified adverse effect of anesthesia     wakes up for anesthesia early      ____________________________________________  PAST SURGICAL HISTORY  Past Surgical History:   Procedure Laterality Date    CARDIAC SURG PROCEDURE UNLIST  2015    no blockages, card cath   [address]  05/2018    no blockages x 2 procedures    COLONOSCOPY  04/20/2011    negative    COLONOSCOPY N/A 9/28/2018    COLONOS[facility] performed by Gena May [facility] at [facility] HX CATARACT REMOVAL Left 09/20/2018    HX CATARACT REMOVAL Right 10/20/2018    HX CHOLECYSTECTOMY      HX GI  11/19/13    Rectocele repair with enterocele repair    HX HEENT  09/21/2018    left cataract    HX HYSTERECTOMY      TOOK LEFT OVARY    HX KNEE ARTHROSCOPY  1990's    right knee partial meniscus     HX KNEE REPLACEMENT Right 2016    HX KNEE REPLACEMENT Left 2010, 2016    TKR    HX LUMBAR FUSION  2014    c4-5 fusion cervical fusion    HX ORTHOPAEDIC  1973    ganglion cyst removed rt wrist    HX ORTHOPAEDIC  2013, 1992    bilateral CTR, and had ulna nerve release    HX ORTHOPAEDIC Right 2018    thumb and ring finger trigger release    IR INJ SPINE THER SUBST LUM/SAC W IMG  12/19/2019      ____________________________________________  HOME MEDICATIONS    Current Outpatient Medications   Medication Sig    famotidine (PEPCID) 20 mg tablet Take 40 mg by mouth Daily (before breakfast).  pravastatin (PRAVACHOL) 40 mg tablet Take 40 mg by mouth nightly.  carboxymethylcellulose sodium (THERATEARS OP) Apply  to eye as needed.  naloxone (NARCAN) 4 mg/actuation nasal spray Use 1 spray intranasally, then discard. Repeat with new spray every 2 min as needed for opioid overdose symptoms, alternating nostrils.  furosemide (LASIX) 40 mg tablet Take 20 mg by mouth daily.  telmisartan (MICARDIS) 40 mg tablet Take 40 mg by mouth daily.  aluminum hydrox-magnesium carb (GAVISCON)  mg/15 mL suspension Take 15 mL by mouth every six (6) hours as needed for Indigestion.  metoprolol tartrate (LOPRESSOR) 25 mg tablet TAKE ONE TABLET BY MOUTH TWICE DAILY    isosorbide mononitrate ER (IMDUR) 30 mg tablet TAKE ONE-HALF (1/2) TABLET DAILY FOR RAYNAUDS PHENOMENON (Patient taking differently: Take 15 mg by mouth nightly. TAKE ONE-HALF (1/2) TABLET DAILY FOR RAYNAUDS PHENOMENON)    omeprazole (PRILOSEC) 20 mg capsule Take 40 mg by mouth daily.  benzonatate (TESSALON) 100 mg capsule Take 100 mg by mouth three (3) times daily as needed for Cough.  baclofen (LIORESAL) 10 mg tablet Take 10 mg by mouth nightly.  multivitamin (ONE A DAY) tablet Take 1 Tab by mouth daily.  aspirin delayed-release 81 mg tablet Take 81 mg by mouth nightly.  acetaminophen (TYLENOL EXTRA STRENGTH) 500 mg tablet Take 1,000 mg by mouth every six (6) hours as needed for Pain.  OTHER,NON-FORMULARY, Take 2 Tabs by mouth daily. FIBER SUPPLEMENT     allopurinol (ZYLOPRIM) 100 mg tablet Take 200 mg by mouth daily.  cetirizine (ZYRTEC) 10 mg tablet Take 10 mg by mouth daily.  montelukast (SINGULAIR) 10 mg tablet Take 10 mg by mouth nightly.      No current facility-administered medications for this encounter.       ____________________________________________  ALLERGIES  Allergies   Allergen Reactions    Ciprofloxacin Shortness of Breath    Flagyl [Metronidazole] Shortness of Breath    Percocet [Oxycodone-Acetaminophen] Rash and Itching     irritated    Benzene Other (comments)     Blisters and burn area Benzene found to be on steri-strips    Betadine [Povidone-Iodine] Other (comments)     Blisters and burning    Naproxen Itching    Sulfa (Sulfonamide Antibiotics) Rash    Adhesive Rash     Redness and rash      ____________________________________________  SOCIAL HISTORY  Social History     Tobacco Use    Smoking status: Never Smoker    Smokeless tobacco: Never Used   Substance Use Topics    Alcohol use: No      ____________________________________________        Labs:     Hospital Outpatient Visit on 10/21/2020   Component Date Value Ref Range Status    WBC 10/21/2020 6.2  3.6 - 11.0 K/uL Final    RBC 10/21/2020 3.85  3.80 - 5.20 M/uL Final    HGB 10/21/2020 11.4* 11.5 - 16.0 g/dL Final    HCT 10/21/2020 35.7  35.0 - 47.0 % Final    MCV 10/21/2020 92.7  80.0 - 99.0 FL Final    MCH 10/21/2020 29.6  26.0 - 34.0 PG Final    MCHC 10/21/2020 31.9  30.0 - 36.5 g/dL Final    RDW 10/21/2020 14.5  11.5 - 14.5 % Final    PLATELET 20/67/4723 873  150 - 400 K/uL Final    MPV 10/21/2020 10.1  8.9 - 12.9 FL Final    NRBC 10/21/2020 0.0  0  WBC Final    ABSOLUTE NRBC 10/21/2020 0.00  0.00 - 0.01 K/uL Final    Ventricular Rate 10/21/2020 53  BPM Preliminary    Atrial Rate 10/21/2020 53  BPM Preliminary    P-R Interval 10/21/2020 212  ms Preliminary    QRS Duration 10/21/2020 94  ms Preliminary    Q-T Interval 10/21/2020 406  ms Preliminary    QTC Calculation (Bezet) 10/21/2020 380  ms Preliminary    Calculated P Axis 10/21/2020 48  degrees Preliminary    Calculated R Axis 10/21/2020 -14  degrees Preliminary    Calculated T Axis 10/21/2020 29  degrees Preliminary    Diagnosis 10/21/2020    Preliminary                    Value:Sinus bradycardia with 1st degree AV block  Low voltage QRS  No previous ECGs available      INR 10/21/2020 1.0  0.9 - 1.1   Final    A single therapeutic range for Vit K antagonists may not be optimal for all indications - see June, 2008 issue of Chest, American College of Chest Physicians Evidence-Based Clinical Practice Guidelines, 8th Edition.     Prothrombin time 10/21/2020 10.3  9.0 - 11.1 sec Final  Color 10/21/2020 YELLOW/STRAW    Final    Color Reference Range: Straw, Yellow or Dark Yellow    Appearance 10/21/2020 CLEAR  CLEAR   Final    Specific gravity 10/21/2020 1.023  1.003 - 1.030   Final    pH (UA) 10/21/2020 5.5  5.0 - 8.0   Final    Protein 10/21/2020 Negative  NEG mg/dL Final    Glucose 10/21/2020 Negative  NEG mg/dL Final    Ketone 10/21/2020 Negative  NEG mg/dL Final    Bilirubin 10/21/2020 Negative  NEG   Final    Blood 10/21/2020 Negative  NEG   Final    Urobilinogen 10/21/2020 0.2  0.2 - 1.0 EU/dL Final    Nitrites 10/21/2020 Negative  NEG   Final    Leukocyte Esterase 10/21/2020 Negative  NEG   Final    WBC 10/21/2020 0-4  0 - 4 /hpf Final    RBC 10/21/2020 0-5  0 - 5 /hpf Final    Epithelial cells 10/21/2020 FEW  FEW /lpf Final    Epithelial cell category consists of squamous cells and /or transitional urothelial cells. Renal tubular cells, if present, are separately identified as such.  Bacteria 10/21/2020 Negative  NEG /hpf Final    UA:UC IF INDICATED 10/21/2020 CULTURE NOT INDICATED BY UA RESULT  CNI   Final    Hyaline cast 10/21/2020 0-2  0 - 5 /lpf Final    Sodium 10/21/2020 144  136 - 145 mmol/L Final    Potassium 10/21/2020 4.3  3.5 - 5.1 mmol/L Final    Chloride 10/21/2020 112* 97 - 108 mmol/L Final    CO2 10/21/2020 27  21 - 32 mmol/L Final    Anion gap 10/21/2020 5  5 - 15 mmol/L Final    Glucose 10/21/2020 95  65 - 100 mg/dL Final    BUN 10/21/2020 17  6 - 20 MG/DL Final    Creatinine 10/21/2020 0.96  0.55 - 1.02 MG/DL Final    BUN/Creatinine ratio 10/21/2020 18  12 - 20   Final    GFR est AA 10/21/2020 >60  >60 ml/min/1.73m2 Final    GFR est non-AA 10/21/2020 57* >60 ml/min/1.73m2 Final    Estimated GFR is calculated using the IDMS-traceable Modification of Diet in Renal Disease (MDRD) Study equation, reported for both  Americans (GFRAA) and non- Americans (GFRNA), and normalized to 1.73m2 body surface area.  The physician must decide which value applies to the patient.  Calcium 10/21/2020 9.2  8.5 - 10.1 MG/DL Final    Bilirubin, total 10/21/2020 0.7  0.2 - 1.0 MG/DL Final    ALT (SGPT) 10/21/2020 20  12 - 78 U/L Final    AST (SGOT) 10/21/2020 18  15 - 37 U/L Final    Alk. phosphatase 10/21/2020 91  45 - 117 U/L Final    Protein, total 10/21/2020 6.6  6.4 - 8.2 g/dL Final    Albumin 10/21/2020 3.4* 3.5 - 5.0 g/dL Final    Globulin 10/21/2020 3.2  2.0 - 4.0 g/dL Final    A-G Ratio 10/21/2020 1.1  1.1 - 2.2   Final    Crossmatch Expiration 10/21/2020 11/01/2020   Final    ABO/Rh(D) 10/21/2020 O POSITIVE   Final    Antibody screen 10/21/2020 NEG   Final          Skin:   Denies open wounds, cuts, sores, rashes or other areas of concern in PAT assessment.         Chelsie Duncan NP

## 2020-10-21 NOTE — PERIOP NOTES
Hibiclens/Chlorhexidine    Preventing Infections Before and After - Your Surgery    IMPORTANT INSTRUCTIONS    Please read and follow these instructions carefully. If you are unable to comply with the below instructions your procedure will be cancelled. Every Night for Three (3) nights before your surgery:  1. Shower with an antibacterial soap, such as Dial, or the soap provided at your preassessment appointment. A shower is better than a bath for cleaning your skin. 2. If needed, ask someone to help you reach all areas of your body. Dont forget to clean your belly button with every shower. The night before your surgery: If you lose your Hibiclens/chlorhexidine please contact surgery center or you can purchase it at a local pharmacy  1. On the night before your surgery, shower with an antibacterial soap, such as Dial, or the soap provided at your preassessment appointment. 2. With one packet of Hibiclens/Chlorhexidine in hand, turn water off.  3. Apply Hibiclens antiseptic skin cleanser with a clean, freshly washed washcloth. ? Gently apply to your body from chin to toes (except the genital area) and especially the area(s) where your incision(s) will be. ? Leave Hibiclens/Chlorhexidine on your skin for at least 20 seconds. CAUTION: If needed, Hibiclens/chlorhexidine may be used to clean the folds of skin of the legs (such as in the area of the groin) and on your buttocks and hips. However, do not use Hibiclens/Chlorhexidine above the neck or in the genital area (your bottom) or put inside any area of your body. 4. Turn the water back on and rinse. 5. Dry gently with a clean, freshly washed towel. 6. After your shower, do not use any powder, deodorant, perfumes or lotion. 7. Use clean, freshly washed towels and washcloths every time you shower. 8. Wear clean, freshly washed pajamas to bed the night before surgery. 9. Sleep on clean, freshly washed sheets.   10. Do not allow pets to sleep in your bed with you. The Morning of your surgery:  1. Shower again thoroughly with an antibacterial soap, such as Dial or the soap provided at your preassessment appointment. If needed, ask someone for help to reach all areas of your body. Dont forget to clean your belly button! Rinse. 2. Dry gently with a clean, freshly washed towel. 3. After your shower, do not use any powder, deodorant, perfumes or lotion prior to surgery. 4. Put on clean, freshly washed clothing. Tips to help prevent infections after your surgery:  1. Protect your surgical wound from germs:  ? Hand washing is the most important thing you and your caregivers can do to prevent infections. ? Keep your bandage clean and dry! ? Do not touch your surgical wound. 2. Use clean, freshly washed towels and washcloths every time you shower; do not share bath linens with others. 3. Until your surgical wound is healed, wear clothing and sleep on bed linens each day that are clean and freshly washed. 4. Do not allow pets to sleep in your bed with you or touch your surgical wound. 5. Do not smoke - smoking delays wound healing. This may be a good time to stop smoking. 6. If you have diabetes, it is important for you to manage your blood sugar levels properly before your surgery as well as after your surgery. Poorly managed blood sugar levels slow down wound healing and prevent you from healing completely. If you lose your Hibiclens/chlorhexidine, please call the [facility], or it is available for purchase at your pharmacy.                ___________________      ___________________      10/21/2020 @ 7966  (Signature of Patient)          (Witness)                   (Date and Time)

## 2020-10-21 NOTE — PROGRESS NOTES
[facility]  Physical Therapy Pre-surgery evaluation  [address]  [address] [facility] [address], [address] [facility]    PHYSICAL THERAPY PRE SPINE SURGERY EVALUATION  Patient:Margaret Rhodia Sicard (79 y.o. female)  Date: 10/21/2020    PAT  Procedure(s) (LRB):  L5- LUMBAR LAMINECTOMY WITH INSTRUMENTATION (N/A)     Precautions: spinal, falls         ASSESSMENT :  Based on the objective data described below, the patient presents with impaired tolerance of activity, pain and falls  due to end stage degenerative joint disease in the spinal level: lumbar spine. Discussed anticipated disposition to home with possible discharge within a 1 to 2 day time frame post-surgery. Patient  in agreement. Anticipate patient will need acute PT and OT orders based on expected deficits post surgery. GOALS: (Goals have been discussed and agreed upon with patient.)  DISCHARGE GOALS: Time Frame: 1 DAY  1. Patient will demonstrate increased strength, range of motion, and pain control via a home exercise program in order to minimize functional deficits in preparation for their upcoming surgery. This will be achieved by using education, demonstration and through the use of an informational handout including a home exercise program.  REHABILITATION POTENTIAL FOR STATED GOALS: Good     RECOMMENDATIONS AND PLANNED INTERVENTIONS: (Benefits and precautions of physical therapy have been discussed with the patient.)  · Home Exercise Program  TREATMENT PLAN EFFECTIVE DATES: 10/21/2020 to 10/21/2020   FREQUENCY/DURATION: Patient to continue to perform home exercise program at least twice daily until surgery. SUBJECTIVE:   Patient stated I have had so many surgeries! My  and son  will help with everything.     OBJECTIVE DATA SUMMARY:   HISTORY:      Past Medical History:   Diagnosis Date    Adverse effect of anesthesia     woke up during hysterectomy    Arrhythmia     h/o palpitations normal work up 22/2015 heart: Ronni    Arthritis     Asthma     allergy to weather changing    Cataract     Chronic pain     bulging disc c4/c5 l4/l5 : as of 9/1018 no neck/head movement limitation says patient    Claustrophobia     Color blind     CREST (calcinosis, Raynaud's phenomenon, esophageal dysfunction, sclerodactyly, telangiectasia) ([address] 2018    [facility], Dr. Vasquez Shen GERD (gastroesophageal reflux disease)     Gout     H/O arthroscopic knee surgery     H/O: hysterectomy     Hypertension     Ill-defined condition     gout    Ill-defined condition     l4-5 hernia disc    Leg swelling 2016    unknown etiology    Bartlett's neuralgia 2001    from neuroma middle toe, left foot    Musculoskeletal disorder     arthritis    Nausea & vomiting     surgery related    Other ill-defined conditions(799.89) 11/2013    RECTAL PROLASE    Pulmonary hypertension ([address] 08/2018    Heart: Dr. Shmuel Spears S/P appendectomy     Shortness of breath 2016    Pulmonary Dr. Sofy Mar Sleep apnea     Unspecified adverse effect of anesthesia     wakes up for anesthesia early     Past Surgical History:   Procedure Laterality Date    CARDIAC SURG PROCEDURE UNLIST  2015    no blockages, card cath   [address]  05/2018    no blockages x 2 procedures    COLONOSCOPY  04/20/2011    negative    COLONOSCOPY N/A 9/28/2018    COL[facility] HX CATARACT REMOVAL Left 09/20/2018    HX CATARACT REMOVAL Right 10/20/2018    HX CHOLECYSTECTOMY      HX GI  11/19/13    Rectocele repair with enterocele repair    HX HEENT  09/21/2018    left cataract    HX HYSTERECTOMY      TOOK LEFT OVARY    HX KNEE ARTHROSCOPY  1990's    right knee partial meniscus     HX KNEE REPLACEMENT Right 2016    HX KNEE REPLACEMENT Left 2010, 2016    TKR    HX LUMBAR FUSION  2014    c4-5 fusion cervical fusion    HX ORTHOPAEDIC  1973    ganglion cyst removed rt wrist    HX ORTHOPAEDIC  2013, 1992    bilateral CTR, and had ulna nerve release    HX ORTHOPAEDIC Right 2018    thumb and ring finger trigger release    IR INJ SPINE THER SUBST LUM/SAC W IMG  12/19/2019       Prior Level of Function/Home Situation: Patient lives in tri level home, ambulates mostly household distances without AD and  reports she holds onto her  to step in home, had recent fall 3 days ago stepping backward, fell and hit table but son was able to help her up. Patient drives, reports it is ok because she is sitting. Personal factors and/or comorbidities impacting plan of care:  Cardiac history, bilateral TKR, cervical fusion, history of falls, bilateral LE edema      Home Situation  Home Environment: Private residence  # Steps to Enter: 1  Rails to Enter: No  One/Two Story Residence: [facility] level  # of Interior Steps: 7  Interior Rails: Left  Living Alone: No  Support Systems: Spouse/Significant Other/Partner  Patient Expects to be Discharged to[de-identified] Private residence  Current DME Used/Available at Home: Cane, straight, Raised toilet seat, Shower chair, Walker, rolling  Tub or Shower Type: Tub/Shower combination           EXAMINATION/PRESENTATION/DECISION MAKING:     ADLs (Current Functional Status): Bathing/Showering:   [x] Independent  [] Requires Assistance from Someone  [] Sponge Bath Only   Ambulation:  [x] Independent  [] Walk Indoors Only  [] Walk Outdoors  [] Use Assistive Device  [] Use Wheelchair Only     Dressing:  [x] Independent    Requires Assistance from Someone for:  [] Sock/Shoes  [] Pants  [] Everything   Household Activities:  [] Routine house and yard work  [x] Light Housework Only  [] None       Critical Behavior:                Strength:     Strength:  Within functional limits(mild weakness right LE)                       Tone & Sensation:   Tone: Normal              Sensation: Intact                  Range Of Motion:     AROM: Within functional limits Coordination:   Coordination: Within functional limits    Functional Mobility:  Transfers:  Sit to Stand: Independent  Stand to Sit: Independent                       Balance:   Sitting: Intact  Standing: Impaired  Standing - Static: Good  Standing - Dynamic : Fair  Ambulation/Gait Training:  Distance (ft): 100 Feet (ft)(sometimes holds 's arm)     Ambulation - Level of Assistance: Modified independent        Gait Abnormalities: Decreased step clearance              Speed/Rosalee: Pace decreased (<100 feet/min)  Step Length: Left shortened, Right shortened                      Functional Measure:  10 Meter walk test:  (Specify if any supplemental oxygen is used, the type, pre, during and post sats.)    Self-Selected Or Fast-Velocity: Self Selected Velocity  Trial 1 (Time to Walk 10 Meters): 17.6 Seconds  Trial 2 (Time to Walk 10 Meters): 14.4 Seconds  Trial 3 (Time to Walk 10 Meters): 16.4 Seconds  Average : 16.1 Seconds  Score (Meters/Second): 0.6 Meters/Second             Walking Speed (m/s)  Modifier Scale Age 52-63 Age 61-76 Age 66-77 Age 80-80    Male Female Male Female Male Female Male Female   CH   0% Impaired ? 1.39 ? 1.40 ? 1.36 ? 1.30 ? 1.33 ? 1.27 ? 1.21 ? 1.15   CI   1-19% Impaired 1.11-1.38 1.12-1.39 1.09-1.35 1.04-1.29 1.06-1.32 1.01-1.26 0.96-1.20 0.92-1.14   CJ   20-39% Impaired 0.83-1.10 0.84-1.11 0.82-1.08 0.78-1.03 0.80-1.05 0.76-1.00 0.72-0.95 0.69-0.91   CK   40-59% Impaired 0.56-0.82 0.57-0.83 0.54-0.81 0.52-0.77 0.53-0.79 0.51-0.75 0.48-0.71 0.46-0.68   CL   60-79% Impaired 0.28-0.55 0.28-0.56 0.27-0.53 0.26-0.51 0.27-0.52 0.25-0.50 0.24-0.49 0.23-0.45   CM   80-99% Impaired 0.01-0.28 < 0.01-0.28 < 0.01-0.27 < 0.01-0.26 0.01-0.27 0.01-0.24 0.01-0.23 0.01-0.22   CN   100% Impaired Cannot Perform   Minimal Detectable Change (MDC-90) = 0.1 m/s  Veronica BROUSSARD. \"Comfortable and maximum walking speed of adults aged 20-79 years: reference values and determinants. \" Age and Agin Volume 26(1):15-9. Perla Brunner, Mollinger L. \"Age- and gender-related test performance in community-dwelling elderly people: Six-Minute Walk Test, Mcduffie Balance Scale, Timed Up & Go Test, and gait speeds. \" Physical Therapy: 2002 Volume 82(2):128-37. Catina Oliver DM, Giorgi PW, Everlene Dancer JD, [facility] PARAS. \"Assessing stability and change of four performance measures: a longitudinal study evaluating outcome following total hip and knee arthroplasty. \" [facility] Musculoskeletal Disorders: 2005 Volume 6(3). Riaz Olivera, PhD; Maddi Finley, PhD. Sandie Kaylynn Paper: \"Walking Speed: the Sixth Vital Sign\" Journal of Geriatric Physical Therapy: 2009 - Volume 32 - Issue 2 - p 2-5 . Pain:  Pain Scale 1: Numeric (0 - 10)  Pain Intensity 1: 8  Pain Location 1: Leg;Back  Pain Orientation 1: Right; Lower  Pain Description 1: Burning; Throbbing       Radicular pain:   Patient reports LPB radiating into right LE to level of foot    Activity Tolerance:   fair   Patient []   does  [x]   does not demonstrate signs/symptoms of shortness of breath/dyspnea on exertion/respiratory distress. COMMUNICATION/EDUCATION:   The patient was educated on:  [x]         Early post operative mobility is imperative to achieve a patient's desired outcomes and to restore biological function. [x]         Post operative spinal precautions may/may not be applicable. These precautions are based on the patient's physician and the procedure(s) performed.     [x]         Spinal precautions including:  ·   No bending forward, sideways, or backwards  ·   No twisting   ·   No lifting more than 5-10 pounds  ·   No sitting longer than 30-60 minutes at a time  ·   Ernesto brace when out of bed and mobilizing    The patients plan of care was discussed as follows:   [x]         The patient verbalized understanding of his/her plan in preparation for their upcoming surgery  []         The patient's  was present for this session  []        The patient reports that he/she does not have a  identified at this time  []         The  verbalized understanding of the education regarding the patient's upcoming surgery  [x]         Patient/family agree to work toward stated goals and plan of care. []         Patient understands intent and goals of therapy, but is neutral about his/her participation. []         Patient is unable to participate in goal setting and plan of care.       Thank you for this referral.  Emerson Gonzalez, PT    Time Calculation: 22 mins

## 2020-10-21 NOTE — PERIOP NOTES
[facility]  Joint/Spine Preoperative Instructions    Surgery Date 10/29/2020          Time Landen Starr  Contact # [phone] home    1. On the day of your surgery, please report to the Surgical Services Registration Desk and sign in at your designated time. The Surgery Center is located to the right of the Emergency Room. 2. You must have someone with you to drive you home. You should not drive a car for 24 hours following surgery. Please make arrangements for a friend or family member to stay with you for the first 24 hours after your surgery. 3. No food after midnight 10/28/2020. Medications morning of surgery should be taken with a sip of water. Please follow pre-surgery drink instructions that were given at your Pre Admission Testing appointment. 4. We recommend you do not drink any alcoholic beverages for 24 hours before and after your surgery. 5. Contact your surgeons office for instructions on the following medications: non-steroidal anti-inflammatory drugs (i.e. Advil, Aleve), vitamins, and supplements. (Some surgeons will want you to stop these medications prior to surgery and others may allow you to take them)  **If you are currently taking Plavix, Coumadin, Aspirin and/or other blood-thinning agents, contact your surgeon for instructions. ** Your surgeon will partner with the physician prescribing these medications to determine if it is safe to stop or if you need to continue taking. Please do not stop taking these medications without instructions from your surgeon    6. Wear comfortable clothes. Wear glasses instead of contacts. Do not bring any money or jewelry. Please bring picture ID, insurance card, and any prearranged co-payment or hospital payment. Do not wear make-up, particularly mascara the morning of your surgery. Do not wear nail polish, particularly if you are having foot /hand surgery.   Wear your hair loose or down, no ponytails, buns, thomas pins or clips. All body piercings must be removed. Please shower with antibacterial soap for three consecutive days before and on the morning of surgery, but do not apply any lotions, powders or deodorants after the shower on the day of surgery. Please use a fresh towels after each shower. Please sleep in clean clothes and change bed linens the night before surgery. Please do not shave for 48 hours prior to surgery. Shaving of the face is acceptable. 7. You should understand that if you do not follow these instructions your surgery may be cancelled. If your physical condition changes (I.e. fever, cold or flu) please contact your surgeon as soon as possible. 8. It is important that you be on time. If a situation occurs where you may be late, please call [phone] (OR Holding Area). 9. If you have any questions and or problems, please call [phone] (Pre-admission Testing). 10. Your surgery time may be subject to change. You will receive a phone call the evening prior if your time changes. 11.  If having outpatient surgery, you must have someone to drive you here, stay with you during the duration of your stay, and to drive you home at time of discharge. 12. The following link is for the educational video for patients and/or families. http://bello-guevara.org/. com/locations/ivuwnkvda-vkxwgzs-aghfbyh/[place]/[facility]/educational-materials    Special Instructions:   Covid test scheduled 10/25/2020 @ 7-10 am.  Please see attached directions/location. Patient advised to self-quarantine after test and up to date of surgery. Practice incentive spirometer per instructions and bring to hospital day of surgery. Bring CPAP machine day of surgery. TAKE ALL MEDICATIONS THE DAY OF SURGERY EXCEPT: Telmisartan      I understand a pre-operative phone call will be made to verify my surgery time.   In the event that I am not available, I give permission for a message to be left on my answering service and/or with another person?   yes         ___________________        __________   10/21/2020 @ 4060    (Signature of Patient)             (Witness)                (Date and Time)

## 2020-10-21 NOTE — PERIOP NOTES
Incentive Spirometer        Using the incentive spirometer helps expand the small air sacs of your lungs, helps you breathe deeply, and helps improve your lung function. Use your incentive spirometer twice a day (10 breaths each time) prior to surgery. How to Use Your Incentive Spirometer:  1. Hold the incentive spirometer in an upright position. 2. Breathe out as usual.   3. Place the mouthpiece in your mouth and seal your lips tightly around it. 4. Take a deep breath. Breathe in slowly and as deeply as possible. Keep the blue flow rate guide between the arrows. 5. Hold your breath as long as possible. Then exhale slowly and allow the piston to fall to the bottom of the column. 6. Rest for a few seconds and repeat steps one through five at least 10 times. PAT Tidal Volume_____2250_____  x__2______  Date__10/21/2020_____    Tonna Ok THE INCENTIVE SPIROMETER WITH YOU TO THE HOSPITAL ON THE DAY OF YOUR SURGERY. Opportunity given to ask and answer questions as well as to observe return demonstration.     Patient signature_____________________________            Witness____________________________

## 2020-10-22 LAB
BACTERIA SPEC CULT: NORMAL
BACTERIA SPEC CULT: NORMAL
EST. AVERAGE GLUCOSE BLD GHB EST-MCNC: 123 MG/DL
HBA1C MFR BLD: 5.9 % (ref 4–5.6)
SERVICE CMNT-IMP: NORMAL

## 2020-10-25 ENCOUNTER — HOSPITAL ENCOUNTER (OUTPATIENT)
Dept: PREADMISSION TESTING | Age: 70
Discharge: HOME | End: 2020-10-25
Payer: MEDICARE

## 2020-10-25 PROCEDURE — 87635 SARS-COV-2 COVID-19 AMP PRB: CPT

## 2020-10-27 LAB
HEALTH STATUS, XMCV2T: NORMAL
SARS-COV-2, COV2NT: NOT DETECTED
SOURCE, COVRS: NORMAL
SPECIMEN SOURCE, FCOV2M: NORMAL
SPECIMEN TYPE, XMCV1T: NORMAL

## 2020-10-29 ENCOUNTER — APPOINTMENT (OUTPATIENT)
Dept: GENERAL RADIOLOGY | Age: 70
DRG: 455 | End: 2020-10-29
Attending: ORTHOPAEDIC SURGERY
Payer: MEDICARE

## 2020-10-29 ENCOUNTER — ANESTHESIA (OUTPATIENT)
Dept: SURGERY | Age: 70
DRG: 455 | End: 2020-10-29
Payer: MEDICARE

## 2020-10-29 ENCOUNTER — HOSPITAL ENCOUNTER (INPATIENT)
Age: 70
LOS: 1 days | Discharge: HOME | DRG: 455 | End: 2020-10-30
Attending: ORTHOPAEDIC SURGERY | Admitting: ORTHOPAEDIC SURGERY
Payer: MEDICARE

## 2020-10-29 ENCOUNTER — ANESTHESIA EVENT (OUTPATIENT)
Dept: SURGERY | Age: 70
DRG: 455 | End: 2020-10-29
Payer: MEDICARE

## 2020-10-29 DIAGNOSIS — M48.061 SPINAL STENOSIS OF LUMBAR REGION, UNSPECIFIED WHETHER NEUROGENIC CLAUDICATION PRESENT: Primary | ICD-10-CM

## 2020-10-29 DIAGNOSIS — M48.061 SPINAL STENOSIS OF LUMBAR REGION WITHOUT NEUROGENIC CLAUDICATION: ICD-10-CM

## 2020-10-29 LAB
GLUCOSE BLD STRIP.AUTO-MCNC: 119 MG/DL (ref 65–100)
SERVICE CMNT-IMP: ABNORMAL

## 2020-10-29 PROCEDURE — 77030026438 HC STYL ET INTUB CARD -A: Performed by: NURSE ANESTHETIST, CERTIFIED REGISTERED

## 2020-10-29 PROCEDURE — 01NR0ZZ RELEASE SACRAL NERVE, OPEN APPROACH: ICD-10-PCS | Performed by: INTERNAL MEDICINE

## 2020-10-29 PROCEDURE — 97116 GAIT TRAINING THERAPY: CPT

## 2020-10-29 PROCEDURE — 74011000250 HC RX REV CODE- 250: Performed by: PHYSICIAN ASSISTANT

## 2020-10-29 PROCEDURE — 74011000250 HC RX REV CODE- 250: Performed by: ORTHOPAEDIC SURGERY

## 2020-10-29 PROCEDURE — 2709999900 HC NON-CHARGEABLE SUPPLY: Performed by: ORTHOPAEDIC SURGERY

## 2020-10-29 PROCEDURE — 94760 N-INVAS EAR/PLS OXIMETRY 1: CPT

## 2020-10-29 PROCEDURE — 77030012406 HC DRN WND PENRS BARD -A: Performed by: ORTHOPAEDIC SURGERY

## 2020-10-29 PROCEDURE — 0SG3071 FUSION OF LUMBOSACRAL JOINT WITH AUTOLOGOUS TISSUE SUBSTITUTE, POSTERIOR APPROACH, POSTERIOR COLUMN, OPEN APPROACH: ICD-10-PCS | Performed by: ORTHOPAEDIC SURGERY

## 2020-10-29 PROCEDURE — 76010000171 HC OR TIME 2 TO 2.5 HR INTENSV-TIER 1: Performed by: ORTHOPAEDIC SURGERY

## 2020-10-29 PROCEDURE — 77030008771 HC TU NG SALEM SUMP -A: Performed by: NURSE ANESTHETIST, CERTIFIED REGISTERED

## 2020-10-29 PROCEDURE — 77030022270 HC GRFT BN MAGNIFUS OSTEO -C: Performed by: ORTHOPAEDIC SURGERY

## 2020-10-29 PROCEDURE — 65270000029 HC RM PRIVATE

## 2020-10-29 PROCEDURE — 74011250636 HC RX REV CODE- 250/636

## 2020-10-29 PROCEDURE — 74011250636 HC RX REV CODE- 250/636: Performed by: PHYSICIAN ASSISTANT

## 2020-10-29 PROCEDURE — C1713 ANCHOR/SCREW BN/BN,TIS/BN: HCPCS | Performed by: ORTHOPAEDIC SURGERY

## 2020-10-29 PROCEDURE — 77030018836 HC SOL IRR NACL ICUM -A: Performed by: ORTHOPAEDIC SURGERY

## 2020-10-29 PROCEDURE — 97161 PT EVAL LOW COMPLEX 20 MIN: CPT

## 2020-10-29 PROCEDURE — 77030032958 HC GRFT BN SUB ELITE TRNTY MUSC -K1: Performed by: ORTHOPAEDIC SURGERY

## 2020-10-29 PROCEDURE — 74011250637 HC RX REV CODE- 250/637: Performed by: ORTHOPAEDIC SURGERY

## 2020-10-29 PROCEDURE — 77030013079 HC BLNKT BAIR HGGR 3M -A: Performed by: NURSE ANESTHETIST, CERTIFIED REGISTERED

## 2020-10-29 PROCEDURE — 0ST40ZZ RESECTION OF LUMBOSACRAL DISC, OPEN APPROACH: ICD-10-PCS | Performed by: ORTHOPAEDIC SURGERY

## 2020-10-29 PROCEDURE — 77010033678 HC OXYGEN DAILY

## 2020-10-29 PROCEDURE — 77030029099 HC BN WAX SSPC -A: Performed by: ORTHOPAEDIC SURGERY

## 2020-10-29 PROCEDURE — 77030014007 HC SPNG HEMSTAT J&J -B: Performed by: ORTHOPAEDIC SURGERY

## 2020-10-29 PROCEDURE — 0SG30AJ FUSION OF LUMBOSACRAL JOINT WITH INTERBODY FUSION DEVICE, POSTERIOR APPROACH, ANTERIOR COLUMN, OPEN APPROACH: ICD-10-PCS | Performed by: ORTHOPAEDIC SURGERY

## 2020-10-29 PROCEDURE — 77030040361 HC SLV COMPR DVT MDII -B: Performed by: ORTHOPAEDIC SURGERY

## 2020-10-29 PROCEDURE — 76000 FLUOROSCOPY <1 HR PHYS/QHP: CPT

## 2020-10-29 PROCEDURE — 74011000250 HC RX REV CODE- 250: Performed by: NURSE ANESTHETIST, CERTIFIED REGISTERED

## 2020-10-29 PROCEDURE — 74011250636 HC RX REV CODE- 250/636: Performed by: ANESTHESIOLOGY

## 2020-10-29 PROCEDURE — 77030040922 HC BLNKT HYPOTHRM STRY -A

## 2020-10-29 PROCEDURE — 2709999900 HC NON-CHARGEABLE SUPPLY

## 2020-10-29 PROCEDURE — 77030031139 HC SUT VCRL2 J&J -A: Performed by: ORTHOPAEDIC SURGERY

## 2020-10-29 PROCEDURE — 74011250637 HC RX REV CODE- 250/637: Performed by: ANESTHESIOLOGY

## 2020-10-29 PROCEDURE — 77030038692 HC WND DEB SYS IRMX -B: Performed by: ORTHOPAEDIC SURGERY

## 2020-10-29 PROCEDURE — 74011250637 HC RX REV CODE- 250/637: Performed by: PHYSICIAN ASSISTANT

## 2020-10-29 PROCEDURE — 74011250636 HC RX REV CODE- 250/636: Performed by: ORTHOPAEDIC SURGERY

## 2020-10-29 PROCEDURE — C1889 IMPLANT/INSERT DEVICE, NOC: HCPCS | Performed by: ORTHOPAEDIC SURGERY

## 2020-10-29 PROCEDURE — 76060000035 HC ANESTHESIA 2 TO 2.5 HR: Performed by: ORTHOPAEDIC SURGERY

## 2020-10-29 PROCEDURE — 74011000258 HC RX REV CODE- 258: Performed by: NURSE ANESTHETIST, CERTIFIED REGISTERED

## 2020-10-29 PROCEDURE — 77030012891

## 2020-10-29 PROCEDURE — 77030038600 HC TU BPLR IRR DISP STRY -B: Performed by: ORTHOPAEDIC SURGERY

## 2020-10-29 PROCEDURE — 74011250636 HC RX REV CODE- 250/636: Performed by: NURSE ANESTHETIST, CERTIFIED REGISTERED

## 2020-10-29 PROCEDURE — 97530 THERAPEUTIC ACTIVITIES: CPT

## 2020-10-29 PROCEDURE — 82962 GLUCOSE BLOOD TEST: CPT

## 2020-10-29 PROCEDURE — 76210000000 HC OR PH I REC 2 TO 2.5 HR: Performed by: ORTHOPAEDIC SURGERY

## 2020-10-29 PROCEDURE — 72100 X-RAY EXAM L-S SPINE 2/3 VWS: CPT

## 2020-10-29 PROCEDURE — 77030018846 HC SOL IRR STRL H20 ICUM -A: Performed by: ORTHOPAEDIC SURGERY

## 2020-10-29 PROCEDURE — 77030008684 HC TU ET CUF COVD -B: Performed by: NURSE ANESTHETIST, CERTIFIED REGISTERED

## 2020-10-29 DEVICE — SCREW 55840007540 5.5/6 MAS 7.5X40 CC
Type: IMPLANTABLE DEVICE | Site: SPINE LUMBAR | Status: FUNCTIONAL
Brand: CD HORIZON® SPINAL SYSTEM

## 2020-10-29 DEVICE — GRAFT BNE SUB 15GM GRN CA COMPND PTTY AND SILICATE BASE INJ: Type: IMPLANTABLE DEVICE | Site: SPINE LUMBAR | Status: FUNCTIONAL

## 2020-10-29 DEVICE — DBM 7509007 MAGNIFUSE 7.5 X 25MM
Type: IMPLANTABLE DEVICE | Site: SPINE LUMBAR | Status: FUNCTIONAL
Brand: MAGNIFUSE® BONE GRAFT

## 2020-10-29 DEVICE — SPACER 7770723 ELEVATE X-LOR 23X7MM
Type: IMPLANTABLE DEVICE | Site: SPINE LUMBAR | Status: FUNCTIONAL
Brand: ELEVATE™ SPINAL SYSTEM

## 2020-10-29 DEVICE — GRAFT BNE SUB L CANC FRZN MORSELIZED W/ VIABLE CELL TRINITY: Type: IMPLANTABLE DEVICE | Site: SPINE LUMBAR | Status: FUNCTIONAL

## 2020-10-29 RX ORDER — ACETAMINOPHEN 500 MG
1000 TABLET ORAL EVERY 6 HOURS
Status: DISCONTINUED | OUTPATIENT
Start: 2020-10-29 | End: 2020-10-30 | Stop reason: HOSPADM

## 2020-10-29 RX ORDER — ONDANSETRON 2 MG/ML
4 INJECTION INTRAMUSCULAR; INTRAVENOUS AS NEEDED
Status: DISCONTINUED | OUTPATIENT
Start: 2020-10-29 | End: 2020-10-29 | Stop reason: HOSPADM

## 2020-10-29 RX ORDER — NEOSTIGMINE METHYLSULFATE 1 MG/ML
INJECTION, SOLUTION INTRAVENOUS AS NEEDED
Status: DISCONTINUED | OUTPATIENT
Start: 2020-10-29 | End: 2020-10-29 | Stop reason: HOSPADM

## 2020-10-29 RX ORDER — ROCURONIUM BROMIDE 10 MG/ML
INJECTION, SOLUTION INTRAVENOUS AS NEEDED
Status: DISCONTINUED | OUTPATIENT
Start: 2020-10-29 | End: 2020-10-29 | Stop reason: HOSPADM

## 2020-10-29 RX ORDER — FACIAL-BODY WIPES
10 EACH TOPICAL DAILY PRN
Status: DISCONTINUED | OUTPATIENT
Start: 2020-10-31 | End: 2020-10-30 | Stop reason: HOSPADM

## 2020-10-29 RX ORDER — MORPHINE SULFATE 2 MG/ML
2 INJECTION, SOLUTION INTRAMUSCULAR; INTRAVENOUS
Status: DISCONTINUED | OUTPATIENT
Start: 2020-10-29 | End: 2020-10-30 | Stop reason: HOSPADM

## 2020-10-29 RX ORDER — LIDOCAINE HYDROCHLORIDE 20 MG/ML
INJECTION, SOLUTION EPIDURAL; INFILTRATION; INTRACAUDAL; PERINEURAL AS NEEDED
Status: DISCONTINUED | OUTPATIENT
Start: 2020-10-29 | End: 2020-10-29 | Stop reason: HOSPADM

## 2020-10-29 RX ORDER — HYDROMORPHONE HYDROCHLORIDE 2 MG/1
2 TABLET ORAL
Status: DISCONTINUED | OUTPATIENT
Start: 2020-10-29 | End: 2020-10-30 | Stop reason: HOSPADM

## 2020-10-29 RX ORDER — GLYCOPYRROLATE 0.2 MG/ML
INJECTION INTRAMUSCULAR; INTRAVENOUS AS NEEDED
Status: DISCONTINUED | OUTPATIENT
Start: 2020-10-29 | End: 2020-10-29 | Stop reason: HOSPADM

## 2020-10-29 RX ORDER — CYCLOBENZAPRINE HCL 10 MG
10 TABLET ORAL
Status: DISCONTINUED | OUTPATIENT
Start: 2020-10-29 | End: 2020-10-30 | Stop reason: HOSPADM

## 2020-10-29 RX ORDER — DIPHENHYDRAMINE HYDROCHLORIDE 50 MG/ML
12.5 INJECTION, SOLUTION INTRAMUSCULAR; INTRAVENOUS AS NEEDED
Status: DISCONTINUED | OUTPATIENT
Start: 2020-10-29 | End: 2020-10-29 | Stop reason: HOSPADM

## 2020-10-29 RX ORDER — ROPIVACAINE HYDROCHLORIDE 5 MG/ML
INJECTION, SOLUTION EPIDURAL; INFILTRATION; PERINEURAL AS NEEDED
Status: DISCONTINUED | OUTPATIENT
Start: 2020-10-29 | End: 2020-10-29 | Stop reason: HOSPADM

## 2020-10-29 RX ORDER — ASPIRIN 81 MG/1
81 TABLET ORAL
Status: DISCONTINUED | OUTPATIENT
Start: 2020-10-30 | End: 2020-10-30 | Stop reason: HOSPADM

## 2020-10-29 RX ORDER — SODIUM CHLORIDE, SODIUM LACTATE, POTASSIUM CHLORIDE, CALCIUM CHLORIDE 600; 310; 30; 20 MG/100ML; MG/100ML; MG/100ML; MG/100ML
25 INJECTION, SOLUTION INTRAVENOUS CONTINUOUS
Status: DISCONTINUED | OUTPATIENT
Start: 2020-10-29 | End: 2020-10-29 | Stop reason: HOSPADM

## 2020-10-29 RX ORDER — FENTANYL CITRATE 50 UG/ML
25 INJECTION, SOLUTION INTRAMUSCULAR; INTRAVENOUS
Status: DISCONTINUED | OUTPATIENT
Start: 2020-10-29 | End: 2020-10-29 | Stop reason: HOSPADM

## 2020-10-29 RX ORDER — SODIUM CHLORIDE 0.9 % (FLUSH) 0.9 %
5-40 SYRINGE (ML) INJECTION AS NEEDED
Status: DISCONTINUED | OUTPATIENT
Start: 2020-10-29 | End: 2020-10-30 | Stop reason: HOSPADM

## 2020-10-29 RX ORDER — BACLOFEN 10 MG/1
10 TABLET ORAL
Status: DISCONTINUED | OUTPATIENT
Start: 2020-10-29 | End: 2020-10-30 | Stop reason: HOSPADM

## 2020-10-29 RX ORDER — HYDROMORPHONE HYDROCHLORIDE 2 MG/1
4 TABLET ORAL
Status: DISCONTINUED | OUTPATIENT
Start: 2020-10-29 | End: 2020-10-30 | Stop reason: HOSPADM

## 2020-10-29 RX ORDER — SODIUM CHLORIDE 9 MG/ML
25 INJECTION, SOLUTION INTRAVENOUS CONTINUOUS
Status: DISCONTINUED | OUTPATIENT
Start: 2020-10-29 | End: 2020-10-29 | Stop reason: HOSPADM

## 2020-10-29 RX ORDER — PANTOPRAZOLE SODIUM 40 MG/1
40 TABLET, DELAYED RELEASE ORAL DAILY
Status: DISCONTINUED | OUTPATIENT
Start: 2020-10-30 | End: 2020-10-30 | Stop reason: HOSPADM

## 2020-10-29 RX ORDER — SODIUM CHLORIDE 9 MG/ML
125 INJECTION, SOLUTION INTRAVENOUS CONTINUOUS
Status: DISPENSED | OUTPATIENT
Start: 2020-10-29 | End: 2020-10-30

## 2020-10-29 RX ORDER — EPHEDRINE SULFATE/0.9% NACL/PF 50 MG/5 ML
SYRINGE (ML) INTRAVENOUS AS NEEDED
Status: DISCONTINUED | OUTPATIENT
Start: 2020-10-29 | End: 2020-10-29 | Stop reason: HOSPADM

## 2020-10-29 RX ORDER — HYDROMORPHONE HYDROCHLORIDE 1 MG/ML
0.2 INJECTION, SOLUTION INTRAMUSCULAR; INTRAVENOUS; SUBCUTANEOUS
Status: DISCONTINUED | OUTPATIENT
Start: 2020-10-29 | End: 2020-10-29 | Stop reason: HOSPADM

## 2020-10-29 RX ORDER — FAMOTIDINE 20 MG/1
40 TABLET, FILM COATED ORAL
Status: DISCONTINUED | OUTPATIENT
Start: 2020-10-30 | End: 2020-10-30 | Stop reason: HOSPADM

## 2020-10-29 RX ORDER — PROPOFOL 10 MG/ML
INJECTION, EMULSION INTRAVENOUS
Status: DISCONTINUED | OUTPATIENT
Start: 2020-10-29 | End: 2020-10-29 | Stop reason: HOSPADM

## 2020-10-29 RX ORDER — FENTANYL CITRATE 50 UG/ML
INJECTION, SOLUTION INTRAMUSCULAR; INTRAVENOUS
Status: COMPLETED
Start: 2020-10-29 | End: 2020-10-29

## 2020-10-29 RX ORDER — ISOSORBIDE MONONITRATE 30 MG/1
15 TABLET, EXTENDED RELEASE ORAL
Status: DISCONTINUED | OUTPATIENT
Start: 2020-10-29 | End: 2020-10-30 | Stop reason: HOSPADM

## 2020-10-29 RX ORDER — SODIUM CHLORIDE 0.9 % (FLUSH) 0.9 %
5-40 SYRINGE (ML) INJECTION AS NEEDED
Status: DISCONTINUED | OUTPATIENT
Start: 2020-10-29 | End: 2020-10-29 | Stop reason: HOSPADM

## 2020-10-29 RX ORDER — OXYCODONE HYDROCHLORIDE 5 MG/1
5 TABLET ORAL AS NEEDED
Status: DISCONTINUED | OUTPATIENT
Start: 2020-10-29 | End: 2020-10-29 | Stop reason: HOSPADM

## 2020-10-29 RX ORDER — VANCOMYCIN HYDROCHLORIDE 1 G/20ML
INJECTION, POWDER, LYOPHILIZED, FOR SOLUTION INTRAVENOUS AS NEEDED
Status: DISCONTINUED | OUTPATIENT
Start: 2020-10-29 | End: 2020-10-30 | Stop reason: HOSPADM

## 2020-10-29 RX ORDER — MONTELUKAST SODIUM 10 MG/1
10 TABLET ORAL
Status: DISCONTINUED | OUTPATIENT
Start: 2020-10-29 | End: 2020-10-30 | Stop reason: HOSPADM

## 2020-10-29 RX ORDER — FENTANYL CITRATE 50 UG/ML
50 INJECTION, SOLUTION INTRAMUSCULAR; INTRAVENOUS AS NEEDED
Status: DISCONTINUED | OUTPATIENT
Start: 2020-10-29 | End: 2020-10-29 | Stop reason: HOSPADM

## 2020-10-29 RX ORDER — FENTANYL CITRATE 50 UG/ML
INJECTION, SOLUTION INTRAMUSCULAR; INTRAVENOUS AS NEEDED
Status: DISCONTINUED | OUTPATIENT
Start: 2020-10-29 | End: 2020-10-29 | Stop reason: HOSPADM

## 2020-10-29 RX ORDER — LIDOCAINE HYDROCHLORIDE 10 MG/ML
0.1 INJECTION, SOLUTION EPIDURAL; INFILTRATION; INTRACAUDAL; PERINEURAL AS NEEDED
Status: DISCONTINUED | OUTPATIENT
Start: 2020-10-29 | End: 2020-10-29 | Stop reason: HOSPADM

## 2020-10-29 RX ORDER — SODIUM CHLORIDE, SODIUM LACTATE, POTASSIUM CHLORIDE, CALCIUM CHLORIDE 600; 310; 30; 20 MG/100ML; MG/100ML; MG/100ML; MG/100ML
125 INJECTION, SOLUTION INTRAVENOUS CONTINUOUS
Status: DISCONTINUED | OUTPATIENT
Start: 2020-10-29 | End: 2020-10-29 | Stop reason: HOSPADM

## 2020-10-29 RX ORDER — MORPHINE SULFATE IN 0.9 % NACL 150MG/30ML
PATIENT CONTROLLED ANALGESIA SYRINGE INTRAVENOUS
Status: DISCONTINUED | OUTPATIENT
Start: 2020-10-29 | End: 2020-10-30 | Stop reason: HOSPADM

## 2020-10-29 RX ORDER — SODIUM CHLORIDE 0.9 % (FLUSH) 0.9 %
5-40 SYRINGE (ML) INJECTION EVERY 8 HOURS
Status: DISCONTINUED | OUTPATIENT
Start: 2020-10-29 | End: 2020-10-30 | Stop reason: HOSPADM

## 2020-10-29 RX ORDER — PRAVASTATIN SODIUM 40 MG/1
40 TABLET ORAL
Status: DISCONTINUED | OUTPATIENT
Start: 2020-10-29 | End: 2020-10-30 | Stop reason: HOSPADM

## 2020-10-29 RX ORDER — THERA TABS 400 MCG
1 TAB ORAL DAILY
Status: DISCONTINUED | OUTPATIENT
Start: 2020-10-30 | End: 2020-10-30 | Stop reason: HOSPADM

## 2020-10-29 RX ORDER — METOPROLOL TARTRATE 25 MG/1
25 TABLET, FILM COATED ORAL 2 TIMES DAILY
Status: DISCONTINUED | OUTPATIENT
Start: 2020-10-29 | End: 2020-10-30 | Stop reason: HOSPADM

## 2020-10-29 RX ORDER — ONDANSETRON 2 MG/ML
4 INJECTION INTRAMUSCULAR; INTRAVENOUS
Status: DISCONTINUED | OUTPATIENT
Start: 2020-10-29 | End: 2020-10-30 | Stop reason: HOSPADM

## 2020-10-29 RX ORDER — LOSARTAN POTASSIUM 50 MG/1
50 TABLET ORAL DAILY
Status: DISCONTINUED | OUTPATIENT
Start: 2020-10-30 | End: 2020-10-30 | Stop reason: HOSPADM

## 2020-10-29 RX ORDER — ONDANSETRON 2 MG/ML
INJECTION INTRAMUSCULAR; INTRAVENOUS AS NEEDED
Status: DISCONTINUED | OUTPATIENT
Start: 2020-10-29 | End: 2020-10-29 | Stop reason: HOSPADM

## 2020-10-29 RX ORDER — TELMISARTAN 40 MG/1
40 TABLET ORAL DAILY
Status: DISCONTINUED | OUTPATIENT
Start: 2020-10-30 | End: 2020-10-29

## 2020-10-29 RX ORDER — HYDROMORPHONE HYDROCHLORIDE 2 MG/ML
INJECTION, SOLUTION INTRAMUSCULAR; INTRAVENOUS; SUBCUTANEOUS AS NEEDED
Status: DISCONTINUED | OUTPATIENT
Start: 2020-10-29 | End: 2020-10-29 | Stop reason: HOSPADM

## 2020-10-29 RX ORDER — POLYETHYLENE GLYCOL 3350 17 G/17G
17 POWDER, FOR SOLUTION ORAL DAILY
Status: DISCONTINUED | OUTPATIENT
Start: 2020-10-30 | End: 2020-10-30 | Stop reason: HOSPADM

## 2020-10-29 RX ORDER — SODIUM CHLORIDE 9 MG/ML
INJECTION, SOLUTION INTRAVENOUS
Status: COMPLETED | OUTPATIENT
Start: 2020-10-29 | End: 2020-10-29

## 2020-10-29 RX ORDER — MORPHINE SULFATE 10 MG/ML
2 INJECTION, SOLUTION INTRAMUSCULAR; INTRAVENOUS
Status: DISCONTINUED | OUTPATIENT
Start: 2020-10-29 | End: 2020-10-29 | Stop reason: HOSPADM

## 2020-10-29 RX ORDER — CALCIUM POLYCARBOPHIL 625 MG
2 TABLET ORAL DAILY
Status: DISCONTINUED | OUTPATIENT
Start: 2020-10-30 | End: 2020-10-30 | Stop reason: HOSPADM

## 2020-10-29 RX ORDER — HYDROMORPHONE HYDROCHLORIDE 2 MG/1
2-4 TABLET ORAL
Qty: 50 TAB | Refills: 0 | Status: SHIPPED | OUTPATIENT
Start: 2020-10-29 | End: 2020-11-12

## 2020-10-29 RX ORDER — NALOXONE HYDROCHLORIDE 4 MG/.1ML
SPRAY NASAL
Qty: 2 EACH | Refills: 0 | Status: SHIPPED | OUTPATIENT
Start: 2020-10-29 | End: 2020-12-03

## 2020-10-29 RX ORDER — ACETAMINOPHEN 325 MG/1
650 TABLET ORAL ONCE
Status: COMPLETED | OUTPATIENT
Start: 2020-10-29 | End: 2020-10-29

## 2020-10-29 RX ORDER — MIDAZOLAM HYDROCHLORIDE 1 MG/ML
1 INJECTION, SOLUTION INTRAMUSCULAR; INTRAVENOUS AS NEEDED
Status: DISCONTINUED | OUTPATIENT
Start: 2020-10-29 | End: 2020-10-29 | Stop reason: HOSPADM

## 2020-10-29 RX ORDER — SODIUM CHLORIDE 0.9 % (FLUSH) 0.9 %
5-40 SYRINGE (ML) INJECTION EVERY 8 HOURS
Status: DISCONTINUED | OUTPATIENT
Start: 2020-10-29 | End: 2020-10-29 | Stop reason: HOSPADM

## 2020-10-29 RX ORDER — CETIRIZINE HCL 10 MG
10 TABLET ORAL DAILY
Status: DISCONTINUED | OUTPATIENT
Start: 2020-10-30 | End: 2020-10-30 | Stop reason: HOSPADM

## 2020-10-29 RX ORDER — AMOXICILLIN 250 MG
1 CAPSULE ORAL 2 TIMES DAILY
Status: DISCONTINUED | OUTPATIENT
Start: 2020-10-29 | End: 2020-10-30 | Stop reason: HOSPADM

## 2020-10-29 RX ORDER — PROPOFOL 10 MG/ML
INJECTION, EMULSION INTRAVENOUS AS NEEDED
Status: DISCONTINUED | OUTPATIENT
Start: 2020-10-29 | End: 2020-10-29 | Stop reason: HOSPADM

## 2020-10-29 RX ORDER — ALLOPURINOL 100 MG/1
200 TABLET ORAL DAILY
Status: DISCONTINUED | OUTPATIENT
Start: 2020-10-30 | End: 2020-10-30 | Stop reason: HOSPADM

## 2020-10-29 RX ORDER — DIPHENHYDRAMINE HYDROCHLORIDE 50 MG/ML
12.5 INJECTION, SOLUTION INTRAMUSCULAR; INTRAVENOUS
Status: DISCONTINUED | OUTPATIENT
Start: 2020-10-29 | End: 2020-10-30 | Stop reason: HOSPADM

## 2020-10-29 RX ORDER — NALOXONE HYDROCHLORIDE 0.4 MG/ML
0.4 INJECTION, SOLUTION INTRAMUSCULAR; INTRAVENOUS; SUBCUTANEOUS AS NEEDED
Status: DISCONTINUED | OUTPATIENT
Start: 2020-10-29 | End: 2020-10-30 | Stop reason: HOSPADM

## 2020-10-29 RX ORDER — BENZONATATE 100 MG/1
100 CAPSULE ORAL
Status: DISCONTINUED | OUTPATIENT
Start: 2020-10-29 | End: 2020-10-30 | Stop reason: HOSPADM

## 2020-10-29 RX ORDER — MIDAZOLAM HYDROCHLORIDE 1 MG/ML
0.5 INJECTION, SOLUTION INTRAMUSCULAR; INTRAVENOUS
Status: DISCONTINUED | OUTPATIENT
Start: 2020-10-29 | End: 2020-10-29 | Stop reason: HOSPADM

## 2020-10-29 RX ADMIN — DEXMEDETOMIDINE HYDROCHLORIDE 4 MCG: 100 INJECTION, SOLUTION, CONCENTRATE INTRAVENOUS at 09:33

## 2020-10-29 RX ADMIN — ACETAMINOPHEN 1000 MG: 500 TABLET ORAL at 17:45

## 2020-10-29 RX ADMIN — Medication 10 MG: at 09:08

## 2020-10-29 RX ADMIN — GLYCOPYRROLATE 0.4 MG: 0.2 INJECTION, SOLUTION INTRAMUSCULAR; INTRAVENOUS at 09:43

## 2020-10-29 RX ADMIN — Medication 10 ML: at 22:10

## 2020-10-29 RX ADMIN — DOCUSATE SODIUM 50MG AND SENNOSIDES 8.6MG 1 TABLET: 8.6; 5 TABLET, FILM COATED ORAL at 13:09

## 2020-10-29 RX ADMIN — FENTANYL CITRATE 50 MCG: 50 INJECTION, SOLUTION INTRAMUSCULAR; INTRAVENOUS at 07:47

## 2020-10-29 RX ADMIN — ACETAMINOPHEN 1000 MG: 500 TABLET ORAL at 13:09

## 2020-10-29 RX ADMIN — DEXMEDETOMIDINE HYDROCHLORIDE 4 MCG: 100 INJECTION, SOLUTION, CONCENTRATE INTRAVENOUS at 08:51

## 2020-10-29 RX ADMIN — SODIUM CHLORIDE 125 ML/HR: 900 INJECTION, SOLUTION INTRAVENOUS at 11:10

## 2020-10-29 RX ADMIN — Medication: at 11:10

## 2020-10-29 RX ADMIN — Medication 1 AMPULE: at 22:09

## 2020-10-29 RX ADMIN — DEXMEDETOMIDINE HYDROCHLORIDE 4 MCG: 100 INJECTION, SOLUTION, CONCENTRATE INTRAVENOUS at 09:06

## 2020-10-29 RX ADMIN — FENTANYL CITRATE 25 MCG: 50 INJECTION, SOLUTION INTRAMUSCULAR; INTRAVENOUS at 10:38

## 2020-10-29 RX ADMIN — LIDOCAINE HYDROCHLORIDE 100 MG: 20 INJECTION, SOLUTION INTRAVENOUS at 07:52

## 2020-10-29 RX ADMIN — Medication 1 AMPULE: at 13:13

## 2020-10-29 RX ADMIN — Medication 10 ML: at 13:10

## 2020-10-29 RX ADMIN — HYDROMORPHONE HYDROCHLORIDE 0.5 MG: 2 INJECTION, SOLUTION INTRAMUSCULAR; INTRAVENOUS; SUBCUTANEOUS at 08:10

## 2020-10-29 RX ADMIN — FENTANYL CITRATE 50 MCG: 50 INJECTION, SOLUTION INTRAMUSCULAR; INTRAVENOUS at 07:52

## 2020-10-29 RX ADMIN — PRAVASTATIN SODIUM 40 MG: 40 TABLET ORAL at 22:09

## 2020-10-29 RX ADMIN — HYDROMORPHONE HYDROCHLORIDE 0.5 MG: 2 INJECTION, SOLUTION INTRAMUSCULAR; INTRAVENOUS; SUBCUTANEOUS at 10:07

## 2020-10-29 RX ADMIN — PROPOFOL 180 MG: 10 INJECTION, EMULSION INTRAVENOUS at 07:52

## 2020-10-29 RX ADMIN — ISOSORBIDE MONONITRATE 15 MG: 30 TABLET, EXTENDED RELEASE ORAL at 22:09

## 2020-10-29 RX ADMIN — ONDANSETRON HYDROCHLORIDE 4 MG: 2 INJECTION, SOLUTION INTRAMUSCULAR; INTRAVENOUS at 09:34

## 2020-10-29 RX ADMIN — WATER 2 G: 1 INJECTION INTRAMUSCULAR; INTRAVENOUS; SUBCUTANEOUS at 07:47

## 2020-10-29 RX ADMIN — METOPROLOL TARTRATE 25 MG: 25 TABLET, FILM COATED ORAL at 17:45

## 2020-10-29 RX ADMIN — MONTELUKAST 10 MG: 10 TABLET, FILM COATED ORAL at 22:09

## 2020-10-29 RX ADMIN — HYDROMORPHONE HYDROCHLORIDE 0.5 MG: 2 INJECTION, SOLUTION INTRAMUSCULAR; INTRAVENOUS; SUBCUTANEOUS at 10:14

## 2020-10-29 RX ADMIN — BACLOFEN 10 MG: 10 TABLET ORAL at 22:09

## 2020-10-29 RX ADMIN — SODIUM CHLORIDE, SODIUM LACTATE, POTASSIUM CHLORIDE, AND CALCIUM CHLORIDE 25 ML/HR: 600; 310; 30; 20 INJECTION, SOLUTION INTRAVENOUS at 06:39

## 2020-10-29 RX ADMIN — Medication 3 AMPULE: at 06:39

## 2020-10-29 RX ADMIN — Medication 3 MG: at 09:47

## 2020-10-29 RX ADMIN — DEXMEDETOMIDINE HYDROCHLORIDE 4 MCG: 100 INJECTION, SOLUTION, CONCENTRATE INTRAVENOUS at 08:45

## 2020-10-29 RX ADMIN — FENTANYL CITRATE 25 MCG: 50 INJECTION, SOLUTION INTRAMUSCULAR; INTRAVENOUS at 11:00

## 2020-10-29 RX ADMIN — ONDANSETRON 4 MG: 2 INJECTION INTRAMUSCULAR; INTRAVENOUS at 13:09

## 2020-10-29 RX ADMIN — DOCUSATE SODIUM 50MG AND SENNOSIDES 8.6MG 1 TABLET: 8.6; 5 TABLET, FILM COATED ORAL at 17:44

## 2020-10-29 RX ADMIN — ROCURONIUM BROMIDE 20 MG: 10 INJECTION INTRAVENOUS at 07:52

## 2020-10-29 RX ADMIN — ACETAMINOPHEN 650 MG: 325 TABLET ORAL at 07:29

## 2020-10-29 RX ADMIN — DEXMEDETOMIDINE HYDROCHLORIDE 4 MCG: 100 INJECTION, SOLUTION, CONCENTRATE INTRAVENOUS at 08:38

## 2020-10-29 RX ADMIN — FENTANYL CITRATE 25 MCG: 50 INJECTION, SOLUTION INTRAMUSCULAR; INTRAVENOUS at 10:46

## 2020-10-29 RX ADMIN — Medication 10 MG: at 08:57

## 2020-10-29 RX ADMIN — CEFAZOLIN SODIUM 2 G: 1 INJECTION, POWDER, FOR SOLUTION INTRAMUSCULAR; INTRAVENOUS at 15:22

## 2020-10-29 RX ADMIN — PROPOFOL 125 MCG/KG/MIN: 10 INJECTION, EMULSION INTRAVENOUS at 07:57

## 2020-10-29 RX ADMIN — METOPROLOL TARTRATE 25 MG: 25 TABLET, FILM COATED ORAL at 13:09

## 2020-10-29 NOTE — PERIOP NOTES
Handoff Report from Operating Room to PACU  10:17 AM  Report received from [facility] and Veronica Hemphill CRNA regarding Unruly Hennessy. Surgeon(s):  Payal Guzman MD  And Procedure(s) (LRB):  L5-S1 LUMBAR LAMINECTOMY WITH INSTRUMENTATION (N/A)  confirmed   with allergies and dressings discussed. Anesthesia type, drugs, patient history, complications, estimated blood loss, vital signs, intake and output, and last pain medication, lines, reversal medications and temperature were reviewed. 11:30 AM  Updated  via phone. 12:31 PM  TRANSFER - OUT REPORT:    Verbal report given to Marysol(name) on Unruly Hennessy  being transferred to Ortho (unit) for routine progression of care       Report consisted of patients Situation, Background, Assessment and   Recommendations(SBAR). Information from the following report(s) SBAR, Kardex, Procedure Summary, Intake/Output, MAR and Accordion was reviewed with the receiving nurse. Lines:   Peripheral IV 10/29/20 Right Hand (Active)   Site Assessment Clean, dry, & intact 10/29/20 1017   Phlebitis Assessment 0 10/29/20 1017   Infiltration Assessment 0 10/29/20 1017   Dressing Status Clean, dry, & intact 10/29/20 1017   Dressing Type Transparent;Tape 10/29/20 1017   Hub Color/Line Status Pink; Infusing 10/29/20 1017        Opportunity for questions and clarification was provided. Patient transported with:   O2 @ 4 liters  Registered Nurse    Updated  on bed assignment.

## 2020-10-29 NOTE — PROGRESS NOTES
Problem: Mobility Impaired (Adult and Pediatric)  Goal: *Acute Goals and Plan of Care (Insert Text)  Description: FUNCTIONAL STATUS PRIOR TO ADMISSION: Patient was independent and active without use of DME. She reports she would hold onto her 's arm for longer distances. HOME SUPPORT PRIOR TO ADMISSION: The patient lived with her  and son. Physical Therapy Goals  Initiated 10/29/2020    1. Patient will move from supine to sit and sit to supine , scoot up and down, and roll side to side in bed with independence within 4 days. 2. Patient will perform sit to stand with independence within 4 days. 3. Patient will ambulate with independence for 150 feet with the least restrictive device within 4 days. 4. Patient will ascend/descend 7 stairs with left sided handrail(s) with modified independence within 4 days. 5. Patient will verbalize and demonstrate understanding of spinal precautions (No bending, lifting greater than 5 lbs, or twisting; log-roll technique; frequent repositioning as instructed) within 4 days. Outcome: Not Met   PHYSICAL THERAPY EVALUATION  Patient: Brenda April (56 y.o. female)  Date: 10/29/2020  Primary Diagnosis: Spinal stenosis, lumbar [M48.061]  Spinal stenosis, lumbar [M48.061]  Procedure(s) (LRB):  L5-S1 LUMBAR LAMINECTOMY WITH INSTRUMENTATION (N/A) Day of Surgery   Precautions:   Back    ASSESSMENT  Based on the objective data described below, the patient presents with decreased strength, decreased functional mobility, impaired balance, unsteady gait, and increased nausea s/p L5-S1 laminectomy POD 0. Patient is functioning below her baseline due to back pain and weakness. She required CGA for bed mobility via log roll, requiring constant verbal cues. Patient able to stand with CGA-min A and RW. She ambulated to the bathroom and voided with CGA-min A for balance and RW management. She demonstrates decreased gait speed and shuffled steps.  She became more nauseated returning from the bathroom, dry heaving and belching. Returned to bed with min A for LE management. Current Level of Function Impacting Discharge (mobility/balance): CGA-min A with RW    Functional Outcome Measure: The patient scored 55/100 on the Barthel outcome measure which is indicative of moderate functional impairment. Other factors to consider for discharge: back pain, has  and son to assist her, nausea     Patient will benefit from skilled therapy intervention to address the above noted impairments. PLAN :  Recommendations and Planned Interventions: bed mobility training, transfer training, gait training, therapeutic exercises, patient and family training/education, and therapeutic activities      Frequency/Duration: Patient will be followed by physical therapy:  twice daily to address goals. Recommendation for discharge: (in order for the patient to meet his/her long term goals)  Physical therapy at least 2 days/week in the home AND ensure assist and/or supervision for safety with functional mobility and ADLS    This discharge recommendation:  Has not yet been discussed the attending provider and/or case management    IF patient discharges home will need the following DME: patient owns DME required for discharge         SUBJECTIVE:   Patient stated I feel pretty nauseated.     OBJECTIVE DATA SUMMARY:   HISTORY:    Past Medical History:   Diagnosis Date    Adverse effect of anesthesia     woke up during hysterectomy    Arrhythmia     h/o palpitations normal work up 22/2015 heart: Ronni    Arthritis     Asthma     allergy to weather changing    Cataract     Chronic pain     bulging disc c4/c5 l4/l5 : as of 9/1018 no neck/head movement limitation says patient    Claustrophobia     Color blind     CREST (calcinosis, Raynaud's phenomenon, esophageal dysfunction, sclerodactyly, telangiectasia) ([facility] [address] 2018    [facility], Dr. Velia Zimmerman    GERD (gastroesophageal reflux disease) Gout     H/O arthroscopic knee surgery     H/O: hysterectomy     Hypertension     Ill-defined condition     gout    Ill-defined condition     l4-5 hernia disc    Leg swelling 2016    unknown etiology    Bartlett's neuralgia 2001    from neuroma middle toe, left foot    Musculoskeletal disorder     arthritis    Nausea & vomiting     surgery related    Other ill-defined conditions(799.89) 11/2013    RECTAL PROLASE    Pulmonary hypertension ([address] 08/2018    Heart: Dr. Vince Santos    S/P appendectomy     Shortness of breath 2016    Pulmonary Dr. Armando Barber    Sleep apnea     Unspecified adverse effect of anesthesia     wakes up for anesthesia early     Past Surgical History:   Procedure Laterality Date    CARDIAC SURG PROCEDURE UNLIST  2015    no blockages, card cath    [address]  05/2018    no blockages x 2 procedures    COLONOSCOPY  04/20/2011    negative    COLONOSCOPY N/A 9/28/2018    COLONOSCOPY performed by Aggie Alaniz MD at [place] [id number] Left 09/20/2018    HX CATARACT REMOVAL Right 10/20/2018    HX CHOLECYSTECTOMY      HX GI  11/19/13    Rectocele repair with enterocele repair    HX HEENT  09/21/2018    left cataract    HX HYSTERECTOMY      TOOK LEFT OVARY    HX KNEE ARTHROSCOPY  1990's    right knee partial meniscus     HX KNEE REPLACEMENT Right 2016    HX KNEE REPLACEMENT Left 2010, 2016    TKR    HX LUMBAR FUSION  2014    c4-5 fusion cervical fusion    HX ORTHOPAEDIC  1973    ganglion cyst removed rt wrist    HX ORTHOPAEDIC  2013, 1992    bilateral CTR, and had ulna nerve release    HX ORTHOPAEDIC Right 2018    thumb and ring finger trigger release    IR INJ SPINE THER SUBST LUM/SAC W IMG  12/19/2019       Personal factors and/or comorbidities impacting plan of care: pain, nausea    Home Situation  Home Environment: Private residence  # Steps to Enter: 2  Rails to Enter: No  One/Two Story Residence: Split level  # of Interior Steps: 7  Interior Rails: Left  Living Alone: No  Support Systems: Spouse/Significant Other/Partner  Patient Expects to be Discharged to[de-identified] Private residence  Current DME Used/Available at Home: Shower chair, Cane, straight, Raised toilet seat, Walker, Adaptive dressing aides  Tub or Shower Type: Tub/Shower combination    EXAMINATION/PRESENTATION/DECISION MAKING:   Critical Behavior:  Neurologic State: Sleeping, Eyes open spontaneously, Eyes open to voice           Hearing: Auditory  Auditory Impairment: None  Skin:    Edema:   Range Of Motion:  AROM: Generally decreased, functional           PROM: Generally decreased, functional           Strength:    Strength: Generally decreased, functional                    Tone & Sensation:   Tone: Normal              Sensation: Intact               Coordination:  Coordination: Within functional limits  Vision:      Functional Mobility:  Bed Mobility:  Rolling: Contact guard assistance  Supine to Sit: Contact guard assistance  Sit to Supine: Minimum assistance  Scooting: Contact guard assistance  Transfers:  Sit to Stand: Contact guard assistance;Minimum assistance  Stand to Sit: Contact guard assistance        Bed to Chair: Contact guard assistance              Balance:   Sitting: Intact; Without support  Standing: Impaired; With support  Standing - Static: Constant support;Good  Standing - Dynamic : Fair  Ambulation/Gait Training:  Distance (ft): 30 Feet (ft)  Assistive Device: Gait belt;Walker, rolling  Ambulation - Level of Assistance: Contact guard assistance;Minimal assistance     Gait Description (WDL): Exceptions to WDL  Gait Abnormalities: Decreased step clearance; Antalgic; Shuffling gait        Base of Support: Widened     Speed/Rosalee: Pace decreased (<100 feet/min); Shuffled  Step Length: Right shortened;Left shortened          Functional Measure:  Barthel Index:    Bathin  Bladder: 10  Bowels: 10  Groomin  Dressin  Feeding: 10  Mobility: 0  Stairs: 0  Toilet Use: 5  Transfer (Bed to Chair and Back): 10  Total: 55/100       The Barthel ADL Index: Guidelines  1. The index should be used as a record of what a patient does, not as a record of what a patient could do. 2. The main aim is to establish degree of independence from any help, physical or verbal, however minor and for whatever reason. 3. The need for supervision renders the patient not independent. 4. A patient's performance should be established using the best available evidence. Asking the patient, friends/relatives and nurses are the usual sources, but direct observation and common sense are also important. However direct testing is not needed. 5. Usually the patient's performance over the preceding 24-48 hours is important, but occasionally longer periods will be relevant. 6. Middle categories imply that the patient supplies over 50 per cent of the effort. 7. Use of aids to be independent is allowed. Stefanie Munoz., Barthel, D.W. (3110). Functional evaluation: the Barthel Index. [facility] (14)2. Gricel Morel najma KALEE Dotson, Mary Alice Ramirez., Ra Russell., [place], [address] [place] [address] (1999). Measuring the change indisability after inpatient rehabilitation; comparison of the responsiveness of the Barthel Index and Functional [place] Measure. Journal of Neurology, Neurosurgery, and Psychiatry, 66(4), 637-875. HOANG Bhagat, TERESA Crawford, & Reji Rocha MOSCAR. (2004.) Assessment of post-stroke quality of life in cost-effectiveness studies: The usefulness of the Barthel Index and the EuroQoL-5D.  Quality of Life Research, 15, 933-66            Physical Therapy Evaluation Charge Determination   History Examination Presentation Decision-Making   MEDIUM  Complexity : 1-2 comorbidities / personal factors will impact the outcome/ POC  MEDIUM Complexity : 3 Standardized tests and measures addressing body structure, function, activity limitation and / or participation in recreation  MEDIUM Complexity : Evolving with changing characteristics  Other outcome measures Barthel  MEDIUM      Based on the above components, the patient evaluation is determined to be of the following complexity level: MEDIUM    Pain Rating:  Reports 8/10 pain in back    Activity Tolerance:   Fair and SpO2 stable on RA  Please refer to the flowsheet for vital signs taken during this treatment. After treatment patient left in no apparent distress:   Supine in bed, Call bell within reach, and Caregiver / family present    COMMUNICATION/EDUCATION:   The patients plan of care was discussed with: Registered nurse and Case management. Fall prevention education was provided and the patient/caregiver indicated understanding., Patient/family have participated as able in goal setting and plan of care. , and Patient/family agree to work toward stated goals and plan of care.     Thank you for this referral.  Jurgen France, PT, DPT   Time Calculation: 32 mins

## 2020-10-29 NOTE — ANESTHESIA PREPROCEDURE EVALUATION
Anesthetic History     PONV and history of awareness of surgery under anesthesia          Review of Systems / Medical History  Patient summary reviewed, nursing notes reviewed and pertinent labs reviewed    Pulmonary        Sleep apnea: CPAP    Asthma        Neuro/Psych             Comments: Chronic pain (G89.29)  Cervical stenosis of spinal canal Cardiovascular    Hypertension          Hyperlipidemia    Exercise tolerance: >4 METS  Comments: Pulmonary HTN   GI/Hepatic/Renal     GERD: well controlled           Endo/Other        Obesity and arthritis     Other Findings   Comments: CREST (calcinosis, Raynaud's phenomenon, esophageal dysfunction, sclerodactyly, telangiectasia) ([facility] (M34.1)           Physical Exam    Airway  Mallampati: II  TM Distance: 4 - 6 cm  Neck ROM: normal range of motion        Cardiovascular    Rhythm: regular  Rate: normal         Dental    Dentition: Edentulous     Pulmonary  Breath sounds clear to auscultation               Abdominal  GI exam deferred       Other Findings            Anesthetic Plan    ASA: 3  Anesthesia type: general    Monitoring Plan: BIS      Induction: Intravenous  Anesthetic plan and risks discussed with: Patient

## 2020-10-29 NOTE — PROGRESS NOTES
Spiritual Care Assessment/Progress Note  [facility]      NAME: Dale Cogan      MRN: [id number]  AGE: 79 y.o.  SEX: female  Christianity Affiliation: Rg Williamson   Language: English     10/29/2020     Total Time (in minutes): 35     Spiritual Assessment begun in [facility] through conversation with:         [x]Patient        [] Family    [] Friend(s)        Reason for Consult: Advance medical directive consult     Spiritual beliefs: (Please include comment if needed)     [x] Identifies with a zoraida tradition:         [] Supported by a zoraida community:            [] Claims no spiritual orientation:           [] Seeking spiritual identity:                [] Adheres to an individual form of spirituality:           [] Not able to assess:                           Identified resources for coping:      [] Prayer                               [] Music                  [] Guided Imagery     [x] Family/friends                 [] Pet visits     [] Devotional reading                         [] Unknown     [] Other:                                               Interventions offered during this visit: (See comments for more details)    Patient Interventions: Advance medical directive consult, Affirmation of zoraida, Catharsis/review of pertinent events in supportive environment, Initial/Spiritual assessment, patient floor, Life review/legacy, Prayer (actual)           Plan of Care:     [] Support spiritual and/or cultural needs    [x] Support AMD and/or advance care planning process      [] Support grieving process   [] Coordinate Rites and/or Rituals    [] Coordination with community clergy   [] No spiritual needs identified at this time   [] Detailed Plan of Care below (See Comments)  [] Make referral to Music Therapy  [] Make referral to Pet Therapy     [] Make referral to Addiction services  [] Make referral to [facility]  [] Make referral to Spiritual Care Partner  [] No future visits requested        [x] Follow up visits as needed     Comments: Provided support to this pt in [facility]. Offered assistance to pt as a result of AMD consult. Pt wasn't interested in completing AMD at this time. Provided pt with copy of AMD.   CH engaged pt in life review and offered listening presence. Family is a source of strength for pt. Pt credits God for bring her this far in her journey. Advised pt of chaplains' availability to offer assistance with completing AMD when ready. [address] assured pt of prayer and concluded by affirming ongoing availability of support. Rizwan Mike MDiv.  Staff   Request  Support/Spiritual Care Services via [address] [place] [address]

## 2020-10-29 NOTE — PERIOP NOTES
Covid-19 test negative on 10/25/20. Patient adhered to quarantine guidelines.  Denies S/S    Valuables to PACU, 1 bag

## 2020-10-29 NOTE — PROGRESS NOTES
TRANSFER - IN REPORT:    Verbal report received from Stefani(name) on Katya Buddle  being received from [employer]) for routine post - op      Report consisted of patients Situation, Background, Assessment and   Recommendations(SBAR). Information from the following report(s) SBAR, Kardex, OR Summary, Intake/Output, MAR and Recent Results was reviewed with the receiving nurse. Opportunity for questions and clarification was provided. Assessment completed upon patients arrival to unit and care assumed.

## 2020-10-29 NOTE — BRIEF OP NOTE
Brief Postoperative Note    Patient: Iggy Correia  YOB: 1950  MRN: [id number]    Date of Procedure: 10/29/2020     Pre-Op Diagnosis: STENOSIS/DEGENERATIVE DISC DISEASE    Post-Op Diagnosis: Same as preoperative diagnosis. Procedure(s):  L5-S1 LUMBAR LAMINECTOMY WITH INSTRUMENTATION    Surgeon(s):  Candy Sanchez MD    Surgical Assistant: Physician Assistant: DIANA Hayes    Anesthesia: General     Estimated Blood Loss (mL): less than 50     Complications: None    Specimens: * No specimens in log *     Implants:   Implant Name Type Inv. Item Serial No.  Lot No. LRB No. Used Action   GRAFT BNE SUB L CANC FRZN MORSELIZED W/ VIABLE CELL MARTÍNEZ - [id number]  GRAFT BNE SUB L CANC FRZN MORSELIZED W/ VIABLE CELL MARTÍNEZ [id number] [employer]_WD N/A N/A 1 Implanted   GRAFT BONE SUB SM [id number] MAGNIFUSE IB - [id number]  GRAFT BONE SUB SM [id number] MAGNIFUSE IB [id number] [employer] SPINALGRAFT TECH_WD N/A N/A 1 Implanted   GRAFT BONE SUB SM [id number] MAGNIFUSE IB - [id number]  GRAFT BONE SUB SM [id number] MAGNIFUSE IB [id number] [employer] SPINALGRAFT TECH_WD N/A N/A 1 Implanted   SPACER SPNL [id number] LS PEEK INTBDY FUS W/ TANT MRK - SN/A  SPACER SPNL [id number] LS PEEK INTBDY FUS W/ TANT MRK N/A [employer] SOFAMOR DANEK_WD [id number] N/A 1 Implanted   SPACER SPNL [id number] LS PEEK INTBDY FUS W/ TANT MRK - SN/A  SPACER SPNL [id number] LS PEEK INTBDY FUS W/ TANT MRK N/A [employer] SOFAMOR DANEK_WD [id number] N/A 1 Implanted   GRAFT BNE SUB 15GM GRN CA COMPND PTTY AND SILICATE BASE INJ - SN/A  GRAFT BNE SUB 15GM GRN CA COMPND PTTY AND SILICATE BASE INJ N/A [employer]_WD [id number] N/A 1 Implanted   SET SCR SPNL L6MM DIA5. 5MM TI BRK OFF DILMA W/ DETACH [address] - SN/A  SET SCR SPNL L6MM DIA5. 5MM TI BRK OFF DILMA W/ DETACH [address] N/A [employer] SHELBYOR ALHAJI_WD N/A N/A 4 Implanted   SCREW SPNL L45MM DIA6. 5MM POST THORACOLUMBOSACRAL CO CHROM - SN/A  SCREW SPNL L45MM DIA6. 5MM POST THORACOLUMBOSACRAL CO CHROM N/A [employer] SOFAMOR DANEK_WD N/A N/A 2 Implanted   SCREW SPNL L40MM DIA7. 5MM POST THORACOLUMBOSACRAL CO CHROM - SN/A  SCREW SPNL L40MM DIA7. 5MM POST THORACOLUMBOSACRAL CO CHROM N/A [employer] SOFAMOR DANEK_WD N/A N/A 2 Implanted   ALICE SPNL L40MM DIA5. 5MM ANT POST THORACOLUMBOSACRAL TI - SN/A  ALICE SPNL L40MM DIA5. 5MM ANT POST THORACOLUMBOSACRAL TI N/A [employer] SOFAMOR DANEK_WD N/A N/A 2 Implanted       Drains:   Hemovac Right; Lower Back (Active)   Site Assessment Clean, dry, & intact 10/29/20 0951   Dressing Status Clean, dry, & intact 10/29/20 0951   Drainage Description Serosanguinous 10/29/20 1293       Findings: Stenosis     Electronically Signed by DIANA Martini on 10/29/2020 at 10:06 AM

## 2020-10-29 NOTE — PERIOP NOTES
Irrisept Wound Debridement and Cleansing System  Ref: Janis Begun: [id number] LOT: [id number] Expiration Date: 07/31/2022    Surgifoam Absorbable Gelatin Sponge, U.S.P.  Size 8cm x 12.5cm x 1cm Reference Number: 1974 Lot Number: [id number] Expiration Date: 04/23/2024

## 2020-10-29 NOTE — ANESTHESIA POSTPROCEDURE EVALUATION
Post-Anesthesia Evaluation and Assessment    Patient: Amalia Pace MRN: [id number]  SSN: [id number]    YOB: 1950  Age: 79 y.o. Sex: female       Cardiovascular Function/Vital Signs  Visit Vitals  BP (!) 128/39   Pulse 66   Temp 37 °C (98.6 °F)   Resp 15   Ht 5' 6\" (1.676 m)   Wt 103.5 kg (228 lb 2.8 oz)   SpO2 100%   BMI 36.83 kg/m²       Patient is status post General anesthesia for Procedure(s):  L5-S1 LUMBAR LAMINECTOMY WITH INSTRUMENTATION. Nausea/Vomiting: None    Postoperative hydration reviewed and adequate. Pain:  Pain Scale 1: Visual (10/29/20 1110)  Pain Intensity 1: 0 (10/29/20 1110)   Managed    Neurological Status:   Neuro (WDL): Exceptions to WDL (10/29/20 1017)  Neuro  Neurologic State: Drowsy (10/29/20 1017)   At baseline    Mental Status and Level of Consciousness: Alert and oriented to person, place, and time    Pulmonary Status:   O2 Device: Nasal cannula (10/29/20 1040)   Adequate oxygenation and airway patent    Complications related to anesthesia: None    Post-anesthesia assessment completed. No concerns    Signed By: Enzo Brown MD     October 29, 2020              Procedure(s):  L5-S1 LUMBAR LAMINECTOMY WITH INSTRUMENTATION. general    <BSHSIANPOST>    INITIAL Post-op Vital signs:   Vitals Value Taken Time   /67 10/29/2020 11:30 AM   Temp 37 °C (98.6 °F) 10/29/2020 10:17 AM   Pulse 63 10/29/2020 11:36 AM   Resp 12 10/29/2020 11:36 AM   SpO2 99 % 10/29/2020 11:36 AM   Vitals shown include unvalidated device data.

## 2020-10-30 VITALS
SYSTOLIC BLOOD PRESSURE: 131 MMHG | DIASTOLIC BLOOD PRESSURE: 76 MMHG | WEIGHT: 228.18 LBS | RESPIRATION RATE: 16 BRPM | HEIGHT: 66 IN | OXYGEN SATURATION: 92 % | HEART RATE: 84 BPM | BODY MASS INDEX: 36.67 KG/M2 | TEMPERATURE: 98.5 F

## 2020-10-30 LAB
ANION GAP SERPL CALC-SCNC: 6 MMOL/L (ref 5–15)
BUN SERPL-MCNC: 12 MG/DL (ref 6–20)
BUN/CREAT SERPL: 13 (ref 12–20)
CALCIUM SERPL-MCNC: 8.3 MG/DL (ref 8.5–10.1)
CHLORIDE SERPL-SCNC: 110 MMOL/L (ref 97–108)
CO2 SERPL-SCNC: 27 MMOL/L (ref 21–32)
CREAT SERPL-MCNC: 0.93 MG/DL (ref 0.55–1.02)
GLUCOSE SERPL-MCNC: 101 MG/DL (ref 65–100)
HGB BLD-MCNC: 10.3 G/DL (ref 11.5–16)
POTASSIUM SERPL-SCNC: 3.8 MMOL/L (ref 3.5–5.1)
SODIUM SERPL-SCNC: 143 MMOL/L (ref 136–145)

## 2020-10-30 PROCEDURE — 90471 IMMUNIZATION ADMIN: CPT

## 2020-10-30 PROCEDURE — 74011250637 HC RX REV CODE- 250/637: Performed by: PHYSICIAN ASSISTANT

## 2020-10-30 PROCEDURE — 97165 OT EVAL LOW COMPLEX 30 MIN: CPT

## 2020-10-30 PROCEDURE — 36415 COLL VENOUS BLD VENIPUNCTURE: CPT

## 2020-10-30 PROCEDURE — 97116 GAIT TRAINING THERAPY: CPT

## 2020-10-30 PROCEDURE — 74011250637 HC RX REV CODE- 250/637: Performed by: ORTHOPAEDIC SURGERY

## 2020-10-30 PROCEDURE — 74011000250 HC RX REV CODE- 250: Performed by: PHYSICIAN ASSISTANT

## 2020-10-30 PROCEDURE — 80048 BASIC METABOLIC PNL TOTAL CA: CPT

## 2020-10-30 PROCEDURE — 90686 IIV4 VACC NO PRSV 0.5 ML IM: CPT | Performed by: ORTHOPAEDIC SURGERY

## 2020-10-30 PROCEDURE — 97530 THERAPEUTIC ACTIVITIES: CPT

## 2020-10-30 PROCEDURE — 85018 HEMOGLOBIN: CPT

## 2020-10-30 PROCEDURE — 74011250636 HC RX REV CODE- 250/636: Performed by: PHYSICIAN ASSISTANT

## 2020-10-30 PROCEDURE — 2709999900 HC NON-CHARGEABLE SUPPLY

## 2020-10-30 PROCEDURE — 94760 N-INVAS EAR/PLS OXIMETRY 1: CPT

## 2020-10-30 PROCEDURE — 74011250636 HC RX REV CODE- 250/636: Performed by: ORTHOPAEDIC SURGERY

## 2020-10-30 PROCEDURE — 77010033678 HC OXYGEN DAILY

## 2020-10-30 RX ORDER — HYDROMORPHONE HYDROCHLORIDE 2 MG/1
2 TABLET ORAL
Qty: 50 TAB | Refills: 0 | Status: SHIPPED | OUTPATIENT
Start: 2020-10-30 | End: 2020-11-06

## 2020-10-30 RX ADMIN — ACETAMINOPHEN 1000 MG: 500 TABLET ORAL at 00:18

## 2020-10-30 RX ADMIN — THERA TABS 1 TABLET: TAB at 08:35

## 2020-10-30 RX ADMIN — LOSARTAN POTASSIUM 50 MG: 50 TABLET ORAL at 08:35

## 2020-10-30 RX ADMIN — METOPROLOL TARTRATE 25 MG: 25 TABLET, FILM COATED ORAL at 08:35

## 2020-10-30 RX ADMIN — CEFAZOLIN SODIUM 2 G: 1 INJECTION, POWDER, FOR SOLUTION INTRAMUSCULAR; INTRAVENOUS at 00:18

## 2020-10-30 RX ADMIN — ALLOPURINOL 200 MG: 100 TABLET ORAL at 08:35

## 2020-10-30 RX ADMIN — FAMOTIDINE 40 MG: 20 TABLET, FILM COATED ORAL at 08:35

## 2020-10-30 RX ADMIN — Medication 10 ML: at 05:17

## 2020-10-30 RX ADMIN — ACETAMINOPHEN 1000 MG: 500 TABLET ORAL at 05:17

## 2020-10-30 RX ADMIN — DOCUSATE SODIUM 50MG AND SENNOSIDES 8.6MG 1 TABLET: 8.6; 5 TABLET, FILM COATED ORAL at 08:35

## 2020-10-30 RX ADMIN — INFLUENZA VIRUS VACCINE 0.5 ML: 15; 15; 15; 15 SUSPENSION INTRAMUSCULAR at 11:38

## 2020-10-30 RX ADMIN — CETIRIZINE HYDROCHLORIDE 10 MG: 10 TABLET, FILM COATED ORAL at 08:35

## 2020-10-30 RX ADMIN — Medication 1 AMPULE: at 08:36

## 2020-10-30 RX ADMIN — PANTOPRAZOLE SODIUM 40 MG: 40 TABLET, DELAYED RELEASE ORAL at 08:35

## 2020-10-30 NOTE — PROGRESS NOTES
Bedside and Verbal shift change report given to [address] [place] [address] (oncoming nurse) by Yodit Lopez RN (offgoing nurse). Report included the following information SBAR, Kardex, Intake/Output, MAR and Recent Results.

## 2020-10-30 NOTE — DISCHARGE SUMMARY
Spine Discharge Summary    Patient ID:  Tr Willams  [id number]  female  79 y.o.  1950    Admit date: 10/29/2020    Discharge date: 10/30/2020    Admitting Physician: Stephanie Arriola MD     Consulting Physician(s):   Treatment Team: Attending Provider: Nadiya Martin MD; Utilization Review: Jaya Iniguez RN    Date of Surgery:   10/29/2020     Preoperative Diagnosis:  STENOSIS/DEGENERATIVE DISC DISEASE    Postoperative Diagnosis:   STENOSIS/DEGENERATIVE DISC DISEASE    Procedure(s):  L5-S1 LUMBAR LAMINECTOMY WITH INSTRUMENTATION     Anesthesia Type:   General     Surgeon: Nadiya Martin MD                            HPI:  Pt is a 79 y.o. female who has a history of STENOSIS/DEGENERATIVE DISC DISEASE  with pain and limitations of activities of daily living who presents at this time for a L5-S1 following the failure of conservative management.     PMH:   Past Medical History:   Diagnosis Date    Adverse effect of anesthesia     woke up during hysterectomy    Arrhythmia     h/o palpitations normal work up 22/2015 heart: Ronni    Arthritis     Asthma     allergy to weather changing    Cataract     Chronic pain     bulging disc c4/c5 l4/l5 : as of 9/1018 no neck/head movement limitation says patient    Claustrophobia     Color blind     CREST (calcinosis, Raynaud's phenomenon, esophageal dysfunction, sclerodactyly, telangiectasia) ([address] 2018    [facility], Dr. Evelyn Clements GERD (gastroesophageal reflux disease)     Gout     H/O arthroscopic knee surgery     H/O: hysterectomy     Hypertension     Ill-defined condition     gout    Ill-defined condition     l4-5 hernia disc    Leg swelling 2016    unknown etiology    Bartlett's neuralgia 2001    from neuroma middle toe, left foot    Musculoskeletal disorder     arthritis    Nausea & vomiting     surgery related    Other ill-defined conditions(799.89) 11/2013    RECTAL PROLASE    Pulmonary hypertension ([address] 08/2018    Heart:  Ronni    S/P appendectomy     Shortness of breath 2016    Pulmonary Dr. Alec Small    Sleep apnea     Unspecified adverse effect of anesthesia     wakes up for anesthesia early       Body mass index is 36.83 kg/m². : A BMI > 30 is classified as obesity and > 40 is classified as morbid obesity. Medications upon admission :   Prior to Admission Medications   Prescriptions Last Dose Informant Patient Reported? Taking? OTHER,NON-FORMULARY, 10/28/2020 at Unknown time  Yes Yes   Sig: Take 2 Tabs by mouth daily. FIBER SUPPLEMENT    acetaminophen (TYLENOL EXTRA STRENGTH) 500 mg tablet 10/28/2020 at Unknown time  Yes Yes   Sig: Take 1,000 mg by mouth every six (6) hours as needed for Pain. allopurinol (ZYLOPRIM) 100 mg tablet 10/29/2020 at 0415  Yes Yes   Sig: Take 200 mg by mouth daily. aluminum hydrox-magnesium carb (GAVISCON)  mg/15 mL suspension   Yes No   Sig: Take 15 mL by mouth every six (6) hours as needed for Indigestion. aspirin delayed-release 81 mg tablet 10/20/2020  Yes No   Sig: Take 81 mg by mouth nightly. baclofen (LIORESAL) 10 mg tablet 10/28/2020 at Unknown time  Yes Yes   Sig: Take 10 mg by mouth nightly. benzonatate (TESSALON) 100 mg capsule 9/29/2020 at Unknown time  Yes Yes   Sig: Take 100 mg by mouth three (3) times daily as needed for Cough. carboxymethylcellulose sodium (THERATEARS OP) 10/28/2020 at Unknown time  Yes Yes   Sig: Apply  to eye as needed. cetirizine (ZYRTEC) 10 mg tablet 10/29/2020 at 0415  Yes Yes   Sig: Take 10 mg by mouth daily. famotidine (PEPCID) 20 mg tablet 10/28/2020 at Unknown time  Yes Yes   Sig: Take 40 mg by mouth Daily (before breakfast). furosemide (LASIX) 40 mg tablet   Yes No   Sig: Take 20 mg by mouth daily. isosorbide mononitrate ER (IMDUR) 30 mg tablet 10/29/2020 at Unknown time  No Yes   Sig: TAKE ONE-HALF (1/2) TABLET DAILY FOR RAYNAUDS PHENOMENON   Patient taking differently: Take 15 mg by mouth nightly.  TAKE ONE-HALF (1/2) TABLET DAILY FOR RAYNAUDS PHENOMENON   metoprolol tartrate (LOPRESSOR) 25 mg tablet 10/29/2020 at 0415  No Yes   Sig: TAKE ONE TABLET BY MOUTH TWICE DAILY   montelukast (SINGULAIR) 10 mg tablet 10/28/2020 at Unknown time  Yes Yes   Sig: Take 10 mg by mouth nightly. multivitamin (ONE A DAY) tablet 10/28/2020 at Unknown time  Yes Yes   Sig: Take 1 Tab by mouth daily. naloxone (NARCAN) 4 mg/actuation nasal spray   No No   Sig: Use 1 spray intranasally, then discard. Repeat with new spray every 2 min as needed for opioid overdose symptoms, alternating nostrils. omeprazole (PRILOSEC) 20 mg capsule 10/28/2020 at Unknown time  Yes Yes   Sig: Take 40 mg by mouth daily. pravastatin (PRAVACHOL) 40 mg tablet 10/28/2020 at Unknown time  Yes Yes   Sig: Take 40 mg by mouth nightly. telmisartan (MICARDIS) 40 mg tablet 10/28/2020 at Unknown time  Yes Yes   Sig: Take 40 mg by mouth daily. Facility-Administered Medications: None        Allergies: Allergies   Allergen Reactions    Ciprofloxacin Shortness of Breath    Flagyl [Metronidazole] Shortness of Breath    Percocet [Oxycodone-Acetaminophen] Rash and Itching     irritated    Benzene Other (comments)     Blisters and burn area Benzene found to be on steri-strips    Betadine [Povidone-Iodine] Other (comments)     Blisters and burning    Naproxen Itching    Sulfa (Sulfonamide Antibiotics) Rash    Adhesive Rash     Redness and rash        Hospital Course: The patient underwent surgery. Complications:  None; patient tolerated the procedure well. Was taken to the PACU in stable condition and then transferred to the ortho floor.       Perioperative Antibiotics:  Ancef     Postoperative Pain Management:  Dilaudid    Postoperative transfusions:    Number of units banked PRBCs =   none     Post Op complications: none    Hemoglobin at discharge:    Lab Results   Component Value Date/Time    HGB 10.3 (L) 10/30/2020 03:19 AM    INR 1.0 10/21/2020 09:19 AM Dressing was changed on POD # 1. Incision - clean, dry and intact. No significant erythema or swelling. Neurovascular exam found to be within normal limits. Wound appears to be healing without any evidence of infection. Pt had a HVAC drain that was removed on POD# 1. Physical Therapy started following surgery and participated in bed mobility, transfers and ambulation. Gait:  Gait  Base of Support: Widened  Speed/Rosalee: Pace decreased (<100 feet/min)  Step Length: Right shortened, Left shortened  Gait Abnormalities: Decreased step clearance  Ambulation - Level of Assistance: Contact guard assistance, Stand-by assistance  Distance (ft): 400 Feet (ft)  Assistive Device: Gait belt, Walker, rolling  Rail Use: Left (and SPC)  Stairs - Level of Assistance: Contact guard assistance  Number of Stairs Trained: 4                   Discharged to: Home. Condition on Discharge:   stable    Discharge instructions:    - Take pain medications as prescribed  - Resume pre hospital diet      - Discharge activity: activity as tolerated  - Ambulate as tolerated  - Wound Care Keep wound clean and dry. See discharge instruction sheet. -DISCHARGE MEDICATION LIST     Current Discharge Medication List      START taking these medications    Details   !! HYDROmorphone (DILAUDID) 2 mg tablet Take 1 Tab by mouth every four (4) hours as needed for Pain for up to 7 days. Max Daily Amount: 12 mg.  Qty: 50 Tab, Refills: 0    Associated Diagnoses: Spinal stenosis of lumbar region without neurogenic claudication      !! HYDROmorphone (DILAUDID) 2 mg tablet Take 1-2 Tabs by mouth every four (4) hours as needed for Pain for up to 14 days. Max Daily Amount: 24 mg. Indications: excessive pain  Qty: 50 Tab, Refills: 0    Comments: S/p lumbar fusion surgery  Associated Diagnoses: Spinal stenosis of lumbar region, unspecified whether neurogenic claudication present       !! - Potential duplicate medications found.  Please discuss with provider. CONTINUE these medications which have CHANGED    Details   naloxone (Narcan) 4 mg/actuation nasal spray Use 1 spray intranasally, then discard. Repeat with new spray every 2 min as needed for opioid overdose symptoms, alternating nostrils. Indications: decrease in rate & depth of breathing due to opioid drug, opioid overdose  Qty: 2 Each, Refills: 0         CONTINUE these medications which have NOT CHANGED    Details   famotidine (PEPCID) 20 mg tablet Take 40 mg by mouth Daily (before breakfast). pravastatin (PRAVACHOL) 40 mg tablet Take 40 mg by mouth nightly. carboxymethylcellulose sodium (THERATEARS OP) Apply  to eye as needed. telmisartan (MICARDIS) 40 mg tablet Take 40 mg by mouth daily. metoprolol tartrate (LOPRESSOR) 25 mg tablet TAKE ONE TABLET BY MOUTH TWICE DAILY  Qty: 180 Tab, Refills: 2    Associated Diagnoses: Myocardial ischemia; SOB (shortness of breath); Mixed hyperlipidemia; Angina at rest [facility])      isosorbide mononitrate ER (IMDUR) 30 mg tablet TAKE ONE-HALF (1/2) TABLET DAILY FOR RAYNAUDS PHENOMENON  Qty: 90 Tab, Refills: 2      omeprazole (PRILOSEC) 20 mg capsule Take 40 mg by mouth daily. benzonatate (TESSALON) 100 mg capsule Take 100 mg by mouth three (3) times daily as needed for Cough. baclofen (LIORESAL) 10 mg tablet Take 10 mg by mouth nightly. acetaminophen (TYLENOL EXTRA STRENGTH) 500 mg tablet Take 1,000 mg by mouth every six (6) hours as needed for Pain. OTHER,NON-FORMULARY, Take 2 Tabs by mouth daily. FIBER SUPPLEMENT       allopurinol (ZYLOPRIM) 100 mg tablet Take 200 mg by mouth daily. cetirizine (ZYRTEC) 10 mg tablet Take 10 mg by mouth daily. montelukast (SINGULAIR) 10 mg tablet Take 10 mg by mouth nightly. aspirin delayed-release 81 mg tablet Take 81 mg by mouth nightly.          STOP taking these medications       furosemide (LASIX) 40 mg tablet Comments:   Reason for Stopping:         aluminum hydrox-magnesium carb (GAVISCON)  mg/15 mL suspension Comments:   Reason for Stopping:            per medical continuation form      -Follow up in office in 2 weeks      Signed:  Megan Estrada.  Ana Russell, ACNP-BC, ONP-C  Orthopaedic Nurse Practitioner    10/30/2020  12:06 PM

## 2020-10-30 NOTE — DISCHARGE INSTRUCTIONS
After Hospital Care Plan:  Discharge Instructions Lumbar Fusion Surgery   Dr. Kevin Cedillo   Patient Name: Lotus Jones    Date of procedure: 10/29/2020  Date of discharge:     Procedure: Procedure(s):  L5-S1 LUMBAR LAMINECTOMY WITH INSTRUMENTATION  PCP: Janell Munoz MD    Follow up appointments  -follow up with Dr. Dr. Kevin Cedillo in 2 weeks. Call [phone] to make an appointment as soon as you get home from the hospital.    [address] [place], [place] [address]y: ____________________   phone: _______________________  The agency will contact you to arrange dates/times for visits. Please call them if you do not hear from them within 24 hours after you are discharged  Physical therapy 3 times a week for 3 weeks  Nursing-initial assessment and as needed    When to call your Orthopaedic Surgeon:  -Signs of infection-if your incision is red; continues to have drainage; drainage has a foul odor or if you have a persistent fever over 101 degrees for 24 hours  -Nausea or vomiting, severe headache  -Loss of bowel or bladder function, inability to urinate  -Changes in sensation in your arms or legs (numbness, tingling, loss of color)  -Increased weakness-greater than before your surgery  -Severe pain or pain not relieved by medications  -Signs of a blood clot in your leg-calf pain, tenderness, redness, swelling of lower leg    When to call your Primary Care Physician:  -Concerns about medical conditions such as diabetes, high blood pressure, asthma, congestive heart failure  -Call if blood sugars are elevated, persistent headache or dizziness, coughing or congestion, constipation or diarrhea, burning with urination, abnormal heart rate    When to call [phone] and go to the nearest emergency room:  -Acute onset of chest pain, shortness of breath, difficulty breathing    Activity  -You are going home a well person, be as active as possible. Your only exercise should be walking.   Start with short frequent walks and increase your walking distance each day.  -Limit the amount of time you sit to 20-30 minute intervals. Sitting for prolonged periods of time will be uncomfortable for you following surgery.  -Do NOT lift anything over 5 pounds  -Do NOT do any straining, twisting or bending  -When you are in bed, you may lay on your back or on either side. Do NOT lie on your stomach    Brace  -If you have a back brace, you should wear your brace at all times when you are out of bed. Do not wear the brace while in bed or showering.  -Remember to always wear a cotton t-shirt underneath your brace.  -Do not bend or twist when your brace is off    Diet  -Resume usual diet; drink plenty of fluids; eat foods high in fiber  -It is important to have regular bowel movements. Pain medications may cause constipation. You may want to take a stool softener (such as Senokot-S or Colace) to prevent constipation.   -If constipation occurs, take a laxative (such as Dulcolax tablets, Milk of Magnesia, or a suppository). Laxatives should only be used if the above preventable measures have failed and you still have not had a bowel movement after three days    Driving  -You may not drive or return to work until instructed by your physician. However, you may ride in the car for short periods of time. Incision Care  Your incision has been closed with absorbable sutures and the Zipline skin closure system. This will assist with healing. The David Skipper is to remain on your incision for 2 weeks. A dry dressing (ABD and tape) will be placed over it and should be changed daily, for at least the first several days after your surgery. If you have no incisional drainage, you may leave the incision open to air if you wish, still leaving the Zipline in place. Please make sure to wash your hands prior to touching your dressing. You may take brief showers but do not run the water directly onto the wound.  After your shower, blot your incision dry with a soft towel and replace the dry dressing. Do not allow the tape to come in contact with the Zipline. Do not rub or apply any lotions or ointments to your incision site. Do not soak or scrub your wound. The Zipline dressing will be removed during your two week follow-up appointment. If you experience drainage leaking from underneath the Zipline or if it peels off before 2 weeks, please contact your orthopedic surgeons office. Showering  -You may shower in approximately 4 days after your surgery.    -Leave the dressing on during your shower. Do NOT allow the water to run directly onto your dressing. Once you get out of the shower, gently pat the dressing dry. -Reminder- your brace can be removed while showering. Remember to not bend or twist while your brace is off.    -Do not take a tub bath. Preventing blood clots  -You have been given T.E.D. stockings to wear. Continue to wear these for 7 days after your discharge. Put them on in the morning and take them off at night.    -They are used to increase your circulation and prevent blood clots from forming in your legs  -T. E.D. stockings can be machine washed, temperature not to exceed 160° F (71°C) and machine dried for 15 to 20 minutes, temperature not to exceed 250° F (121°C). Pain management  -Take pain medication as prescribed; decrease the amount you use as your pain lessens  -DO not wait until you are in extreme pain to take your medication.  -Avoid alcoholic beverages while taking pain medication    Pain Medication Safety  DO:  -Read the Medication Guide   -Take your medicine exactly as prescribed   -Store your medicine away from children and in a safe place   -Flush unused medicine down the toilet   -Call your healthcare provider for medical advice about side effects. You may report side effects to FDA at 5-737-FDA-3800.   -Please be aware that many medications contain Tylenol.   We do not want you to over medicate so please read the information below as a guide. Do not take more than 4 Grams of Tylenol in a 24 hour period. (There are 1000 milligrams in one Gram)                                                                                                                                                                                                                                                Percocet contains 325 mg of Tylenol per tablet (do not take more than 12 tablets in 24 hours)  Lortab contains 500 mg of Tylenol per tablet (do not take more than 8 tablets in 24 hours)  Norco contains 325 mg of Tylenol per tablet (do not take more than 12 tablets in 24 hours). DO NOT:  -Do not give your medicine to others   -Do not take medicine unless it was prescribed for you   -Do not stop taking your medicine without talking to your healthcare provider   -Do not break, chew, crush, dissolve, or inject your medicine. If you cannot swallow your medicine whole, talk to your healthcare provider.  -Do not drink alcohol while taking this medicine  -Do not take anti-inflammatory medications or aspirin unless instructed by your     physician.

## 2020-10-30 NOTE — PROGRESS NOTES
Orthopedic Spine Progress Note  Post Op day: 1 Day Post-Op    2020 10:12 AM   Admit Date: 10/29/2020  Procedure: Procedure(s):  L5-S1 LUMBAR LAMINECTOMY WITH INSTRUMENTATION    Subjective:     Padmini Puentes has no complaints. Pain well controlled. Tolerating diet. No N/V. Pain Control:   Pain Assessment  Pain Scale 1: Numeric (0 - 10)  Pain Intensity 1: 5  Pain Onset 1: post op  Pain Location 1: Back  Pain Orientation 1: Lower  Pain Description 1: Constant  Pain Intervention(s) 1: Declines    Objective:          Physical Exam:  General:  Alert and oriented. No acute distress. Heart:  Respirations unlabored. Abdomen:   Extremities: Soft, non-tender. No evidence of cyanosis. Pulses palpable in both upper and lower extremities. Neurologic:  Musculoskeletal:  No new motor deficits. Neurovascular exam within normal limits. Sensation stable. Motor: unchanged C5-T1 and L2-S1. Ricardo's sign negative in bilateral lower extremities. Calves soft, nontender upon palpation and with passive twitch. Moves both upper and lower extremities. Incision: clean, dry, and intact. No significant erythema or swelling. No active drainage noted. Vital Signs:    Blood pressure 131/76, pulse 84, temperature 98.5 °F (36.9 °C), resp. rate 16, height 5' 6\" (1.676 m), weight 103.5 kg (228 lb 2.8 oz), SpO2 92 %. Temp (24hrs), Av.2 °F (36.8 °C), Min:97.7 °F (36.5 °C), Max:99 °F (37.2 °C)      LAB:    Recent Labs     10/30/20  0319   HGB 10.3*     Lab Results   Component Value Date/Time    Sodium 143 10/30/2020 03:19 AM    Potassium 3.8 10/30/2020 03:19 AM    Chloride 110 (H) 10/30/2020 03:19 AM    CO2 27 10/30/2020 03:19 AM    Glucose 101 (H) 10/30/2020 03:19 AM    BUN 12 10/30/2020 03:19 AM    Creatinine 0.93 10/30/2020 03:19 AM    Calcium 8.3 (L) 10/30/2020 03:19 AM       Intake/Output:No intake/output data recorded.   10/28 1901 - 10/30 0700  In: 650 [I.V.:650]  Out: 575 [Urine:200; Drains:375]    PT/OT:   Gait:  Gait  Base of Support: Widened  Speed/Rosalee: Pace decreased (<100 feet/min), Shuffled  Step Length: Right shortened, Left shortened  Gait Abnormalities: Decreased step clearance, Antalgic, Shuffling gait  Ambulation - Level of Assistance: Contact guard assistance, Minimal assistance  Distance (ft): 30 Feet (ft)  Assistive Device: Gait belt, Walker, rolling                 Assessment:   Patient is 1 Day Post-Op s/p Procedure(s):  L5-S1 LUMBAR LAMINECTOMY WITH INSTRUMENTATION    Plan:     1. Continue PT/OT  2. Continue established methods of pain control  3. VTE Prophylaxes - TEDS &/or SCDs   4. D/C HV  5.  Possible home today        Signed By: Ellen Chacon MD

## 2020-10-30 NOTE — PROGRESS NOTES
Problem: Mobility Impaired (Adult and Pediatric)  Goal: *Acute Goals and Plan of Care (Insert Text)  Description: FUNCTIONAL STATUS PRIOR TO ADMISSION: Patient was independent and active without use of DME. She reports she would hold onto her 's arm for longer distances. HOME SUPPORT PRIOR TO ADMISSION: The patient lived with her  and son. Physical Therapy Goals  Initiated 10/29/2020    1. Patient will move from supine to sit and sit to supine , scoot up and down, and roll side to side in bed with independence within 4 days. 2. Patient will perform sit to stand with independence within 4 days. 3. Patient will ambulate with independence for 150 feet with the least restrictive device within 4 days. 4. Patient will ascend/descend 7 stairs with left sided handrail(s) with modified independence within 4 days. 5. Patient will verbalize and demonstrate understanding of spinal precautions (No bending, lifting greater than 5 lbs, or twisting; log-roll technique; frequent repositioning as instructed) within 4 days. Outcome: Resolved/Met   PHYSICAL THERAPY TREATMENT  Patient: Cari Regan (39 y.o. female)  Date: 10/30/2020  Diagnosis: Spinal stenosis, lumbar [M48.061]  Spinal stenosis, lumbar [M48.061]   <principal problem not specified>  Procedure(s) (LRB):  L5-S1 LUMBAR LAMINECTOMY WITH INSTRUMENTATION (N/A) 1 Day Post-Op  Precautions: Back No bending, no lifting greater than 5 lbs, no twisting, log-roll technique, repositioning every 20-30 min except when sleeping, brace when OOB (if ordered)  Chart, physical therapy assessment, plan of care and goals were reviewed. ASSESSMENT  Patient continues with skilled PT services and is progressing towards goals. Pt presents with decreased strength and increased pain with mobility. Pt performed sit to stand at SBA/Supervision. Pt ambulated 400ft with RW at CGA/SBA. Pt only requiring cueing to relax shoulders to decrease tension.  Pt able to improve with time. Pt ascended/descended 4 steps with left raling and SPC on right. Pt performed a car transfers at [facility] with no difficulty. Pt moving well with no safety concerns. From physical therapy stand point pt is cleared for discharge. Current Level of Function Impacting Discharge (mobility/balance): mobility at CGA/Supervision     Other factors to consider for discharge: has support family         PLAN :  Patient continues to benefit from skilled intervention to address the above impairments. Continue treatment per established plan of care. to address goals. Recommendation for discharge: (in order for the patient to meet his/her long term goals)  Physical therapy at least 2 days/week in the home     This discharge recommendation:  Has been made in collaboration with the attending provider and/or case management    IF patient discharges home will need the following DME: patient owns DME required for discharge       SUBJECTIVE:   Patient stated  I have been through all of this before so I kind of  know what to expect.     OBJECTIVE DATA SUMMARY:   Critical Behavior:  Neurologic State: Alert  Orientation Level: Oriented X4  Cognition: Appropriate decision making, Appropriate for age attention/concentration, Appropriate safety awareness, Follows commands       Spinal diagnosis intervention:  The patient stated 3/3 back precautions when prompted. Reviewed all 3 back precautions, log roll technique, and sitting for 30 minutes at a time. Functional Mobility Training:    Bed Mobility:  Log                   Transfers:  Sit to Stand: Stand-by assistance;Supervision  Stand to Sit: Supervision        Bed to Chair: Supervision                    Balance:  Sitting: Intact  Standing: Intact; With support  Standing - Static: Good;Constant support  Standing - Dynamic : Good;Constant support  Ambulation/Gait Training:  Distance (ft): 400 Feet (ft)  Assistive Device: Gait belt;Walker, rolling  Ambulation - Level of Assistance: Contact guard assistance;Stand-by assistance        Gait Abnormalities: Decreased step clearance        Base of Support: Widened     Speed/Rosalee: Pace decreased (<100 feet/min)  Step Length: Right shortened;Left shortened                    Stairs:  Number of Stairs Trained: 4  Stairs - Level of Assistance: Contact guard assistance   Rail Use: Left (and SPC)  Pain Rating:  Pt reported minimal pain with activity 4/10. Activity Tolerance:   Good  Please refer to the flowsheet for vital signs taken during this treatment. After treatment patient left in no apparent distress:   Sitting in chair and Call bell within reach    COMMUNICATION/COLLABORATION:   The patients plan of care was discussed with: Registered nurse.      Celia Ruano PTA   Time Calculation: 24 mins

## 2020-10-30 NOTE — ROUTINE PROCESS
Discharge AVS reviewed with pt. All medications reviewed individually with patient. Opportunities for questions and concerns provided.

## 2020-10-30 NOTE — PROGRESS NOTES
Orthopedic End of Shift Note    Bedside and Verbal shift change report given to Tess Gonzales (oncoming nurse) by Michelle Freitas (offgoing nurse). Report included the following information SBAR, Kardex, Intake/Output, MAR and Recent Results.      POD#  0    Significant issues during shift: pain    Issues for Physician to address: pain    Activity This Shift  (check all that apply) [] chair  [] dangle   [] bathroom  [] bedside commode [] hallway  [x] bedrest   Nausea/Vomiting [] yes [x] no     Voiding Status [x] void [] La [] I&O Cath   Bowel Movements [] yes [x] no     Foot Pumps or SCD [x] yes [] no    Ice Pack [x] yes    [] no    Incentive Spirometer [] yes [x] no Volume:      Telemetry Monitoring   [] yes [x] no Rhythm:   Supplemental O2 [x] yes [] no Sat off O2:   89%

## 2020-10-30 NOTE — PROGRESS NOTES
OCCUPATIONAL THERAPY EVALUATION/DISCHARGE  Patient: Tr Willams (5 y.o. female)  Date: 10/30/2020  Primary Diagnosis: Spinal stenosis, lumbar [M48.061]  Spinal stenosis, lumbar [M48.061]  Procedure(s) (LRB):  L5-S1 LUMBAR LAMINECTOMY WITH INSTRUMENTATION (N/A) 1 Day Post-Op   Precautions:  Back    ASSESSMENT  Based on the objective data described below, the patient presents with mildly decreased activity tolerance and anticipated post op pain. Pt was received seated in chair on arrival, dressed. Pt stated 3/3 back precautions at onset of session, stating she has already had to modify her daily routine using these precautions d/t pain prior to surgery. Pt is requiring up to [address] with LB ADLs d/t back precautions and inability to cross legs (prior B TKRs), yet states she can continue using her reacher and plans to purchase sock aide upon D/C. Pt demonstrated intact balance and safety with performing toilet transfer and item retrieval from top cabinet height in room using RW. She is safe to D/C home with family assist from OT perspective. Current Level of Function (ADLs/self-care): Independent to Min A with ADLs; Supervision with mobility using RW     Functional Outcome Measure: The patient scored 60/100 on the Barthel Index outcome measure which is indicative of requiring up to 40% assist with basic ADLs/related mobility. Other factors to consider for discharge:  and son can assist at D/C. PLAN :  Recommendation for discharge: (in order for the patient to meet his/her long term goals)  No skilled occupational therapy/ follow up rehabilitation needs identified at this time. This discharge recommendation:  Has been made in collaboration with the attending provider and/or case management    IF patient discharges home will need the following DME: Recommended Pt purchase sock aide to assist with LB ADLs. SUBJECTIVE:   Patient stated \"My  can help me with that. \"    OBJECTIVE DATA SUMMARY:   HISTORY:   Past Medical History:   Diagnosis Date    Adverse effect of anesthesia     woke up during hysterectomy    Arrhythmia     h/o palpitations normal work up 22/2015 heart: Ronni    Arthritis     Asthma     allergy to weather changing    Cataract     Chronic pain     bulging disc c4/c5 l4/l5 : as of 9/1018 no neck/head movement limitation says patient    Claustrophobia     Color blind     CREST (calcinosis, Raynaud's phenomenon, esophageal dysfunction, sclerodactyly, telangiectasia) ([address] 2018    [facility], Dr. Jacklyn Spear GERD (gastroesophageal reflux disease)     Gout     H/O arthroscopic knee surgery     H/O: hysterectomy     Hypertension     Ill-defined condition     gout    Ill-defined condition     l4-5 hernia disc    Leg swelling 2016    unknown etiology    Bartlett's neuralgia 2001    from neuroma middle toe, left foot    Musculoskeletal disorder     arthritis    Nausea & vomiting     surgery related    Other ill-defined conditions(799.89) 11/2013    RECTAL PROLASE    Pulmonary hypertension ([address] 08/2018    Heart: Dr. Ca Primus S/P appendectomy     Shortness of breath 2016    Pulmonary Dr. Kem Schlatter Sleep apnea     Unspecified adverse effect of anesthesia     wakes up for anesthesia early     Past Surgical History:   Procedure Laterality Date    CARDIAC SURG PROCEDURE UNLIST  2015    no blockages, card cath   [address]  05/2018    no blockages x 2 procedures    COLONOSCOPY  04/20/2011    negative    COLONOSCOPY N/A 9/28/2018    [facility] HX CATARACT REMOVAL Left 09/20/2018    HX CATARACT REMOVAL Right 10/20/2018    HX CHOLECYSTECTOMY      HX GI  11/19/13    Rectocele repair with enterocele repair    HX HEENT  09/21/2018    left cataract    HX HYSTERECTOMY      TOOK LEFT OVARY    HX KNEE ARTHROSCOPY  1990's    right knee partial meniscus     HX KNEE REPLACEMENT Right 2016    HX KNEE REPLACEMENT Left 2010, 2016    TKR    HX LUMBAR FUSION  2014    c4-5 fusion cervical fusion    HX ORTHOPAEDIC  1973    ganglion cyst removed rt wrist    HX ORTHOPAEDIC  2013, 1992    bilateral CTR, and had ulna nerve release    HX ORTHOPAEDIC Right 2018    thumb and ring finger trigger release    IR INJ SPINE THER SUBST LUM/SAC W IMG  12/19/2019       Prior Level of Function/Environment/Context: Lives with  and son who assist with IADLs. Independent with ADLs/mobility. Does light household cleaning. Drives. Admits to x1 fall in past year. Expanded or extensive additional review of patient history:     Home Situation  Home Environment: Private residence  # Steps to Enter: 2  Rails to [employer]: No  One/Two Story Residence: Split level  # of Interior Steps: 7  Interior Rails: Left  Living Alone: No  Support Systems: Spouse/Significant Other/Partner  Patient Expects to be Discharged to[de-identified] Private residence  Current DME Used/[facility] at [facility]: Shower chair, Cane, straight, Raised toilet seat, Walker, Adaptive dressing aides  Tub or Shower Type: Tub/Shower combination    Hand dominance: Right    EXAMINATION OF PERFORMANCE DEFICITS:  Cognitive/Behavioral Status:  Neurologic State: Alert  Orientation Level: Oriented X4  Cognition: Appropriate decision making; Appropriate for age attention/concentration; Appropriate safety awareness; Follows commands             Skin: Surgical dressing intact    Edema: None observed    Hearing: Auditory  Auditory Impairment: None    Vision/Perceptual:                           Acuity: Within Defined Limits    Corrective Lenses: Glasses    Range of Motion:  AROM: Generally decreased, functional                         Strength:  Strength: Generally decreased, functional                Coordination:  Coordination: Within functional limits  Fine Motor Skills-Upper: Left Intact; Right Intact    Gross Motor Skills-Upper: Left Intact; Right Intact    Tone & Sensation:  Tone: Normal  Sensation: Intact                      Balance:  Sitting: Intact  Standing: Intact; With support  Standing - Static: Good;Constant support  Standing - Dynamic : Good;Constant support    Functional Mobility and Transfers for ADLs:  Bed Mobility:       Transfers:  Sit to Stand: Supervision  Stand to Sit: Supervision  Bed to Chair: Supervision  Toilet Transfer : Supervision  Tub Transfer: Contact guard assistance;Minimum assistance  Assistive Device : Walker, rolling    ADL Assessment:  Feeding: Independent    Oral Facial Hygiene/Grooming: Supervision(standing)    Bathing: Minimum assistance    Upper Body Dressing: Independent    Lower Body Dressing: Minimum assistance(with distal LB aspects only)    Toileting: Supervision       ADL Intervention and task modifications:     Challenged Pt to complete teach-back of BLT precautions at onset of session. Reviewed benefits to use of sock aide to don socks given precautions and inability to cross legs. Guided Pt through safe toilet transfer and techniques for completing rear jack care. Reviewed safe techniques for item retrieval from cabinets with instruction to use reacher for ground level item retrieval. Provided visual demo of safe tub transfer techniques and instructed Pt to perform with clothes on prior to completing when shower is turned on to maximize safety. Lower Body Dressing Assistance  Socks: Total assistance (dependent)(needs sock aide, states  will assist)  Leg Crossed Method Used: No(unable 2/2 prior B TKRs)  Position Performed: Seated in chair  Cues: Physical assistance;Don;Doff;Verbal cues provided(provided demo on use of sock aide)    Patient instructed and demonstrated 3/3 spine precautions with verbal cues.      Patient instructed and indicated understanding the benefits of maintaining activity tolerance, functional mobility, and independence with self care tasks during acute stay  to ensure safe return home and to baseline. Encouraged patient to increase frequency and duration OOB, not sitting longer than 30 mins without marching/walking with staff, be out of bed for all meals, perform daily ADLs (as approved by RN/MD regarding bathing etc), and performing functional mobility to/from bathroom. Patient instruction and indicated understanding on body mechanics, ergonomics and gravitational force on the spine during different body positions to plan activities in prep for return home to complete basic ADLs, instrumental ADLs and back to work safely. Bathing: Patient instructed and indicated understanding when bathing to not submerge wound in water, stand to shower or sponge bathe, cover wound with plastic and tape to ensure no water reaches bandage/wound without cues. Dressing brace: Patient instructed and demonstrated to don/doff velcro on brace using dominant side, keeping non-dominant side intact. Patient instructed and demonstrated in meantime of being able to stand with back against wall to don/doff brace, to don/doff seated using lap and bed/chair surface to support brace while manipulating. Dressing lower body: Patient instructed to don brace first and on the benefits to remain seated to don all clothing to increase independence with precautions and pain management. Patient instructed and demonstrated tailor sitting for lower body dressing. Toileting: Patient instructed on the benefits of using flushable wet wipes and toilet tongs if decreased reach or pain for jack care. Also, the benefits of a reacher to aid in clothing management. Patient instruction and indicated understanding to not strain i.e. holding breath to bear down during a bowel movement, lifting/activity, and sexual activity.      Home safety: Patient instructed and indicated understanding on home modifications and safety [raise height of ADL objects (i.e. clothing, sink items, fridge items, items to mouth when grooming), appropriate height of chair surfaces, recliner safety, change of floor surfaces, clear pathways] to increase independence and fall prevention. Standing: Patient instructed and indicated understanding to walk up to sink/counter top/surfaces, step into walker, square off while using objects, slide objects along surfaces, to increase adherence to back precautions and fall prevention. Patient instructed to increase amount of time standing in order to increase independence and tolerance with ADLs. During prolonged standing, can open cabinet door or place foot on stool to decrease spinal pressure/increase pain. Tub transfer: Patient instructed and indicated understanding regarding when it is safe to begin transfer into tub (complete stairs with PT, advance exercises with PT high enough to clear tub height, and while clothes donned practice with another person present). Functional Measure:  Barthel Index:    Bathin  Bladder: 10  Bowels: 10  Groomin  Dressin  Feeding: 10  Mobility: 10  Stairs: 5  Toilet Use: 5  Transfer (Bed to Chair and Back): 10  Total: 70/100        The Barthel ADL Index: Guidelines  1. The index should be used as a record of what a patient does, not as a record of what a patient could do. 2. The main aim is to establish degree of independence from any help, physical or verbal, however minor and for whatever reason. 3. The need for supervision renders the patient not independent. 4. A patient's performance should be established using the best available evidence. Asking the patient, friends/relatives and nurses are the usual sources, but direct observation and common sense are also important. However direct testing is not needed. 5. Usually the patient's performance over the preceding 24-48 hours is important, but occasionally longer periods will be relevant. 6. Middle categories imply that the patient supplies over 50 per cent of the effort.   7. Use of aids to be independent is allowed. Fernando Bergeron., Barthel, D.W. (1760). Functional evaluation: the Barthel Index. [address] (14)2. ShuKALEE Murphy, Rufina Stanley., Irina James, Reynaldo, [address] (). Measuring the change indisability after inpatient rehabilitation; comparison of the responsiveness of the Barthel Index and Functional [place] Measure. Journal of Neurology, Neurosurgery, and Psychiatry, 66(4), 972-515. HOANG Espinoza, TERESA Crawford, & Madeleine Gerardo M.A. (2004.) Assessment of post-stroke quality of life in cost-effectiveness studies: The usefulness of the Barthel Index and the EuroQoL-5D. Quality of Life Research, 15, 218-26       Occupational Therapy Evaluation Charge Determination   History Examination Decision-Making   LOW Complexity : Brief history review  LOW Complexity : 1-3 performance deficits relating to physical, cognitive , or psychosocial skils that result in activity limitations and / or participation restrictions  LOW Complexity : No comorbidities that affect functional and no verbal or physical assistance needed to complete eval tasks       Based on the above components, the patient evaluation is determined to be of the following complexity level: LOW   Pain Ratin/10 with activity. Activity Tolerance:   Good  Please refer to the flowsheet for vital signs taken during this treatment. After treatment patient left in no apparent distress:    Sitting in chair and Call bell within reach    COMMUNICATION/EDUCATION:   The patients plan of care was discussed with: Physical therapy assistant, Registered nurse and Patient.      Thank you for this referral.  HAYDE Beck/L  Time Calculation: 15 mins

## 2020-10-30 NOTE — PROGRESS NOTES
Problem: Falls - Risk of  Goal: *Absence of Falls  Description: Document Alexsander Escamilla Fall Risk and appropriate interventions in the flowsheet.   Outcome: Progressing Towards Goal  Note: Fall Risk Interventions:            Medication Interventions: Patient to call before getting OOB, Teach patient to arise slowly

## 2020-10-30 NOTE — OP NOTES
[address]  OPERATIVE REPORT    Name:  Maximo Leiva  MR#:  [id number]  :  1950  ACCOUNT #:  [de-identified]  DATE OF SERVICE:  10/29/2020    PREOPERATIVE DIAGNOSES:  Lumbar spondylolisthesis, L5-S1; lumbar stenosis, L5-S1. POSTOPERATIVE DIAGNOSES:  Lumbar spondylolisthesis, L5-S1; lumbar stenosis, L5-S1. PROCEDURES PERFORMED:  Lumbar laminectomy, L5-S1; posterior lumbar interbody fusion bilaterally, L5-S1; posterior lumbar fusion, L5-S1, with posterior segmental instrumentation. SURGEON:  Edmund Alanis MD    ASSISTANT:  Casandra Russell PA-C    ANESTHESIA:  General    COMPLICATIONS:  None. SPECIMENS REMOVED:  No specimens. IMPLANTS:  Instrumentation included Medtronic Solera pedicle screws and Medtronic Elevate grafts. ESTIMATED BLOOD LOSS:  Minimal.    DRAINS:  One medium Hemovac placed. INDICATIONS:  This is a pleasant 51-year-old female with chronic ongoing lower back pain, right-sided radicular symptoms, severe right-sided foraminal stenosis at L5-S1 with asymmetrical spondylolisthesis, causing severe foraminal stenosis and L5 and S1 nerve root symptoms. She had failed conservative treatment, understood the risks and benefits, and elected to proceed. PROCEDURE:  The patient was identified, brought to the operative suite, underwent general anesthesia without difficulty. La catheter was placed. Preoperative neuromonitoring was placed, baselines were obtained. Antibiotics, 2 g of Ancef given to the patient preoperatively. The patient was placed prone on the open [place] table with all bony prominences well padded. Back was prepped and draped sterilely. Time-out was confirmed. A midline incision was made directly over the L5-S1 disc space. Dissection was carried down through the subcutaneous tissue. The thoracodorsal fascia was then split, and we exposed the posterior lamina at L5, the L5-S1 facet, as well as the L4-5 facet.   The L5 transverse process was identified as well as the sacral ala. We confirmed this on lateral fluoroscopy. We then started wide laminectomy with removal of the bone up to the spinous process of L5, this was morselized and mixed with local bone graft. Central laminectomy was performed with undercutting the lamina with #3 and #4 Kerrisons. This allowed decompression of the central canal.  We also did bilateral foraminal decompression and wide facet resection, particularly on the right side, with removal of the inferior articular process and severe decompression of the dorsum of the L5 nerve root in the far lateral recess. This was done bilaterally. We then cannulated the pedicles of L5 and S1 using standard bony landmarks. [email or web address] drill was used to make a  hole, followed by a gearshift into the pedicle. This was tested with neuromonitoring as well as ball-tip probe. We placed 6.5 x 45 mm screws at L5 and 7.5 x 40 mm screws at S1. Neuromonitoring was stable to each one of these. We then did bilateral annulotomy at L5-S1 with complete discectomy, removed disc material and cartilaginous endplates. We distracted the disc space back out to approximately 11 mm height. We then packed two small 7.5 x 10 mm Magnifuse bags into the anterior one-third column for bony fusion, followed by two bilateral Medtronic Elevate grafts, these were 10 mm x 23 mm in dimension. They were elevated to 11 mm in height with 6 degrees of lordosis. Neuromonitoring was stable. We decorticated the transverse processes of L5 and the sacral ala. The wound was irrigated with pulse lavage and bacitracin. Used FloSeal for hemostasis. We placed 40 mm rods in the pedicle screws and attached to the set screws and did final tightening. Final x-rays demonstrated stable fixation. The local bone graft and Cassandra allograft and Signafuse were packed into the lateral gutters for bony fusion. Medium Hemovac was placed.   0 Vicryl in the fascial layer, 2-0 Vicryl in the subcutaneous layer, 3-0 Vicryl in the skin. The physician assistant was present during the entire operative procedure and assisted in all critical elements of the surgery. No surgical assist or resident was available.      Mavis Herrera MD      [id number]  D:  10/29/2020 10:01  T:  10/29/2020 20:18  JOB #:  [id number]

## 2020-11-03 ENCOUNTER — PATIENT OUTREACH (OUTPATIENT)
Dept: CASE MANAGEMENT | Age: 70
End: 2020-11-03

## 2020-11-04 RX ORDER — BUMETANIDE 2 MG/1
2 TABLET ORAL DAILY
COMMUNITY
End: 2020-12-03

## 2020-11-04 RX ORDER — METHYLPREDNISOLONE 4 MG/1
4 TABLET ORAL
COMMUNITY
End: 2020-12-03

## 2020-11-04 RX ORDER — DIPHENHYDRAMINE HCL 25 MG
25 CAPSULE ORAL
COMMUNITY

## 2020-11-06 ENCOUNTER — PATIENT OUTREACH (OUTPATIENT)
Dept: CASE MANAGEMENT | Age: 70
End: 2020-11-06

## 2020-11-06 NOTE — ACP (ADVANCE CARE PLANNING)
Does not have an AMD- was given 2 copies while in the hospital and states that once she is no longer taking narcotics, she will pursue completion.

## 2020-11-23 ENCOUNTER — PATIENT OUTREACH (OUTPATIENT)
Dept: CASE MANAGEMENT | Age: 70
End: 2020-11-23

## 2020-11-23 NOTE — PROGRESS NOTES
Date/Time:  11/23/2020 2:30 PM     Attempted to reach patient by telephone. Left HIPPA compliant message requesting a return call. Will attempt to reach patient again.

## 2020-12-03 ENCOUNTER — OFFICE VISIT (OUTPATIENT)
Dept: CARDIOLOGY CLINIC | Age: 70
End: 2020-12-03
Payer: MEDICARE

## 2020-12-03 VITALS
RESPIRATION RATE: 16 BRPM | OXYGEN SATURATION: 98 % | SYSTOLIC BLOOD PRESSURE: 120 MMHG | HEIGHT: 66 IN | WEIGHT: 221.3 LBS | BODY MASS INDEX: 35.57 KG/M2 | DIASTOLIC BLOOD PRESSURE: 70 MMHG | HEART RATE: 64 BPM

## 2020-12-03 DIAGNOSIS — I10 ESSENTIAL HYPERTENSION: Primary | ICD-10-CM

## 2020-12-03 DIAGNOSIS — Z99.89 OSA ON CPAP: ICD-10-CM

## 2020-12-03 DIAGNOSIS — E78.2 MIXED HYPERLIPIDEMIA: ICD-10-CM

## 2020-12-03 DIAGNOSIS — R06.02 SOB (SHORTNESS OF BREATH): ICD-10-CM

## 2020-12-03 DIAGNOSIS — G47.33 OSA ON CPAP: ICD-10-CM

## 2020-12-03 DIAGNOSIS — R07.89 ATYPICAL CHEST PAIN: ICD-10-CM

## 2020-12-03 PROCEDURE — G8427 DOCREV CUR MEDS BY ELIG CLIN: HCPCS | Performed by: INTERNAL MEDICINE

## 2020-12-03 PROCEDURE — 1100F PTFALLS ASSESS-DOCD GE2>/YR: CPT | Performed by: INTERNAL MEDICINE

## 2020-12-03 PROCEDURE — G8754 DIAS BP LESS 90: HCPCS | Performed by: INTERNAL MEDICINE

## 2020-12-03 PROCEDURE — 3288F FALL RISK ASSESSMENT DOCD: CPT | Performed by: INTERNAL MEDICINE

## 2020-12-03 PROCEDURE — 93005 ELECTROCARDIOGRAM TRACING: CPT | Performed by: INTERNAL MEDICINE

## 2020-12-03 PROCEDURE — G9899 SCRN MAM PERF RSLTS DOC: HCPCS | Performed by: INTERNAL MEDICINE

## 2020-12-03 PROCEDURE — G8752 SYS BP LESS 140: HCPCS | Performed by: INTERNAL MEDICINE

## 2020-12-03 PROCEDURE — 3017F COLORECTAL CA SCREEN DOC REV: CPT | Performed by: INTERNAL MEDICINE

## 2020-12-03 PROCEDURE — G0463 HOSPITAL OUTPT CLINIC VISIT: HCPCS | Performed by: INTERNAL MEDICINE

## 2020-12-03 PROCEDURE — G8399 PT W/DXA RESULTS DOCUMENT: HCPCS | Performed by: INTERNAL MEDICINE

## 2020-12-03 PROCEDURE — G8417 CALC BMI ABV UP PARAM F/U: HCPCS | Performed by: INTERNAL MEDICINE

## 2020-12-03 PROCEDURE — G8510 SCR DEP NEG, NO PLAN REQD: HCPCS | Performed by: INTERNAL MEDICINE

## 2020-12-03 PROCEDURE — G8536 NO DOC ELDER MAL SCRN: HCPCS | Performed by: INTERNAL MEDICINE

## 2020-12-03 PROCEDURE — 1090F PRES/ABSN URINE INCON ASSESS: CPT | Performed by: INTERNAL MEDICINE

## 2020-12-03 PROCEDURE — 93010 ELECTROCARDIOGRAM REPORT: CPT | Performed by: INTERNAL MEDICINE

## 2020-12-03 PROCEDURE — 99214 OFFICE O/P EST MOD 30 MIN: CPT | Performed by: INTERNAL MEDICINE

## 2020-12-03 RX ORDER — GABAPENTIN 300 MG/1
300-600 CAPSULE ORAL
COMMUNITY
Start: 2020-11-12 | End: 2021-04-27

## 2020-12-03 NOTE — PROGRESS NOTES
Chief Complaint   Patient presents with    Follow-up    Hypertension    Shortness of Breath    Cholesterol Problem       1. Have you been to the ER, urgent care clinic since your last visit? Hospitalized since your last visit? Yes 10/20 Lumbar laminectomy    2. Have you seen or consulted any other health care providers outside of the [address] [place] [address] since your last visit? Include any pap smears or colon screening. Yes Ortho    Patient C/O chest discomfort radiating to neck and arms with activity will rest with relief.

## 2020-12-03 NOTE — PROGRESS NOTES
[address] [place] [address], [address] [facility]  [phone]     Subjective:      Iggy Correia is a 79 y.o. female is here for routine f/u. Last seen by us via VV in 5/2020  Recently s/p L5-S1 LUMBAR LAMINECTOMY WITH INSTRUMENTATION 10/29/2020  Reports unchanged sob and atypical cp with negative cardiac work up. Will see rheumatologist in January    The patient denies orthopnea, PND, LE edema, palpitations, syncope, or presyncope.        Patient Active Problem List    Diagnosis Date Noted    Spinal stenosis, lumbar 10/29/2020    Left chest pressure 04/27/2020    Cervical stenosis of spinal canal 03/09/2020    Essential hypertension 10/29/2019    KRYSTINA on CPAP 05/16/2019    Severe obesity ([facility] 10/23/2018    Chronic venous hypertension (idiopathic) with inflammation of left lower extremity 02/06/2017    Venous insufficiency of left leg 11/22/2016    Venous reflux 08/16/2016    Osteoarthritis 01/21/2016    OA (osteoarthritis) of knee 01/21/2016    Abnormal ankle brachial index 04/08/2015    SOB (shortness of breath) 02/24/2015    Right leg pain 02/24/2015    HNP (herniated nucleus pulposus), cervical 07/07/2014    Esophageal reflux 06/27/2012    Mixed hyperlipidemia 06/27/2012    Foot pain 07/28/2011    Asthma 07/28/2011      Manas Wright MD  Past Medical History:   Diagnosis Date    Adverse effect of anesthesia     woke up during hysterectomy    Arrhythmia     h/o palpitations normal work up 22/2015 heart: Ronni    Arthritis     Asthma     allergy to weather changing    Cataract     Chronic pain     bulging disc c4/c5 l4/l5 : as of 9/1018 no neck/head movement limitation says patient    Claustrophobia     Color blind     CREST (calcinosis, Raynaud's phenomenon, esophageal dysfunction, sclerodactyly, telangiectasia) ([facility] 2018    [facility], Dr. Del Castillo Host GERD (gastroesophageal reflux disease)     Gout     H/O arthroscopic knee surgery     H/O: hysterectomy     Hypertension     Ill-defined condition     gout    Ill-defined condition     l4-5 hernia disc    Leg swelling 2016    unknown etiology    Bartlett's neuralgia 2001    from neuroma middle toe, left foot    Musculoskeletal disorder     arthritis    Nausea & vomiting     surgery related    Other ill-defined conditions(799.89) 11/2013    RECTAL PROLASE    Pulmonary hypertension ([address] 08/2018    Heart: Dr. Burnard Baumgarten S/P appendectomy     Shortness of breath 2016    Pulmonary Dr. Betzaida De Leon Sleep apnea     Unspecified adverse effect of anesthesia     wakes up for anesthesia early      Past Surgical History:   Procedure Laterality Date    CARDIAC SURG PROCEDURE UNLIST  2015    no blockages, card cath   [address]  05/2018    no blockages x 2 procedures    COLONOSCOPY  04/20/2011    negative    COLONOSCOPY N/A 9/28/2018    COLONOSCOPY performed by [facility] at [facility] HX CATARACT REMOVAL Left 09/20/2018    HX CATARACT REMOVAL Right 10/20/2018    HX CHOLECYSTECTOMY      HX GI  11/19/13    Rectocele repair with enterocele repair    HX HEENT  09/21/2018    left cataract    HX HYSTERECTOMY      TOOK LEFT OVARY    HX KNEE ARTHROSCOPY  1990's    right knee partial meniscus     HX KNEE REPLACEMENT Right 2016    HX KNEE REPLACEMENT Left 2010, 2016    TKR    HX LUMBAR FUSION  2014    c4-5 fusion cervical fusion    HX ORTHOPAEDIC  1973    ganglion cyst removed rt wrist    HX ORTHOPAEDIC  2013, 1992    bilateral CTR, and had ulna nerve release    HX ORTHOPAEDIC Right 2018    thumb and ring finger trigger release    IR INJ SPINE THER SUBST LUM/SAC W IMG  12/19/2019     Allergies   Allergen Reactions    Ciprofloxacin Shortness of Breath    Flagyl [Metronidazole] Shortness of Breath    Percocet [Oxycodone-Acetaminophen] Rash and Itching     irritated    Benzene Other (comments)     Blisters and burn area Benzene found to be on steri-strips    Betadine [Povidone-Iodine] Other (comments)     Blisters and burning    Naproxen Itching    Sulfa (Sulfonamide Antibiotics) Rash    Adhesive Rash     Redness and rash      Family History   Problem Relation Age of Onset    Heart Disease Mother     Hypertension Mother     Diabetes Mother     Cancer Mother         BREAST, LUNG    Breast Cancer Mother 61    Heart Disease Father     Hypertension Father     Thyroid Disease Father     Diabetes Brother     Psychiatric Disorder Son         STAINS, [place], [place] DEPRESSIVE    Anesth Problems Neg Hx       Social History     Socioeconomic History    Marital status:      Spouse name: Not on file    Number of children: Not on file    Years of education: Not on file    Highest education level: Not on file   Occupational History    Not on file   Social Needs    Financial resource strain: Not on file    Food insecurity     Worry: Not on file     Inability: Not on file    Transportation needs     Medical: Not on file     Non-medical: Not on file   Tobacco Use    Smoking status: Never Smoker    Smokeless tobacco: Never Used   Substance and Sexual Activity    Alcohol use: No    Drug use: No    Sexual activity: Not on file   Lifestyle    Physical activity     Days per week: Not on file     Minutes per session: Not on file    Stress: Not on file   Relationships    Social connections     Talks on phone: Not on file     Gets together: Not on file     Attends Buddhist service: Not on file     Active member of club or organization: Not on file     Attends meetings of clubs or organizations: Not on file     Relationship status: Not on file    Intimate partner violence     Fear of current or ex partner: Not on file     Emotionally abused: Not on file     Physically abused: Not on file     Forced sexual activity: Not on file   Other Topics Concern    Not on file   Social History Narrative    Not on file      Current Outpatient Medications   Medication Sig    gabapentin (NEURONTIN) 300 mg capsule TAKE 1 TO 2 CAPSULES BY MOUTH AT BEDTIME    diphenhydrAMINE (BenadryL) 25 mg capsule Take 25 mg by mouth every six (6) hours as needed.  famotidine (PEPCID) 20 mg tablet Take 40 mg by mouth Daily (before breakfast).  pravastatin (PRAVACHOL) 40 mg tablet Take 40 mg by mouth nightly.  carboxymethylcellulose sodium (THERATEARS OP) Apply  to eye as needed.  telmisartan (MICARDIS) 40 mg tablet Take 40 mg by mouth daily.  metoprolol tartrate (LOPRESSOR) 25 mg tablet TAKE ONE TABLET BY MOUTH TWICE DAILY    isosorbide mononitrate ER (IMDUR) 30 mg tablet TAKE ONE-HALF (1/2) TABLET DAILY FOR RAYNAUDS PHENOMENON (Patient taking differently: Take 15 mg by mouth nightly. TAKE ONE-HALF (1/2) TABLET DAILY FOR RAYNAUDS PHENOMENON)    omeprazole (PRILOSEC) 20 mg capsule Take 40 mg by mouth daily.  baclofen (LIORESAL) 10 mg tablet Take 10 mg by mouth nightly.  multivitamin (ONE A DAY) tablet Take 1 Tab by mouth daily.  aspirin delayed-release 81 mg tablet Take 81 mg by mouth nightly.  acetaminophen (TYLENOL EXTRA STRENGTH) 500 mg tablet Take 1,000 mg by mouth every six (6) hours as needed for Pain.  OTHER,NON-FORMULARY, Take 2 Tabs by mouth daily. FIBER SUPPLEMENT     allopurinol (ZYLOPRIM) 100 mg tablet Take 200 mg by mouth daily.  cetirizine (ZYRTEC) 10 mg tablet Take 10 mg by mouth daily.  montelukast (SINGULAIR) 10 mg tablet Take 10 mg by mouth nightly. No current facility-administered medications for this visit. Review of Symptoms:  11 systems reviewed, negative other than as stated in the HPI    Physical ExamPhysical Exam:    Vitals:    12/03/20 1330   BP: 120/70   Pulse: 64   Resp: 16   SpO2: 98%   Weight: 221 lb 4.8 oz (100.4 kg)   Height: 5' 6\" (1.676 m)     Body mass index is 35.72 kg/m². General PE  Gen:  NAD  Mental Status - Alert. General Appearance - Not in acute distress.    HEENT:  PERRL, no carotid bruits or JVD  Chest and Lung Exam   Inspection: Accessory muscles - No use of accessory muscles in breathing. Auscultation:   Breath sounds: - Normal.   Cardiovascular   Inspection: Jugular vein - Bilateral - Inspection Normal.   Palpation/Percussion:   Apical Impulse: - Normal.   Auscultation: Rhythm - Regular. Heart Sounds - S1 WNL and S2 WNL. No S3 or S4. Murmurs & Other Heart Sounds: Auscultation of the heart reveals - No Murmurs. Peripheral Vascular   Upper Extremity: Inspection - Bilateral - No Cyanotic nailbeds or Digital clubbing. Lower Extremity:   Palpation: Edema - Bilateral - No edema. Abdomen:   Soft, non-tender, bowel sounds are active. Neuro: A&O times 3, CN and motor grossly WNL    Labs:   No results found for: CHOL, CHOLX, CHLST, CHOLV, [id number], HDL, HDLP, LDL, LDLC, DLDLP, TGLX, TRIGL, TRIGP, CHHD, CHHDX  No results found for: CPK, CPKX, CPX  Lab Results   Component Value Date/Time    Sodium 143 10/30/2020 03:19 AM    Potassium 3.8 10/30/2020 03:19 AM    Chloride 110 (H) 10/30/2020 03:19 AM    CO2 27 10/30/2020 03:19 AM    Anion gap 6 10/30/2020 03:19 AM    Glucose 101 (H) 10/30/2020 03:19 AM    BUN 12 10/30/2020 03:19 AM    Creatinine 0.93 10/30/2020 03:19 AM    BUN/Creatinine ratio 13 10/30/2020 03:19 AM    GFR est AA >60 10/30/2020 03:19 AM    GFR est non-AA 60 (L) 10/30/2020 03:19 AM    Calcium 8.3 (L) 10/30/2020 03:19 AM    Bilirubin, total 0.7 10/21/2020 09:19 AM    Alk. phosphatase 91 10/21/2020 09:19 AM    Protein, total 6.6 10/21/2020 09:19 AM    Albumin 3.4 (L) 10/21/2020 09:19 AM    Globulin 3.2 10/21/2020 09:19 AM    A-G Ratio 1.1 10/21/2020 09:19 AM    ALT (SGPT) 20 10/21/2020 09:19 AM       EKG:  SR     Assessment:     Assessment:      1. Essential hypertension    2. KRYSTINA on CPAP    3. Mixed hyperlipidemia    4. SOB (shortness of breath)    5.  Atypical chest pain        Orders Placed This Encounter    AMB POC EKG ROUTINE W/ 12 LEADS, INTER & REP     Order Specific Question:   Reason for Exam:     Answer:   routine    gabapentin (NEURONTIN) 300 mg capsule     Sig: TAKE 1 TO 2 CAPSULES BY MOUTH AT BEDTIME        Plan:     1. Negative NST 5/2020. Normal EF grade 2 DD with no significant valve issues per echo  5/2020. Chronic SOB has been unchanged. 2. Essential hypertension: Controlled with current meds.    3. Mixed hyperlipidemia: 2/2020 LDL 94 On statin Labs and lipids per PCP  4. KRYSTINA: on CPAP. 5. H/o CREST syndrome. F/u with rheumatology    Continue current care and f/u in 6 months. Wilton Graves NP       Patient seen and examined by me with nurse practitioner. I personally performed all components of the history, physical, and medical decision making and agree with the assessment and plan as noted.     Giancarlo Weeks MD

## 2020-12-09 ENCOUNTER — DOCUMENTATION ONLY (OUTPATIENT)
Dept: CASE MANAGEMENT | Age: 70
End: 2020-12-09

## 2020-12-09 ENCOUNTER — TELEPHONE (OUTPATIENT)
Dept: CASE MANAGEMENT | Age: 70
End: 2020-12-09

## 2020-12-09 NOTE — TELEPHONE ENCOUNTER
12/9/20 4:04 PM     Patient resolved from [address] episode on 12/9/20  Per previous note from our Mai team--did not discuss COVID-19 related testing since it was done at pre-op testing prior to admission. Test results were negative. Patient informed of results, if available? n/a     Patient/family has been provided the following resources and education related to COVID-19:                         Signs, symptoms and red flags related to COVID-19            CDC exposure and quarantine guidelines            [facility] exposure contact - [phone]            Contact for their local Department of Health                 Patient currently reports that the following symptoms have improved:  pt reports she is doing really well post-op. Has had F/U with her surgeon who she says was also pleased with her progress. She reports she is sweeping and doing steps with no difficulty--[place]. She was offered PT but at this point doesn't feel she needs it. Planning a trip to [facility] on 12/20. I thanked her for the wonderful update, advised this is our final call to her, wished her a Sheyla Michelle Casper and a great vacation in [place] [facility], and we disconnected. No further outreach scheduled with this CTN/ACM/LPN/HC/ MA. Episode of Care resolved. Patient has this CTN/ACM/LPN/HC/MA contact information if future needs arise.

## 2021-02-12 RX ORDER — ACETAMINOPHEN/DIPHENHYDRAMINE 500MG-25MG
1 TABLET ORAL
COMMUNITY
End: 2022-03-23

## 2021-02-12 RX ORDER — CALCIUM POLYCARBOPHIL 625 MG
625 TABLET ORAL DAILY
COMMUNITY

## 2021-02-12 NOTE — PERIOP NOTES
[facility]  Ambulatory Surgery Unit  Pre-operative Instructions    Surgery/Procedure Date  2/24            Tentative Arrival Time 11:30pm      1. On the day of your surgery/procedure, please report to the Ambulatory Surgery Unit Registration Desk and sign in at your designated time. The Ambulatory Surgery Unit is located in [facility] on the [facility] side of the [facility] across from the [address] [place] [address]. Please have all of your health insurance cards and a photo ID. 2. You must have someone with you to drive you home, as you should not drive a car for 24 hours following anesthesia. Please make arrangements for a responsible adult friend or family member to stay with you for at least the first 24 hours after your surgery. 3. Do not have anything to eat or drink (including water, gum, mints, coffee, juice) after 11:59 PM  2/23. This may not apply to medications prescribed by your physician. (Please note below the special instructions with medications to take the morning of surgery, if applicable.)    4. We recommend you do not drink any alcoholic beverages for 24 hours before and after your surgery. 5. Contact your surgeons office for instructions on the following medications: non-steroidal anti-inflammatory drugs (i.e. Advil, Aleve), vitamins, and supplements. (Some surgeons will want you to stop these medications prior to surgery and others may allow you to take them)   **If you are currently taking Plavix, Coumadin, Aspirin and/or other blood-thinning agents, contact your surgeon for instructions. ** Your surgeon will partner with the physician prescribing these medications to determine if it is safe to stop or if you need to continue taking. Please do not stop taking these medications without instructions from your surgeon.     6. In an effort to help prevent surgical site infection, we ask that you shower with an anti-bacterial soap (i.e. Dial/Safeguard, or the soap provided to you at your preadmission testing appointment) for 3 days prior to and on the morning of surgery, using a fresh towel after each shower. (Please begin this process with fresh bed linens.) Do not apply any lotions, powders, or deodorants after the shower on the day of your procedure. If applicable, please do not shave the operative site for 48 hours prior to surgery. 7. Wear comfortable clothes. Wear glasses instead of contacts. Do not bring any jewelry or money (other than copays or fees as instructed). Do not wear make-up, particularly mascara, the morning of your surgery. Do not wear nail polish, particularly if you are having foot /hand surgery. Wear your hair loose or down, no ponytails, buns, thomas pins or clips. All body piercings must be removed. 8. You should understand that if you do not follow these instructions your surgery may be cancelled. If your physical condition changes (i.e. fever, cold or flu) please contact your surgeon as soon as possible. 9. It is important that you be on time. If a situation occurs where you may be late, or if you have any questions or problems, please call [phone].    10. Your surgery time may be subject to change. You will receive a phone call the day prior to surgery to confirm your arrival time. Special Instructions: Take all medications and inhalers, as prescribed, on the morning of surgery with a sip of water EXCEPT: none      I understand a pre-operative phone call will be made to verify my surgery time. In the event that I am not available, I give permission for a message to be left on my answering service and/or with another person? yes    Reviewed by phone with pt, verbalized understanding.   Aware of recommendation to self quarantine post covid testing.   ___________________      ___________________      ________________  (Signature of Patient)          (Witness)                   (Date and Time)

## 2021-02-20 ENCOUNTER — HOSPITAL ENCOUNTER (OUTPATIENT)
Dept: PREADMISSION TESTING | Age: 71
Discharge: HOME | End: 2021-02-20
Payer: MEDICARE

## 2021-02-20 LAB — SARS-COV-2, COV2: NORMAL

## 2021-02-20 PROCEDURE — U0003 INFECTIOUS AGENT DETECTION BY NUCLEIC ACID (DNA OR RNA); SEVERE ACUTE RESPIRATORY SYNDROME CORONAVIRUS 2 (SARS-COV-2) (CORONAVIRUS DISEASE [COVID-19]), AMPLIFIED PROBE TECHNIQUE, MAKING USE OF HIGH THROUGHPUT TECHNOLOGIES AS DESCRIBED BY CMS-2020-01-R: HCPCS

## 2021-02-22 LAB — SARS-COV-2, COV2NT: NOT DETECTED

## 2021-02-23 ENCOUNTER — ANESTHESIA EVENT (OUTPATIENT)
Dept: SURGERY | Age: 71
End: 2021-02-23
Payer: MEDICARE

## 2021-02-24 ENCOUNTER — HOSPITAL ENCOUNTER (OUTPATIENT)
Age: 71
Setting detail: OUTPATIENT SURGERY
Discharge: HOME | End: 2021-02-24
Attending: ORTHOPAEDIC SURGERY | Admitting: ORTHOPAEDIC SURGERY
Payer: MEDICARE

## 2021-02-24 ENCOUNTER — ANESTHESIA (OUTPATIENT)
Dept: SURGERY | Age: 71
End: 2021-02-24
Payer: MEDICARE

## 2021-02-24 VITALS
WEIGHT: 223 LBS | BODY MASS INDEX: 35.84 KG/M2 | HEART RATE: 55 BPM | SYSTOLIC BLOOD PRESSURE: 147 MMHG | TEMPERATURE: 97 F | DIASTOLIC BLOOD PRESSURE: 72 MMHG | RESPIRATION RATE: 23 BRPM | OXYGEN SATURATION: 98 % | HEIGHT: 66 IN

## 2021-02-24 DIAGNOSIS — M19.042 LOCALIZED PRIMARY OSTEOARTHRITIS OF LEFT HAND: Primary | Chronic | ICD-10-CM

## 2021-02-24 PROCEDURE — C1769 GUIDE WIRE: HCPCS | Performed by: ORTHOPAEDIC SURGERY

## 2021-02-24 PROCEDURE — 76210000040 HC AMBSU PH I REC FIRST 0.5 HR: Performed by: ORTHOPAEDIC SURGERY

## 2021-02-24 PROCEDURE — 2709999900 HC NON-CHARGEABLE SUPPLY: Performed by: ORTHOPAEDIC SURGERY

## 2021-02-24 PROCEDURE — 76210000050 HC AMBSU PH II REC 0.5 TO 1 HR: Performed by: ORTHOPAEDIC SURGERY

## 2021-02-24 PROCEDURE — 77030040356 HC CORD BPLR FRCP COVD -A: Performed by: ORTHOPAEDIC SURGERY

## 2021-02-24 PROCEDURE — 77030040826: Performed by: ORTHOPAEDIC SURGERY

## 2021-02-24 PROCEDURE — 74011250636 HC RX REV CODE- 250/636: Performed by: ORTHOPAEDIC SURGERY

## 2021-02-24 PROCEDURE — 74011250636 HC RX REV CODE- 250/636: Performed by: ANESTHESIOLOGY

## 2021-02-24 PROCEDURE — 76060000062 HC AMB SURG ANES 1 TO 1.5 HR: Performed by: ORTHOPAEDIC SURGERY

## 2021-02-24 PROCEDURE — 74011250636 HC RX REV CODE- 250/636: Performed by: NURSE ANESTHETIST, CERTIFIED REGISTERED

## 2021-02-24 PROCEDURE — A4565 SLINGS: HCPCS | Performed by: ORTHOPAEDIC SURGERY

## 2021-02-24 PROCEDURE — 77030002916 HC SUT ETHLN J&J -A: Performed by: ORTHOPAEDIC SURGERY

## 2021-02-24 PROCEDURE — C1713 ANCHOR/SCREW BN/BN,TIS/BN: HCPCS | Performed by: ORTHOPAEDIC SURGERY

## 2021-02-24 PROCEDURE — 74011250637 HC RX REV CODE- 250/637: Performed by: ORTHOPAEDIC SURGERY

## 2021-02-24 PROCEDURE — 77030021352 HC CBL LD SYS DISP COVD -B: Performed by: ORTHOPAEDIC SURGERY

## 2021-02-24 PROCEDURE — 77030013837 HC NERV BLK KT BBMI -B: Performed by: ANESTHESIOLOGY

## 2021-02-24 PROCEDURE — 74011000250 HC RX REV CODE- 250: Performed by: ORTHOPAEDIC SURGERY

## 2021-02-24 PROCEDURE — 77030006689 HC BLD OPHTH BVR BD -A: Performed by: ORTHOPAEDIC SURGERY

## 2021-02-24 PROCEDURE — 77030039825 HC MSK NSL PAP SUPERNO2VA VYRM -B: Performed by: ANESTHESIOLOGY

## 2021-02-24 PROCEDURE — 77030000032 HC CUF TRNQT ZIMM -B: Performed by: ORTHOPAEDIC SURGERY

## 2021-02-24 PROCEDURE — 76030000001 HC AMB SURG OR TIME 1 TO 1.5: Performed by: ORTHOPAEDIC SURGERY

## 2021-02-24 PROCEDURE — 77030039497 HC CST PAD STERILE CHCS -A: Performed by: ORTHOPAEDIC SURGERY

## 2021-02-24 DEVICE — 28MM ACUTWIST® ACUTRAK COMPRESSION SCREW
Type: IMPLANTABLE DEVICE | Site: FINGER | Status: FUNCTIONAL
Brand: ACUMED

## 2021-02-24 DEVICE — 30MM ACUTWIST® ACUTRAK COMPRESSION SCREW
Type: IMPLANTABLE DEVICE | Site: FINGER | Status: FUNCTIONAL
Brand: ACUMED

## 2021-02-24 RX ORDER — MIDAZOLAM HYDROCHLORIDE 1 MG/ML
INJECTION, SOLUTION INTRAMUSCULAR; INTRAVENOUS
Status: COMPLETED
Start: 2021-02-24 | End: 2021-02-24

## 2021-02-24 RX ORDER — ONDANSETRON 2 MG/ML
INJECTION INTRAMUSCULAR; INTRAVENOUS AS NEEDED
Status: DISCONTINUED | OUTPATIENT
Start: 2021-02-24 | End: 2021-02-24 | Stop reason: HOSPADM

## 2021-02-24 RX ORDER — SODIUM CHLORIDE 0.9 % (FLUSH) 0.9 %
5-40 SYRINGE (ML) INJECTION EVERY 8 HOURS
Status: DISCONTINUED | OUTPATIENT
Start: 2021-02-24 | End: 2021-02-24 | Stop reason: HOSPADM

## 2021-02-24 RX ORDER — SODIUM CHLORIDE 0.9 % (FLUSH) 0.9 %
5-40 SYRINGE (ML) INJECTION AS NEEDED
Status: DISCONTINUED | OUTPATIENT
Start: 2021-02-24 | End: 2021-02-24 | Stop reason: HOSPADM

## 2021-02-24 RX ORDER — LIDOCAINE HYDROCHLORIDE 10 MG/ML
0.1 INJECTION, SOLUTION EPIDURAL; INFILTRATION; INTRACAUDAL; PERINEURAL AS NEEDED
Status: DISCONTINUED | OUTPATIENT
Start: 2021-02-24 | End: 2021-02-24 | Stop reason: HOSPADM

## 2021-02-24 RX ORDER — SODIUM CHLORIDE, SODIUM LACTATE, POTASSIUM CHLORIDE, CALCIUM CHLORIDE 600; 310; 30; 20 MG/100ML; MG/100ML; MG/100ML; MG/100ML
25 INJECTION, SOLUTION INTRAVENOUS CONTINUOUS
Status: DISCONTINUED | OUTPATIENT
Start: 2021-02-24 | End: 2021-02-24 | Stop reason: HOSPADM

## 2021-02-24 RX ORDER — DIPHENHYDRAMINE HYDROCHLORIDE 50 MG/ML
12.5 INJECTION, SOLUTION INTRAMUSCULAR; INTRAVENOUS AS NEEDED
Status: DISCONTINUED | OUTPATIENT
Start: 2021-02-24 | End: 2021-02-24 | Stop reason: HOSPADM

## 2021-02-24 RX ORDER — ROPIVACAINE HYDROCHLORIDE 5 MG/ML
INJECTION, SOLUTION EPIDURAL; INFILTRATION; PERINEURAL
Status: COMPLETED
Start: 2021-02-24 | End: 2021-02-24

## 2021-02-24 RX ORDER — PROPOFOL 10 MG/ML
INJECTION, EMULSION INTRAVENOUS AS NEEDED
Status: DISCONTINUED | OUTPATIENT
Start: 2021-02-24 | End: 2021-02-24 | Stop reason: HOSPADM

## 2021-02-24 RX ORDER — HYDROMORPHONE HYDROCHLORIDE 1 MG/ML
0.2 INJECTION, SOLUTION INTRAMUSCULAR; INTRAVENOUS; SUBCUTANEOUS
Status: DISCONTINUED | OUTPATIENT
Start: 2021-02-24 | End: 2021-02-24 | Stop reason: HOSPADM

## 2021-02-24 RX ORDER — MIDAZOLAM HYDROCHLORIDE 1 MG/ML
INJECTION, SOLUTION INTRAMUSCULAR; INTRAVENOUS AS NEEDED
Status: DISCONTINUED | OUTPATIENT
Start: 2021-02-24 | End: 2021-02-24 | Stop reason: HOSPADM

## 2021-02-24 RX ORDER — FENTANYL CITRATE 50 UG/ML
25 INJECTION, SOLUTION INTRAMUSCULAR; INTRAVENOUS
Status: DISCONTINUED | OUTPATIENT
Start: 2021-02-24 | End: 2021-02-24 | Stop reason: HOSPADM

## 2021-02-24 RX ORDER — HYDROCODONE BITARTRATE AND ACETAMINOPHEN 5; 325 MG/1; MG/1
1 TABLET ORAL
Qty: 20 TAB | Refills: 0 | Status: SHIPPED | OUTPATIENT
Start: 2021-02-24 | End: 2021-03-01

## 2021-02-24 RX ORDER — PROPOFOL 10 MG/ML
INJECTION, EMULSION INTRAVENOUS
Status: DISCONTINUED | OUTPATIENT
Start: 2021-02-24 | End: 2021-02-24 | Stop reason: HOSPADM

## 2021-02-24 RX ORDER — ROPIVACAINE HYDROCHLORIDE 5 MG/ML
INJECTION, SOLUTION EPIDURAL; INFILTRATION; PERINEURAL AS NEEDED
Status: DISCONTINUED | OUTPATIENT
Start: 2021-02-24 | End: 2021-02-24 | Stop reason: HOSPADM

## 2021-02-24 RX ADMIN — PROPOFOL 50 MG: 10 INJECTION, EMULSION INTRAVENOUS at 13:22

## 2021-02-24 RX ADMIN — MIDAZOLAM HYDROCHLORIDE 2 MG: 1 INJECTION, SOLUTION INTRAMUSCULAR; INTRAVENOUS at 12:02

## 2021-02-24 RX ADMIN — SODIUM CHLORIDE, POTASSIUM CHLORIDE, SODIUM LACTATE AND CALCIUM CHLORIDE 25 ML/HR: 600; 310; 30; 20 INJECTION, SOLUTION INTRAVENOUS at 11:54

## 2021-02-24 RX ADMIN — Medication 3 AMPULE: at 11:54

## 2021-02-24 RX ADMIN — ROPIVACAINE HYDROCHLORIDE 30 ML: 5 INJECTION, SOLUTION EPIDURAL; INFILTRATION; PERINEURAL at 12:04

## 2021-02-24 RX ADMIN — ONDANSETRON HYDROCHLORIDE 4 MG: 2 INJECTION, SOLUTION INTRAMUSCULAR; INTRAVENOUS at 13:27

## 2021-02-24 RX ADMIN — PROPOFOL 20 MG: 10 INJECTION, EMULSION INTRAVENOUS at 13:33

## 2021-02-24 RX ADMIN — WATER 2 G: 1 INJECTION INTRAMUSCULAR; INTRAVENOUS; SUBCUTANEOUS at 13:26

## 2021-02-24 RX ADMIN — PROPOFOL INJECTABLE EMULSION 100 MCG/KG/MIN: 10 INJECTION, EMULSION INTRAVENOUS at 13:22

## 2021-02-24 NOTE — H&P
Date of Surgery Update:  Silvia Chaidez was seen and examined.  History and physical has been reviewed. The patient has been examined. There have been no significant clinical changes since the completion of the originally dated History and Physical.  Patient identified by surgeon; surgical site was confirmed by patient and surgeon.    Signed By: Jose Francisco Kee MD     February 24, 2021 1:09 PM

## 2021-02-24 NOTE — PERIOP NOTES
Received to PACU, awake and alert. LUE peripheral nerve blocked, elevated on pillow, HOB elevated, drinking apple juice. 1500 pt alert, VSS, denies discomfort. . Continues sipping juice, talking with nurse. 1537Discharged to home via/wc,accompanied to car per RN. Skin warm and dry, awake and alert. Respirations even, unlabored. Pt and family members questions and concerns addressed prior to discharge. All belongings with pt. Sling placed to LUE prior to d/c.

## 2021-02-24 NOTE — ANESTHESIA PROCEDURE NOTES
Peripheral Block    Start time: 2/24/2021 12:00 PM  End time: 2/24/2021 12:02 PM  Performed by: Rebecca Castro MD  Authorized by: Rebecca Castro MD       Pre-procedure: Indications: at surgeon's request and post-op pain management    Preanesthetic Checklist: patient identified, risks and benefits discussed, site marked, timeout performed, anesthesia consent given and patient being monitored      Block Type:   Block Type:  Supraclavicular  Laterality:  Left  Monitoring:  Standard ASA monitoring, frequent vital sign checks, responsive to questions and oxygen  Injection Technique:  Single shot  Procedures: ultrasound guided    Prep: chlorhexidine    Location:  Supraclavicular  Needle Type:  Stimuplex  Needle Gauge:  22 G  Needle Localization:  Ultrasound guidance    Assessment:  Number of attempts:  1  Injection Assessment:  Incremental injection every 5 mL, local visualized surrounding nerve on ultrasound, negative aspiration for blood, ultrasound image on chart, no intravascular symptoms and no paresthesia  Patient tolerance:  Patient tolerated the procedure well with no immediate complications  Standard monitors applied, 3 L NC O2, and sedation given as recorded by RN so as to achieve patient comfort and anxiolysis, but maintain meaningful verbal contact. Sterile prep followed by 1-2 mL local at insertion site. A 40 mm, 22G insulated stimiplex needle was inserted in the interscalene groove, approximately one centimeter from the mid-clavicle. The nerve bundle was identified under [facility] [facility] guidance. Local anesthetic injected without resistance and with gentle aspiration in 3-5 ml increments. Patient tolerated procedure well. No pain, paresthesia, or blood noted. VSS throughout. No complications noted.

## 2021-02-24 NOTE — ANESTHESIA PREPROCEDURE EVALUATION
Anesthetic History     PONV and history of awareness of surgery under anesthesia     Comments: Hx of awareness under anesthesia during hysterectomy     Review of Systems / Medical History  Patient summary reviewed, nursing notes reviewed and pertinent labs reviewed    Pulmonary        Sleep apnea: CPAP  Shortness of breath  Asthma        Neuro/Psych             Comments: Chronic pain (G89.29)  Cervical stenosis of spinal canal  Claustrophobia Cardiovascular    Hypertension        Dysrhythmias   Hyperlipidemia    Exercise tolerance: >4 METS  Comments: Pulmonary HTN  55-60% EF by ECHO 5/2020  No obstructions by cardiac cath    Venous insufficiency of left leg   GI/Hepatic/Renal     GERD: well controlled           Endo/Other        Obesity, arthritis and anemia  Pertinent negatives: No morbid obesity   Other Findings   Comments: CREST (calcinosis, Raynaud's phenomenon, esophageal dysfunction, sclerodactyly, telangiectasia) ([facility]) (M34.1)    Grade 1 or 2 view for ortho surgeries           Physical Exam    Airway  Mallampati: II  TM Distance: 4 - 6 cm  Neck ROM: normal range of motion        Cardiovascular    Rhythm: regular  Rate: normal         Dental    Dentition: Edentulous     Pulmonary  Breath sounds clear to auscultation               Abdominal  GI exam deferred       Other Findings            Anesthetic Plan    ASA: 3  Anesthesia type: regional and MAC - supraclavicular block    Monitoring Plan: BIS      Induction: Intravenous  Anesthetic plan and risks discussed with: Patient

## 2021-02-24 NOTE — DISCHARGE INSTRUCTIONS
Patient Education        Hand Arthritis: Exercises  Introduction  Here are some examples of exercises for you to try. The exercises may be suggested for a condition or for rehabilitation. Start each exercise slowly. Ease off the exercises if you start to have pain. You will be told when to start these exercises and which ones will work best for you. How to do the exercises  Tendon glides   1. In this exercise, the steps follow one another to a make a continuous movement. 2. With your affected hand, point your fingers and thumb straight up. Your wrist should be relaxed, following the line of your fingers and thumb. 3. Curl your fingers so that the top two joints in them are bent, and your fingers wrap down. Your fingertips should touch or be near the base of your fingers. Your fingers will look like a hook. 4. Make a fist by bending your knuckles. Your thumb can gently rest against your index (pointing) finger. 5. Unwind your fingers slightly so that your fingertips can touch the base of your palm. Your thumb can rest against your index finger. 6. Move back to your starting position, with your fingers and thumb pointing up. 7. Repeat the series of motions 8 to 12 times. 8. Switch hands and repeat steps 1 through 6, even if only one hand is sore. Intrinsic flexion   1. Rest your affected hand on a table and bend the large joints where your fingers connect to your hand. Keep your thumb and the other joints in your fingers straight. 2. Slowly straighten your fingers. Your wrist should be relaxed, following the line of your fingers and thumb. 3. Move back to your starting position, with your hand bent. 4. Repeat 8 to 12 times. 5. Switch hands and repeat steps 1 through 4, even if only one hand is sore. Finger extension   1. Place your affected hand flat on a table. 2. Lift and then lower one finger at a time off the table. 3. Repeat 8 to 12 times.   4. Switch hands and repeat steps 1 through 3, even if only one hand is sore. MP extension   1. Place your good hand on a table, palm up. Put your affected hand on top of your good hand with your fingers wrapped around the thumb of your good hand like you are making a fist.  2. Slowly uncurl the joints of your affected hand where your fingers connect to your hand so that only the top two joints of your fingers are bent. Your fingers will look like a hook. 3. Move back to your starting position, with your fingers wrapped around your good thumb. 4. Repeat 8 to 12 times. 5. Switch hands and repeat steps 1 through 4, even if only one hand is sore. PIP extension (with MP extension)   1. Place your good hand on a table, palm up. Put your affected hand on top of your good hand, palm up. 2. Use the thumb and fingers of your good hand to grasp below the middle joint of one finger of your affected hand. 3. Straighten the last two joints of that finger. 4. Repeat 8 to 12 times. 5. Repeat steps 1 through 4 with each finger. 6. Switch hands and repeat steps 1 through 5, even if only one hand is sore. DIP flexion   1. With your good hand, grasp one finger of your affected hand. Your thumb will be on the top side of your finger just below the joint that is closest to your fingernail. 2. Slowly bend your affected finger only at the joint closest to your fingernail. 3. Repeat 8 to 12 times. 4. Repeat steps 1 through 3 with each finger. 5. Switch hands and repeat steps 1 through 4, even if only one hand is sore. Follow-up care is a key part of your treatment and safety. Be sure to make and go to all appointments, and call your doctor if you are having problems. It's also a good idea to know your test results and keep a list of the medicines you take. Where can you learn more? Go to http://www.gray.com/  Enter E040 in the search box to learn more about \"Hand Arthritis: Exercises. \"  Current as of: March 2, 2020               Content Version: 12.6  © 0053-1583 [email or web address], [employer]. Care instructions adapted under license by [employer] (which disclaims liability or warranty for this information). If you have questions about a medical condition or this instruction, always ask your healthcare professional. [address] any warranty or liability for your use of this information. Dr. Radha Morrow Instructions for Elbow/Wrist/Hand Surgery    Medications/Diet  1. Eat only light, non-greasy foods today  2. Take pain medicine with food  3. While taking pain medicines, you may not operate a vehicle, heavy machinery, or appliances  4. While taking pain medicines, you may not drink alcoholic beverages  5. While taking pain medicines, you may not make critical decisions or sign legal papers  6. If you have any reactions to your medicines, stop taking them and call my office immediately  7. Please keep in mind that constipation is a very common side   effect of taking narcotic pain medication. We recommend that patients take precautions to prevent constipation:   Drink plenty of water (6-8 glasses of 8 oz. a day)   Avoid alcohol, caffeine, and dairy products   Eat plenty of fiber (fruits, vegetables and whole grains)   Take an over the counter stool softener (colace or dulcolax)   Patients that have had upper extremity surgery should take frequent walks      Activity/Exercise    1. Exercises are not necessary at this stage, and you will be given exercises at your first post operative visit  2. You are in a splint and should remain in this until your first postoperative visit  3. Please keep ice applied to the wrist for the first 72 hours or as long as pain or swelling persist. Do not apply ice directly to skin, or allow water to leak on your dressing    Dressings/Shower    1. Please keep dressing dry  2. If you have had arthroscopy, you may expect some drainage  3.  Please reinforce your dressing with a dry sterile dressing  4. Your dressing will be removed at your first post operative visit  5. You may not shower until after your first post operative visit    Emergency/Follow-Up    1. Please notify my office at [phone] if you develop any fever (101° or above), unexpected warmth, redness or swelling in your hand. Please call if your fingers become cold, purple, numb, or there is excessive bleeding  2. Please call the office within 24 hours at [phone] (ext. [phone]) to schedule a follow up appointment next week  Please call the office before 3pm on Friday if you do not have enough pain medicine for the weekend. Narcotic pain medication cannot be called into your pharmacy and the prescription must be picked up at our office. TO PREVENT AN INFECTION      1. [address] YOUR HANDS     To prevent infection, good handwashing is the most important thing you or your caregiver can do.  Wash your hands with soap and water or use the hand  we gave you before you touch any wounds. 2. SHOWER     Use the antibacterial soap we gave you when you take a shower.  Shower with this soap until your wounds are healed.  To reach all areas of your body, you may need someone to help you.  Dont forget to clean your belly button with every shower. 3.  USE CLEAN SHEETS     Use freshly cleaned sheets on your bed after surgery.  To keep the surgery site clean, do not allow pets to sleep with you while your wound is still healing. 4. STOP SMOKING     Stop smoking, or at least cut back on smoking     Smoking slows your healing. 5.  CONTROL YOUR BLOOD SUGAR     High blood sugars slow wound healing. If you are diabetic, control your blood sugar levels before and after your surgery. [place] THROMBOSIS AND PULMONARY EMBOLUS    SURGICAL PATIENTS  Surgical patients are the #1 risk for DVT and PE. WHAT IS DVT?  WHAT IS PE?  DVT is a serious condition where blood clots develop deep in the veins of the legs. PE occurs when a blood clot breaks loose from the wall of a vein and travels to the lungs blocking the pulmonary artery or one of its branches impairing blood flow from the heart, which could result in death. RISK FACTORS  Surgery lasting longer than 45 minutes  History of inflammatory bowel disease  Oral contraceptive or hormone replacement therapy  Immobilization  Varicose veins / swollen legs  Smoking   CHF / Acute MI / Irregular heart beat  Family history of thrombosis  General anesthesia greater than 2 hours  Obesity  Infection of less than one month  Less than 1 month postpartum  COPD / Pneumonia  Arthroscopic surgery  Malignancy / cancer  Spine surgery  Blood abnormalities  Stroke / Paralysis / Coma    SIGNS AND SYMPTOMS OF DEEP VEIN TROMBOSIS   -  ER VISIT  Usually occurs in one leg, above or below the knee  Swelling - one calf or thigh may be larger than the other  Feeling increased warmth in the area of the leg that is swollen or painful  Leg pain, which may increase when standing or walking  Swelling along the vein of the leg  When swollen areas is pressed with a finger, a depression may remain  Tenderness of the leg that may be confined to one area  Change in leg color (bluish or red)    SIGNS AND SYMPTOMS OF PULMONARY EMBOLUS  - 911 CALL  Chest pain that gets worse with deep breath, coughing or chest movement  Coughing up blood  Sweating  Shortness of breath or difficulty breathing  Rapid heart beat  Lightheadedness    HOW TO REDUCE THE POSSIBLE RISK OF DVT  Exercise - simple activities as rotating ankles and wrists, wiggling toes and fingers, tightening and relaxing muscles in calves and thighs promotes circulation while recovering from surgery. Please do these exercises every hour during waking hours  DILCIA hose - If you have been given white support hose while having surgery, wear them home.  You may remove them for half an hour every 8-hour period and stop wearing them 48 hours after surgery or as prescribed by your physician. DILCIA hose may be reused for air travel or extended car travel  Take mediation as prescribed by your physician (Lovenox, Coumadin, Aspirin)  Resume your normal activities as soon as your doctor advises you to do so. Remember, when traveling, to [place] your legs frequently. Wear non skid shoes when DILCIA hose are on. They are very slippery! PATIENTS WHO BELIEVE THEY MAY BE EXPERIENCING SIGNS AND SYMPTOMS OF DVT OR PE SHOULD SEEK MEDICAL HELP IMMEDIATELY           DO NOT TAKE TYLENOL/ACETAMINOPHEN WITH PERCOCET, [address], [address] OR [place]. TAKE NARCOTIC PAIN MEDICATIONS WITH FOOD! For the night of surgery, while block is still in effect, start with 1 pain pill at bedtime    Narcotics tend to be constipating and we recommend taking a stool softener such as Colace or Miralax (follow package instructions). If you were given prescriptions, please review the written information on the prescribed medications. DO NOT DRIVE WHILE TAKING NARCOTIC PAIN MEDICATIONS. CPAP PATIENTS BE SURE TO WEAR MACHINE WHENEVER NAPPING OR SLEEPING DAY/NIGHT OF SURGERY! DISCHARGE SUMMARY from Nurse    The following personal items collected during your admission are returned to you:   Dental Appliance: Dental Appliances: Lowers, Uppers, At home  Vision: Visual Aid: Glasses, With patient  Hearing Aid:    Jewelry: Jewelry: None  Clothing: Clothing: With patient  Other Valuables: Other Valuables: Eyeglasses, With patient  Valuables sent to safe:        PATIENT INSTRUCTIONS:    After General Anesthesia or Intravenous Sedation, for 24 hours or while taking prescription Narcotics:  Someone should be with you for the next 24 hours. For your own safety, a responsible adult must drive you home. Limit your activities  Recommended activity: Rest today, up with assistance today.  Do not climb stairs or shower unattended for the next 24 hours.  Start with a soft bland diet and advance as tolerated (no nausea) to regular diet. If you have a sore throat some things that may help are: fluids, warm salt water gargle, or throat lozenges. If this does not improve after several days please follow up with your family physician. Do not drive and operate hazardous machinery  Do not make important personal or business decisions  Do  not drink alcoholic beverages  If you have not urinated within 8 hours after discharge, please contact your surgeon on call. Report the following to your surgeon:  Excessive pain, swelling, redness or odor of or around the surgical area  Temperature over 100.5  Nausea and vomiting lasting longer than 4 hours or if unable to take medications  Any signs of decreased circulation or nerve impairment to extremity: change in color, persistent  numbness, tingling, coldness or increase pain    If you received an upper extremity nerve block, please wear your sling until the block has worn off, then refer to your surgeons post-operative instructions. If you have had a shoulder block or a block near your collar bone, you may have              symptoms such as:          1. Mild shortness of breath        2. A hoarse voice        3. Blurry vision        4. Unequal pupils        5. Drooping of your face on the same side as the nerve block. These symptoms will disappear as the nerve block wears off. If you received a lower extremity nerve block, please use your crutches, walker, or cane until the nerve block has worn off, then refer to your surgeons post-operative instructions. You will receive a Post Operative Call from one of the Recovery Room Nurses on the day after your surgery to check on you. It is very important for us to know how you are recovering after your surgery. If you have an issue please call your surgeon, do not wait for the post operative call.     You may receive an e-mail or letter in the mail from [facility] regarding your experience with us in the Ambulatory Surgery Unit. Your feedback is valuable to us and we appreciate your participation in the survey. If the above instructions are not adequate or you are having problems after your surgery, call your physician at their office number. We wish youre a speedy recovery ? What to do at Home:    *  Please give a list of your current medications to your Primary Care Provider. *  Please update this list whenever your medications are discontinued, doses are      changed, or new medications (including over-the-counter products) are added. *  Please carry medication information at all times in case of emergency situations. If you have not had your influenza or pneumococcal vaccines, please follow up with your primary care physician. The discharge information has been reviewed with the patient and caregiver. The patient and caregiver verbalized understanding. Patient Education        Learning About Coronavirus [phone])  What is coronavirus (COVID-19)? COVID-19 is a disease caused by a new type of coronavirus. This illness was first found in December 2019. It has since spread worldwide. Coronaviruses are a large group of viruses. They cause the common cold. They also cause more serious illnesses like [place] respiratory syndrome (MERS) and severe acute respiratory syndrome (SARS). COVID-19 is caused by a novel coronavirus. That means it's a new type that has not been seen in people before. What are the symptoms? Coronavirus (COVID-19) symptoms may include:  Fever. Cough. Trouble breathing. Chills or repeated shaking with chills. Muscle pain. Headache. Sore throat. New loss of taste or smell. Vomiting. Diarrhea. In severe cases, COVID-19 can cause pneumonia and make it hard to breathe without help from a machine. It can cause death. How is it diagnosed?   COVID-19 is diagnosed with a viral test. This may also be called a PCR test or antigen test. It looks for evidence of the virus in your breathing passages or lungs (respiratory system). The test is most often done on a sample from the nose, throat, or lungs. It's sometimes done on a sample of saliva. One way a sample is collected is by putting a long swab into the back of your nose. How is it treated? Mild cases of COVID-19 can be treated at home. Serious cases need treatment in the hospital. Treatment may include medicines to reduce symptoms, plus breathing support such as oxygen therapy or a ventilator. Some people may be placed on their belly to help their oxygen levels. Treatments that may help people who have COVID-19 include:  Antiviral medicines. These medicines treat viral infections. Remdesivir is an example. Immune-based therapy. These medicines help the immune system fight COVID-19. One example is bamlanivimab. It's a monoclonal antibody. Blood thinners. These medicines help prevent blood clots. People with severe illness are at risk for blood clots. How can you protect yourself and others? The best way to protect yourself from getting sick is to: Avoid areas where there is an outbreak. Avoid contact with people who may be infected. Avoid crowds and try to stay at least 6 feet away from other people. Wash your hands often, especially after you cough or sneeze. Use soap and water, and scrub for at least 20 seconds. If soap and water aren't available, use an alcohol-based hand . Avoid touching your mouth, nose, and eyes. To help avoid spreading the virus to others:  Stay home if you are sick or have been exposed to the virus. Don't go to school, work, or public areas. And don't use public transportation, ride-shares, or taxis unless you have no choice. Wear a cloth face cover if you have to go to public areas. Cover your mouth with a tissue when you cough or sneeze.  Then throw the tissue in the trash and wash your hands right away. If you're sick:  Leave your home only if you need to get medical care. But call the doctor's office first so they know you're coming. And wear a face cover. Wear the face cover whenever you're around other people. It can help stop the spread of the virus when you cough or sneeze. Limit contact with pets and people in your home. If possible, stay in a separate bedroom and use a separate bathroom. Clean and disinfect your home every day. Use household  and disinfectant wipes or sprays. Take special care to clean things that you grab with your hands. These include doorknobs, remote controls, phones, and handles on your refrigerator and microwave. And don't forget countertops, tabletops, bathrooms, and computer keyboards. When should you call for help? Call 911 anytime you think you may need emergency care. For example, call if you have life-threatening symptoms, such as:    You have severe trouble breathing. (You can't talk at all.)     You have constant chest pain or pressure.     You are severely dizzy or lightheaded.     You are confused or can't think clearly.     Your face and lips have a blue color.     You pass out (lose consciousness) or are very hard to wake up. Call your doctor now or seek immediate medical care if:    You have moderate trouble breathing. (You can't speak a full sentence.)     You are coughing up blood (more than about 1 teaspoon).     You have signs of low blood pressure. These include feeling lightheaded; being too weak to stand; and having cold, pale, clammy skin. Watch closely for changes in your health, and be sure to contact your doctor if:    Your symptoms get worse.     You are not getting better as expected. Call before you go to the doctor's office. Follow their instructions. And wear a cloth face cover. Current as of: December 18, 2020               Content Version: 12.7  © 7510-4553 [employer].    Care instructions adapted under license by [address] (which disclaims liability or warranty for this information). If you have questions about a medical condition or this instruction, always ask your healthcare professional. [address] any warranty or liability for your use of this information.

## 2021-02-24 NOTE — PERIOP NOTES
Dr. Katz performed nerve block in preop using ultrasound machine to LUE. Pt on CM x3, 02 by NC at 3L, patient responsive when spoken to. Able to answer questions appropriately. Pt did receive Versed 1 mg IV given by Dr. Katz for sedation. Pt tolerated procedure well. VSS and will continue to monitor.

## 2021-02-24 NOTE — PERIOP NOTES
Falguni Dwyer  1950  [id number]    Situation:  Verbal report given from: SANTANA Roberts RN and DAMION Christensen CRNA  Procedure: Procedure(s):  LEFT FUSION LEFT MIDDLE AND INDEX FINGERS    Background:    Preoperative diagnosis: ARTHRITIS    Postoperative diagnosis: ARTHRITIS    :  Dr. Peg Florez    Assistant(s): Circ-1: Rozelle Schlatter, RN  Circ-Relief: Samaria Conner  Scrub Tech-1: Farnaz Agosto    Specimens: * No specimens in log *    Assessment:  Intra-procedure medications         Anesthesia gave intra-procedure sedation and medications, see anesthesia flow sheet     Intravenous fluids: LR@ KVO     Vital signs stable       Recommendation:    Permission to share finding with  Lincoln Devinealba : yes

## 2021-02-24 NOTE — ANESTHESIA POSTPROCEDURE EVALUATION
Procedure(s):  LEFT FUSION LEFT MIDDLE AND INDEX FINGERS. regional, MAC    Anesthesia Post Evaluation        Patient location during evaluation: PACU  Note status: Adequate. Level of consciousness: responsive to verbal stimuli and sleepy but conscious  Pain management: satisfactory to patient  Airway patency: patent  Anesthetic complications: no  Cardiovascular status: acceptable  Respiratory status: acceptable  Hydration status: acceptable  Comments: +Post-Anesthesia Evaluation and Assessment    Patient: Delmi Denney MRN: [id number]  SSN: [id number]   YOB: 1950  Age: 70 y.o. Sex: female      Cardiovascular Function/Vital Signs    BP (!) 128/96   Pulse 70   Temp 36.1 °C (97 °F)   Resp 22   Ht 5' 6\" (1.676 m)   Wt 101.2 kg (223 lb)   SpO2 99%   BMI 35.99 kg/m²     Patient is status post Procedure(s):  LEFT FUSION LEFT MIDDLE AND INDEX FINGERS. Nausea/Vomiting: Controlled. Postoperative hydration reviewed and adequate. Pain:  Pain Scale 1: Numeric (0 - 10) (02/24/21 1132)  Pain Intensity 1: 4 (02/24/21 1132)   Managed. Neurological Status:   Neuro (WDL): Within Defined Limits (02/24/21 1124)   At baseline. Mental Status and Level of Consciousness: Arousable. Pulmonary Status:   O2 Device: Room air (02/24/21 1441)   Adequate oxygenation and airway patent. Complications related to anesthesia: None    Post-anesthesia assessment completed. No concerns. Signed By: Katt Bolivar MD    2/24/2021  Post anesthesia nausea and vomiting:  controlled      INITIAL Post-op Vital signs:   Vitals Value Taken Time   /96 02/24/21 1441   Temp 36.1 °C (97 °F) 02/24/21 1433   Pulse 64 02/24/21 1443   Resp 20 02/24/21 1443   SpO2 99 % 02/24/21 1443   Vitals shown include unvalidated device data.

## 2021-02-24 NOTE — BRIEF OP NOTE
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 Procedure(s):  LEFT FUSION LEFT MIDDLE AND INDEX DIP JOInts / FINGERS    Surgeon(s):  Juana Miner MD    Surgical Assistant: None    Anesthesia: Regional     Estimated Blood Loss (mL): Minimal    Complications: None    Specimens: * No specimens in log *     Implants:   Implant Name Type Inv.  Item Serial No.  Lot No. LRB No. Used Action   SCREW BNE L30MM WRST HND ANK FT TI FULL THRD HDLSS BRKWY - SN/A  SCREW BNE L30MM WRST HND ANK FT TI FULL THRD HDLSS BRKWY N/A [employer] [facility] [id number] Left 1 Implanted   SCREW BNE L28MM TI WRST HND ANK FT COMPR FULL THRD HDLSS - SN/A  SCREW BNE L28MM TI WRST HND ANK FT COMPR FULL THRD HDLSS N/A [employer] [facility] [id number] Left 1 Implanted       Drains: * No LDAs found *    Findings: DJD left inde and middle DIP joints    Electronically Signed by Ana Paula Champion MD on 2/24/2021 at 2:31 PM

## 2021-02-24 NOTE — PERIOP NOTES
Permission received to review discharge instructions and discuss private health information with Michael Luque, .

## 2021-02-25 NOTE — OP NOTES
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 PROCEDURE PERFORMED:  Left index and middle finger distal interphalangeal joint arthrodesis. SURGEON:  Tom Reina MD    ASSISTANT:  There were no assistants. ANESTHESIA:  Regional nerve block and sedation. COMPLICATIONS:  No complications. SPECIMENS REMOVED:  No specimens. IMPLANTS:  A 26 mm Acutrak twist screw index DIP joint and 30 mm in middle finger DIP joint. ESTIMATED BLOOD LOSS:  Less than 5 mL. DRAINS:  There were no drains placed. TOURNIQUET TIME:  51 minutes at 250 mmHg in the left arm. INDICATIONS:  The patient is a 70-year-old right hand dominant woman with osteoarthritis involving the left index and middle finger distal interphalangeal joint that has failed conservative management and today she elected to undergo fusion for arthrodesis of the left index and middle DIP joints. REPORT OF OPERATION:  The patient was identified, taken into the operating room, and placed supine on the operating room table. She received regional nerve block and sedation by Anesthesia. Her left upper extremity was prepped and draped sterilely. After Esmarch exsanguination, the tourniquet was inflated to 250 mmHg. Two separate dorsal curvilinear incisions were made to expose the DIP joint of the left index and middle fingers. Skin flaps were elevated. The extensor tendons were transected transversely across the DIP joints, the collateral ligament were released radially and ulnarly off the middle phalangeal head. The joints were flexed. There was marked arthrosis present.   The arthritis was removed in a cup and cone manner with the rongeur from the head of the middle phalanx and the base of the distal phalanx to expose bleeding cancellous bone edges. Utilizing the Acumed AcuTwist fusion system, a 0.045 guide pin was passed proximal, retrograde essentially in each middle phalanx and then distal antegrade essentially in each distal phalanx and checked in good position separately under FluroScan guidance before they were passed temporarily as transarticular pins. The distal phalanx canal size was so small to both fingers it prevented use of a standard Acutrak fusion screw and thus AcuTwist screws were utilized. After measuring and reaming distally, a 26 mm Acutrak fusion screw was inserted with good position and alignment with slight purposeful radial angulation to offset ulnar angulation of the PIP joint with excellent compression and tucked in good position under FluoroScan guidance. A 30-mm Acutrak fusion screw was inserted centrally in the distal phalanx with excellent position and alignment. There were solid overall clinical fusions of each seen intraoperatively. Each screw was broken off as the screw had just passed through the distal tuft of the distal phalanx. Final FluoroScan images were obtained. The wounds were irrigated with saline, then closed with 4-0 nylon simple suture after trimming some of the excess skin. Soft dressings were applied. The tourniquet was deflated. The hand was pink and had a good refill. She was transported into the recovery room postoperatively in good condition.       MD KANDIS Preston/[id number]  D:  02/24/2021 14:31  T:  02/24/2021 20:02  JOB #:  [id number]

## 2021-04-01 ENCOUNTER — TRANSCRIBE ORDER (OUTPATIENT)
Dept: SCHEDULING | Age: 71
End: 2021-04-01

## 2021-04-01 DIAGNOSIS — K57.92 DIVERTICULITIS: Primary | ICD-10-CM

## 2021-04-14 ENCOUNTER — HOSPITAL ENCOUNTER (OUTPATIENT)
Dept: CT IMAGING | Age: 71
Discharge: HOME | End: 2021-04-14
Attending: FAMILY MEDICINE
Payer: MEDICARE

## 2021-04-14 DIAGNOSIS — K57.92 DIVERTICULITIS: ICD-10-CM

## 2021-04-14 LAB — CREAT BLD-MCNC: 1.1 MG/DL (ref 0.6–1.3)

## 2021-04-14 PROCEDURE — 74011000250 HC RX REV CODE- 250

## 2021-04-14 PROCEDURE — 74177 CT ABD & PELVIS W/CONTRAST: CPT

## 2021-04-14 PROCEDURE — 82565 ASSAY OF CREATININE: CPT

## 2021-04-14 PROCEDURE — 74011000636 HC RX REV CODE- 636

## 2021-04-14 RX ORDER — BARIUM SULFATE 20 MG/ML
900 SUSPENSION ORAL
Status: COMPLETED | OUTPATIENT
Start: 2021-04-14 | End: 2021-04-14

## 2021-04-14 RX ADMIN — BARIUM SULFATE 900 ML: 20 SUSPENSION ORAL at 08:00

## 2021-04-14 RX ADMIN — IOPAMIDOL 100 ML: 755 INJECTION, SOLUTION INTRAVENOUS at 08:24

## 2021-04-24 ENCOUNTER — HOSPITAL ENCOUNTER (OUTPATIENT)
Dept: PREADMISSION TESTING | Age: 71
Discharge: HOME | End: 2021-04-24
Payer: MEDICARE

## 2021-04-24 LAB — SARS-COV-2, COV2: NORMAL

## 2021-04-24 PROCEDURE — U0003 INFECTIOUS AGENT DETECTION BY NUCLEIC ACID (DNA OR RNA); SEVERE ACUTE RESPIRATORY SYNDROME CORONAVIRUS 2 (SARS-COV-2) (CORONAVIRUS DISEASE [COVID-19]), AMPLIFIED PROBE TECHNIQUE, MAKING USE OF HIGH THROUGHPUT TECHNOLOGIES AS DESCRIBED BY CMS-2020-01-R: HCPCS

## 2021-04-25 LAB — SARS-COV-2, COV2NT: NOT DETECTED

## 2021-04-28 ENCOUNTER — ANESTHESIA (OUTPATIENT)
Dept: ENDOSCOPY | Age: 71
End: 2021-04-28
Payer: MEDICARE

## 2021-04-28 ENCOUNTER — HOSPITAL ENCOUNTER (OUTPATIENT)
Age: 71
Setting detail: OUTPATIENT SURGERY
Discharge: HOME | End: 2021-04-28
Attending: SPECIALIST | Admitting: SPECIALIST
Payer: MEDICARE

## 2021-04-28 ENCOUNTER — ANESTHESIA EVENT (OUTPATIENT)
Dept: ENDOSCOPY | Age: 71
End: 2021-04-28
Payer: MEDICARE

## 2021-04-28 VITALS
HEART RATE: 66 BPM | OXYGEN SATURATION: 97 % | BODY MASS INDEX: 36.36 KG/M2 | SYSTOLIC BLOOD PRESSURE: 135 MMHG | HEIGHT: 66 IN | WEIGHT: 226.25 LBS | TEMPERATURE: 97.8 F | RESPIRATION RATE: 18 BRPM | DIASTOLIC BLOOD PRESSURE: 61 MMHG

## 2021-04-28 PROCEDURE — 88305 TISSUE EXAM BY PATHOLOGIST: CPT

## 2021-04-28 PROCEDURE — 77030039825 HC MSK NSL PAP SUPERNO2VA VYRM -B: Performed by: NURSE ANESTHETIST, CERTIFIED REGISTERED

## 2021-04-28 PROCEDURE — 74011250636 HC RX REV CODE- 250/636: Performed by: NURSE ANESTHETIST, CERTIFIED REGISTERED

## 2021-04-28 PROCEDURE — 74011000250 HC RX REV CODE- 250: Performed by: NURSE ANESTHETIST, CERTIFIED REGISTERED

## 2021-04-28 PROCEDURE — 77030018712 HC DEV BLLN INFL BSC -B: Performed by: SPECIALIST

## 2021-04-28 PROCEDURE — 76060000031 HC ANESTHESIA FIRST 0.5 HR: Performed by: SPECIALIST

## 2021-04-28 PROCEDURE — 88342 IMHCHEM/IMCYTCHM 1ST ANTB: CPT

## 2021-04-28 PROCEDURE — 74011250636 HC RX REV CODE- 250/636: Performed by: SPECIALIST

## 2021-04-28 PROCEDURE — 2709999900 HC NON-CHARGEABLE SUPPLY: Performed by: SPECIALIST

## 2021-04-28 PROCEDURE — 74011000258 HC RX REV CODE- 258: Performed by: NURSE ANESTHETIST, CERTIFIED REGISTERED

## 2021-04-28 PROCEDURE — 77030019988 HC FCPS ENDOSC DISP BSC -B: Performed by: SPECIALIST

## 2021-04-28 PROCEDURE — 76040000019: Performed by: SPECIALIST

## 2021-04-28 RX ORDER — LIDOCAINE HYDROCHLORIDE 20 MG/ML
INJECTION, SOLUTION EPIDURAL; INFILTRATION; INTRACAUDAL; PERINEURAL AS NEEDED
Status: DISCONTINUED | OUTPATIENT
Start: 2021-04-28 | End: 2021-04-28 | Stop reason: HOSPADM

## 2021-04-28 RX ORDER — PROPOFOL 10 MG/ML
INJECTION, EMULSION INTRAVENOUS AS NEEDED
Status: DISCONTINUED | OUTPATIENT
Start: 2021-04-28 | End: 2021-04-28 | Stop reason: HOSPADM

## 2021-04-28 RX ORDER — SODIUM CHLORIDE 0.9 % (FLUSH) 0.9 %
5-40 SYRINGE (ML) INJECTION EVERY 8 HOURS
Status: DISCONTINUED | OUTPATIENT
Start: 2021-04-28 | End: 2021-04-28 | Stop reason: HOSPADM

## 2021-04-28 RX ORDER — SODIUM CHLORIDE 9 MG/ML
50 INJECTION, SOLUTION INTRAVENOUS CONTINUOUS
Status: DISCONTINUED | OUTPATIENT
Start: 2021-04-28 | End: 2021-04-28 | Stop reason: HOSPADM

## 2021-04-28 RX ORDER — DEXTROMETHORPHAN/PSEUDOEPHED 2.5-7.5/.8
1.2 DROPS ORAL
Status: DISCONTINUED | OUTPATIENT
Start: 2021-04-28 | End: 2021-04-28 | Stop reason: HOSPADM

## 2021-04-28 RX ORDER — GLYCOPYRROLATE 0.2 MG/ML
INJECTION INTRAMUSCULAR; INTRAVENOUS AS NEEDED
Status: DISCONTINUED | OUTPATIENT
Start: 2021-04-28 | End: 2021-04-28 | Stop reason: HOSPADM

## 2021-04-28 RX ORDER — SODIUM CHLORIDE 0.9 % (FLUSH) 0.9 %
5-40 SYRINGE (ML) INJECTION AS NEEDED
Status: DISCONTINUED | OUTPATIENT
Start: 2021-04-28 | End: 2021-04-28 | Stop reason: HOSPADM

## 2021-04-28 RX ORDER — PHENYLEPHRINE HCL IN 0.9% NACL 0.4MG/10ML
SYRINGE (ML) INTRAVENOUS AS NEEDED
Status: DISCONTINUED | OUTPATIENT
Start: 2021-04-28 | End: 2021-04-28 | Stop reason: HOSPADM

## 2021-04-28 RX ADMIN — PROPOFOL 20 MG: 10 INJECTION, EMULSION INTRAVENOUS at 10:06

## 2021-04-28 RX ADMIN — PROPOFOL 50 MG: 10 INJECTION, EMULSION INTRAVENOUS at 09:58

## 2021-04-28 RX ADMIN — Medication 120 MCG: at 10:11

## 2021-04-28 RX ADMIN — PROPOFOL 50 MG: 10 INJECTION, EMULSION INTRAVENOUS at 10:02

## 2021-04-28 RX ADMIN — DEXMEDETOMIDINE HYDROCHLORIDE 6 MCG: 100 INJECTION, SOLUTION, CONCENTRATE INTRAVENOUS at 10:02

## 2021-04-28 RX ADMIN — PROPOFOL 80 MG: 10 INJECTION, EMULSION INTRAVENOUS at 09:56

## 2021-04-28 RX ADMIN — LIDOCAINE HYDROCHLORIDE 40 MG: 20 INJECTION, SOLUTION EPIDURAL; INFILTRATION; INTRACAUDAL; PERINEURAL at 09:56

## 2021-04-28 RX ADMIN — SODIUM CHLORIDE 50 ML/HR: 900 INJECTION, SOLUTION INTRAVENOUS at 09:47

## 2021-04-28 RX ADMIN — GLYCOPYRROLATE 0.2 MG: 0.2 INJECTION, SOLUTION INTRAMUSCULAR; INTRAVENOUS at 10:15

## 2021-04-28 RX ADMIN — DEXMEDETOMIDINE HYDROCHLORIDE 8 MCG: 100 INJECTION, SOLUTION, CONCENTRATE INTRAVENOUS at 09:58

## 2021-04-28 NOTE — DISCHARGE INSTRUCTIONS
Phan Dan  [id number]  1950    EGD DISCHARGE INSTRUCTIONS  Discomfort:  Sore throat- throat lozenges or warm salt water gargle  redness at IV site- apply warm compress to area; if redness or soreness persist- contact your physician  Gaseous discomfort- walking, belching will help relieve any discomfort  You may not operate a vehicle for 12 hours  You may not engage in an occupation involving machinery or appliances for rest of today. You may not drink alcoholic beverages for at least 12 hours  Avoid making any critical decisions for at least 24 hour  DIET  You may resume your regular diet - however -  remember your colon is empty and a heavy meal will produce gas. Avoid these foods:  vegetables, fried / greasy foods, carbonated drinks  MEDICATIONS        Regarding Aspirin or Nonsteroidal medications specifically, please see below. ACTIVITY  You may resume your normal daily activities. Spend the remainder of the day resting -  avoid any strenuous activity. CALL M.D. ANY SIGN OF   Increasing pain, nausea, vomiting  Abdominal distension (swelling)  New increased bleeding (oral or rectal)  Fever (chills)  Pain in chest area  Bloody discharge from nose or mouth  Shortness of breath    ONLY  Tylenol as needed for pain.     Follow-up Instructions:   Call Dr. Yuniel Edmond for results of procedure / biopsy in 4-5 days at Telephone #  [phone]              Make a follow up visit with Dr. Yuniel Edmond

## 2021-04-28 NOTE — ANESTHESIA POSTPROCEDURE EVALUATION
Procedure(s):  UPPER ENODSCOPY WITH SAVARY DILATION  ESOPHAGOGASTRODUODENAL (EGD) BIOPSY  ESOPHAGEAL DILATION. MAC, total IV anesthesia    Anesthesia Post Evaluation      Multimodal analgesia: multimodal analgesia used between 6 hours prior to anesthesia start to PACU discharge  Patient location during evaluation: bedside  Patient participation: complete - patient participated  Level of consciousness: awake  Pain management: adequate  Airway patency: patent  Anesthetic complications: no  Cardiovascular status: acceptable  Respiratory status: acceptable  Hydration status: acceptable  Post anesthesia nausea and vomiting:  controlled  Final Post Anesthesia Temperature Assessment:  Normothermia (36.0-37.5 degrees C)      INITIAL Post-op Vital signs:   Vitals Value Taken Time   /59 04/28/21 1027   Temp 36.6 °C (97.8 °F) 04/28/21 1022   Pulse 69 04/28/21 1030   Resp 22 04/28/21 1030   SpO2 97 % 04/28/21 1030   Vitals shown include unvalidated device data.

## 2021-04-28 NOTE — ANESTHESIA PREPROCEDURE EVALUATION
Anesthetic History     PONV and history of awareness of surgery under anesthesia     Comments: Hx of awareness under anesthesia during hysterectomy     Review of Systems / Medical History  Patient summary reviewed, nursing notes reviewed and pertinent labs reviewed    Pulmonary        Sleep apnea: CPAP  Shortness of breath  Asthma        Neuro/Psych             Comments: Chronic pain (G89.29)  Cervical stenosis of spinal canal  Claustrophobia Cardiovascular    Hypertension        Dysrhythmias   Hyperlipidemia    Exercise tolerance: >4 METS  Comments: Pulmonary HTN  55-60% EF by ECHO 5/2020  No obstructions by cardiac cath    Venous insufficiency of left leg   GI/Hepatic/Renal     GERD: well controlled           Endo/Other        Obesity, arthritis and anemia  Pertinent negatives: No morbid obesity   Other Findings   Comments: CREST (calcinosis, Raynaud's phenomenon, esophageal dysfunction, sclerodactyly, telangiectasia) ([facility]) (M34.1)    Grade 1 or 2 view for ortho surgeries           Physical Exam    Airway  Mallampati: II  TM Distance: 4 - 6 cm  Neck ROM: normal range of motion        Cardiovascular    Rhythm: regular  Rate: normal         Dental    Dentition: Edentulous     Pulmonary  Breath sounds clear to auscultation               Abdominal  GI exam deferred       Other Findings            Anesthetic Plan    ASA: 3  Anesthesia type: MAC and total IV anesthesia          Induction: Intravenous  Anesthetic plan and risks discussed with: Patient

## 2021-04-28 NOTE — PROGRESS NOTES
Endoscope was pre-cleaned at the bedside immediately following procedure by [facility]. For medications administered by anesthesia, see anesthesia chart.

## 2021-04-28 NOTE — H&P
Gastroenterology Outpatient History and Physical    Patient: Monica Diaz    Physician: Jenny Wallace MD    Vital Signs: Blood pressure (!) 160/61, pulse 63, temperature 98 °F (36.7 °C), resp. rate 11, height 5' 6\" (1.676 m), weight 102.6 kg (226 lb 4 oz), SpO2 98 %. Allergies: Allergies   Allergen Reactions    Ciprofloxacin Shortness of Breath    Flagyl [Metronidazole] Shortness of Breath    Percocet [Oxycodone-Acetaminophen] Rash and Itching     Able to take if uses benadryl    Benzene Other (comments)     Blisters and burn area Benzene found to be on steri-strips    Betadine [Povidone-Iodine] Other (comments)     Blisters and burning    Naproxen Itching    Sulfa (Sulfonamide Antibiotics) Rash    Adhesive Rash     Redness and rash       Chief Complaint: Epigastric pain, Dysphagia    History of Present Illness: 69 yo WF for EGD with dilation.     Justification for Procedure: above    History:  Past Medical History:   Diagnosis Date    Arrhythmia     h/o palpitations normal work up 22/2015 heart: Ronni    Arthritis     Awareness under anesthesia     with hysterectomy    Chronic pain     bulging disc c4/c5 l4/l5 : as of 9/1018 no neck/head movement limitation says patient    Claustrophobia     Color blind     CREST (calcinosis, Raynaud's phenomenon, esophageal dysfunction, sclerodactyly, telangiectasia) ([address] 2018    [facility], Dr. Phyllis Bass GERD (gastroesophageal reflux disease)     Gout     Hypertension     Leg swelling 2016    unknown etiology    Bartlett's neuralgia 2001    from neuroma middle toe, left foot    Nausea & vomiting     KRYSTINA on CPAP     CPAP    Pulmonary hypertension ([address] 08/2018    Heart: Dr. Kenzie Swartz    Seasonal asthma     allergy    Shortness of breath 2016    Pulmonary Dr. Markus Ramsey    Unspecified adverse effect of anesthesia     wakes up for anesthesia early      Past Surgical History:   Procedure Laterality Date    COLONOSCOPY  04/20/2011    negative  COLONOSCOPY N/A 9/28/2018    COLONOSCOPY performed by Lee Montana MD at [facility] CATARACT REMOVAL Bilateral 2018    HX CERVICAL FUSION  2014    c4-5     HX CHOLECYSTECTOMY      HX GI  11/19/13    Rectocele repair with enterocele repair    HX HYSTERECTOMY      TOOK LEFT OVARY    HX KNEE ARTHROSCOPY Right 1990's    right knee partial meniscus     HX KNEE REPLACEMENT Right 2016    HX KNEE REPLACEMENT Left 2010, 2016    TKR    HX ORTHOPAEDIC  1973    ganglion cyst removed rt wrist    HX ORTHOPAEDIC  2013, 1992    bilateral CTR, and had ulna nerve release    HX ORTHOPAEDIC Right 2018    thumb and ring finger trigger release    HX ORTHOPAEDIC  2019    cervical fusion x2     HX ORTHOPAEDIC  2020    L4-L5    IR INJ SPINE THER SUBST LUM/SAC W IMG  12/19/2019    MI CARDIAC SURG PROCEDURE UNLIST  2015    no blockages, card cath   Georgianna Olszewski [place] CARDIAC SURG PROCEDURE UNLIST  05/2018    no blockages x 2 procedures      Social History     Socioeconomic History    Marital status:      Spouse name: Not on file    Number of children: Not on file    Years of education: Not on file    Highest education level: Not on file   Tobacco Use    Smoking status: Never Smoker    Smokeless tobacco: Never Used   Substance and Sexual Activity    Alcohol use: No    Drug use: No      Family History   Problem Relation Age of Onset    Heart Disease Mother     Hypertension Mother     Diabetes Mother     Cancer Mother         BREAST, LUNG    Breast Cancer Mother 61    Heart Disease Father     Hypertension Father     Thyroid Disease Father     Diabetes Brother     Psychiatric Disorder Son         STAINS, [place], [place] DEPRESSIVE    Anesth Problems Neg Hx        Medications:   Prior to Admission medications    Medication Sig Start Date End Date Taking?  Authorizing Provider   diphenhydrAMINE-acetaminophen (Tylenol PM Extra Strength)  mg tab Take 1 Tab by mouth nightly as needed. Yes Provider, Historical   calcium polycarbophiL (Fiber Laxative, ca polycarbo,) 625 mg tablet Take 625 mg by mouth daily. Yes Provider, Historical   famotidine (PEPCID) 20 mg tablet Take 40 mg by mouth Daily (before breakfast). Yes Provider, Historical   pravastatin (PRAVACHOL) 40 mg tablet Take 40 mg by mouth nightly. Yes Provider, Historical   carboxymethylcellulose sodium (THERATEARS OP) Apply  to eye as needed. Yes Provider, Historical   telmisartan (MICARDIS) 40 mg tablet Take 40 mg by mouth daily. Yes Provider, Historical   metoprolol tartrate (LOPRESSOR) 25 mg tablet TAKE ONE TABLET BY MOUTH TWICE DAILY 4/9/19  Yes Felicia Spence MD   isosorbide mononitrate ER (IMDUR) 30 mg tablet TAKE ONE-HALF (1/2) TABLET DAILY FOR RAYNAUDS PHENOMENON  Patient taking differently: Take 15 mg by mouth nightly. TAKE ONE-HALF (1/2) TABLET DAILY FOR RAYNAUDS PHENOMENON 4/9/19  Yes Felicia Spence MD   omeprazole (PRILOSEC) 20 mg capsule Take 20 mg by mouth daily. 10/26/18  Yes Provider, Historical   baclofen (LIORESAL) 10 mg tablet Take 10 mg by mouth nightly. 3/3/18  Yes Provider, Historical   multivitamin (ONE A DAY) tablet Take 1 Tab by mouth daily. Yes Provider, Historical   aspirin delayed-release 81 mg tablet Take 81 mg by mouth nightly. Yes Provider, Historical   acetaminophen (TYLENOL EXTRA STRENGTH) 500 mg tablet Take 1,000 mg by mouth every six (6) hours as needed for Pain. Yes Provider, Historical   allopurinol (ZYLOPRIM) 100 mg tablet Take 100 mg by mouth daily. Yes Provider, Historical   cetirizine (ZYRTEC) 10 mg tablet Take 10 mg by mouth daily. Yes Provider, Historical   montelukast (SINGULAIR) 10 mg tablet Take 10 mg by mouth nightly. Yes Provider, Historical   diphenhydrAMINE (BenadryL) 25 mg capsule Take 25 mg by mouth every six (6) hours as needed.     Provider, Historical       Physical Exam:   General: alert, no distress   HEENT: Head: Normocephalic, no lesions, without obvious abnormality.    Heart: regular rate and rhythm, S1, S2 normal, no murmur, click, rub or gallop   Lungs: chest clear, no wheezing, rales, normal symmetric air entry   Abdominal: soft, NT/ND+ BS   Neurological: Grossly normal   Extremities: extremities normal, atraumatic, no cyanosis or edema     Findings/Diagnosis: Dysphagia    Plan of Care/Planned Procedure: EGD with dilation

## 2021-04-28 NOTE — PROCEDURES
Esophagogastroduodenoscopy Procedure Note      Jackson Maldonado  1950  [id number]    Indication:  Dysphagia     Endoscopist: Susan Gage MD    Referring Provider:  Augusto Reveles MD    Sedation:  MAC anesthesia Propofol    Procedure Details:  After infomed consent was obtained for the procedure, with all risks and benefits of procedure explained the patient was taken to the endoscopy suite and placed in the left lateral decubitus position. Following sequential administration of sedation as per above, the endoscope was inserted into the mouth and advanced under direct vision to second portion of the duodenum. A careful inspection was made as the gastroscope was withdrawn, including a retroflexed view of the proximal stomach; findings and interventions are described below. Findings:     Esophagus:   + There was patent lumen with normal esophageal mucosa s/p empiric dilation with 57 FR Savary over wire.  + S/P Bx of Z line located at 37 cm from incisors as well as the Mid esophagus  + Small 2 cm hiatal hernia. Stomach:   + Mild antral erythema s/p Bx to r/o H pylori c/w nonerosive gastritis    Duodenum:   + Very mild erythema of folds in D2 s/p Bx c/w duodenitis ? nsaid induced. Therapies:   esophageal dilation with savary sizes 62 FR  biopsy of esophagus  biopsy of stomach antrum  biopsy of duodenal second portion    Specimen: Specimens were collected as described and send to the laboratory. Complications:   None were encountered during the procedure. EBL: <10 ml.           Recommendations:   -f/u path  -f/u with Dr. Rico Garcia  -continue current medications    Susan Gage MD  4/28/2021  10:12 AM

## 2021-04-28 NOTE — ROUTINE PROCESS
Samira Young  1950  [id number]    Situation:  Verbal report received from: Wing Newberry  Procedure: Procedure(s) with comments:  UPPER ENODSCOPY WITH SAVARY DILATION  ESOPHAGOGASTRODUODENAL (EGD) BIOPSY  ESOPHAGEAL DILATION - 62 fr savory    Background:    Preoperative diagnosis: EPIGASTRIC PAIN  HIATAL HERNIA  REFLUX  Postoperative diagnosis: Gastritis, Hiatal Hernia, Dysphasia    :  Dr. Amie Hooper  Assistant(s): Endoscopy Technician-1: Natasha Niño  Endoscopy RN-1: Yisel Gaspar    Specimens:   ID Type Source Tests Collected by Time Destination   1 : Duodenum Biopsy Preservative Duodenum  Katerine Barrios MD 4/28/2021 1007 Pathology   2 : Stomach Biopsy Preservative Stomach  Katerine Barrios MD 4/28/2021 1009 Pathology   3 : [place] Biopsy Preservative GE Junction  Katerine Barrios MD 4/28/2021 1010 Pathology   4 : Mid Esophagus Biopsy Preservative Esophagus, Mid  Katerine Barrios MD 4/28/2021 1011 Pathology     H. Pylori  no    Assessment:  Intra-procedure medications     Anesthesia gave intra-procedure sedation and medications, see anesthesia flow sheet yes    Intravenous fluids: NS@ KVO     Vital signs stable     Abdominal assessment: round and soft     No subcutaneous crepitus of the chest or cervical region was noted post procedure. Recommendation:  Discharge patient per MD order. Family or Friend   Permission to share finding with family or friend yes    Endoscopy discharge instructions have been reviewed and given to patient. The patient verbalized understanding and acceptance of instructions. Dr. Amie Hooper discussed with spouse procedure findings and next steps.

## 2021-05-07 ENCOUNTER — TRANSCRIBE ORDER (OUTPATIENT)
Dept: SCHEDULING | Age: 71
End: 2021-05-07

## 2021-05-07 DIAGNOSIS — R07.9 CHEST PAIN: Primary | ICD-10-CM

## 2021-05-13 ENCOUNTER — HOSPITAL ENCOUNTER (OUTPATIENT)
Dept: CT IMAGING | Age: 71
Discharge: HOME | End: 2021-05-13
Attending: SPECIALIST
Payer: MEDICARE

## 2021-05-13 DIAGNOSIS — R07.9 CHEST PAIN: ICD-10-CM

## 2021-05-13 PROCEDURE — 71260 CT THORAX DX C+: CPT

## 2021-05-13 PROCEDURE — 74011000636 HC RX REV CODE- 636: Performed by: SPECIALIST

## 2021-05-13 RX ADMIN — IOPAMIDOL 80 ML: 755 INJECTION, SOLUTION INTRAVENOUS at 07:24

## 2021-06-02 ENCOUNTER — TRANSCRIBE ORDER (OUTPATIENT)
Dept: SCHEDULING | Age: 71
End: 2021-06-02

## 2021-06-02 DIAGNOSIS — Z12.31 VISIT FOR SCREENING MAMMOGRAM: Primary | ICD-10-CM

## 2021-06-07 ENCOUNTER — TELEPHONE (OUTPATIENT)
Dept: CARDIOLOGY CLINIC | Age: 71
End: 2021-06-07

## 2021-06-07 ENCOUNTER — OFFICE VISIT (OUTPATIENT)
Dept: CARDIOLOGY CLINIC | Age: 71
End: 2021-06-07
Payer: MEDICARE

## 2021-06-07 VITALS
HEIGHT: 66 IN | WEIGHT: 224.6 LBS | BODY MASS INDEX: 36.1 KG/M2 | SYSTOLIC BLOOD PRESSURE: 120 MMHG | HEART RATE: 62 BPM | DIASTOLIC BLOOD PRESSURE: 62 MMHG | OXYGEN SATURATION: 98 % | RESPIRATION RATE: 18 BRPM

## 2021-06-07 DIAGNOSIS — Z99.89 OSA ON CPAP: ICD-10-CM

## 2021-06-07 DIAGNOSIS — I10 ESSENTIAL HYPERTENSION: Primary | ICD-10-CM

## 2021-06-07 DIAGNOSIS — G47.33 OSA ON CPAP: ICD-10-CM

## 2021-06-07 DIAGNOSIS — R07.89 ATYPICAL CHEST PAIN: ICD-10-CM

## 2021-06-07 DIAGNOSIS — R06.02 SOB (SHORTNESS OF BREATH): ICD-10-CM

## 2021-06-07 DIAGNOSIS — E78.2 MIXED HYPERLIPIDEMIA: ICD-10-CM

## 2021-06-07 PROCEDURE — G8536 NO DOC ELDER MAL SCRN: HCPCS | Performed by: INTERNAL MEDICINE

## 2021-06-07 PROCEDURE — 1100F PTFALLS ASSESS-DOCD GE2>/YR: CPT | Performed by: INTERNAL MEDICINE

## 2021-06-07 PROCEDURE — G8752 SYS BP LESS 140: HCPCS | Performed by: INTERNAL MEDICINE

## 2021-06-07 PROCEDURE — G0463 HOSPITAL OUTPT CLINIC VISIT: HCPCS | Performed by: INTERNAL MEDICINE

## 2021-06-07 PROCEDURE — 3288F FALL RISK ASSESSMENT DOCD: CPT | Performed by: INTERNAL MEDICINE

## 2021-06-07 PROCEDURE — 99214 OFFICE O/P EST MOD 30 MIN: CPT | Performed by: INTERNAL MEDICINE

## 2021-06-07 PROCEDURE — 3017F COLORECTAL CA SCREEN DOC REV: CPT | Performed by: INTERNAL MEDICINE

## 2021-06-07 PROCEDURE — 93005 ELECTROCARDIOGRAM TRACING: CPT | Performed by: INTERNAL MEDICINE

## 2021-06-07 PROCEDURE — 1090F PRES/ABSN URINE INCON ASSESS: CPT | Performed by: INTERNAL MEDICINE

## 2021-06-07 PROCEDURE — G8417 CALC BMI ABV UP PARAM F/U: HCPCS | Performed by: INTERNAL MEDICINE

## 2021-06-07 PROCEDURE — G9899 SCRN MAM PERF RSLTS DOC: HCPCS | Performed by: INTERNAL MEDICINE

## 2021-06-07 PROCEDURE — G8427 DOCREV CUR MEDS BY ELIG CLIN: HCPCS | Performed by: INTERNAL MEDICINE

## 2021-06-07 PROCEDURE — 93010 ELECTROCARDIOGRAM REPORT: CPT | Performed by: INTERNAL MEDICINE

## 2021-06-07 PROCEDURE — G8399 PT W/DXA RESULTS DOCUMENT: HCPCS | Performed by: INTERNAL MEDICINE

## 2021-06-07 PROCEDURE — G8754 DIAS BP LESS 90: HCPCS | Performed by: INTERNAL MEDICINE

## 2021-06-07 PROCEDURE — G8510 SCR DEP NEG, NO PLAN REQD: HCPCS | Performed by: INTERNAL MEDICINE

## 2021-06-07 NOTE — PROGRESS NOTES
Chief Complaint   Patient presents with    Hypertension     6 month follow up     Chest Pain     chest pressure that radiated to both arm - most recent was about a week ago     Shortness of Breath     upon exertion - worse the last few days related to no a/c      1. Have you been to the ER, urgent care clinic since your last visit? Hospitalized since your last visit? No     2. Have you seen or consulted any other health care providers outside of the [address] [place] [address] since your last visit? Include any pap smears or colon screening.   No

## 2021-06-07 NOTE — TELEPHONE ENCOUNTER
----- Message from Gian Schuler NP sent at 6/7/2021 11:36 AM EDT -----  Pls get labs from dr Alexandra Adams thanks      Called PCP office to get most recent labs/lipid panel faxed to us. She advised she would get that faxed over to us.

## 2021-06-07 NOTE — PROGRESS NOTES
[address], [address] [place] [address]  [phone]     Subjective:      Karen Palacios is a 70 y.o. female is here for routine f/u. Last seen by us  In 12/2020. She is s/p UGI with esophageal dilatation 4/28/2021 performed by Dr Kirk Richardson. Had chest CT done 5/13/2021 which showed increase in size of RUL nodule, she has f/u with Dr Jayjay Bledsoe end of this mos. Reports occasional sob and nonexertional chest discomfort which is sl better / improved after her esophageal dilatation. Dr Kirk Richardson also switched her PPI---will receive it tomorrow. The patient denies orthopnea, PND, LE edema, palpitations, syncope, or presyncope.        Patient Active Problem List    Diagnosis Date Noted    Localized primary osteoarthritis of left hand 02/24/2021    Spinal stenosis, lumbar 10/29/2020    Left chest pressure 04/27/2020    Cervical stenosis of spinal canal 03/09/2020    Essential hypertension 10/29/2019    KRYSTINA on CPAP 05/16/2019    Severe obesity ([address] 10/23/2018    Chronic venous hypertension (idiopathic) with inflammation of left lower extremity 02/06/2017    Venous insufficiency of left leg 11/22/2016    Venous reflux 08/16/2016    Osteoarthritis 01/21/2016    OA (osteoarthritis) of knee 01/21/2016    Abnormal ankle brachial index 04/08/2015    SOB (shortness of breath) 02/24/2015    Right leg pain 02/24/2015    HNP (herniated nucleus pulposus), cervical 07/07/2014    Esophageal reflux 06/27/2012    Mixed hyperlipidemia 06/27/2012    Foot pain 07/28/2011    Asthma 07/28/2011      Margaree Kussmaul, MD  Past Medical History:   Diagnosis Date    Arrhythmia     h/o palpitations normal work up 22/2015 heart: Ronni    Arthritis     Awareness under anesthesia     with hysterectomy    Chronic pain     bulging disc c4/c5 l4/l5 : as of 9/1018 no neck/head movement limitation says patient    Claustrophobia     Color blind     CREST (calcinosis, Raynaud's phenomenon, esophageal dysfunction, sclerodactyly, telangiectasia) ([facility] 2018    [address], Dr. Flako Mcnally GERD (gastroesophageal reflux disease)     Gout     Hypertension     Leg swelling 2016    unknown etiology    Bartlett's neuralgia 2001    from neuroma middle toe, left foot    Nausea & vomiting     KRYSTINA on CPAP     CPAP    Pulmonary hypertension ([facility] [address] 08/2018    Heart: Dr. Candelario Gonzalez asthma     allergy    Shortness of breath 2016    Pulmonary Dr. Maddy Davidson    Unspecified adverse effect of anesthesia     wakes up for anesthesia early      Past Surgical History:   Procedure Laterality Date    COLONOSCOPY  04/20/2011    negative    COLONOSCOPY N/A 9/28/2018    COLONOSCOPY performed by Sissy Menendez MD at [facility] CATARACT REMOVAL Bilateral 2018    HX CERVICAL FUSION  2014    c4-5     HX CHOLECYSTECTOMY      HX GI  11/19/13    Rectocele repair with enterocele repair    HX HYSTERECTOMY      TOOK LEFT OVARY    HX KNEE ARTHROSCOPY Right 1990's    right knee partial meniscus     HX KNEE REPLACEMENT Right 2016    HX KNEE REPLACEMENT Left 2010, 2016    TKR    HX ORTHOPAEDIC  1973    ganglion cyst removed rt wrist    HX ORTHOPAEDIC  2013, 1992    bilateral CTR, and had ulna nerve release    HX ORTHOPAEDIC Right 2018    thumb and ring finger trigger release    HX ORTHOPAEDIC  2019    cervical fusion x2     HX ORTHOPAEDIC  2020    L4-L5    IR INJ SPINE THER SUBST LUM/SAC W IMG  12/19/2019    [place] CARDIAC SURG PROCEDURE UNLIST  2015    no blockages, card [facility] [place] CARDIAC SUR[place] PROCEDURE UNLIST  05/2018    no blockages x 2 procedures     Allergies   Allergen Reactions    Ciprofloxacin Shortness of Breath    Flagyl [Metronidazole] Shortness of Breath    Percocet [Oxycodone-Acetaminophen] Rash and Itching     Able to take if uses benadryl    Benzene Other (comments)     Blisters and burn area Benzene found to be on steri-strips    Betadine [Povidone-Iodine] Other (comments)     Blisters and burning    Naproxen Itching    Sulfa (Sulfonamide Antibiotics) Rash    Adhesive Rash     Redness and rash      Family History   Problem Relation Age of Onset    Heart Disease Mother     Hypertension Mother     Diabetes Mother     Cancer Mother         BREAST, LUNG    Breast Cancer Mother 61    Heart Disease Father     Hypertension Father     Thyroid Disease Father     Diabetes Brother     Psychiatric Disorder Son         STAINS, [place], [place] DEPRESSIVE    Anesth Problems Neg Hx       Social History     Socioeconomic History    Marital status:      Spouse name: Not on file    Number of children: Not on file    Years of education: Not on file    Highest education level: Not on file   Occupational History    Not on file   Tobacco Use    Smoking status: Never Smoker    Smokeless tobacco: Never Used   Vaping Use    Vaping Use: Never used   Substance and Sexual Activity    Alcohol use: No    Drug use: No    Sexual activity: Not on file   Other Topics Concern    Not on file   Social History Narrative    Not on file     Social Determinants of Health     Financial Resource Strain:     Difficulty of Paying Living Expenses:    Food Insecurity:     Worried About Running Out of Food in the Last Year:     Ran Out of Food in the Last Year:    Transportation Needs:     Lack of Transportation (Medical):      Lack of Transportation (Non-Medical):    Physical Activity:     Days of Exercise per Week:     Minutes of Exercise per Session:    Stress:     Feeling of Stress :    Social Connections:     Frequency of Communication with Friends and Family:     Frequency of Social Gatherings with Friends and Family:     Attends Alevism Services:     Active Member of Clubs or Organizations:     Attends Club or Organization Meetings:     Marital Status:    Intimate Partner Violence:     Fear of Current or Ex-Partner:     Emotionally Abused:  Physically Abused:     Sexually Abused:       Current Outpatient Medications   Medication Sig    diphenhydrAMINE-acetaminophen (Tylenol PM Extra Strength)  mg tab Take 1 Tab by mouth nightly as needed.  calcium polycarbophiL (Fiber Laxative, ca polycarbo,) 625 mg tablet Take 625 mg by mouth daily.  diphenhydrAMINE (BenadryL) 25 mg capsule Take 25 mg by mouth every six (6) hours as needed.  famotidine (PEPCID) 20 mg tablet Take 40 mg by mouth Daily (before breakfast).  pravastatin (PRAVACHOL) 40 mg tablet Take 40 mg by mouth nightly.  carboxymethylcellulose sodium (THERATEARS OP) Apply  to eye as needed.  telmisartan (MICARDIS) 40 mg tablet Take 40 mg by mouth daily.  metoprolol tartrate (LOPRESSOR) 25 mg tablet TAKE ONE TABLET BY MOUTH TWICE DAILY    isosorbide mononitrate ER (IMDUR) 30 mg tablet TAKE ONE-HALF (1/2) TABLET DAILY FOR RAYNAUDS PHENOMENON (Patient taking differently: Take 15 mg by mouth nightly. TAKE ONE-HALF (1/2) TABLET DAILY FOR RAYNAUDS PHENOMENON)    omeprazole (PRILOSEC) 20 mg capsule Take 20 mg by mouth daily.  baclofen (LIORESAL) 10 mg tablet Take 10 mg by mouth nightly.  multivitamin (ONE A DAY) tablet Take 1 Tab by mouth daily.  aspirin delayed-release 81 mg tablet Take 81 mg by mouth nightly.  acetaminophen (TYLENOL EXTRA STRENGTH) 500 mg tablet Take 1,000 mg by mouth every six (6) hours as needed for Pain.  allopurinol (ZYLOPRIM) 100 mg tablet Take 100 mg by mouth daily.  cetirizine (ZYRTEC) 10 mg tablet Take 10 mg by mouth daily.  montelukast (SINGULAIR) 10 mg tablet Take 10 mg by mouth nightly. No current facility-administered medications for this visit.          Review of Symptoms:  11 systems reviewed, negative other than as stated in the HPI    Physical ExamPhysical Exam:    Vitals:    06/07/21 1115   BP: 120/62   Pulse: 62   Resp: 18   SpO2: 98%   Weight: 224 lb 9.6 oz (101.9 kg)   Height: 5' 6\" (1.676 m)     Body mass index is 36.25 kg/m². General PE  Gen:  NAD  Mental Status - Alert. General Appearance - Not in acute distress. HEENT:  PERRL, no carotid bruits or JVD  Chest and Lung Exam   Inspection: Accessory muscles - No use of accessory muscles in breathing. Auscultation:   Breath sounds: - Normal.   Cardiovascular   Inspection: Jugular vein - Bilateral - Inspection Normal.   Palpation/Percussion:   Apical Impulse: - Normal.   Auscultation: Rhythm - Regular. Heart Sounds - S1 WNL and S2 WNL. No S3 or S4. Murmurs & Other Heart Sounds: Auscultation of the heart reveals - No Murmurs. Peripheral Vascular   Upper Extremity: Inspection - Bilateral - No Cyanotic nailbeds or Digital clubbing. Lower Extremity:   Palpation: Edema - Bilateral - No edema. Abdomen:   Soft, non-tender, bowel sounds are active. Neuro: A&O times 3, CN and motor grossly WNL    Labs:   No results found for: CHOL, CHOLX, CHLST, CHOLV, 470525, HDL, HDLP, LDL, LDLC, DLDLP, TGLX, TRIGL, TRIGP, CHHD, CHHDX  No results found for: CPK, CPKX, CPX  Lab Results   Component Value Date/Time    Sodium 143 10/30/2020 03:19 AM    Potassium 3.8 10/30/2020 03:19 AM    Chloride 110 (H) 10/30/2020 03:19 AM    CO2 27 10/30/2020 03:19 AM    Anion gap 6 10/30/2020 03:19 AM    Glucose 101 (H) 10/30/2020 03:19 AM    BUN 12 10/30/2020 03:19 AM    Creatinine 0.93 10/30/2020 03:19 AM    BUN/Creatinine ratio 13 10/30/2020 03:19 AM    GFR est AA >60 10/30/2020 03:19 AM    GFR est non-AA 60 (L) 10/30/2020 03:19 AM    Calcium 8.3 (L) 10/30/2020 03:19 AM    Bilirubin, total 0.7 10/21/2020 09:19 AM    Alk. phosphatase 91 10/21/2020 09:19 AM    Protein, total 6.6 10/21/2020 09:19 AM    Albumin 3.4 (L) 10/21/2020 09:19 AM    Globulin 3.2 10/21/2020 09:19 AM    A-G Ratio 1.1 10/21/2020 09:19 AM    ALT (SGPT) 20 10/21/2020 09:19 AM       EKG:  SR     Assessment:     Assessment:      1. Essential hypertension    2. Mixed hyperlipidemia    3. KRYSTINA on CPAP    4. SOB (shortness of breath)    5. Atypical chest pain        Orders Placed This Encounter    AMB POC EKG ROUTINE W/ 12 LEADS, INTER & REP     Order Specific Question:   Reason for Exam:     Answer:   routine        Plan:     1. Chronic sob, atypical cp: Negative NST 5/2020. Normal EF grade 2 DD with no significant valve issues per echo  5/2020. Improved symptoms after EGD / dilatation 4/2021. Will continue to follow. 2. Essential hypertension: Controlled with current meds.    3. Mixed hyperlipidemia: 2/2020 LDL 94 On statin Labs and lipids per PCP will request labs from PCP  4. KRYSTINA: on CPAP. 5. H/o CREST syndrome. F/u with rheumatology  6. Pulm HTN: Followed by Dr Anabel Toro current care and f/u in 6 months. Tyshawn Sommer NP       Patient seen and examined by me with nurse practitioner. I personally performed all components of the history, physical, and medical decision making and agree with the assessment and plan as noted. Normal cardiac evaluation previously. BP controlled. On CPAP. F/u with pulmonary.   - Superficial venous reflux: s/p right GSV RF closure 10/2016. Left leg continues to do very well with conservative management. stable overall.      Cuauhtemoc Bell MD

## 2021-07-02 ENCOUNTER — HOSPITAL ENCOUNTER (OUTPATIENT)
Dept: MAMMOGRAPHY | Age: 71
Discharge: HOME | End: 2021-07-02
Attending: FAMILY MEDICINE
Payer: MEDICARE

## 2021-07-02 DIAGNOSIS — Z12.31 VISIT FOR SCREENING MAMMOGRAM: ICD-10-CM

## 2021-07-02 PROCEDURE — 77063 BREAST TOMOSYNTHESIS BI: CPT

## 2021-08-06 ENCOUNTER — TELEPHONE (OUTPATIENT)
Dept: CARDIOLOGY CLINIC | Age: 71
End: 2021-08-06

## 2021-08-06 NOTE — TELEPHONE ENCOUNTER
Anam Akins MD  You 33 minutes ago (1:12 PM)   MC  5 days. Message text            Message  Received: Today  Anam Akins MD sent to Celestine Bro LPN  Caller: Unspecified (Today,  8:38 AM)  Yes cleared. [facility] [phone] stating per  pt can be cleared and can hold ASA 5 days before and resume per surgeon afterwards. Received fax confirmation.

## 2021-08-06 NOTE — TELEPHONE ENCOUNTER
2019       Klaus Brown DO  [address]  [facility] [address]  VIA In Basket      Patient: Pealr Butler   YOB: 1940   Date of Visit: 2019       Dear Dr. Brown:    I saw your patient, Pearl Butler, for an evaluation. Below are my notes for this visit with her.    If you have questions, please do not hesitate to call me.      Sincerely,        Janel Amin MD        CC: No Recipients  Janel Amin MD  2019 11:16 AM  Sign when Signing Visit  [facility]  [address], [place] [address]  P [phone]  F [phone]      PATIENT:  Pearl Butler   : 1940  Referring physician:  .Klaus Brown DO      CHIEF COMPLAINT:   Chief Complaint   Patient presents with   • Follow-up     6 month       HISTORY OF PRESENT ILLNESS:  Pearl is a 78 year old female with a history of coronary artery disease, paroxysmal supraventricular tachycardia, moderate mitral regurgitation and aortic insufficiency, heart failure with preserved EF, hyponatremia, hypertension and hyperlipidemia    Her   suddenly at home in July.   He sounds like had an MI.     Presents for routine follow up. Doing well at this time, remains active.   No chest pain, pressure, palpitations, sob/orthopnea, blood/black stools.     Taking medications as prescribed. She remains active  No chest pain, palpitations, sob/orthopnea, syncope, blood/black stools.     Taking medications as prescribed.     She is having intermittent episodes of light headedness and hypotension at home in the mornings typically.  She lays around for a while and blood pressure can drop as well.    PAST MEDICAL HISTORY:    Past Medical History:   Diagnosis Date   • Abnormal liver function test    • Arthralgia of multiple sites    • CAD (coronary artery disease)     PCI  to    • Cataract    • Diastolic CHF (CMS/HCC)     2016 due to salt excess   • Environmental allergies    •  Received cardiac clearance on pt from [facility] . Pt having Interstim PERC Test and Full Implant under General anesthesia on 8/18/21. Asking how long to hold ASA before procedure. Pt last visit 6/7/21. Please advise if cleared and how long to hold ASA. Thanks. Glaucoma     Dr. Bell   • HTN (hypertension)    • Hyperlipemia, mixed    • Hyperlipidemia    • Hyponatremia     Severe hyponatremia with aldactone/ hctz combo 9/15   • Labile blood pressure    • Lung nodule     Ivanovich   • Mitral regurgitation     mod MR on Echo 10/17   • Osteoarthritis of both knees    • SVT (supraventricular tachycardia) (CMS/HCC)     NSVT, SVT short runs on Event 5/17   • Ventricular ectopy    • Vitamin D deficiency         ALLERGIES:    ALLERGIES:   Allergen Reactions   • Hydrochlorothiazide Other (See Comments)      hypokalemia   • Penicillins RASH     Allergy       MEDICATIONS:     Outpatient Encounter Medications as of 8/28/2019   Medication Sig Dispense Refill   • metoPROLOL tartrate (LOPRESSOR) 50 MG tablet TAKE 1 TABLET EVERY MORNING AND 2 TABLETS IN THE EVENING 270 tablet 1   • montelukast (SINGULAIR) 10 MG tablet TAKE ONE TABLET BY MOUTH ONE TIME DAILY  90 tablet 0   • Multiple Vitamins-Minerals (PRESERVISION AREDS 2 PO) Take 1 tablet by mouth 2 times daily.     • atorvastatin (LIPITOR) 80 MG tablet Take 1 tablet by mouth daily. 90 tablet 4   • lisinopril (ZESTRIL) 20 MG tablet Take 1 tablet by mouth 2 times daily. 180 tablet 4   • potassium chloride (KLOR-CON, K-TAB) 10 MEQ ER tablet 10 mEq. 2 capsule Orally Once a day as needed - take with PRN furosemide      • acetaminophen (TYLENOL) 325 MG tablet 1 tablet as needed Orally prn     • Probiotic Product (ALIGN) 4 MG Cap as directed Orally     • Calcium Carb-Cholecalciferol (CALCIUM 600 + D) 600-200 MG-UNIT Tab 1 tablet with food Orally Once a day     • brimonidine (ALPHAGAN P) 0.1 % ophthalmic solution 1 drop.     • dorzolamide-timolol (COSOPT) 22.3-6.8 MG/ML ophthalmic solution 1 drop.     • aspirin (ECOTRIN) 81 MG EC tablet Take 81 mg by mouth.     • b complex vitamins tablet Take 1 tablet by mouth.     • ibuprofen (MOTRIN) 800 MG tablet      • furosemide (LASIX) 20 MG tablet Take 20 mg by mouth.     • Omega-3 Fatty Acids (FISH  OIL CONCENTRATE) 300 MG Cap      • MAGNESIUM PO Take 250 mg by mouth daily.      • Latanoprostene Bunod (VYZULTA) 0.024 % Solution      • [DISCONTINUED] montelukast (SINGULAIR) 10 MG tablet Take 1 tablet by mouth daily. 90 tablet 0   • [DISCONTINUED] amLODIPine (NORVASC) 2.5 MG tablet Take 1 tablet by mouth daily. 90 tablet 4   • [DISCONTINUED] bimatoprost (LUMIGAN) 0.01 % ophthalmic solution 1 drop into affected eye in the evening Ophthalmic Once a day       No facility-administered encounter medications on file as of 8/28/2019.        SOCIAL HISTORY:    Social History     Tobacco Use   • Smoking status: Never Smoker   • Smokeless tobacco: Never Used   Substance Use Topics   • Alcohol use: Not Currently     Comment: rare   • Drug use: No       FAMILY HISTORY:    Family History   Problem Relation Age of Onset   • Osteoarthritis Mother    • Hypertension Father    • Hypertension Sister    • Congestive Heart Failure Sister    • Hypertension Brother        REVIEW OF SYSTEMS:    Respiratory: Cough: denies.   Constitutional: Fatigue: denies.   Dermatologic: Rash: denies.   Endocrine: Sleep disturbance: denies.   Gastrointestinal: Abdomina pain: denies.   Hematologic: Abnormal bleeding: denies.   Musculoskeletal: Muscle aches: denies.   Neurologic: Dizziness: denies.   Ophthalmologic: Loss of vision: denies.   Psychology: Depression: denies.   Urologic: Blood in urine: denies.   Cardiac: As per HPI    Remainder are reviewed and are negative.       PHYSICAL EXAMINATION:  /70 (BP Location: [facility])   Pulse 60   Ht 5' 5\" (1.651 m)   Wt 64.5 kg (142 lb 4.9 oz)   BMI 23.68 kg/m²   BSA 1.71 m²    Constitutional: appears well-developed and well-nourished. Alert and oriented.   Head: Normocephalic and atraumatic.   Nose: Nose normal.   Mouth/Throat: Mucous membranes are moist.  Normal oropharynx  Eyes: Pupils are equal, round, and reactive to light.   Neck: Normal range of motion. Neck supple. No  thyromegaly  Cardiovascular: Regular with ectopy, normal S1, S2 no S3 or S4. Grade 2/6 apical murmur, no rub or gallops  Pulmonary: Effort normal and breath sounds normal. No wheezes, rales or rhonchi  Abdominal: Soft. Bowel sounds are normal. No hepatosplenomegaly.  Musculoskeletal: Normal range of motion. No tenderness.   Neurological: grossly normal.   Skin: Skin is warm and dry.   Psych: normal mood and affect  Extremities: no significant edema  Pulses: LE DP/PT palpable    I personally reviewed and interpreted recent laboratory values in the chart.     LABS  No results found for: HGBA1C  CHOLESTEROL (mg/dl)   Date Value   2018 120     HDL (mg/dl)   Date Value   2018 53     TRIGLYCERIDE (mg/dl)   Date Value   2018 64     CALCULATED LDL (mg/dl)   Date Value   2018 54       CARDIAC TESTING   Echocardiogram: TTE (05/15): nml LVEF, mod MR/AR/TR, normal PA's, AO root 3.9cm. (10/17): nml EF, +mild DD, mild LVH, TUSHAR, mod MR/AR, mild TR/KS, PAP 26mmHg  ECHO  normal LV , moderate AI, mild to mod MR; no sig change  Stress testin normal   Coronary Angiogram:  Event monitor  with PSVT and possible 8 beat run of NSVT versus PSVT  EKGs in the past have shown PACs  ECG 19 personally reviewed and is sinus with PAC  ASSESSMENT/PLAN    Coronary artery disease s/p PCI   Stable with respect to chest pain. Continue with bb, acei, asa, statin.     Essential hypertension  Blood pressure is well controlled although she has underwent episodes of low blood pressures in the mornings.  I would be okay with allowing some hypertension as she does have symptoms.  We will stop amlodipine.  She is been instructed to cut her lisinopril in half as he persistently has low blood pressures.    Chronic diastolic congestive heart failure (CMS/HCC)  euvolemic on exam. She is on furosemide prn    Hyperlipidemia  Lipids reviewed from 2018 and are well controlled. Will have these repeated by pcp in  July. Continue statin.   Orders Placed This Encounter   • Electrocardiogram 12-Lead     Return in about 6 months (around 2/28/2020).    Janel Amin MD, [facility]  [facility]  [facility]      A letter has been sent to the referring physician.

## 2021-08-11 ENCOUNTER — HOSPITAL ENCOUNTER (OUTPATIENT)
Dept: PREADMISSION TESTING | Age: 71
Discharge: HOME | End: 2021-08-11
Attending: OBSTETRICS & GYNECOLOGY
Payer: MEDICARE

## 2021-08-11 VITALS
SYSTOLIC BLOOD PRESSURE: 167 MMHG | OXYGEN SATURATION: 97 % | BODY MASS INDEX: 36.46 KG/M2 | RESPIRATION RATE: 20 BRPM | WEIGHT: 226.85 LBS | DIASTOLIC BLOOD PRESSURE: 45 MMHG | HEIGHT: 66 IN | TEMPERATURE: 98.2 F | HEART RATE: 52 BPM

## 2021-08-11 LAB
ANION GAP SERPL CALC-SCNC: 5 MMOL/L (ref 5–15)
APPEARANCE UR: CLEAR
BACTERIA URNS QL MICRO: NEGATIVE /HPF
BILIRUB UR QL: NEGATIVE
BUN SERPL-MCNC: 14 MG/DL (ref 6–20)
BUN/CREAT SERPL: 15 (ref 12–20)
CALCIUM SERPL-MCNC: 8.9 MG/DL (ref 8.5–10.1)
CHLORIDE SERPL-SCNC: 110 MMOL/L (ref 97–108)
CO2 SERPL-SCNC: 26 MMOL/L (ref 21–32)
COLOR UR: NORMAL
CREAT SERPL-MCNC: 0.93 MG/DL (ref 0.55–1.02)
EPITH CASTS URNS QL MICRO: NORMAL /LPF
ERYTHROCYTE [DISTWIDTH] IN BLOOD BY AUTOMATED COUNT: 15.4 % (ref 11.5–14.5)
GLUCOSE SERPL-MCNC: 99 MG/DL (ref 65–100)
GLUCOSE UR STRIP.AUTO-MCNC: NEGATIVE MG/DL
HCT VFR BLD AUTO: 37.9 % (ref 35–47)
HGB BLD-MCNC: 12.3 G/DL (ref 11.5–16)
HGB UR QL STRIP: NEGATIVE
HYALINE CASTS URNS QL MICRO: NORMAL /LPF (ref 0–5)
KETONES UR QL STRIP.AUTO: NEGATIVE MG/DL
LEUKOCYTE ESTERASE UR QL STRIP.AUTO: NEGATIVE
MCH RBC QN AUTO: 30.1 PG (ref 26–34)
MCHC RBC AUTO-ENTMCNC: 32.5 G/DL (ref 30–36.5)
MCV RBC AUTO: 92.7 FL (ref 80–99)
NITRITE UR QL STRIP.AUTO: NEGATIVE
NRBC # BLD: 0 K/UL (ref 0–0.01)
NRBC BLD-RTO: 0 PER 100 WBC
PH UR STRIP: 5.5 [PH] (ref 5–8)
PLATELET # BLD AUTO: 198 K/UL (ref 150–400)
PMV BLD AUTO: 10.1 FL (ref 8.9–12.9)
POTASSIUM SERPL-SCNC: 4.3 MMOL/L (ref 3.5–5.1)
PROT UR STRIP-MCNC: NEGATIVE MG/DL
RBC # BLD AUTO: 4.09 M/UL (ref 3.8–5.2)
RBC #/AREA URNS HPF: NORMAL /HPF (ref 0–5)
SODIUM SERPL-SCNC: 141 MMOL/L (ref 136–145)
SP GR UR REFRACTOMETRY: 1.01 (ref 1–1.03)
UA: UC IF INDICATED,UAUC: NORMAL
UROBILINOGEN UR QL STRIP.AUTO: 0.2 EU/DL (ref 0.2–1)
WBC # BLD AUTO: 7.5 K/UL (ref 3.6–11)
WBC URNS QL MICRO: NORMAL /HPF (ref 0–4)

## 2021-08-11 PROCEDURE — 36415 COLL VENOUS BLD VENIPUNCTURE: CPT

## 2021-08-11 PROCEDURE — 85027 COMPLETE CBC AUTOMATED: CPT

## 2021-08-11 PROCEDURE — 81001 URINALYSIS AUTO W/SCOPE: CPT

## 2021-08-11 PROCEDURE — 80048 BASIC METABOLIC PNL TOTAL CA: CPT

## 2021-08-11 PROCEDURE — 87086 URINE CULTURE/COLONY COUNT: CPT

## 2021-08-11 RX ORDER — RABEPRAZOLE SODIUM 20 MG/1
20 TABLET, DELAYED RELEASE ORAL DAILY
COMMUNITY

## 2021-08-11 RX ORDER — SODIUM CHLORIDE, SODIUM LACTATE, POTASSIUM CHLORIDE, CALCIUM CHLORIDE 600; 310; 30; 20 MG/100ML; MG/100ML; MG/100ML; MG/100ML
25 INJECTION, SOLUTION INTRAVENOUS CONTINUOUS
Status: CANCELLED | OUTPATIENT
Start: 2021-08-18

## 2021-08-11 NOTE — PERIOP NOTES
[facility]  Preoperative Instructions        Surgery Date August 18         Time of Arrival 1100  Contact# [phone]    1. On the day of your surgery, please report to the Surgical Services Registration Desk and sign in at your designated time. The Surgery Center is located to the right of the Emergency Room. 2. You must have someone with you to drive you home. You should not drive a car for 24 hours following surgery. Please make arrangements for a friend or family member to stay with you for the first 24 hours after your surgery. 3. Do not have anything to eat or drink (including water, gum, mints, coffee, juice) after midnight ? August 17? [place] [employer] ? This may not apply to medications prescribed by your physician. ?(Please note below the special instructions with medications to take the morning of your procedure.)    4. We recommend you do not drink any alcoholic beverages for 24 hours before and after your surgery. 5. Contact your surgeons office for instructions on the following medications: non-steroidal anti-inflammatory drugs (i.e. Advil, Aleve), vitamins, and supplements. (Some surgeons will want you to stop these medications prior to surgery and others may allow you to take them)  **If you are currently taking Plavix, Coumadin, Aspirin and/or other blood-thinning agents, contact your surgeon for instructions. ** Your surgeon will partner with the physician prescribing these medications to determine if it is safe to stop or if you need to continue taking. Please do not stop taking these medications without instructions from your surgeon    6. Wear comfortable clothes. Wear glasses instead of contacts. Do not bring any money or jewelry. Please bring picture ID, insurance card, and any prearranged co-payment or hospital payment. Do not wear make-up, particularly mascara the morning of your surgery. Do not wear nail polish, particularly if you are having foot /hand surgery. Wear your hair loose or down, no ponytails, buns, thomas pins or clips. All body piercings must be removed. Please shower with antibacterial soap for three consecutive days before and on the morning of surgery, but do not apply any lotions, powders or deodorants after the shower on the day of surgery. Please use a fresh towels after each shower. Please sleep in clean clothes and change bed linens the night before surgery. Please do not shave for 48 hours prior to surgery. Shaving of the face is acceptable. 7. You should understand that if you do not follow these instructions your surgery may be cancelled. If your physical condition changes (I.e. fever, cold or flu) please contact your surgeon as soon as possible. 8. It is important that you be on time. If a situation occurs where you may be late, please call [phone] (OR Holding Area). 9. If you have any questions and or problems, please call [phone] (Pre-admission Testing). 10. Your surgery time may be subject to change. You will receive a phone call the evening prior if your time changes. 11.  If having outpatient surgery, you must have someone to drive you here, stay with you during the duration of your stay, and to drive you home at time of discharge. Special Instructions: Follow surgeon instructions for holding all non-steroidal anti-inflammatory drugs (NSAIDs), blood-thinning agents, vitamins & supplements prior to surgery. TAKE ALL MEDICATIONS DAY OF SURGERY EXCEPT:  Telmisartan, Fiber    I understand a pre-operative phone call will be made to verify my surgery time. In the event that I am not available, I give permission for a message to be left on my answering service and/or with another person? yes         ___________________      __________   _________    (Signature of Patient)             (Witness)                (Date and Time)

## 2021-08-14 LAB
BACTERIA SPEC CULT: NORMAL
SERVICE CMNT-IMP: NORMAL

## 2021-08-18 ENCOUNTER — HOSPITAL ENCOUNTER (OUTPATIENT)
Age: 71
Setting detail: OUTPATIENT SURGERY
Discharge: HOME | End: 2021-08-18
Attending: OBSTETRICS & GYNECOLOGY | Admitting: OBSTETRICS & GYNECOLOGY
Payer: MEDICARE

## 2021-08-18 ENCOUNTER — APPOINTMENT (OUTPATIENT)
Dept: GENERAL RADIOLOGY | Age: 71
End: 2021-08-18
Attending: OBSTETRICS & GYNECOLOGY
Payer: MEDICARE

## 2021-08-18 ENCOUNTER — ANESTHESIA (OUTPATIENT)
Dept: SURGERY | Age: 71
End: 2021-08-18
Payer: MEDICARE

## 2021-08-18 ENCOUNTER — ANESTHESIA EVENT (OUTPATIENT)
Dept: SURGERY | Age: 71
End: 2021-08-18
Payer: MEDICARE

## 2021-08-18 VITALS
HEART RATE: 67 BPM | TEMPERATURE: 98.8 F | BODY MASS INDEX: 36.21 KG/M2 | DIASTOLIC BLOOD PRESSURE: 60 MMHG | HEIGHT: 66 IN | WEIGHT: 225.31 LBS | SYSTOLIC BLOOD PRESSURE: 165 MMHG | OXYGEN SATURATION: 96 % | RESPIRATION RATE: 18 BRPM

## 2021-08-18 PROCEDURE — 74011250636 HC RX REV CODE- 250/636: Performed by: NURSE ANESTHETIST, CERTIFIED REGISTERED

## 2021-08-18 PROCEDURE — 74011250637 HC RX REV CODE- 250/637: Performed by: OBSTETRICS & GYNECOLOGY

## 2021-08-18 PROCEDURE — 76210000020 HC REC RM PH II FIRST 0.5 HR: Performed by: OBSTETRICS & GYNECOLOGY

## 2021-08-18 PROCEDURE — 74011000250 HC RX REV CODE- 250: Performed by: ANESTHESIOLOGY

## 2021-08-18 PROCEDURE — 77030002933 HC SUT MCRYL J&J -A: Performed by: OBSTETRICS & GYNECOLOGY

## 2021-08-18 PROCEDURE — C1897 LEAD, NEUROSTIM TEST KIT: HCPCS | Performed by: OBSTETRICS & GYNECOLOGY

## 2021-08-18 PROCEDURE — 76210000016 HC OR PH I REC 1 TO 1.5 HR: Performed by: OBSTETRICS & GYNECOLOGY

## 2021-08-18 PROCEDURE — 77030040361 HC SLV COMPR DVT MDII -B: Performed by: OBSTETRICS & GYNECOLOGY

## 2021-08-18 PROCEDURE — 76000 FLUOROSCOPY <1 HR PHYS/QHP: CPT

## 2021-08-18 PROCEDURE — 2709999900 HC NON-CHARGEABLE SUPPLY: Performed by: OBSTETRICS & GYNECOLOGY

## 2021-08-18 PROCEDURE — 76060000032 HC ANESTHESIA 0.5 TO 1 HR: Performed by: OBSTETRICS & GYNECOLOGY

## 2021-08-18 PROCEDURE — 74011000250 HC RX REV CODE- 250: Performed by: NURSE ANESTHETIST, CERTIFIED REGISTERED

## 2021-08-18 PROCEDURE — 77030031139 HC SUT VCRL2 J&J -A: Performed by: OBSTETRICS & GYNECOLOGY

## 2021-08-18 PROCEDURE — 74011250636 HC RX REV CODE- 250/636: Performed by: ANESTHESIOLOGY

## 2021-08-18 PROCEDURE — 77030035549 HC NEUROSTIM EXT VERIFY DISP MEDT -E: Performed by: OBSTETRICS & GYNECOLOGY

## 2021-08-18 PROCEDURE — 77030010507 HC ADH SKN DERMBND J&J -B: Performed by: OBSTETRICS & GYNECOLOGY

## 2021-08-18 PROCEDURE — 76010000138 HC OR TIME 0.5 TO 1 HR: Performed by: OBSTETRICS & GYNECOLOGY

## 2021-08-18 DEVICE — LEAD 306001 ISTM PNE TST STM 1PK EMAN
Type: IMPLANTABLE DEVICE | Site: BACK | Status: FUNCTIONAL
Brand: INTERSTIM™

## 2021-08-18 DEVICE — LEAD 309201 ISTIM TEST STIM EMAN LF45
Type: IMPLANTABLE DEVICE | Site: BACK | Status: FUNCTIONAL
Brand: INTERSTIM™

## 2021-08-18 RX ORDER — HYDROMORPHONE HYDROCHLORIDE 1 MG/ML
.2-.5 INJECTION, SOLUTION INTRAMUSCULAR; INTRAVENOUS; SUBCUTANEOUS
Status: DISCONTINUED | OUTPATIENT
Start: 2021-08-18 | End: 2021-08-18 | Stop reason: HOSPADM

## 2021-08-18 RX ORDER — ONDANSETRON 2 MG/ML
4 INJECTION INTRAMUSCULAR; INTRAVENOUS AS NEEDED
Status: DISCONTINUED | OUTPATIENT
Start: 2021-08-18 | End: 2021-08-18 | Stop reason: HOSPADM

## 2021-08-18 RX ORDER — PROPOFOL 10 MG/ML
INJECTION, EMULSION INTRAVENOUS AS NEEDED
Status: DISCONTINUED | OUTPATIENT
Start: 2021-08-18 | End: 2021-08-18 | Stop reason: HOSPADM

## 2021-08-18 RX ORDER — FENTANYL CITRATE 50 UG/ML
25 INJECTION, SOLUTION INTRAMUSCULAR; INTRAVENOUS
Status: DISCONTINUED | OUTPATIENT
Start: 2021-08-18 | End: 2021-08-18 | Stop reason: HOSPADM

## 2021-08-18 RX ORDER — SODIUM CHLORIDE, SODIUM LACTATE, POTASSIUM CHLORIDE, CALCIUM CHLORIDE 600; 310; 30; 20 MG/100ML; MG/100ML; MG/100ML; MG/100ML
25 INJECTION, SOLUTION INTRAVENOUS CONTINUOUS
Status: DISCONTINUED | OUTPATIENT
Start: 2021-08-18 | End: 2021-08-18 | Stop reason: HOSPADM

## 2021-08-18 RX ORDER — FENTANYL CITRATE 50 UG/ML
50 INJECTION, SOLUTION INTRAMUSCULAR; INTRAVENOUS AS NEEDED
Status: DISCONTINUED | OUTPATIENT
Start: 2021-08-18 | End: 2021-08-18 | Stop reason: HOSPADM

## 2021-08-18 RX ORDER — LIDOCAINE HYDROCHLORIDE 20 MG/ML
INJECTION, SOLUTION EPIDURAL; INFILTRATION; INTRACAUDAL; PERINEURAL AS NEEDED
Status: DISCONTINUED | OUTPATIENT
Start: 2021-08-18 | End: 2021-08-18 | Stop reason: HOSPADM

## 2021-08-18 RX ORDER — MIDAZOLAM HYDROCHLORIDE 1 MG/ML
INJECTION, SOLUTION INTRAMUSCULAR; INTRAVENOUS AS NEEDED
Status: DISCONTINUED | OUTPATIENT
Start: 2021-08-18 | End: 2021-08-18 | Stop reason: HOSPADM

## 2021-08-18 RX ORDER — LIDOCAINE HYDROCHLORIDE 10 MG/ML
0.1 INJECTION, SOLUTION EPIDURAL; INFILTRATION; INTRACAUDAL; PERINEURAL AS NEEDED
Status: DISCONTINUED | OUTPATIENT
Start: 2021-08-18 | End: 2021-08-18 | Stop reason: HOSPADM

## 2021-08-18 RX ORDER — MIDAZOLAM HYDROCHLORIDE 1 MG/ML
1 INJECTION, SOLUTION INTRAMUSCULAR; INTRAVENOUS AS NEEDED
Status: DISCONTINUED | OUTPATIENT
Start: 2021-08-18 | End: 2021-08-18 | Stop reason: HOSPADM

## 2021-08-18 RX ADMIN — PROPOFOL 40 MCG/KG/MIN: 10 INJECTION, EMULSION INTRAVENOUS at 12:56

## 2021-08-18 RX ADMIN — PROPOFOL 40 MG: 10 INJECTION, EMULSION INTRAVENOUS at 13:00

## 2021-08-18 RX ADMIN — PROPOFOL 70 MG: 10 INJECTION, EMULSION INTRAVENOUS at 12:55

## 2021-08-18 RX ADMIN — Medication 3 AMPULE: at 11:57

## 2021-08-18 RX ADMIN — SODIUM CHLORIDE, POTASSIUM CHLORIDE, SODIUM LACTATE AND CALCIUM CHLORIDE 25 ML/HR: 600; 310; 30; 20 INJECTION, SOLUTION INTRAVENOUS at 11:57

## 2021-08-18 RX ADMIN — LIDOCAINE HYDROCHLORIDE 40 MG: 20 INJECTION, SOLUTION EPIDURAL; INFILTRATION; INTRACAUDAL; PERINEURAL at 12:55

## 2021-08-18 RX ADMIN — MIDAZOLAM HYDROCHLORIDE 1 MG: 1 INJECTION, SOLUTION INTRAMUSCULAR; INTRAVENOUS at 12:47

## 2021-08-18 NOTE — ANESTHESIA PREPROCEDURE EVALUATION
Anesthetic History     PONV and history of awareness of surgery under anesthesia     Comments: Hx of awareness under anesthesia during hysterectomy     Review of Systems / Medical History  Patient summary reviewed, nursing notes reviewed and pertinent labs reviewed    Pulmonary        Sleep apnea: CPAP  Shortness of breath  Asthma        Neuro/Psych             Comments: Chronic pain (G89.29)  Cervical stenosis of spinal canal  Claustrophobia Cardiovascular    Hypertension        Dysrhythmias   Hyperlipidemia    Exercise tolerance: >4 METS  Comments: Pulmonary HTN  55-60% EF by ECHO 5/2020  No obstructions by cardiac cath    Venous insufficiency of left leg   GI/Hepatic/Renal     GERD: well controlled           Endo/Other        Obesity, arthritis and anemia  Pertinent negatives: No morbid obesity   Other Findings   Comments: CREST (calcinosis, Raynaud's phenomenon, esophageal dysfunction, sclerodactyly, telangiectasia) ([facility]) (M34.1)    Grade 1 or 2 view for ortho surgeries           Physical Exam    Airway  Mallampati: II  TM Distance: 4 - 6 cm  Neck ROM: normal range of motion        Cardiovascular    Rhythm: regular  Rate: normal         Dental    Dentition: Edentulous     Pulmonary  Breath sounds clear to auscultation               Abdominal  GI exam deferred       Other Findings            Anesthetic Plan    ASA: 3  Anesthesia type: MAC and total IV anesthesia          Induction: Intravenous  Anesthetic plan and risks discussed with: Patient

## 2021-08-18 NOTE — PERIOP NOTES
Handoff Report from Operating Room to PACU    Report received from Marnie Keith RN and Eileen Monaco CRNA regarding Samaria Harding. Surgeon(s):  Lyubov Crenshaw MD  And Procedure(s) (LRB):  INTERSTIM STAGE 1 (N/A)  confirmed   with dressings discussed. Anesthesia type, drugs, patient history, complications, estimated blood loss, vital signs, intake and output, and last pain medication, lines and temperature were reviewed.

## 2021-08-18 NOTE — PERIOP NOTES
For dc home. Vswnl. Denies pain, nausea. dsgs to back d&i. Went over [employer] instructions w/pt and  including f/up. Verbalized understanding. dc'd home.

## 2021-08-18 NOTE — DISCHARGE INSTRUCTIONS
Follow up as scheduled with [facility]. Leave dressing on until your follow up visit next week. NO SHOWERING, BATHING, SWIMMING WHILE TEMPORARY LEADS IN PLACE     With questions please call [phone]. During business hours a nurse can assist you. For urgent or emergent issues after hours you can ask to speak to the Urogynecologist on-call. DISCHARGE SUMMARY from Nurse    PATIENT INSTRUCTIONS:    After general anesthesia or intravenous sedation, for 24 hours or while taking prescription Narcotics:  · Limit your activities  · Do not drive and operate hazardous machinery  · Do not make important personal or business decisions  · Do  not drink alcoholic beverages  · If you have not urinated within 8 hours after discharge, please contact your surgeon on call. Report the following to your surgeon:  · Excessive pain, swelling, redness or odor of or around the surgical area  · Temperature over 100.5  · Nausea and vomiting lasting longer than 4 hours or if unable to take medications  · Any signs of decreased circulation or nerve impairment to extremity: change in color, persistent  numbness, tingling, coldness or increase pain  · Any questions    What to do at Home:    *  Please give a list of your current medications to your Primary Care Provider. *  Please update this list whenever your medications are discontinued, doses are      changed, or new medications (including over-the-counter products) are added. *  Please carry medication information at all times in case of emergency situations. These are general instructions for a healthy lifestyle:    No smoking/ No tobacco products/ Avoid exposure to second hand smoke  Surgeon General's Warning:  Quitting smoking now greatly reduces serious risk to your health.     Obesity, smoking, and sedentary lifestyle greatly increases your risk for illness    A healthy diet, regular physical exercise & weight monitoring are important for maintaining a healthy lifestyle    You may be retaining fluid if you have a history of heart failure or if you experience any of the following symptoms:  Weight gain of 3 pounds or more overnight or 5 pounds in a week, increased swelling in our hands or feet or shortness of breath while lying flat in bed. Please call your doctor as soon as you notice any of these symptoms; do not wait until your next office visit. The discharge information has been reviewed with the patient and caregiver. The patient and caregiver verbalized understanding. Discharge medications reviewed with the patient and guardian and appropriate educational materials and side effects teaching were provided. ___________________________________________________________________________________________________________________________________    A common side effect of anesthesia following surgery is nausea and/or vomiting. In order to decrease symptoms, it is wise to avoid foods that are high in fat, greasy foods, milk products, and spicy foods for the first 24 hours. Acceptable foods for the first 24 hours following surgery include but are not limited to:     soup   broth    toast    crackers    applesauce    bananas    mashed potatoes,   soft or scrambled eggs   oatmeal    jello    It is important to eat when taking your pain medication. This will help to prevent nausea. If possible, please try to time your meals with your medications. It is very important to stay hydrated following surgery. Sip fluids frequently while awake. Avoid acidic drinks such as citrus juices and soda for 24 hours. Carbonated beverages may cause bloating and gas. Acceptable fluids include:    - water (flavor packets may add variety)  - coffee or tea (in moderation)  - Gatorade  - Bubba-aid  - apple juice  - cranberry juice    You are encouraged to cough and deep breathe every hour when awake.  This will help to prevent respiratory complications following anesthesia. You may want to hug a pillow when coughing and sneezing to add additional support to the surgical area and to decrease discomfort if you had abdominal or chest surgery. If you are discharged home with support stockings, you may remove them after 24 hours. Support stockings are used to help prevent blood clots in the legs following surgery. Please take time to review all of your Home Care Instructions and Medication Information sheets provided in your discharge packet. If you have any questions, please contact your surgeons office. Thank you.

## 2021-08-18 NOTE — PERIOP NOTES
[place] - PT DENIES S/S OF COVID - NO FEVER, COLD, COUGH, N/V, DIARRHEA. .... PT STATES AT TIMES DOES HAVE SOME SOB WHICH IS BEING WATCHED BY HER CARDIOLOGIST/PULMONARY. PRE-OP TCHING DONE - PT VERBALIZES UNDERSTANDING. STRETCHER IN LOWEST POSITION, CB IN PLACE AND SR UP X2.

## 2021-08-18 NOTE — OP NOTES
OPERATIVE NOTE    PROCEDURE:  Sacral neuromodulation InterStim percutaneous  placement, intraoperative fluroscopy    ANESTHESIA: MAC with 1% lidocaine with bicarbonate for local.    INDICATION FOR PROCEDURE: This is a 70 y.o. female with refractory overactive bladder and urgency urinary incontinence. The patient elects to undergo InterStim percutaneous test for neuromodulation. Risks, benefits, expectations and alternatives were discussed with the patient preoperatively. All her questions were answered and she elects to proceed with InterStim therapy. Written informed consent was obtained. OPERATIVE FINDINGS:  Insertion of temporary sacral leads bilaterally at the level of S3 sacral foramen with appropriate motor responses. INTERPRETATION OF FLUOROSCOPY:  AP and lateral images were taken throughout the procedure and the leads were placed into the S3 sacral foramen bilaterally. PROCEDURE IN DETAIL:   The patient was taken the operating room where she was prepped and draped in the prone position in sterile fashion. AP and lateral images were obtained in order to identify the medial aspect of the S3 sacral foramina. These areas were infused with 1% lidocaine with bicarbonate. We used the localizing needles to enter into the S3 foramen bilaterally. We identified a good motor response bilaterally. Once the position was ideal, the temporary sacral leads were led through the trocar. The trocar was carefully retracted, therefore exposing the electrodes. Tegaderm dressings were placed. The patient tolerated the procedure well. Sponge, lap and needle counts were correct x2. The patient was awakened from anesthesia and transferred to the recovery room awake, alert and breathing independently in stable condition.     ANTIBIOTICS: None indicated     ESTIMATED BLOOD LOSS: none    DRAINS:  none    SPECIMENS: none      IMPLANTS: none    COMPLICATIONS: No complications were apparent at the end of surgery     DISPOSITION: PACU - hemodynamically stable.     Chapin Johnson MD   [facility]

## 2021-08-18 NOTE — ANESTHESIA POSTPROCEDURE EVALUATION
Post-Anesthesia Evaluation and Assessment    Patient: Valeriano Todd MRN: [id number]  SSN: [id number]    YOB: 1950  Age: 70 y.o. Sex: female      I have evaluated the patient and they are stable and ready for discharge from the PACU. Cardiovascular Function/Vital Signs  Visit Vitals  BP (!) 169/56   Pulse (!) 55   Temp 37.1 °C (98.7 °F)   Resp 11   Ht 5' 5.5\" (1.664 m)   Wt 102.2 kg (225 lb 5 oz)   SpO2 95%   BMI 36.92 kg/m²       Patient is status post MAC anesthesia for Procedure(s):  INTERSTIM STAGE 1. Nausea/Vomiting: None    Postoperative hydration reviewed and adequate. Pain:  Pain Scale 1: Numeric (0 - 10) (08/18/21 1445)  Pain Intensity 1: 0 (08/18/21 1445)   Managed    Neurological Status:   Neuro (WDL): Within Defined Limits (08/18/21 1344)  Neuro  Neurologic State: Alert;Eyes open spontaneously (08/18/21 1344)  Orientation Level: Oriented X4 (08/18/21 1344)  Cognition: Follows commands (08/18/21 1344)  Speech: Clear (08/18/21 1344)  LUE Motor Response: Purposeful (08/18/21 1344)  LLE Motor Response: Purposeful (08/18/21 1344)  RUE Motor Response: Purposeful (08/18/21 1344)  RLE Motor Response: Purposeful (08/18/21 1344)   At baseline    Mental Status, Level of Consciousness: Alert and  oriented to person, place, and time    Pulmonary Status:   O2 Device: None (Room air) (08/18/21 1344)   Adequate oxygenation and airway patent    Complications related to anesthesia: None    Post-anesthesia assessment completed. No concerns    Signed By: Larry Davis MD     August 18, 2021              Procedure(s):  INTERSTIM STAGE 1. MAC, total IV anesthesia    <BSHSIANPOST>    INITIAL Post-op Vital signs:   Vitals Value Taken Time   /56 08/18/21 1445   Temp 37.1 °C (98.7 °F) 08/18/21 1344   Pulse 61 08/18/21 1453   Resp 9 08/18/21 1453   SpO2 94 % 08/18/21 1453   Vitals shown include unvalidated device data.

## 2021-08-26 NOTE — PERIOP NOTES
[facility]  Preoperative Instructions        Surgery Date 9/1/21          Time of Arrival 1100    1. On the day of your surgery, please report to the Surgical Services Registration Desk and sign in at your designated time. The Surgery Center is located to the right of the Emergency Room. 2. You must have someone with you to drive you home. You should not drive a car for 24 hours following surgery. Please make arrangements for a friend or family member to stay with you for the first 24 hours after your surgery. 3. Do not have anything to eat or drink (including water, gum, mints, coffee, juice) after midnight ?8/31/21? Joffre Colder ? This may not apply to medications prescribed by your physician. ?(Please note below the special instructions with medications to take the morning of your procedure.)    4. We recommend you do not drink any alcoholic beverages for 24 hours before and after your surgery. 5. Contact your surgeons office for instructions on the following medications: non-steroidal anti-inflammatory drugs (i.e. Advil, Aleve), vitamins, and supplements. (Some surgeons will want you to stop these medications prior to surgery and others may allow you to take them)  **If you are currently taking Plavix, Coumadin, Aspirin and/or other blood-thinning agents, contact your surgeon for instructions. ** Your surgeon will partner with the physician prescribing these medications to determine if it is safe to stop or if you need to continue taking. Please do not stop taking these medications without instructions from your surgeon    6. Wear comfortable clothes. Wear glasses instead of contacts. Do not bring any money or jewelry. Please bring picture ID, insurance card, and any prearranged co-payment or hospital payment. Do not wear make-up, particularly mascara the morning of your surgery. Do not wear nail polish, particularly if you are having foot /hand surgery.   Wear your hair loose or down, no ponytails, buns, thomas pins or clips. All body piercings must be removed. Please shower with antibacterial soap for three consecutive days before and on the morning of surgery, but do not apply any lotions, powders or deodorants after the shower on the day of surgery. Please use a fresh towels after each shower. Please sleep in clean clothes and change bed linens the night before surgery. Please do not shave for 48 hours prior to surgery. Shaving of the face is acceptable. 7. You should understand that if you do not follow these instructions your surgery may be cancelled. If your physical condition changes (I.e. fever, cold or flu) please contact your surgeon as soon as possible. 8. It is important that you be on time. If a situation occurs where you may be late, please call [phone] (OR Holding Area). 9. If you have any questions and or problems, please call [phone] (Pre-admission Testing). 10. Your surgery time may be subject to change. You will receive a phone call the evening prior if your time changes. 11.  If having outpatient surgery, you must have someone to drive you here, stay with you during the duration of your stay, and to drive you home at time of discharge. Special Instructions:     TAKE ALL MEDICATIONS DAY OF SURGERY EXCEPT:  Telmisartan, Fiber    I understand a pre-operative phone call will be made to verify my surgery time. In the event that I am not available, I give permission for a message to be left on my answering service and/or with another person?   Yes [phone]         ___________________      __________   _________    (Signature of Patient)             (Witness)                (Date and Time)

## 2021-08-26 NOTE — PERIOP NOTES
Spoke with patient and confirmed no changes in PAT assessment or medications since PAT visit on 8/11/21. Re-reviewed preop instructions and medications, DIANE BUSTAMANTE

## 2021-09-01 ENCOUNTER — APPOINTMENT (OUTPATIENT)
Dept: GENERAL RADIOLOGY | Age: 71
End: 2021-09-01
Attending: OBSTETRICS & GYNECOLOGY
Payer: MEDICARE

## 2021-09-01 ENCOUNTER — ANESTHESIA EVENT (OUTPATIENT)
Dept: SURGERY | Age: 71
End: 2021-09-01
Payer: MEDICARE

## 2021-09-01 ENCOUNTER — ANESTHESIA (OUTPATIENT)
Dept: SURGERY | Age: 71
End: 2021-09-01
Payer: MEDICARE

## 2021-09-01 ENCOUNTER — HOSPITAL ENCOUNTER (OUTPATIENT)
Age: 71
Setting detail: OUTPATIENT SURGERY
Discharge: HOME | End: 2021-09-01
Attending: OBSTETRICS & GYNECOLOGY | Admitting: OBSTETRICS & GYNECOLOGY
Payer: MEDICARE

## 2021-09-01 VITALS
DIASTOLIC BLOOD PRESSURE: 54 MMHG | RESPIRATION RATE: 17 BRPM | SYSTOLIC BLOOD PRESSURE: 142 MMHG | WEIGHT: 227.07 LBS | TEMPERATURE: 97.7 F | HEART RATE: 59 BPM | OXYGEN SATURATION: 96 % | HEIGHT: 66 IN | BODY MASS INDEX: 36.49 KG/M2

## 2021-09-01 PROCEDURE — C1767 GENERATOR, NEURO NON-RECHARG: HCPCS | Performed by: OBSTETRICS & GYNECOLOGY

## 2021-09-01 PROCEDURE — 76210000006 HC OR PH I REC 0.5 TO 1 HR: Performed by: OBSTETRICS & GYNECOLOGY

## 2021-09-01 PROCEDURE — 74011000250 HC RX REV CODE- 250: Performed by: NURSE ANESTHETIST, CERTIFIED REGISTERED

## 2021-09-01 PROCEDURE — 74011250636 HC RX REV CODE- 250/636: Performed by: ANESTHESIOLOGY

## 2021-09-01 PROCEDURE — 77030019908 HC STETH ESOPH SIMS -A: Performed by: ANESTHESIOLOGY

## 2021-09-01 PROCEDURE — 74011000250 HC RX REV CODE- 250: Performed by: OBSTETRICS & GYNECOLOGY

## 2021-09-01 PROCEDURE — 2709999900 HC NON-CHARGEABLE SUPPLY: Performed by: OBSTETRICS & GYNECOLOGY

## 2021-09-01 PROCEDURE — 77030031139 HC SUT VCRL2 J&J -A: Performed by: OBSTETRICS & GYNECOLOGY

## 2021-09-01 PROCEDURE — 77030010507 HC ADH SKN DERMBND J&J -B: Performed by: OBSTETRICS & GYNECOLOGY

## 2021-09-01 PROCEDURE — 76010000149 HC OR TIME 1 TO 1.5 HR: Performed by: OBSTETRICS & GYNECOLOGY

## 2021-09-01 PROCEDURE — 76210000020 HC REC RM PH II FIRST 0.5 HR: Performed by: OBSTETRICS & GYNECOLOGY

## 2021-09-01 PROCEDURE — 74011250636 HC RX REV CODE- 250/636: Performed by: NURSE ANESTHETIST, CERTIFIED REGISTERED

## 2021-09-01 PROCEDURE — C1778 LEAD, NEUROSTIMULATOR: HCPCS | Performed by: OBSTETRICS & GYNECOLOGY

## 2021-09-01 PROCEDURE — 77030008684 HC TU ET CUF COVD -B: Performed by: ANESTHESIOLOGY

## 2021-09-01 PROCEDURE — 76060000033 HC ANESTHESIA 1 TO 1.5 HR: Performed by: OBSTETRICS & GYNECOLOGY

## 2021-09-01 PROCEDURE — 72100 X-RAY EXAM L-S SPINE 2/3 VWS: CPT

## 2021-09-01 PROCEDURE — 77030026438 HC STYL ET INTUB CARD -A: Performed by: ANESTHESIOLOGY

## 2021-09-01 PROCEDURE — 74011250637 HC RX REV CODE- 250/637: Performed by: OBSTETRICS & GYNECOLOGY

## 2021-09-01 PROCEDURE — 77030040361 HC SLV COMPR DVT MDII -B: Performed by: OBSTETRICS & GYNECOLOGY

## 2021-09-01 PROCEDURE — 74011250636 HC RX REV CODE- 250/636: Performed by: OBSTETRICS & GYNECOLOGY

## 2021-09-01 PROCEDURE — 77030002933 HC SUT MCRYL J&J -A: Performed by: OBSTETRICS & GYNECOLOGY

## 2021-09-01 PROCEDURE — 76000 FLUOROSCOPY <1 HR PHYS/QHP: CPT

## 2021-09-01 DEVICE — GENERATOR INTERSTIM X --: Type: IMPLANTABLE DEVICE | Site: BACK | Status: FUNCTIONAL

## 2021-09-01 RX ORDER — PROPOFOL 10 MG/ML
INJECTION, EMULSION INTRAVENOUS AS NEEDED
Status: DISCONTINUED | OUTPATIENT
Start: 2021-09-01 | End: 2021-09-01 | Stop reason: HOSPADM

## 2021-09-01 RX ORDER — DEXAMETHASONE SODIUM PHOSPHATE 4 MG/ML
INJECTION, SOLUTION INTRA-ARTICULAR; INTRALESIONAL; INTRAMUSCULAR; INTRAVENOUS; SOFT TISSUE AS NEEDED
Status: DISCONTINUED | OUTPATIENT
Start: 2021-09-01 | End: 2021-09-01 | Stop reason: HOSPADM

## 2021-09-01 RX ORDER — PROPOFOL 10 MG/ML
INJECTION, EMULSION INTRAVENOUS
Status: DISCONTINUED | OUTPATIENT
Start: 2021-09-01 | End: 2021-09-01 | Stop reason: HOSPADM

## 2021-09-01 RX ORDER — DEXMEDETOMIDINE HYDROCHLORIDE 100 UG/ML
INJECTION, SOLUTION INTRAVENOUS AS NEEDED
Status: DISCONTINUED | OUTPATIENT
Start: 2021-09-01 | End: 2021-09-01 | Stop reason: HOSPADM

## 2021-09-01 RX ORDER — SODIUM CHLORIDE, SODIUM LACTATE, POTASSIUM CHLORIDE, CALCIUM CHLORIDE 600; 310; 30; 20 MG/100ML; MG/100ML; MG/100ML; MG/100ML
25 INJECTION, SOLUTION INTRAVENOUS CONTINUOUS
Status: DISCONTINUED | OUTPATIENT
Start: 2021-09-01 | End: 2021-09-01 | Stop reason: HOSPADM

## 2021-09-01 RX ORDER — SODIUM CHLORIDE 0.9 % (FLUSH) 0.9 %
5-40 SYRINGE (ML) INJECTION EVERY 8 HOURS
Status: DISCONTINUED | OUTPATIENT
Start: 2021-09-01 | End: 2021-09-01 | Stop reason: HOSPADM

## 2021-09-01 RX ORDER — FENTANYL CITRATE 50 UG/ML
25 INJECTION, SOLUTION INTRAMUSCULAR; INTRAVENOUS
Status: CANCELLED | OUTPATIENT
Start: 2021-09-01

## 2021-09-01 RX ORDER — PHENYLEPHRINE HCL IN 0.9% NACL 0.4MG/10ML
SYRINGE (ML) INTRAVENOUS AS NEEDED
Status: DISCONTINUED | OUTPATIENT
Start: 2021-09-01 | End: 2021-09-01 | Stop reason: HOSPADM

## 2021-09-01 RX ORDER — HYDROMORPHONE HYDROCHLORIDE 2 MG/ML
INJECTION, SOLUTION INTRAMUSCULAR; INTRAVENOUS; SUBCUTANEOUS AS NEEDED
Status: DISCONTINUED | OUTPATIENT
Start: 2021-09-01 | End: 2021-09-01 | Stop reason: HOSPADM

## 2021-09-01 RX ORDER — SODIUM CHLORIDE, SODIUM LACTATE, POTASSIUM CHLORIDE, CALCIUM CHLORIDE 600; 310; 30; 20 MG/100ML; MG/100ML; MG/100ML; MG/100ML
25 INJECTION, SOLUTION INTRAVENOUS CONTINUOUS
Status: CANCELLED | OUTPATIENT
Start: 2021-09-01

## 2021-09-01 RX ORDER — SODIUM CHLORIDE 0.9 % (FLUSH) 0.9 %
5-40 SYRINGE (ML) INJECTION AS NEEDED
Status: DISCONTINUED | OUTPATIENT
Start: 2021-09-01 | End: 2021-09-01 | Stop reason: HOSPADM

## 2021-09-01 RX ORDER — ONDANSETRON 2 MG/ML
INJECTION INTRAMUSCULAR; INTRAVENOUS AS NEEDED
Status: DISCONTINUED | OUTPATIENT
Start: 2021-09-01 | End: 2021-09-01 | Stop reason: HOSPADM

## 2021-09-01 RX ORDER — FENTANYL CITRATE 50 UG/ML
INJECTION, SOLUTION INTRAMUSCULAR; INTRAVENOUS AS NEEDED
Status: DISCONTINUED | OUTPATIENT
Start: 2021-09-01 | End: 2021-09-01 | Stop reason: HOSPADM

## 2021-09-01 RX ORDER — LIDOCAINE HYDROCHLORIDE 20 MG/ML
INJECTION, SOLUTION EPIDURAL; INFILTRATION; INTRACAUDAL; PERINEURAL AS NEEDED
Status: DISCONTINUED | OUTPATIENT
Start: 2021-09-01 | End: 2021-09-01 | Stop reason: HOSPADM

## 2021-09-01 RX ORDER — SODIUM CHLORIDE 0.9 % (FLUSH) 0.9 %
5-40 SYRINGE (ML) INJECTION EVERY 8 HOURS
Status: CANCELLED | OUTPATIENT
Start: 2021-09-01

## 2021-09-01 RX ORDER — SODIUM CHLORIDE 0.9 % (FLUSH) 0.9 %
5-40 SYRINGE (ML) INJECTION AS NEEDED
Status: CANCELLED | OUTPATIENT
Start: 2021-09-01

## 2021-09-01 RX ORDER — DIPHENHYDRAMINE HYDROCHLORIDE 50 MG/ML
12.5 INJECTION, SOLUTION INTRAMUSCULAR; INTRAVENOUS AS NEEDED
Status: CANCELLED | OUTPATIENT
Start: 2021-09-01 | End: 2021-09-01

## 2021-09-01 RX ORDER — SUCCINYLCHOLINE CHLORIDE 20 MG/ML
INJECTION INTRAMUSCULAR; INTRAVENOUS AS NEEDED
Status: DISCONTINUED | OUTPATIENT
Start: 2021-09-01 | End: 2021-09-01 | Stop reason: HOSPADM

## 2021-09-01 RX ORDER — HYDROMORPHONE HYDROCHLORIDE 1 MG/ML
0.2 INJECTION, SOLUTION INTRAMUSCULAR; INTRAVENOUS; SUBCUTANEOUS
Status: CANCELLED | OUTPATIENT
Start: 2021-09-01

## 2021-09-01 RX ORDER — LIDOCAINE HYDROCHLORIDE 10 MG/ML
0.1 INJECTION, SOLUTION EPIDURAL; INFILTRATION; INTRACAUDAL; PERINEURAL AS NEEDED
Status: DISCONTINUED | OUTPATIENT
Start: 2021-09-01 | End: 2021-09-01 | Stop reason: HOSPADM

## 2021-09-01 RX ORDER — BUPIVACAINE HYDROCHLORIDE 5 MG/ML
INJECTION, SOLUTION EPIDURAL; INTRACAUDAL AS NEEDED
Status: DISCONTINUED | OUTPATIENT
Start: 2021-09-01 | End: 2021-09-01 | Stop reason: HOSPADM

## 2021-09-01 RX ORDER — MIDAZOLAM HYDROCHLORIDE 1 MG/ML
INJECTION, SOLUTION INTRAMUSCULAR; INTRAVENOUS AS NEEDED
Status: DISCONTINUED | OUTPATIENT
Start: 2021-09-01 | End: 2021-09-01 | Stop reason: HOSPADM

## 2021-09-01 RX ORDER — EPHEDRINE SULFATE/0.9% NACL/PF 50 MG/5 ML
SYRINGE (ML) INTRAVENOUS AS NEEDED
Status: DISCONTINUED | OUTPATIENT
Start: 2021-09-01 | End: 2021-09-01 | Stop reason: HOSPADM

## 2021-09-01 RX ADMIN — MIDAZOLAM HYDROCHLORIDE 1 MG: 1 INJECTION, SOLUTION INTRAMUSCULAR; INTRAVENOUS at 13:49

## 2021-09-01 RX ADMIN — SUCCINYLCHOLINE CHLORIDE 180 MG: 20 INJECTION, SOLUTION INTRAMUSCULAR; INTRAVENOUS at 13:04

## 2021-09-01 RX ADMIN — DEXMEDETOMIDINE HYDROCHLORIDE 4 MCG: 100 INJECTION, SOLUTION, CONCENTRATE INTRAVENOUS at 13:49

## 2021-09-01 RX ADMIN — PROPOFOL 30 MG: 10 INJECTION, EMULSION INTRAVENOUS at 13:49

## 2021-09-01 RX ADMIN — FENTANYL CITRATE 50 MCG: 50 INJECTION, SOLUTION INTRAMUSCULAR; INTRAVENOUS at 13:04

## 2021-09-01 RX ADMIN — Medication 40 MCG: at 13:31

## 2021-09-01 RX ADMIN — SODIUM CHLORIDE 40 MCG/MIN: 900 INJECTION, SOLUTION INTRAVENOUS at 13:36

## 2021-09-01 RX ADMIN — HYDROMORPHONE HYDROCHLORIDE 0.2 MG: 2 INJECTION, SOLUTION INTRAMUSCULAR; INTRAVENOUS; SUBCUTANEOUS at 14:10

## 2021-09-01 RX ADMIN — FENTANYL CITRATE 50 MCG: 50 INJECTION, SOLUTION INTRAMUSCULAR; INTRAVENOUS at 13:29

## 2021-09-01 RX ADMIN — DEXAMETHASONE SODIUM PHOSPHATE 8 MG: 4 INJECTION, SOLUTION INTRAMUSCULAR; INTRAVENOUS at 13:29

## 2021-09-01 RX ADMIN — DEXMEDETOMIDINE HYDROCHLORIDE 8 MCG: 100 INJECTION, SOLUTION, CONCENTRATE INTRAVENOUS at 13:04

## 2021-09-01 RX ADMIN — Medication 40 MCG: at 13:53

## 2021-09-01 RX ADMIN — SODIUM CHLORIDE, POTASSIUM CHLORIDE, SODIUM LACTATE AND CALCIUM CHLORIDE 25 ML/HR: 600; 310; 30; 20 INJECTION, SOLUTION INTRAVENOUS at 12:26

## 2021-09-01 RX ADMIN — MIDAZOLAM HYDROCHLORIDE 1 MG: 1 INJECTION, SOLUTION INTRAMUSCULAR; INTRAVENOUS at 13:04

## 2021-09-01 RX ADMIN — ONDANSETRON HYDROCHLORIDE 4 MG: 2 INJECTION, SOLUTION INTRAMUSCULAR; INTRAVENOUS at 14:05

## 2021-09-01 RX ADMIN — Medication 10 MG: at 13:33

## 2021-09-01 RX ADMIN — LIDOCAINE HYDROCHLORIDE 100 MG: 20 INJECTION, SOLUTION INTRAVENOUS at 13:04

## 2021-09-01 RX ADMIN — Medication 3 AMPULE: at 12:26

## 2021-09-01 RX ADMIN — PROPOFOL 125 MCG/KG/MIN: 10 INJECTION, EMULSION INTRAVENOUS at 13:07

## 2021-09-01 RX ADMIN — WATER 2 G: 1 INJECTION INTRAMUSCULAR; INTRAVENOUS; SUBCUTANEOUS at 13:29

## 2021-09-01 RX ADMIN — DEXMEDETOMIDINE HYDROCHLORIDE 4 MCG: 100 INJECTION, SOLUTION, CONCENTRATE INTRAVENOUS at 14:10

## 2021-09-01 RX ADMIN — PROPOFOL 150 MG: 10 INJECTION, EMULSION INTRAVENOUS at 13:04

## 2021-09-01 RX ADMIN — PROPOFOL 50 MG: 10 INJECTION, EMULSION INTRAVENOUS at 13:08

## 2021-09-01 NOTE — DISCHARGE INSTRUCTIONS
Follow up as scheduled with [facility]. Your incisions have skin glue over them. Please do not scrub or pick at the incisions. The sutures will dissolve over the next several weeks. No tub baths or swimming until I see you back 4 weeks after surgery. You can shower starting the day after surgery. If you have questions about your device you can reach out to our office or call the [email or web address] line for assistance. With questions please call [phone]. During business hours a nurse can assist you. For urgent or emergent issues after hours you can ask to speak to the Urogynecologist on-call. DISCHARGE SUMMARY from Nurse    PATIENT INSTRUCTIONS:    After general anesthesia or intravenous sedation, for 24 hours or while taking prescription Narcotics:  · Limit your activities  · Do not drive and operate hazardous machinery  · Do not make important personal or business decisions  · Do  not drink alcoholic beverages  · If you have not urinated within 8 hours after discharge, please contact your surgeon on call. Report the following to your surgeon:  · Excessive pain, swelling, redness or odor of or around the surgical area  · Temperature over 100.5  · Nausea and vomiting lasting longer than 4 hours or if unable to take medications  · Any signs of decreased circulation or nerve impairment to extremity: change in color, persistent  numbness, tingling, coldness or increase pain  · Any questions            How to Care for Your Wound After Its Treated With  DERMABOND* Topical Skin Adhesive  DERMABOND* Topical Skin Adhesive (2-octyl cyanoacrylate) is a sterile, liquid skin adhesive  that holds wound edges together. The film will usually remain in place for 5 to 10 days, then  naturally fall off your skin.   The following will answer some of your questions and provide instructions for proper care for your  wound while it is healing:    CHECK WOUND APPEARANCE   Some swelling, redness, and pain are common with all wounds and normally will go away as the  wound heals. If swelling, redness, or pain increases or if the wound feels warm to the touch,  contact a doctor. Also contact a doctor if the wound edges reopen or separate. REPLACE BANDAGES   If your wound is bandaged, keep the bandage dry.  Replace the dressing daily until the adhesive film has fallen off or if the  bandage should become wet, unless otherwise instructed by your  physician.  When changing the dressing, do not place tape directly over the  DERMABOND adhesive film, because removing the tape later may also  remove the film. AVOID TOPICAL MEDICATIONS   Do not apply liquid or ointment medications or any other product to your wound while the  DERMABOND adhesive film is in place. These may loosen the film before your wound is healed. KEEP WOUND DRY AND PROTECTED   You may occasionally and briefly wet your wound in the shower or bath. Do not soak or scrub  your wound, do not swim, and avoid periods of heavy perspiration until the DERMABOND  adhesive has naturally fallen off. After showering or bathing, gently blot your wound dry with a  soft towel. If a protective dressing is being used, apply a fresh, dry bandage, being sure to keep  the tape off the DERMABOND adhesive film.  Apply a clean, dry bandage over the wound if necessary to protect it.  Protect your wound from injury until the skin has had sufficient time to heal.   Do not scratch, rub, or pick at the DERMABOND adhesive film. This may loosen the film before  your wound is healed.  Protect the wound from prolonged exposure to sunlight or tanning lamps while the film is in  place. If you have any questions or concerns about this product, please consult your doctor. *Trademark ©[employer] 2002  The discharge information has been reviewed with the spouse. The spouse verbalized understanding.   Discharge medications reviewed with the spouse and appropriate educational materials and side effects teaching were provided.   ___________________________________________________________________________________________________________________________________

## 2021-09-01 NOTE — ANESTHESIA POSTPROCEDURE EVALUATION
Procedure(s):  INTERSTIM FULL IMPLANT. general, total IV anesthesia    Anesthesia Post Evaluation      Multimodal analgesia: multimodal analgesia used between 6 hours prior to anesthesia start to PACU discharge  Patient location during evaluation: bedside  Patient participation: complete - patient participated  Level of consciousness: awake  Pain management: adequate  Airway patency: patent  Anesthetic complications: no  Cardiovascular status: acceptable  Respiratory status: acceptable  Hydration status: acceptable  Post anesthesia nausea and vomiting:  controlled  Final Post Anesthesia Temperature Assessment:  Normothermia (36.0-37.5 degrees C)      INITIAL Post-op Vital signs:   Vitals Value Taken Time   /57 09/01/21 1515   Temp 36.5 °C (97.7 °F) 09/01/21 1515   Pulse 62 09/01/21 1518   Resp 19 09/01/21 1518   SpO2 92 % 09/01/21 1518   Vitals shown include unvalidated device data.

## 2021-09-01 NOTE — ANESTHESIA PREPROCEDURE EVALUATION
Anesthetic History     PONV and history of awareness of surgery under anesthesia     Comments: Hx of awareness under anesthesia during hysterectomy     Review of Systems / Medical History  Patient summary reviewed, nursing notes reviewed and pertinent labs reviewed    Pulmonary        Sleep apnea: CPAP  Shortness of breath  Asthma        Neuro/Psych             Comments: Chronic pain   Cervical stenosis of spinal canal  Claustrophobia Cardiovascular    Hypertension        Dysrhythmias   Hyperlipidemia    Exercise tolerance: >4 METS  Comments: Palpitations    ECG (6/7/21): Sinus  Rhythm    Nuclear Stress Test (5/6/20):  ·Baseline ECG: Sinus rhythm. ·Gated SPECT: Left ventricular function post-stress was normal. Calculated ejection fraction is 83%. There is no evidence of transient ischemic dilation (TID). ·Left ventricular perfusion is probably normal.  · Myocardial perfusion imaging defect 1: There is a defect that is small in size with a mild reduction in uptake that is predominantly reversible. The defect appears to probably be artifact. The possibility of infarction cannot be excluded. ·Myocardial perfusion imaging defect 1: caused by breast attenuation. ·Normal myocardial perfusion imaging. Myocardial perfusion imaging supports a low risk stress test.    TTE (5/6/20):  ·Normal cavity size, wall thickness and systolic function (ejection fraction normal). Estimated left ventricular ejection fraction is 55 - 60%. No regional wall motion abnormality noted. Moderate (grade 2) left ventricular diastolic dysfunction. ·Pulmonary arterial systolic pressure is 26 mmHg.       Venous insufficiency of left leg   GI/Hepatic/Renal     GERD: well controlled           Endo/Other        Obesity, arthritis and anemia  Pertinent negatives: No morbid obesity   Other Findings   Comments: Urinary incontinence s/p Interstim placement, Stage I (8/18/21)    CREST (calcinosis, Raynaud's phenomenon, esophageal dysfunction, sclerodactyly, telangiectasia) ([facility])     Gout           Physical Exam    Airway  Mallampati: II  TM Distance: 4 - 6 cm  Neck ROM: normal range of motion        Cardiovascular    Rhythm: regular  Rate: normal         Dental    Dentition: Edentulous     Pulmonary  Breath sounds clear to auscultation               Abdominal  GI exam deferred       Other Findings            Anesthetic Plan    ASA: 3  Anesthesia type: general and total IV anesthesia    Monitoring Plan: BIS      Induction: Intravenous  Anesthetic plan and risks discussed with: Patient      TIVA    Grade 1 or 2 view for ortho surgeries

## 2021-09-01 NOTE — PERIOP NOTES
Handoff Report from Operating Room to PACU  2:20 PM  Report received from Abdelrahman Beavers RN and Laura Gomes CRNA regarding Mike Wolff. Surgeon(s):  Maxim Neff MD  And Procedure(s) (LRB):  INTERSTIM FULL IMPLANT (N/A)  confirmed   with allergies and dressings discussed. Anesthesia type, drugs, patient history, complications, estimated blood loss, vital signs, intake and output, and last pain medication, lines and temperature were reviewed. 3:30 PM    Discharge papers provided; Reviewed DC instructions with spouse/patient. Opportunity for questions and clarifications was provided; verbalized understanding. Follow-up appointments reviewed/written for patient. Pt stable and ambulatory for discharge; spouse to provide transportation to home. Clarified all personal belongings sent with patient, including eyeglasses and supplies for implant per rep. IV removed (without difficulty/pt tolerated well) Telemetry discontinued.

## 2021-09-01 NOTE — OP NOTES
OPERATIVE REPORT     SURGEON: Scottie Villagomez MD     ANESTHESIA: General anesthesia with ETT    ANTIBIOTICS: Ancef 2 grams     VTE PROPHYLAXIS: SCDs    ESTIMATED BLOOD LOSS: minimal    DRAINS:  none    SPECIMENS: none    IMPLANTS: Full InterStim implant lead and generator     COMPLICATIONS: No complications were apparent at the end of surgery     DISPOSITION: PACU - hemodynamically stable. PROCEDURE:  Full Implantation of neurostimulator electrode and generator with complex nerve mapping, intraoperative fluroscopy, electronic analysis of implanted neurostimulator electrode     INDICATION FOR PROCEDURE: This is a 70 y.o. female with refractory overactive bladder and urgency urinary incontinence. The patient elects to undergo InterStim implantation for neuromodulation. She had >50% improvement after percutaneous trial. Risks, benefits, expectations and alternatives were discussed with the patient preoperatively. All her questions were answered and she elects to proceed with InterStim therapy. Written informed consent was obtained. OPERATIVE FINDINGS:  Insertion of her chronic sacral lead on the right side in the S3 sacral foramen with appropriate motor and sensory responses. INTERPRETATION OF FLUOROSCOPY:  AP and lateral images were taken throughout the procedure and the lead was placed into the S3 sacral foramen on the right side. This was visualized with both AP and lateral images in the appropriate location with electrodes 2 and 3 straddling the inner bony table. PROCEDURE IN DETAIL:   The patient was taken the operating room where she was prepped and draped in the prone position in sterile fashion. AP and lateral images were obtained in order to identify the medial aspect of the S3 sacral foramina. These areas were infused with 1% lidocaine with bicarbonate. We used the localizing needles to enter into the S3 foramen bilaterally.   We identified a good sensory and motor response on the patient's right side, therefore, injected more local and widened the skin incision with a scalpel. The inner filament was removed from the needle and replaced with a guidewire. The dilating trocar was inserted over the guidewire using fluoroscopic imaging to determine the appropriate depth. Once the position was ideal, the chronic sacral lead was led through the trocar. The trocar was carefully retracted, therefore exposing the electrodes, which were tested to confirm adequate placement. All 4 electrodes were tested with the responses as noted above. Because the patient had appropriate responses on 4 of 4 electrodes, the plastic tines were exposed. An approximately 4 cm skin incision was made with the scalpel in the left gluteal region where it had been marked preoperatively. A pocket was created using a combination of monopolar energy and blunt dissection within the subcutaneous tissue underlying the incision. The lead was tunneled to the left gluteal incision. The lead was cleansed and attached to the generator utilizing the self-retaining screw. The underlying pocket was made about an inch deep in the gluteal fat. Hemostasis was achieved with electrocautery. The generator was placed and the lead was deep to the actual generator placement such that it lay flat underneath the subcutaneous skin. The patient  was tested and communicated as described above. The incision was copiously irrigated. The deep subcutaneous tissue was reapproximated with 2-0 Vicryl interrupted stitches and the skin closed with a subcuticular stitch of 4-0 Monocryl. The area was sterilely dressed with a Tegaderm. The paramedian incision was closed with a deep stitch of 2-0 Vicryl and the skin was closed with interrupted stitches of 4-0 Vicryl. Skin glue was placed. The patient tolerated procedure well. Sponge, lap and needle counts were correct x2.   The patient was awakened from anesthesia and transferred to the recovery room awake, alert and beathing independently in stable condition. Complex programming was performed in PACU after the patient had recovered from anesthesia.     Basilio Santos MD   [facility]

## 2021-09-24 ENCOUNTER — TELEPHONE (OUTPATIENT)
Dept: CARDIOLOGY CLINIC | Age: 71
End: 2021-09-24

## 2021-09-24 NOTE — TELEPHONE ENCOUNTER
Message  Received: Today  Nimco J Carlos sent to Fernanda Cunha LPN  Caller: Unspecified (Today, 10:14 AM)  Scheduled 10/12         Noted, thanks.

## 2021-09-24 NOTE — TELEPHONE ENCOUNTER
Pt returned call,verified pt with two pt identifiers, pt advised she has been having more frequent chest pressure,going down her arms, in her shoulders while walking. She only has this on exertion that can last 3-4 minutes. No other changes in meds. She can sit down and rest and it will go away. I advised of last appt on 6/7/21 and next appt 12/13/21 but we want to get her in sooner than this. Advised for any pain lasting or constant and any other concerning cardiac symptoms to call 911 or get to ER. Chika Darling will call to schedule pt for f/u appt sooner. Pt verbalized understanding.

## 2021-09-24 NOTE — TELEPHONE ENCOUNTER
Pt is calling in regards to having fast HR after doing exercise, ws talking to Dr. Jose Carlos Celeste , Pulomlogist who wanted her to get in touch with Dr KAUR. Please advise.  Please call her back at [phone] or [facility] [phone]      Thanks Lori

## 2021-09-27 ENCOUNTER — TRANSCRIBE ORDER (OUTPATIENT)
Dept: SCHEDULING | Age: 71
End: 2021-09-27

## 2021-09-27 DIAGNOSIS — R91.1 LUNG NODULE: Primary | ICD-10-CM

## 2021-10-12 ENCOUNTER — OFFICE VISIT (OUTPATIENT)
Dept: CARDIOLOGY CLINIC | Age: 71
End: 2021-10-12
Payer: MEDICARE

## 2021-10-12 VITALS
DIASTOLIC BLOOD PRESSURE: 68 MMHG | SYSTOLIC BLOOD PRESSURE: 130 MMHG | RESPIRATION RATE: 18 BRPM | BODY MASS INDEX: 37.46 KG/M2 | HEIGHT: 66 IN | OXYGEN SATURATION: 98 % | WEIGHT: 233.1 LBS | HEART RATE: 66 BPM

## 2021-10-12 DIAGNOSIS — I87.2 VENOUS REFLUX: ICD-10-CM

## 2021-10-12 DIAGNOSIS — R07.2 PRECORDIAL PAIN: ICD-10-CM

## 2021-10-12 DIAGNOSIS — G47.33 OSA ON CPAP: ICD-10-CM

## 2021-10-12 DIAGNOSIS — E78.2 MIXED HYPERLIPIDEMIA: ICD-10-CM

## 2021-10-12 DIAGNOSIS — Z99.89 OSA ON CPAP: ICD-10-CM

## 2021-10-12 DIAGNOSIS — R06.09 DOE (DYSPNEA ON EXERTION): ICD-10-CM

## 2021-10-12 DIAGNOSIS — I10 ESSENTIAL HYPERTENSION: Primary | ICD-10-CM

## 2021-10-12 PROCEDURE — G8427 DOCREV CUR MEDS BY ELIG CLIN: HCPCS | Performed by: INTERNAL MEDICINE

## 2021-10-12 PROCEDURE — 99214 OFFICE O/P EST MOD 30 MIN: CPT | Performed by: INTERNAL MEDICINE

## 2021-10-12 PROCEDURE — 1090F PRES/ABSN URINE INCON ASSESS: CPT | Performed by: INTERNAL MEDICINE

## 2021-10-12 PROCEDURE — 1100F PTFALLS ASSESS-DOCD GE2>/YR: CPT | Performed by: INTERNAL MEDICINE

## 2021-10-12 PROCEDURE — 3288F FALL RISK ASSESSMENT DOCD: CPT | Performed by: INTERNAL MEDICINE

## 2021-10-12 PROCEDURE — 3017F COLORECTAL CA SCREEN DOC REV: CPT | Performed by: INTERNAL MEDICINE

## 2021-10-12 PROCEDURE — G8752 SYS BP LESS 140: HCPCS | Performed by: INTERNAL MEDICINE

## 2021-10-12 PROCEDURE — G8510 SCR DEP NEG, NO PLAN REQD: HCPCS | Performed by: INTERNAL MEDICINE

## 2021-10-12 PROCEDURE — 93010 ELECTROCARDIOGRAM REPORT: CPT | Performed by: INTERNAL MEDICINE

## 2021-10-12 PROCEDURE — 93005 ELECTROCARDIOGRAM TRACING: CPT | Performed by: INTERNAL MEDICINE

## 2021-10-12 PROCEDURE — G8399 PT W/DXA RESULTS DOCUMENT: HCPCS | Performed by: INTERNAL MEDICINE

## 2021-10-12 PROCEDURE — G9899 SCRN MAM PERF RSLTS DOC: HCPCS | Performed by: INTERNAL MEDICINE

## 2021-10-12 PROCEDURE — G0463 HOSPITAL OUTPT CLINIC VISIT: HCPCS | Performed by: INTERNAL MEDICINE

## 2021-10-12 PROCEDURE — G8754 DIAS BP LESS 90: HCPCS | Performed by: INTERNAL MEDICINE

## 2021-10-12 PROCEDURE — G8417 CALC BMI ABV UP PARAM F/U: HCPCS | Performed by: INTERNAL MEDICINE

## 2021-10-12 PROCEDURE — G8536 NO DOC ELDER MAL SCRN: HCPCS | Performed by: INTERNAL MEDICINE

## 2021-10-12 RX ORDER — BENZONATATE 100 MG/1
100 CAPSULE ORAL
COMMUNITY

## 2021-10-12 NOTE — PROGRESS NOTES
1. Have you been to the ER, urgent care clinic since your last visit? Hospitalized since your last visit? Yes, [id number], 9/1/21, Interstim full implant    2. Have you seen or consulted any other health care providers outside of the [address] [place] [address] since your last visit? Include any pap smears or colon screening.  No         Chief Complaint   Patient presents with    Hypertension     C/O  CP, SOB, Ankle and leg swelling

## 2021-10-12 NOTE — PROGRESS NOTES
[address]  [phone]     Subjective:      Mason Charles is a 70 y.o. female is here for symptom based visit. Pmhx HTN, HLD, pulm HTN, venous insufficiency and CREST syndrome  Last seen by us  In 6/2021. At that time she c/o occasional sob and nonexertional chest discomfort which is sl better / improved after her esophageal dilatation. Dr Mari Farrell also switched her PPI---will receive it tomorrow. Had chest CT done 5/13/2021 which showed increase in size of RUL nodule, she has f/u with Dr Sawyer Chacko end of this mos. Since last OV, she is s/p Interstim PERC Test and Full Implant under General anesthesia on 8/18/21. Since then she has been experiencing intermittent PAN and exertional  Chest discomfort radiating across her shoulder and left arm. Her symptoms resolve when she stops the activity such as---walking her dog or doing housework. She  Had a follow up visit with Dr Sawyer Chacko in September and was told that  Her pulmonary fxn test is fine. She was advised to follow up with us. The patient denies orthopnea, PND, LE edema, palpitations, syncope, or presyncope.        Patient Active Problem List    Diagnosis Date Noted    Localized primary osteoarthritis of left hand 02/24/2021    Spinal stenosis, lumbar 10/29/2020    Left chest pressure 04/27/2020    Cervical stenosis of spinal canal 03/09/2020    Essential hypertension 10/29/2019    KRYSTINA on CPAP 05/16/2019    Severe obesity ([address] 10/23/2018    Chronic venous hypertension (idiopathic) with inflammation of left lower extremity 02/06/2017    Venous insufficiency of left leg 11/22/2016    Venous reflux 08/16/2016    Osteoarthritis 01/21/2016    OA (osteoarthritis) of knee 01/21/2016    Abnormal ankle brachial index 04/08/2015    SOB (shortness of breath) 02/24/2015    Right leg pain 02/24/2015    HNP (herniated nucleus pulposus), cervical 07/07/2014    Esophageal reflux 06/27/2012  Mixed hyperlipidemia 06/27/2012    Foot pain 07/28/2011    Asthma 07/28/2011      Desmond Chi MD  Past Medical History:   Diagnosis Date    Adverse effect of anesthesia     during hysterectomy- woke up \"too early\"    Arrhythmia     h/o palpitations normal work up 22/2015 heart: Ronni    Arthritis     Awareness under anesthesia     with hysterectomy    Chronic pain     bulging disc c4/c5 l4/l5 : as of 9/1018 no neck/head movement limitation says patient    Claustrophobia     Color blind     CREST (calcinosis, Raynaud's phenomenon, esophageal dysfunction, sclerodactyly, telangiectasia) ([address] 2018    [facility], Dr. Servando Reeys GERD (gastroesophageal reflux disease)     Gout     Hypertension     Ill-defined condition     CREST    Leg swelling 2016    unknown etiology    Bartlett's neuralgia 2001    from neuroma middle toe, left foot    Nausea & vomiting     KRYSTINA on CPAP     CPAP    Pulmonary hypertension ([facility] 08/2018    Heart: Dr. Celia Desir asthma     allergy    Shortness of breath 2016    Pulmonary Dr. Sudha Casillas    Unspecified adverse effect of anesthesia     wakes up for anesthesia early      Past Surgical History:   Procedure Laterality Date    COLONOSCOPY  04/20/2011    negative    COLONOSCOPY N/A 9/28/2018    COLONOSCOPY performed by Yuki Smith MD at [facility] CATARACT REMOVAL Bilateral 2018    HX CERVICAL FUSION  2014    c4-5     HX CHOLECYSTECTOMY      HX GI  11/19/13    Rectocele repair with enterocele repair    HX HYSTERECTOMY      TOOK LEFT OVARY    HX KNEE ARTHROSCOPY Right 1990's    right knee partial meniscus     HX KNEE REPLACEMENT Right 2016    HX KNEE REPLACEMENT Left 2010, 2016    TKR    HX ORTHOPAEDIC  1973    ganglion cyst removed rt wrist    HX ORTHOPAEDIC  2013, 1992    bilateral CTR, and had ulna nerve release    HX ORTHOPAEDIC Right 2018    thumb and ring finger trigger release    HX ORTHOPAEDIC 2019    cervical fusion x2     HX ORTHOPAEDIC  2020    L4-L5    HX OTHER SURGICAL  2013    recto prolapse    IR INJ SPINE THER SUBST LUM/SAC W IMG  12/19/2019    MA CARDIAC SURG PROCEDURE UNLIST  2015    no blockages, card [facility] MA CARDIAC SURG PROCEDURE UNLIST  05/2018    no blockages x 2 procedures     Allergies   Allergen Reactions    Ciprofloxacin Shortness of Breath    Flagyl [Metronidazole] Shortness of Breath    Percocet [Oxycodone-Acetaminophen] Rash and Itching     Able to take if uses benadryl    Benzene Other (comments)     Blisters and burn area Benzene found to be on steri-strips    Betadine [Povidone-Iodine] Other (comments)     Blisters and burning    Naproxen Itching    Sulfa (Sulfonamide Antibiotics) Rash    Adhesive Rash     Redness and rash      Family History   Problem Relation Age of Onset    Heart Disease Mother     Hypertension Mother     Diabetes Mother     Cancer Mother         BREAST, LUNG    Breast Cancer Mother 61    Heart Disease Father     Hypertension Father     Thyroid Disease Father     Diabetes Brother     Psychiatric Disorder Son         STAINS, [place], [place] DEPRESSIVE    Anesth Problems Neg Hx       Social History     Socioeconomic History    Marital status:      Spouse name: Not on file    Number of children: Not on file    Years of education: Not on file    Highest education level: Not on file   Occupational History    Not on file   Tobacco Use    Smoking status: Never Smoker    Smokeless tobacco: Never Used   Vaping Use    Vaping Use: Never used   Substance and Sexual Activity    Alcohol use: No    Drug use: No    Sexual activity: Not on file   Other Topics Concern    Not on file   Social History Narrative    Not on file     Social Determinants of Health     Financial Resource Strain:     Difficulty of Paying Living Expenses:    Food Insecurity:     Worried About Running Out of Food in the Last Year:     [address] in the Last Year:    Transportation Needs:     Lack of Transportation (Medical):  Lack of Transportation (Non-Medical):    Physical Activity:     Days of Exercise per Week:     Minutes of Exercise per Session:    Stress:     Feeling of Stress :    Social Connections:     Frequency of Communication with Friends and Family:     Frequency of Social Gatherings with Friends and Family:     Attends [employer] Services:     Active Member of Clubs or Organizations:     Attends Club or Organization Meetings:     Marital Status:    Intimate Partner Violence:     Fear of Current or Ex-Partner:     Emotionally Abused:     Physically Abused:     Sexually Abused:       Current Outpatient Medications   Medication Sig    benzonatate (TESSALON) 100 mg capsule Take 100 mg by mouth three (3) times daily as needed for Cough.  RABEprazole (ACIPHEX) 20 mg TbEC Take 20 mg by mouth daily.  diphenhydrAMINE-acetaminophen (Tylenol PM Extra Strength)  mg tab Take 1 Tab by mouth nightly as needed.  calcium polycarbophiL (Fiber Laxative, ca polycarbo,) 625 mg tablet Take 625 mg by mouth daily.  diphenhydrAMINE (BenadryL) 25 mg capsule Take 25 mg by mouth every six (6) hours as needed.  pravastatin (PRAVACHOL) 40 mg tablet Take 40 mg by mouth nightly.  carboxymethylcellulose sodium (THERATEARS OP) Apply  to eye as needed.  telmisartan (MICARDIS) 40 mg tablet Take 40 mg by mouth daily.  metoprolol tartrate (LOPRESSOR) 25 mg tablet TAKE ONE TABLET BY MOUTH TWICE DAILY    isosorbide mononitrate ER (IMDUR) 30 mg tablet TAKE ONE-HALF (1/2) TABLET DAILY FOR RAYNAUDS PHENOMENON (Patient taking differently: Take 15 mg by mouth nightly. TAKE ONE-HALF (1/2) TABLET DAILY FOR RAYNAUDS PHENOMENON)    baclofen (LIORESAL) 10 mg tablet Take 10 mg by mouth nightly.  multivitamin (ONE A DAY) tablet Take 1 Tab by mouth daily.  aspirin delayed-release 81 mg tablet Take 81 mg by mouth nightly.     acetaminophen (TYLENOL EXTRA STRENGTH) 500 mg tablet Take 1,000 mg by mouth every six (6) hours as needed for Pain.  allopurinol (ZYLOPRIM) 100 mg tablet Take 100 mg by mouth daily.  cetirizine (ZYRTEC) 10 mg tablet Take 10 mg by mouth daily.  montelukast (SINGULAIR) 10 mg tablet Take 10 mg by mouth nightly. No current facility-administered medications for this visit. Review of Symptoms:  11 systems reviewed, negative other than as stated in the HPI    Physical ExamPhysical Exam:    Vitals:    10/12/21 0930   BP: 130/68   Pulse: 66   Resp: 18   SpO2: 98%   Weight: 233 lb 1.6 oz (105.7 kg)   Height: 5' 5.5\" (1.664 m)     Body mass index is 38.2 kg/m². General PE  Gen:  NAD  Mental Status - Alert. General Appearance - Not in acute distress. HEENT:  PERRL, no carotid bruits or JVD  Chest and Lung Exam   Inspection: Accessory muscles - No use of accessory muscles in breathing. Auscultation:   Breath sounds: - Normal.   Cardiovascular   Inspection: Jugular vein - Bilateral - Inspection Normal.   Palpation/Percussion:   Apical Impulse: - Normal.   Auscultation: Rhythm - Regular. Heart Sounds - S1 WNL and S2 WNL. No S3 or S4. Murmurs & Other Heart Sounds: Auscultation of the heart reveals - No Murmurs. Peripheral Vascular   Upper Extremity: Inspection - Bilateral - No Cyanotic nailbeds or Digital clubbing. Lower Extremity:   Palpation: Edema - Bilateral - No edema. Abdomen:   Soft, non-tender, bowel sounds are active.   Neuro: A&O times 3, CN and motor grossly WNL    Labs:   No results found for: CHOL, CHOLX, CHLST, CHOLV, [id number], HDL, HDLP, LDL, LDLC, DLDLP, TGLX, TRIGL, TRIGP, CHHD, CHHDX  No results found for: CPK, CPKX, CPX  Lab Results   Component Value Date/Time    Sodium 141 08/11/2021 10:59 AM    Potassium 4.3 08/11/2021 10:59 AM    Chloride 110 (H) 08/11/2021 10:59 AM    CO2 26 08/11/2021 10:59 AM    Anion gap 5 08/11/2021 10:59 AM    Glucose 99 08/11/2021 10:59 AM    BUN 14 08/11/2021 10:59 AM Creatinine 0.93 08/11/2021 10:59 AM    BUN/Creatinine ratio 15 08/11/2021 10:59 AM    GFR est AA >60 08/11/2021 10:59 AM    GFR est non-AA 59 (L) 08/11/2021 10:59 AM    Calcium 8.9 08/11/2021 10:59 AM    Bilirubin, total 0.7 10/21/2020 09:19 AM    Alk. phosphatase 91 10/21/2020 09:19 AM    Protein, total 6.6 10/21/2020 09:19 AM    Albumin 3.4 (L) 10/21/2020 09:19 AM    Globulin 3.2 10/21/2020 09:19 AM    A-G Ratio 1.1 10/21/2020 09:19 AM    ALT (SGPT) 20 10/21/2020 09:19 AM       EKG:  SR     Assessment:     Assessment:      1. Essential hypertension    2. Mixed hyperlipidemia    3. KRYSTINA on CPAP    4. PAN (dyspnea on exertion)    5. Precordial pain    6. Venous reflux        Orders Placed This Encounter    CT HEART W CONT STRUC MORPH FUNC     Standing Status:   Future     Standing Expiration Date:   11/12/2022     Order Specific Question:   STAT Creatinine as indicated     Answer: Yes    AMB POC EKG ROUTINE W/ 12 LEADS, INTER & REP     Order Specific Question:   Reason for Exam:     Answer:   routine    benzonatate (TESSALON) 100 mg capsule     Sig: Take 100 mg by mouth three (3) times daily as needed for Cough. Plan:     1. PAN, precordial pain. No significant CAD per [facility] in 2015. Negative NST 5/2020. Normal EF grade 2 DD with no significant valve issues per echo  5/2020. On ASA, BB, Imdur and statin. Will obtain cardiac CTA. 2. Essential hypertension: Controlled with current meds.    3. Mixed hyperlipidemia: 2/2020 LDL 94 On statin Labs and lipids per PCP----will request labs again from pcp  4. KRYSTINA: on CPAP. 5. H/o CREST syndrome. F/u with rheumatology, dr Gypsy Godfrey  6. Pulm HTN: Followed by Dr Omari Veliz   7. Superficial venous reflux: s/p right GSV RF closure 10/2016. Left leg continues to do very well with conservative management. stable overall. Continue current care and f/u in     Henri Silva NP       Patient seen and examined by me with nurse practitioner.   I personally performed all components of the history, physical, and medical decision making and agree with the assessment and plan as noted. Due to symptoms and previous -ve cardiac evaluation will proceed with cardiac CTA, otherwise leximyoview if CTA is not approved. BP controlled. On statin. CPAP. D/w patient.      Krupa George MD

## 2021-10-13 ENCOUNTER — TELEPHONE (OUTPATIENT)
Dept: CARDIOLOGY CLINIC | Age: 71
End: 2021-10-13

## 2021-10-13 NOTE — TELEPHONE ENCOUNTER
Pt having a CT scan 10/26/2021. The Hospital call and told her she needs to be on a bata blocker prior to having the CT. Please advise. Please call her back at [phone] or [phone].       Thanks Lori

## 2021-10-13 NOTE — TELEPHONE ENCOUNTER
----- Message from Micaela To NP sent at 10/12/2021 10:06 AM EDT -----  Can we get labs from pcp thanks        Called PCP office and left message to fax over most recent labs/lipid panel to me. Advised to call if needed.

## 2021-10-13 NOTE — TELEPHONE ENCOUNTER
Pt is on Metoprolol 25 mg bid already. Does she need to increase for CT scan? If so please advise, thanks!

## 2021-10-14 NOTE — TELEPHONE ENCOUNTER
Message  Received: Arabella Dainel, NP sent to Annette Qureshi LPN  Caller: Unspecified Jones Adis, 12:55 PM)  Her HR is around 60 so continue metoprolol 25 mg BID   No need to adjust   thanks         Called pt,verified pt with two pt identifiers, advised pt she is already on Metoprolol 25 mg bid and should not need to adjust the medication. Pt did not realize she was on a BB but understands she is on one now. Pt verbalized understanding.

## 2021-10-26 ENCOUNTER — HOSPITAL ENCOUNTER (OUTPATIENT)
Dept: CT IMAGING | Age: 71
Discharge: HOME | End: 2021-10-26
Attending: NURSE PRACTITIONER
Payer: MEDICARE

## 2021-10-26 VITALS — SYSTOLIC BLOOD PRESSURE: 185 MMHG | DIASTOLIC BLOOD PRESSURE: 85 MMHG | HEART RATE: 69 BPM

## 2021-10-26 DIAGNOSIS — R07.2 PRECORDIAL PAIN: ICD-10-CM

## 2021-10-26 DIAGNOSIS — G47.33 OSA ON CPAP: ICD-10-CM

## 2021-10-26 DIAGNOSIS — I10 ESSENTIAL HYPERTENSION: ICD-10-CM

## 2021-10-26 DIAGNOSIS — E78.2 MIXED HYPERLIPIDEMIA: ICD-10-CM

## 2021-10-26 DIAGNOSIS — I87.2 VENOUS REFLUX: ICD-10-CM

## 2021-10-26 DIAGNOSIS — R06.09 DOE (DYSPNEA ON EXERTION): ICD-10-CM

## 2021-10-26 DIAGNOSIS — Z99.89 OSA ON CPAP: ICD-10-CM

## 2021-10-26 PROCEDURE — 74011000636 HC RX REV CODE- 636: Performed by: NURSE PRACTITIONER

## 2021-10-26 PROCEDURE — 75572 CT HRT W/3D IMAGE: CPT

## 2021-10-26 PROCEDURE — 74011250637 HC RX REV CODE- 250/637: Performed by: NURSE PRACTITIONER

## 2021-10-26 RX ORDER — LABETALOL HYDROCHLORIDE 5 MG/ML
20 INJECTION, SOLUTION INTRAVENOUS ONCE
Status: DISCONTINUED | OUTPATIENT
Start: 2021-10-26 | End: 2021-10-26

## 2021-10-26 RX ORDER — NITROGLYCERIN 0.4 MG/1
0.8 TABLET SUBLINGUAL AS NEEDED
Status: DISCONTINUED | OUTPATIENT
Start: 2021-10-26 | End: 2021-10-26

## 2021-10-26 RX ADMIN — NITROGLYCERIN 0.8 MG: 0.4 TABLET, ORALLY DISINTEGRATING SUBLINGUAL at 12:32

## 2021-10-26 RX ADMIN — IOPAMIDOL 120 ML: 755 INJECTION, SOLUTION INTRAVENOUS at 12:00

## 2021-10-27 NOTE — PROGRESS NOTES
CT chest did not show significant obstructive disease; recommend medical management. I already called her and she will see Dr Amanda Gayle next week.   Just CASE

## 2021-10-28 ENCOUNTER — DOCUMENTATION ONLY (OUTPATIENT)
Dept: CARDIOLOGY CLINIC | Age: 71
End: 2021-10-28

## 2021-10-28 NOTE — PROGRESS NOTES
Message  Received: Han Marie., NP sent to Flores Davis LPN  CT chest did not show significant obstructive disease; recommend medical management.  I already called her and she will see Dr Felicitas Law next week. Just CASE         Noted, Elisha Leary, CHITO already contacted pt and informed of test results. I will document and close out encounter.

## 2021-11-02 ENCOUNTER — TELEPHONE (OUTPATIENT)
Dept: CARDIOLOGY CLINIC | Age: 71
End: 2021-11-02

## 2021-11-02 ENCOUNTER — OFFICE VISIT (OUTPATIENT)
Dept: CARDIOLOGY CLINIC | Age: 71
End: 2021-11-02
Payer: MEDICARE

## 2021-11-02 VITALS
WEIGHT: 228.8 LBS | SYSTOLIC BLOOD PRESSURE: 116 MMHG | RESPIRATION RATE: 18 BRPM | BODY MASS INDEX: 38.12 KG/M2 | DIASTOLIC BLOOD PRESSURE: 56 MMHG | OXYGEN SATURATION: 98 % | HEIGHT: 65 IN

## 2021-11-02 DIAGNOSIS — R06.02 SOB (SHORTNESS OF BREATH): ICD-10-CM

## 2021-11-02 DIAGNOSIS — E78.2 MIXED HYPERLIPIDEMIA: ICD-10-CM

## 2021-11-02 DIAGNOSIS — I10 ESSENTIAL HYPERTENSION: Primary | ICD-10-CM

## 2021-11-02 PROCEDURE — 93005 ELECTROCARDIOGRAM TRACING: CPT | Performed by: INTERNAL MEDICINE

## 2021-11-02 PROCEDURE — G0463 HOSPITAL OUTPT CLINIC VISIT: HCPCS | Performed by: INTERNAL MEDICINE

## 2021-11-02 PROCEDURE — 1090F PRES/ABSN URINE INCON ASSESS: CPT | Performed by: INTERNAL MEDICINE

## 2021-11-02 PROCEDURE — G8399 PT W/DXA RESULTS DOCUMENT: HCPCS | Performed by: INTERNAL MEDICINE

## 2021-11-02 PROCEDURE — G8510 SCR DEP NEG, NO PLAN REQD: HCPCS | Performed by: INTERNAL MEDICINE

## 2021-11-02 PROCEDURE — 3017F COLORECTAL CA SCREEN DOC REV: CPT | Performed by: INTERNAL MEDICINE

## 2021-11-02 PROCEDURE — G8536 NO DOC ELDER MAL SCRN: HCPCS | Performed by: INTERNAL MEDICINE

## 2021-11-02 PROCEDURE — G8427 DOCREV CUR MEDS BY ELIG CLIN: HCPCS | Performed by: INTERNAL MEDICINE

## 2021-11-02 PROCEDURE — G8754 DIAS BP LESS 90: HCPCS | Performed by: INTERNAL MEDICINE

## 2021-11-02 PROCEDURE — G8417 CALC BMI ABV UP PARAM F/U: HCPCS | Performed by: INTERNAL MEDICINE

## 2021-11-02 PROCEDURE — 1100F PTFALLS ASSESS-DOCD GE2>/YR: CPT | Performed by: INTERNAL MEDICINE

## 2021-11-02 PROCEDURE — 93010 ELECTROCARDIOGRAM REPORT: CPT | Performed by: INTERNAL MEDICINE

## 2021-11-02 PROCEDURE — G8752 SYS BP LESS 140: HCPCS | Performed by: INTERNAL MEDICINE

## 2021-11-02 PROCEDURE — G9899 SCRN MAM PERF RSLTS DOC: HCPCS | Performed by: INTERNAL MEDICINE

## 2021-11-02 PROCEDURE — 3288F FALL RISK ASSESSMENT DOCD: CPT | Performed by: INTERNAL MEDICINE

## 2021-11-02 PROCEDURE — 99214 OFFICE O/P EST MOD 30 MIN: CPT | Performed by: INTERNAL MEDICINE

## 2021-11-02 NOTE — PROGRESS NOTES
11/2/2021 10:40 AM      Subjective:     Jesse Craft denies any complaints. Chronic SOB, unchanged. She denies chest pain, chest pressure/discomfort, palpitations, irregular heart beats, near-syncope, syncope, fatigue, orthopnea, paroxysmal nocturnal dyspnea, exertional chest pressure/discomfort, claudication, lower extremity edema. Visit Vitals  BP (!) 116/56 (BP 1 Location: Left upper arm, BP Patient Position: Sitting, BP Cuff Size: Large adult)   Resp 18   Ht 5' 5\" (1.651 m)   Wt 228 lb 12.8 oz (103.8 kg)   SpO2 98%   BMI 38.07 kg/m²     Current Outpatient Medications   Medication Sig    benzonatate (TESSALON) 100 mg capsule Take 100 mg by mouth three (3) times daily as needed for Cough.  RABEprazole (ACIPHEX) 20 mg TbEC Take 20 mg by mouth daily.  diphenhydrAMINE-acetaminophen (Tylenol PM Extra Strength)  mg tab Take 1 Tab by mouth nightly as needed.  calcium polycarbophiL (Fiber Laxative, ca polycarbo,) 625 mg tablet Take 625 mg by mouth daily.  diphenhydrAMINE (BenadryL) 25 mg capsule Take 25 mg by mouth every six (6) hours as needed.  pravastatin (PRAVACHOL) 40 mg tablet Take 40 mg by mouth nightly.  carboxymethylcellulose sodium (THERATEARS OP) Apply  to eye as needed.  telmisartan (MICARDIS) 40 mg tablet Take 40 mg by mouth daily.  metoprolol tartrate (LOPRESSOR) 25 mg tablet TAKE ONE TABLET BY MOUTH TWICE DAILY    isosorbide mononitrate ER (IMDUR) 30 mg tablet TAKE ONE-HALF (1/2) TABLET DAILY FOR RAYNAUDS PHENOMENON (Patient taking differently: Take 15 mg by mouth nightly. TAKE ONE-HALF (1/2) TABLET DAILY FOR RAYNAUDS PHENOMENON)    baclofen (LIORESAL) 10 mg tablet Take 10 mg by mouth nightly.  multivitamin (ONE A DAY) tablet Take 1 Tab by mouth daily.  aspirin delayed-release 81 mg tablet Take 81 mg by mouth nightly.     acetaminophen (TYLENOL EXTRA STRENGTH) 500 mg tablet Take 1,000 mg by mouth every six (6) hours as needed for Pain.    allopurinol (ZYLOPRIM) 100 mg tablet Take 100 mg by mouth daily.  cetirizine (ZYRTEC) 10 mg tablet Take 10 mg by mouth daily.  montelukast (SINGULAIR) 10 mg tablet Take 10 mg by mouth nightly. No current facility-administered medications for this visit.          Objective:      Visit Vitals  BP (!) 116/56 (BP 1 Location: Left upper arm, BP Patient Position: Sitting, BP Cuff Size: Large adult)   Resp 18   Ht 5' 5\" (1.651 m)   Wt 228 lb 12.8 oz (103.8 kg)   SpO2 98%   BMI 38.07 kg/m²       Past Medical History:   Diagnosis Date    Adverse effect of anesthesia     during hysterectomy- woke up \"too early\"    Arrhythmia     h/o palpitations normal work up 22/2015 heart: Ronni    Arthritis     Awareness under anesthesia     with hysterectomy    Chronic pain     bulging disc c4/c5 l4/l5 : as of 9/1018 no neck/head movement limitation says patient    Claustrophobia     Color blind     CREST (calcinosis, Raynaud's phenomenon, esophageal dysfunction, sclerodactyly, telangiectasia) ([facility] [address] 2018    [address] [facility] [place], Dr. Myke Tomlin GERD (gastroesophageal reflux disease)     Gout     Hyperlipidemia     Hypertension     Ill-defined condition     CREST    Leg swelling 2016    unknown etiology    Abrtlett's neuralgia 2001    from neuroma middle toe, left foot    Nausea & vomiting     KRYSTINA on CPAP     CPAP    Pulmonary hypertension ([address] 08/2018    Heart: Dr. Shaheed Hays    Seasonal asthma     allergy    Shortness of breath 2016    Pulmonary Dr. Miguel Reyes    Unspecified adverse effect of anesthesia     wakes up for anesthesia early      Past Surgical History:   Procedure Laterality Date    COLONOSCOPY  04/20/2011    negative    COLONOSCOPY N/A 9/28/2018    COLONOSCOPY performed by Theresa Hernandez MD at [facility] HX CATARACT REMOVAL Bilateral 2018    HX CERVICAL FUSION  2014    c4-5     HX CHOLECYSTECTOMY      HX GI  11/19/13    Rectocele repair with enterocele repair    HX HYSTERECTOMY      TOOK LEFT OVARY    HX KNEE ARTHROSCOPY Right 1990's    right knee partial meniscus     HX KNEE REPLACEMENT Right 2016    HX KNEE REPLACEMENT Left 2010, 2016    TKR    HX ORTHOPAEDIC  1973    ganglion cyst removed rt wrist    HX ORTHOPAEDIC  2013, 1992    bilateral CTR, and had ulna nerve release    HX ORTHOPAEDIC Right 2018    thumb and ring finger trigger release    HX ORTHOPAEDIC  2019    cervical fusion x2     HX ORTHOPAEDIC  2020    L4-L5    HX OTHER SURGICAL  2013    recto prolapse    IR INJ SPINE THER SUBST LUM/SAC W IMG  12/19/2019    [place] CARDIAC SURG PROCEDURE UNLIST  2015    no blockages, card cath   [employer] [place] CARDIAC SURG PROCEDURE UNLIST  05/2018    no blockages x 2 procedures     Allergies   Allergen Reactions    Ciprofloxacin Shortness of Breath    Flagyl [Metronidazole] Shortness of Breath    Percocet [Oxycodone-Acetaminophen] Rash and Itching     Able to take if uses benadryl    Benzene Other (comments)     Blisters and burn area Benzene found to be on steri-strips    Betadine [Povidone-Iodine] Other (comments)     Blisters and burning    Naproxen Itching    Sulfa (Sulfonamide Antibiotics) Rash    Adhesive Rash     Redness and rash      Family History   Problem Relation Age of Onset    Heart Disease Mother     Hypertension Mother     Diabetes Mother     Cancer Mother         BREAST, LUNG    Breast Cancer Mother 61    Heart Disease Father     Hypertension Father     Thyroid Disease Father     Diabetes Brother     Psychiatric Disorder Son         STAINS, [place], [place] DEPRESSIVE    Anesth Problems Neg Hx       Social History     Socioeconomic History    Marital status:      Spouse name: Not on file    Number of children: Not on file    Years of education: Not on file    Highest education level: Not on file   Occupational History    Not on file   Tobacco Use    Smoking status: Never Smoker    Smokeless tobacco: Never Used   Vaping Use  Vaping Use: Never used   Substance and Sexual Activity    Alcohol use: No    Drug use: No    Sexual activity: Not on file   Other Topics Concern    Not on file   Social History Narrative    Not on file     Social Determinants of Health     Financial Resource Strain:     Difficulty of Paying Living Expenses:    Food Insecurity:     Worried About Running Out of Food in the Last Year:     [address] in the Last Year:    Transportation Needs:     Lack of Transportation (Medical):  Lack of Transportation (Non-Medical):    Physical Activity:     Days of Exercise per Week:     Minutes of Exercise per Session:    Stress:     Feeling of Stress :    Social Connections:     Frequency of Communication with Friends and Family:     Frequency of Social Gatherings with Friends and Family:     Attends Anabaptism Services:     Active Member of Clubs or Organizations:     Attends Club or Organization Meetings:     Marital Status:    Intimate Partner Violence:     Fear of Current or Ex-Partner:     Emotionally Abused:     Physically Abused:     Sexually Abused:        Assessment:       ICD-10-CM ICD-9-CM    1. Essential hypertension  I10 401.9    2. SOB (shortness of breath)  R06.02 786.05 AMB POC EKG ROUTINE W/ 12 LEADS, INTER & REP   3. Mixed hyperlipidemia  E78.2 272.2        Plan:     1. PAN, precordial pain  No significant CAD per [facility] in 2015. Negative NST 5/2020. Cardiac CTA unimpressive. No furhter cardiac work up. Recommend pulmonary evaluation is symptoms persist.   2. Essential hypertension: Controlled with current meds.    3. Mixed hyperlipidemia: On statin. Labs and lipids per PCP  4. KRYSTINA: on CPAP. 5. H/o CREST syndrome. F/u with rheumatology, dr Canelo Martinez  6. Pulm HTN: Followed by Dr Marcie Asencio   7. Superficial venous reflux: s/p right GSV RF closure 10/2016. Left leg continues to do very well with conservative management. stable overall.

## 2021-11-02 NOTE — TELEPHONE ENCOUNTER
Patient is upset because she said her my chart notes say she denied chest pains and SOB but she said she did not deny having these things. Please call her at [phone]. What she did She did say that she was glad it wasn't her heart.     Brenda Barnes

## 2021-11-02 NOTE — TELEPHONE ENCOUNTER
Returned call,verified pt with two pt identifiers, pt advised she was upset at her office visit note from today stating she is not complaining of chest pain or soboe. I looked at office note today and it does state this at the top of his office note. I advised pt to look at the bottom of the note where the plan is and it does state precordial pain and mathews and that is chest pain and soboe. I advised I understand it is frustrating to see this but it is mentioned in the bottom note and if another Dr sees this he will see this also and they will see it if it is faxed also. I advised I do not think we can change it but I will put it in my note and in my telephone call encounter. Pt advised she is upset over this but will be here for her 6 month appt. Pt verbalized understanding and thanked me for my help.

## 2021-11-02 NOTE — PROGRESS NOTES
Chief Complaint   Patient presents with    Results     heart scan  10-- chest pain 6-8/10 randomly    Shortness of Breath     with chest pain     1. Have you been to the ER, urgent care clinic since your last visit? Hospitalized since your last visit? No      2. Have you seen or consulted any other health care providers outside of the [address] [place] [address] since your last visit? Include any pap smears or colon screening.  No

## 2021-11-04 ENCOUNTER — HOSPITAL ENCOUNTER (OUTPATIENT)
Dept: CT IMAGING | Age: 71
Discharge: HOME | End: 2021-11-04
Attending: INTERNAL MEDICINE
Payer: MEDICARE

## 2021-11-04 DIAGNOSIS — R91.1 LUNG NODULE: ICD-10-CM

## 2021-11-04 PROCEDURE — 71250 CT THORAX DX C-: CPT

## 2022-02-22 ENCOUNTER — TRANSCRIBE ORDER (OUTPATIENT)
Dept: SCHEDULING | Age: 72
End: 2022-02-22

## 2022-02-22 DIAGNOSIS — K58.0 IRRITABLE BOWEL SYNDROME WITH DIARRHEA: ICD-10-CM

## 2022-02-22 DIAGNOSIS — M62.08 DIASTASIS RECTI: ICD-10-CM

## 2022-02-22 DIAGNOSIS — R10.32 LLQ PAIN: Primary | ICD-10-CM

## 2022-02-22 DIAGNOSIS — R10.9 ABDOMINAL WALL PAIN: ICD-10-CM

## 2022-03-02 ENCOUNTER — OFFICE VISIT (OUTPATIENT)
Dept: ORTHOPEDIC SURGERY | Age: 72
End: 2022-03-02
Payer: MEDICARE

## 2022-03-02 VITALS — WEIGHT: 220 LBS | HEIGHT: 66 IN | BODY MASS INDEX: 35.36 KG/M2

## 2022-03-02 DIAGNOSIS — M79.641 RIGHT HAND PAIN: ICD-10-CM

## 2022-03-02 DIAGNOSIS — M18.11 PRIMARY OSTEOARTHRITIS OF FIRST CARPOMETACARPAL JOINT OF RIGHT HAND: ICD-10-CM

## 2022-03-02 DIAGNOSIS — M65.4 DE QUERVAIN'S TENOSYNOVITIS, RIGHT: ICD-10-CM

## 2022-03-02 DIAGNOSIS — M19.041 PRIMARY OSTEOARTHRITIS, RIGHT HAND: Primary | ICD-10-CM

## 2022-03-02 PROCEDURE — G8536 NO DOC ELDER MAL SCRN: HCPCS | Performed by: ORTHOPAEDIC SURGERY

## 2022-03-02 PROCEDURE — G8427 DOCREV CUR MEDS BY ELIG CLIN: HCPCS | Performed by: ORTHOPAEDIC SURGERY

## 2022-03-02 PROCEDURE — 3017F COLORECTAL CA SCREEN DOC REV: CPT | Performed by: ORTHOPAEDIC SURGERY

## 2022-03-02 PROCEDURE — G8756 NO BP MEASURE DOC: HCPCS | Performed by: ORTHOPAEDIC SURGERY

## 2022-03-02 PROCEDURE — G8432 DEP SCR NOT DOC, RNG: HCPCS | Performed by: ORTHOPAEDIC SURGERY

## 2022-03-02 PROCEDURE — 1090F PRES/ABSN URINE INCON ASSESS: CPT | Performed by: ORTHOPAEDIC SURGERY

## 2022-03-02 PROCEDURE — 99213 OFFICE O/P EST LOW 20 MIN: CPT | Performed by: ORTHOPAEDIC SURGERY

## 2022-03-02 PROCEDURE — G9899 SCRN MAM PERF RSLTS DOC: HCPCS | Performed by: ORTHOPAEDIC SURGERY

## 2022-03-02 PROCEDURE — L3809 WHFO W/O JOINTS PRE OTS: HCPCS | Performed by: ORTHOPAEDIC SURGERY

## 2022-03-02 PROCEDURE — G8399 PT W/DXA RESULTS DOCUMENT: HCPCS | Performed by: ORTHOPAEDIC SURGERY

## 2022-03-02 PROCEDURE — G8417 CALC BMI ABV UP PARAM F/U: HCPCS | Performed by: ORTHOPAEDIC SURGERY

## 2022-03-02 PROCEDURE — 1101F PT FALLS ASSESS-DOCD LE1/YR: CPT | Performed by: ORTHOPAEDIC SURGERY

## 2022-03-02 NOTE — PROGRESS NOTES
HPI: Charles Hopkins (: 1950) is a 67 y.o. female, patient, here for evaluation of the following chief complaint(s):    Hand Pain (Right thumb pain, December. No injury. Numbness, tingling into hand. Right hand dominant.)  Patient is seen today to evaluate her right hand. She remains pleased with the outcome of her prior left index and middle finger fusion in . She has advanced osteoarthritis in both hands and is now considering a right index and middle DIP fusion. She had a prior right distal ulna resection approximate 20 years ago by another physician and then later had a right thumb CMC arthroplasty. The thumb has proximal migration and she does have pain in that region but also demonstrates some findings consistent with de Quervain's tenosynovitis. She had a prior FCR tendon transfer. She is now seen for further treatment of her hand pain. Vitals:  Ht 5' 6\" (1.676 m)   Wt 220 lb (99.8 kg)   BMI 35.51 kg/m²    Body mass index is 35.51 kg/m². Allergies   Allergen Reactions    Ciprofloxacin Shortness of Breath    Flagyl [Metronidazole] Shortness of Breath    Percocet [Oxycodone-Acetaminophen] Rash and Itching     Able to take if uses benadryl    Benzene Other (comments)     Blisters and burn area Benzene found to be on steri-strips    Betadine [Povidone-Iodine] Other (comments)     Blisters and burning    Naproxen Itching    Sulfa (Sulfonamide Antibiotics) Rash    Adhesive Rash     Redness and rash       Current Outpatient Medications   Medication Sig    RABEprazole (ACIPHEX) 20 mg TbEC Take 20 mg by mouth daily.  diphenhydrAMINE-acetaminophen (Tylenol PM Extra Strength)  mg tab Take 1 Tab by mouth nightly as needed.  calcium polycarbophiL (Fiber Laxative, ca polycarbo,) 625 mg tablet Take 625 mg by mouth daily.  diphenhydrAMINE (BenadryL) 25 mg capsule Take 25 mg by mouth every six (6) hours as needed.     pravastatin (PRAVACHOL) 40 mg tablet Take 40 mg by mouth nightly.  carboxymethylcellulose sodium (THERATEARS OP) Apply  to eye as needed.  telmisartan (MICARDIS) 40 mg tablet Take 40 mg by mouth daily.  metoprolol tartrate (LOPRESSOR) 25 mg tablet TAKE ONE TABLET BY MOUTH TWICE DAILY    isosorbide mononitrate ER (IMDUR) 30 mg tablet TAKE ONE-HALF (1/2) TABLET DAILY FOR RAYNAUDS PHENOMENON (Patient taking differently: Take 15 mg by mouth nightly. TAKE ONE-HALF (1/2) TABLET DAILY FOR RAYNAUDS PHENOMENON)    baclofen (LIORESAL) 10 mg tablet Take 10 mg by mouth nightly.  multivitamin (ONE A DAY) tablet Take 1 Tab by mouth daily.  aspirin delayed-release 81 mg tablet Take 81 mg by mouth nightly.  acetaminophen (TYLENOL EXTRA STRENGTH) 500 mg tablet Take 1,000 mg by mouth every six (6) hours as needed for Pain.  allopurinol (ZYLOPRIM) 100 mg tablet Take 100 mg by mouth daily.  cetirizine (ZYRTEC) 10 mg tablet Take 10 mg by mouth daily.  montelukast (SINGULAIR) 10 mg tablet Take 10 mg by mouth nightly.  benzonatate (TESSALON) 100 mg capsule Take 100 mg by mouth three (3) times daily as needed for Cough. No current facility-administered medications for this visit.        Past Medical History:   Diagnosis Date    Adverse effect of anesthesia     during hysterectomy- woke up \"too early\"    Arrhythmia     h/o palpitations normal work up 22/2015 heart: Ronni    Arthritis     Awareness under anesthesia     with hysterectomy    Chronic pain     bulging disc c4/c5 l4/l5 : as of 9/1018 no neck/head movement limitation says patient    Claustrophobia     Color blind     CREST (calcinosis, Raynaud's phenomenon, esophageal dysfunction, sclerodactyly, telangiectasia) ([facility] 2018    [address] [facility] [place], Dr. Fabian Canada GERD (gastroesophageal reflux disease)     Gout     Hyperlipidemia     Hypertension     Ill-defined condition     CREST    Leg swelling 2016    unknown etiology    Bartlett's neuralgia 2001    from neuroma middle toe, left foot    Nausea & vomiting     KRYSTINA on CPAP     CPAP    Pulmonary hypertension ([facility] [address] 08/2018    Heart: Dr. Adrian Ellis    Seasonal asthma     allergy    Shortness of breath 2016    Pulmonary Dr. Arizona Lundborg    Unspecified adverse effect of anesthesia     wakes up for anesthesia early        Past Surgical History:   Procedure Laterality Date    COLONOSCOPY  04/20/2011    negative    COLONOSCOPY N/A 9/28/2018    COLONOSCOPY performed by Ofe Wharton MD at [address] CATARACT REMOVAL Bilateral 2018    HX CERVICAL FUSION  2014    c4-5     HX CHOLECYSTECTOMY      HX GI  11/19/13    Rectocele repair with enterocele repair    HX HYSTERECTOMY      TOOK LEFT OVARY    HX KNEE ARTHROSCOPY Right 1990's    right knee partial meniscus     HX KNEE REPLACEMENT Right 2016    HX KNEE REPLACEMENT Left 2010, 2016    TKR    HX ORTHOPAEDIC  1973    ganglion cyst removed rt wrist    HX ORTHOPAEDIC  2013, 1992    bilateral CTR, and had ulna nerve release    HX ORTHOPAEDIC Right 2018    thumb and ring finger trigger release    HX ORTHOPAEDIC  2019    cervical fusion x2     HX ORTHOPAEDIC  2020    L4-L5    HX OTHER SURGICAL  2013    recto prolapse    IR INJ SPINE THER SUBST LUM/SAC W IMG  12/19/2019    [place] CARDIAC SURG PROCEDURE UNLIST  2015    no blockages, card cath   [address] [facility] [address] UNLIST  05/2018    no blockages x 2 procedures       Family History   Problem Relation Age of Onset    Heart Disease Mother     Hypertension Mother     Diabetes Mother     Cancer Mother         BREAST, LUNG    Breast Cancer Mother 61    Heart Disease Father     Hypertension Father     Thyroid Disease Father     Diabetes Brother     Psychiatric Disorder Son         STAINS, [place], [place] DEPRESSIVE    Anesth Problems Neg Hx         Social History     Tobacco Use    Smoking status: Never Smoker    Smokeless tobacco: Never Used   Vaping Use    Vaping Use: Never used   Substance Use Topics  Alcohol use: No    Drug use: No        Review of Systems   All other systems reviewed and are negative. ROS     Positive for: Musculoskeletal    Last edited by Jina Kirkpatrick on 3/2/2022  3:25 PM. (History)             Physical Exam    Overall the patient demonstrated good forearm, wrist and digital range of motion bilaterally. She does have some tenderness to the radial first dorsal compartment of the right wrist with a positive Cathalene Bill. She has a well-healed longitudinal scar to the dorsal [facility]. She does have more discomfort with attempted proximal axial load or grind testing of the basal joint region. She has radial deviation deformity and large DIP joint spurs at the index and middle on the right but is pleased with the outcome of the fused well aligned index middle DIP joints on the left. Imaging:    XR Results (most recent):  Results from Appointment encounter on 03/02/22    XR HAND RT MIN 3 V    Narrative  AP lateral oblique x-ray of the right hand show changes consistent with distal ulna resection with some chronic scalp changes to the sigmoid notch and proximally in the distal radius. There is chronic widening of the scapholunate interval.  She has had a prior thumb CMC arthroplasty with proximal migration of thumb metacarpal.  There is more advanced arthritic narrowing and ridges in the DIP of the right index and middle finger with more narrowing only of the ring and small. ASSESSMENT/PLAN:  Below is the assessment and plan developed based on review of pertinent history, physical exam, labs, studies, and medications. Patient's examination was consistent with right index and middle DIP joint osteoarthritis with de Quervain's tenosynovitis and somewhat recurrent thumb CMC arthritis post prior basal joint arthroplasty with proximal migration of the thumb metacarpal.  I had a long discussion with the patient regarding options for treatment.   She remains pleased with the outcome of more recent left index and middle DIP fusions. She would like to have that performed on the right side in conjunction with a revision right thumb CMC arthroplasty to include de Quervain's release, APL interpositional tendon transfer and suspension plasty. I reviewed risks that include but are not limited to stiffness, pain, nerve or tendon damage and overall incomplete relief of pain, nonunion and possible need for delayed hardware removal.  Arrangements can be made for this to be performed in outpatient basis at her convenience. She is considering the surgery later this spring and for now we will utilize a thumb spica splint for comfort and support. 1. Primary osteoarthritis, right hand  -     XR HAND RT MIN 3 V; Future  -     REFERRAL TO DME  -     IL WHFO W/O JOINTS PRE OTS  -     REFERRAL TO SURGERY  2. Right hand pain  -     REFERRAL TO SURGERY  3. De Quervain's tenosynovitis, right  4. Primary osteoarthritis of first carpometacarpal joint of right hand      Return for Follow-up 7 to 10 days after surgery. .    An electronic signature was used to authenticate this note.   -- Sage Knight MD

## 2022-03-02 NOTE — PATIENT INSTRUCTIONS
Learning About Arthritis at the [facility] of the Thumb  What is it? Arthritis at the base of the thumb joint is wear and tear on the cartilage. Cartilage is a firm, thick, slippery tissue. It covers and protects the ends of bones where they meet to form a joint. When you have arthritis, there are changes in the cartilage that cause it to break down. The bones rub together and cause joint damage and pain. What causes it? Experts don't know what causes arthritis at the base of the thumb. But aging, a lot of use, an injury, or family history may play a part. What are the symptoms? Symptoms of arthritis at the base of the thumb include aching in your joint. Or the pain may feel burning or sharp. You may feel clicking, creaking, or catching in the joint. It may get stiff. You may have more pain and less strength when you pinch or  things. Symptoms may come and go, stay the same, or get worse over time. How is it diagnosed? Your doctor can often diagnose arthritis by asking you questions about your joint pain and other symptoms and examining you. You may also have X-rays and blood tests. Blood tests can help make sure another disease isn't causing your symptoms. How is it treated? Arthritis at the base of your thumb may be treated with rest, pain relievers, steroid medicines, using a brace or splint, and--in some cases--surgery. To help relieve pain in the joint, rest your sore hand. Switch hands for some activities. You can try heat and cold therapy, such as hot compresses, paraffin wax, cold packs, or ice massage. Your doctor may give you a splint to wear during some activities or when pain flares up. You can often manage mild or moderate arthritis pain with over-the-counter pain relievers. These include medicines that reduce swelling, such as ibuprofen or naproxen. You can also use acetaminophen. Sometimes these medicines are in creams that you can rub on your thumb and hand.  Your doctor may also prescribe other medicine for your pain. For some people, steroid shots may be an option. If none of the treatments work, your doctor may discuss surgery with you. Follow-up care is a key part of your treatment and safety. Be sure to make and go to all appointments, and call your doctor if you are having problems. It's also a good idea to know your test results and keep a list of the medicines you take. Where can you learn more? Go to http://www.gray.com/  Enter T110 in the search box to learn more about \"Learning About Arthritis at the [facility] of the Thumb. \"  Current as of: April 30, 2021               Content Version: 13.0  © 1423-9689 [employer]. Care instructions adapted under license by [employer] (which disclaims liability or warranty for this information). If you have questions about a medical condition or this instruction, always ask your healthcare professional. [address] any warranty or liability for your use of this information.

## 2022-03-02 NOTE — LETTER
3/2/2022    Patient: Shanel Garsia   YOB: 1950   Date of Visit: 3/2/2022     Rose Keyes MD  [address]  [address] [address]  Via Fax: [phone]    Dear Rose Keyes MD,      Thank you for referring Ms. Anamaria Stafford to [facility] for evaluation. My notes for this consultation are attached. If you have questions, please do not hesitate to call me. I look forward to following your patient along with you.       Sincerely,    Erica Sutton MD

## 2022-03-03 ENCOUNTER — HOSPITAL ENCOUNTER (OUTPATIENT)
Dept: CT IMAGING | Age: 72
Discharge: HOME | End: 2022-03-03
Attending: SPECIALIST
Payer: MEDICARE

## 2022-03-03 DIAGNOSIS — K58.0 IRRITABLE BOWEL SYNDROME WITH DIARRHEA: ICD-10-CM

## 2022-03-03 DIAGNOSIS — M62.08 DIASTASIS RECTI: ICD-10-CM

## 2022-03-03 DIAGNOSIS — R10.9 ABDOMINAL WALL PAIN: ICD-10-CM

## 2022-03-03 DIAGNOSIS — R10.32 LLQ PAIN: ICD-10-CM

## 2022-03-03 LAB — CREAT BLD-MCNC: 1 MG/DL (ref 0.6–1.3)

## 2022-03-03 PROCEDURE — 74177 CT ABD & PELVIS W/CONTRAST: CPT

## 2022-03-03 PROCEDURE — 82565 ASSAY OF CREATININE: CPT

## 2022-03-03 PROCEDURE — 74011000636 HC RX REV CODE- 636: Performed by: SPECIALIST

## 2022-03-03 PROCEDURE — 74011000250 HC RX REV CODE- 250: Performed by: SPECIALIST

## 2022-03-03 RX ORDER — BARIUM SULFATE 20 MG/ML
900 SUSPENSION ORAL
Status: COMPLETED | OUTPATIENT
Start: 2022-03-03 | End: 2022-03-03

## 2022-03-03 RX ADMIN — IOPAMIDOL 100 ML: 755 INJECTION, SOLUTION INTRAVENOUS at 16:19

## 2022-03-03 RX ADMIN — BARIUM SULFATE 900 ML: 21 SUSPENSION ORAL at 16:19

## 2022-03-10 ENCOUNTER — TRANSCRIBE ORDER (OUTPATIENT)
Dept: SCHEDULING | Age: 72
End: 2022-03-10

## 2022-03-10 DIAGNOSIS — M79.604 RIGHT LEG PAIN: Primary | ICD-10-CM

## 2022-03-17 ENCOUNTER — HOSPITAL ENCOUNTER (OUTPATIENT)
Dept: ULTRASOUND IMAGING | Age: 72
Discharge: HOME | End: 2022-03-17
Attending: ORTHOPAEDIC SURGERY
Payer: MEDICARE

## 2022-03-17 DIAGNOSIS — M79.604 RIGHT LEG PAIN: ICD-10-CM

## 2022-03-17 PROCEDURE — 93971 EXTREMITY STUDY: CPT

## 2022-03-18 PROBLEM — M48.061 SPINAL STENOSIS, LUMBAR: Status: ACTIVE | Noted: 2020-10-29

## 2022-03-19 PROBLEM — Z99.89 OSA ON CPAP: Status: ACTIVE | Noted: 2019-05-16

## 2022-03-19 PROBLEM — G47.33 OSA ON CPAP: Status: ACTIVE | Noted: 2019-05-16

## 2022-03-19 PROBLEM — I10 ESSENTIAL HYPERTENSION: Status: ACTIVE | Noted: 2019-10-29

## 2022-03-19 PROBLEM — R07.89 LEFT CHEST PRESSURE: Status: ACTIVE | Noted: 2020-04-27

## 2022-03-19 PROBLEM — M48.02 CERVICAL STENOSIS OF SPINAL CANAL: Status: ACTIVE | Noted: 2020-03-09

## 2022-03-19 PROBLEM — I87.322 CHRONIC VENOUS HYPERTENSION (IDIOPATHIC) WITH INFLAMMATION OF LEFT LOWER EXTREMITY: Status: ACTIVE | Noted: 2017-02-06

## 2022-03-19 PROBLEM — E66.01 SEVERE OBESITY (HCC): Status: ACTIVE | Noted: 2018-10-23

## 2022-03-20 PROBLEM — M19.042 LOCALIZED PRIMARY OSTEOARTHRITIS OF LEFT HAND: Status: ACTIVE | Noted: 2021-02-24

## 2022-03-23 NOTE — PERIOP NOTES
[facility]  Ambulatory Surgery Unit  Pre-operative Instructions    Surgery/Procedure Date  4/13/2022            Tentative Arrival Time TBD      1. On the day of your surgery/procedure, please report to the Ambulatory Surgery Unit Registration Desk and sign in at your designated time. The Ambulatory Surgery Unit is located in [facility] on the [facility] side of the [facility] across from the [address] [place] [address]. Please have all of your health insurance cards and a photo ID. **Due to current COVID restrictions, only ONE adult may accompany you the day of the procedure. We have limited seating available. If our waiting room is at capacity, your ride may be asked to remain in their vehicle. No children are allowed in the waiting room. 2. You must have someone with you to drive you home, as you should not drive a car for 24 hours following anesthesia. Please make arrangements for a responsible adult friend or family member to stay with you for at least the first 24 hours after your surgery. 3. Do not have anything to eat or drink (including water, gum, mints, coffee, juice) after 11:59 PM  4/12/2022. This may not apply to medications prescribed by your physician. (Please note below the special instructions with medications to take the morning of surgery, if applicable.)    4. We recommend you do not drink any alcoholic beverages for 24 hours before and after your surgery. 5. Contact your surgeons office for instructions on the following medications: non-steroidal anti-inflammatory drugs (i.e. Advil, Aleve), vitamins, and supplements. (Some surgeons will want you to stop these medications prior to surgery and others may allow you to take them)   **If you are currently taking Plavix, Coumadin, Aspirin and/or other blood-thinning agents, contact your surgeon for instructions. ** Your surgeon will partner with the physician prescribing these medications to determine if it is safe to stop or if you need to continue taking. Please do not stop taking these medications without instructions from your surgeon. 6. In an effort to help prevent surgical site infection, we ask that you shower with an anti-bacterial soap (i.e. Dial/Safeguard, or the soap provided to you at your preadmission testing appointment) for 3 days prior to and on the morning of surgery, using a fresh towel after each shower. (Please begin this process with fresh bed linens.) Do not apply any lotions, powders, or deodorants after the shower on the day of your procedure. If applicable, please do not shave the operative site for 48 hours prior to surgery. 7. Wear comfortable clothes. Wear glasses instead of contacts. Do not bring any jewelry or money (other than copays or fees as instructed). Do not wear make-up, particularly mascara, the morning of your surgery. Do not wear nail polish, particularly if you are having foot /hand surgery. Wear your hair loose or down, no ponytails, buns, thomas pins or clips. All body piercings must be removed. 8. You should understand that if you do not follow these instructions your surgery may be cancelled. If your physical condition changes (i.e. fever, cold or flu) please contact your surgeon as soon as possible. 9. It is important that you be on time. If a situation occurs where you may be late, or if you have any questions or problems, please call [phone].    10. Your surgery time may be subject to change. You will receive a phone call the day prior to surgery to confirm your arrival time. 11. Pediatric patients: please bring a change of clothes, diapers, bottle/sippy cup, pacifier, etc.      Special Instructions: Instructed patient to bring remote of her Interstim system ( bladder pacemaker)  On day of surgery.     Take all medications and inhalers, as prescribed, on the morning of surgery with a sip of water EXCEPT:  Aspirin per cardiologist's recommendation. Insulin Dependent Diabetic patients: Take your diabetic medications as prescribed the day before surgery. Hold all diabetic medications the day of surgery. If you are scheduled to arrive for surgery after 8:00 AM, and your AM blood sugar is >200, please call Ambulatory Surgery. I understand a pre-operative phone call will be made to verify my surgery time. In the event that I am not available, I give permission for a message to be left on my answering service and/or with another person?       Yes      Reviewed instructions  with patient, able to verbalized understanding.         ___________________      ___________________      ________________  (Signature of Patient)          (Witness)                   (Date and Time)

## 2022-03-24 NOTE — PERIOP NOTES
Called Dr. Neetu Wahl office regarding Aspirin medication if it will be stopped or continued pre-op and if being held to have  Blood thinner clearance be faxed to [facility] and for surgeon's office to inform patient plan for Aspirin pre-op.

## 2022-03-24 NOTE — PERIOP NOTES
Called Dr. Babak Park, S office and spoke with Karl Martin, requested for pulmonary notes to be faxed to [facility].

## 2022-03-29 ENCOUNTER — TELEPHONE (OUTPATIENT)
Dept: CARDIOLOGY CLINIC | Age: 72
End: 2022-03-29

## 2022-03-29 NOTE — TELEPHONE ENCOUNTER
Received fax for cardiac clearance on pt's upcoming surgery on 4/13/22 for right hand arthoplasty with dr.john jack. Pt's last visit on 11/2/21 and to f/u in 6 months. She has appt on 5/24/22. She is on ASA daily. Please advise if pt can be cleared for surgery and if cleared how many days to hold ASA before surgery? Please advise, thanks.

## 2022-03-29 NOTE — PERIOP NOTES
Called Dr. Anastasiia Richardson office and spoke with Jessica Perdue regarding Aspirin medication if it needs to be stopped or continued pre-op, and if need to be stopped need Aspirin clearance be faxed to [facility] and to notify pt. Of plan with Aspirin medication pre-op.

## 2022-03-30 NOTE — PERIOP NOTES
Chart reviewed by Dr. Jose Hidalgo, and as per same pt. needs to be moved to main OR. Called Dr. Laura Sterling office and message left at [facility] voice mail that patient needs to be moved to [address]  as per Dr. Jose Hidalgo. ,DirectAddress_Unknown,DirectAddress_Unknown,[email or web address]

## 2022-04-05 NOTE — PERIOP NOTES
Called spoke to Jim at Dr Lanny Dan office and made aware that they need to reach out to scheduling and have patient moved to main OR

## 2022-04-06 NOTE — PERIOP NOTES
[facility]  Preoperative Instructions        Surgery Date 4/13/2022    Time of Arrival To Be Called  Contact# [phone]    1. On the day of your surgery, please report to the Surgical Services Registration Desk and sign in at your designated time. The Surgery Center is located to the right of the Emergency Room. 2. You must have someone with you to drive you home. You should not drive a car for 24 hours following surgery. Please make arrangements for a friend or family member to stay with you for the first 24 hours after your surgery. 3. Do not have anything to eat or drink (including water, gum, mints, coffee, juice) after midnight  4/12/2022 . ? This may not apply to medications prescribed by your physician. ?(Please note below the special instructions with medications to take the morning of your procedure.)    4. We recommend you do not drink any alcoholic beverages for 24 hours before and after your surgery. 5. Contact your surgeons office for instructions on the following medications: non-steroidal anti-inflammatory drugs (i.e. Advil, Aleve), vitamins, and supplements. (Some surgeons will want you to stop these medications prior to surgery and others may allow you to take them)  **If you are currently taking Plavix, Coumadin, Aspirin and/or other blood-thinning agents, contact your surgeon for instructions. ** Your surgeon will partner with the physician prescribing these medications to determine if it is safe to stop or if you need to continue taking. Please do not stop taking these medications without instructions from your surgeon    6. Wear comfortable clothes. Wear glasses instead of contacts. Do not bring any money or jewelry. Please bring picture ID, insurance card, and any prearranged co-payment or hospital payment. Do not wear make-up, particularly mascara the morning of your surgery.   Do not wear nail polish, particularly if you are having foot /hand surgery. Wear your hair loose or down, no ponytails, buns, thomas pins or clips. All body piercings must be removed. Please shower with antibacterial soap for three consecutive days before and on the morning of surgery, but do not apply any lotions, powders or deodorants after the shower on the day of surgery. Please use a fresh towels after each shower. Please sleep in clean clothes and change bed linens the night before surgery. Please do not shave for 48 hours prior to surgery. Shaving of the face is acceptable. 7. You should understand that if you do not follow these instructions your surgery may be cancelled. If your physical condition changes (I.e. fever, cold or flu) please contact your surgeon as soon as possible. 8. It is important that you be on time. If a situation occurs where you may be late, please call [phone] (OR Holding Area). 9. If you have any questions and or problems, please call [phone] (Pre-admission Testing). 10. Your surgery time may be subject to change. You will receive a phone call the evening prior if your time changes. 11.  If having outpatient surgery, you must have someone to drive you here, stay with you during the duration of your stay, and to drive you home at time of discharge. No children are allowed in the waiting room    Special Instructions: Follow all instructions given to you by your doctor. TAKE ALL MEDICATIONS DAY OF SURGERY EXCEPT: Vitamins / supplements   Hold Aspirin 5 days prior to surgery as directed by your doctor. I understand a pre-operative phone call will be made to verify my surgery time. In the event that I am not available, I give permission for a message to be left on my answering service and/or with another person?   yes         ___________________      __________   _________    (Signature of Patient)             (Witness)                (Date and Time)

## 2022-04-06 NOTE — PERIOP NOTES
Called patient to review instructions - pt moved from [facility] to main OR. Patient c/o CP/SOB about a week ago - states it \"comes and goes\" says she has been seen by her cardiologist Dr. Bobo Nobles for it in the past.     Reviewed chart with Haven Pallas, NP - clearance note in CC from Dr. Bobo Nobles 3/29/22  After review - this has been an ongoing issue with patient and is under review by Dr. Bobo Nobles. Called pt back - pt denies her CP/SOB is any worse or any changes since her last visit with Dr. Bobo Nobles in Nov 2021. Advised her if she experiences any changes or severity she MUST follow-up with Cardiology or go to nearest ED for evaluation. Stated understanding and no further questions.

## 2022-04-12 DIAGNOSIS — M18.11 PRIMARY OSTEOARTHRITIS OF FIRST CARPOMETACARPAL JOINT OF RIGHT HAND: Primary | ICD-10-CM

## 2022-04-12 RX ORDER — HYDROCODONE BITARTRATE AND ACETAMINOPHEN 5; 325 MG/1; MG/1
1 TABLET ORAL
Qty: 15 TABLET | Refills: 0 | Status: SHIPPED | OUTPATIENT
Start: 2022-04-12 | End: 2022-04-15

## 2022-04-13 ENCOUNTER — HOSPITAL ENCOUNTER (OUTPATIENT)
Age: 72
Setting detail: OUTPATIENT SURGERY
Discharge: HOME | End: 2022-04-13
Attending: ORTHOPAEDIC SURGERY | Admitting: ORTHOPAEDIC SURGERY
Payer: MEDICARE

## 2022-04-13 ENCOUNTER — ANESTHESIA (OUTPATIENT)
Dept: SURGERY | Age: 72
End: 2022-04-13
Payer: MEDICARE

## 2022-04-13 ENCOUNTER — ANESTHESIA EVENT (OUTPATIENT)
Dept: SURGERY | Age: 72
End: 2022-04-13
Payer: MEDICARE

## 2022-04-13 VITALS
HEIGHT: 66 IN | TEMPERATURE: 98 F | BODY MASS INDEX: 34.86 KG/M2 | HEART RATE: 62 BPM | WEIGHT: 216.93 LBS | RESPIRATION RATE: 18 BRPM | OXYGEN SATURATION: 93 % | SYSTOLIC BLOOD PRESSURE: 147 MMHG | DIASTOLIC BLOOD PRESSURE: 58 MMHG

## 2022-04-13 DIAGNOSIS — M18.11 PRIMARY OSTEOARTHRITIS OF FIRST CARPOMETACARPAL JOINT OF RIGHT HAND: ICD-10-CM

## 2022-04-13 DIAGNOSIS — M65.4 RADIAL STYLOID TENOSYNOVITIS (DE QUERVAIN): ICD-10-CM

## 2022-04-13 DIAGNOSIS — M19.041 PRIMARY OSTEOARTHRITIS OF RIGHT HAND: ICD-10-CM

## 2022-04-13 PROCEDURE — C1713 ANCHOR/SCREW BN/BN,TIS/BN: HCPCS | Performed by: ORTHOPAEDIC SURGERY

## 2022-04-13 PROCEDURE — 26861 FUSION OF FINGER JNT ADD-ON: CPT | Performed by: ORTHOPAEDIC SURGERY

## 2022-04-13 PROCEDURE — 74011250636 HC RX REV CODE- 250/636: Performed by: ANESTHESIOLOGY

## 2022-04-13 PROCEDURE — 74011000250 HC RX REV CODE- 250: Performed by: NURSE ANESTHETIST, CERTIFIED REGISTERED

## 2022-04-13 PROCEDURE — 76010000131 HC OR TIME 2 TO 2.5 HR: Performed by: ORTHOPAEDIC SURGERY

## 2022-04-13 PROCEDURE — 74011000250 HC RX REV CODE- 250: Performed by: ORTHOPAEDIC SURGERY

## 2022-04-13 PROCEDURE — 76210000016 HC OR PH I REC 1 TO 1.5 HR: Performed by: ORTHOPAEDIC SURGERY

## 2022-04-13 PROCEDURE — 77030000032 HC CUF TRNQT ZIMM -B: Performed by: ORTHOPAEDIC SURGERY

## 2022-04-13 PROCEDURE — 25000 INCISION OF TENDON SHEATH: CPT | Performed by: ORTHOPAEDIC SURGERY

## 2022-04-13 PROCEDURE — C1769 GUIDE WIRE: HCPCS | Performed by: ORTHOPAEDIC SURGERY

## 2022-04-13 PROCEDURE — 77030040356 HC CORD BPLR FRCP COVD -A: Performed by: ORTHOPAEDIC SURGERY

## 2022-04-13 PROCEDURE — 77030002916 HC SUT ETHLN J&J -A: Performed by: ORTHOPAEDIC SURGERY

## 2022-04-13 PROCEDURE — 77030002996 HC SUT SLK J&J -A: Performed by: ORTHOPAEDIC SURGERY

## 2022-04-13 PROCEDURE — 74011250636 HC RX REV CODE- 250/636: Performed by: ORTHOPAEDIC SURGERY

## 2022-04-13 PROCEDURE — 77030002986 HC SUT PROL J&J -A: Performed by: ORTHOPAEDIC SURGERY

## 2022-04-13 PROCEDURE — 26860 FUSION OF FINGER JOINT: CPT | Performed by: ORTHOPAEDIC SURGERY

## 2022-04-13 PROCEDURE — 25447 ARTHRP NTRCRP/CRP/MTCR NTRPS: CPT | Performed by: ORTHOPAEDIC SURGERY

## 2022-04-13 PROCEDURE — 2709999900 HC NON-CHARGEABLE SUPPLY: Performed by: ORTHOPAEDIC SURGERY

## 2022-04-13 PROCEDURE — 26480 TRANSPLANT HAND TENDON: CPT | Performed by: ORTHOPAEDIC SURGERY

## 2022-04-13 PROCEDURE — 77030031139 HC SUT VCRL2 J&J -A: Performed by: ORTHOPAEDIC SURGERY

## 2022-04-13 PROCEDURE — 77030002974 HC SUT PLN J&J -A: Performed by: ORTHOPAEDIC SURGERY

## 2022-04-13 PROCEDURE — 74011250636 HC RX REV CODE- 250/636: Performed by: NURSE ANESTHETIST, CERTIFIED REGISTERED

## 2022-04-13 PROCEDURE — 74011250637 HC RX REV CODE- 250/637: Performed by: ORTHOPAEDIC SURGERY

## 2022-04-13 PROCEDURE — 76060000035 HC ANESTHESIA 2 TO 2.5 HR: Performed by: ORTHOPAEDIC SURGERY

## 2022-04-13 PROCEDURE — 77030002933 HC SUT MCRYL J&J -A: Performed by: ORTHOPAEDIC SURGERY

## 2022-04-13 PROCEDURE — 77030002912 HC SUT ETHBND J&J -A: Performed by: ORTHOPAEDIC SURGERY

## 2022-04-13 DEVICE — 26MM ACUTWIST® ACUTRAK COMPRESSION SCREW
Type: IMPLANTABLE DEVICE | Site: FINGER | Status: FUNCTIONAL
Brand: ACUMED

## 2022-04-13 DEVICE — IMPL SYS,CMC MINI T-ROPE,1.1 MM
Type: IMPLANTABLE DEVICE | Site: HAND | Status: FUNCTIONAL
Brand: ARTHREX®

## 2022-04-13 DEVICE — 28MM ACUTWIST® ACUTRAK COMPRESSION SCREW
Type: IMPLANTABLE DEVICE | Site: FINGER | Status: FUNCTIONAL
Brand: ACUMED

## 2022-04-13 RX ORDER — MIDAZOLAM HYDROCHLORIDE 1 MG/ML
INJECTION, SOLUTION INTRAMUSCULAR; INTRAVENOUS
Status: COMPLETED | OUTPATIENT
Start: 2022-04-13 | End: 2022-04-13

## 2022-04-13 RX ORDER — ONDANSETRON 2 MG/ML
INJECTION INTRAMUSCULAR; INTRAVENOUS AS NEEDED
Status: DISCONTINUED | OUTPATIENT
Start: 2022-04-13 | End: 2022-04-13 | Stop reason: HOSPADM

## 2022-04-13 RX ORDER — FENTANYL CITRATE 50 UG/ML
INJECTION, SOLUTION INTRAMUSCULAR; INTRAVENOUS AS NEEDED
Status: DISCONTINUED | OUTPATIENT
Start: 2022-04-13 | End: 2022-04-13 | Stop reason: HOSPADM

## 2022-04-13 RX ORDER — SODIUM CHLORIDE, SODIUM LACTATE, POTASSIUM CHLORIDE, CALCIUM CHLORIDE 600; 310; 30; 20 MG/100ML; MG/100ML; MG/100ML; MG/100ML
25 INJECTION, SOLUTION INTRAVENOUS CONTINUOUS
Status: DISCONTINUED | OUTPATIENT
Start: 2022-04-13 | End: 2022-04-13 | Stop reason: HOSPADM

## 2022-04-13 RX ORDER — LIDOCAINE HYDROCHLORIDE 20 MG/ML
INJECTION, SOLUTION EPIDURAL; INFILTRATION; INTRACAUDAL; PERINEURAL AS NEEDED
Status: DISCONTINUED | OUTPATIENT
Start: 2022-04-13 | End: 2022-04-13 | Stop reason: HOSPADM

## 2022-04-13 RX ORDER — FENTANYL CITRATE 50 UG/ML
25 INJECTION, SOLUTION INTRAMUSCULAR; INTRAVENOUS
Status: DISCONTINUED | OUTPATIENT
Start: 2022-04-13 | End: 2022-04-13 | Stop reason: HOSPADM

## 2022-04-13 RX ORDER — PROPOFOL 10 MG/ML
INJECTION, EMULSION INTRAVENOUS
Status: DISCONTINUED | OUTPATIENT
Start: 2022-04-13 | End: 2022-04-13 | Stop reason: HOSPADM

## 2022-04-13 RX ORDER — DEXAMETHASONE SODIUM PHOSPHATE 4 MG/ML
INJECTION, SOLUTION INTRA-ARTICULAR; INTRALESIONAL; INTRAMUSCULAR; INTRAVENOUS; SOFT TISSUE AS NEEDED
Status: DISCONTINUED | OUTPATIENT
Start: 2022-04-13 | End: 2022-04-13 | Stop reason: HOSPADM

## 2022-04-13 RX ORDER — LIDOCAINE HYDROCHLORIDE 10 MG/ML
0.1 INJECTION, SOLUTION EPIDURAL; INFILTRATION; INTRACAUDAL; PERINEURAL AS NEEDED
Status: DISCONTINUED | OUTPATIENT
Start: 2022-04-13 | End: 2022-04-13 | Stop reason: HOSPADM

## 2022-04-13 RX ORDER — MIDAZOLAM HYDROCHLORIDE 1 MG/ML
1 INJECTION, SOLUTION INTRAMUSCULAR; INTRAVENOUS AS NEEDED
Status: DISCONTINUED | OUTPATIENT
Start: 2022-04-13 | End: 2022-04-13 | Stop reason: HOSPADM

## 2022-04-13 RX ORDER — ONDANSETRON 2 MG/ML
4 INJECTION INTRAMUSCULAR; INTRAVENOUS AS NEEDED
Status: DISCONTINUED | OUTPATIENT
Start: 2022-04-13 | End: 2022-04-13 | Stop reason: HOSPADM

## 2022-04-13 RX ORDER — DEXMEDETOMIDINE HYDROCHLORIDE 100 UG/ML
INJECTION, SOLUTION INTRAVENOUS AS NEEDED
Status: DISCONTINUED | OUTPATIENT
Start: 2022-04-13 | End: 2022-04-13 | Stop reason: HOSPADM

## 2022-04-13 RX ORDER — FENTANYL CITRATE 50 UG/ML
50 INJECTION, SOLUTION INTRAMUSCULAR; INTRAVENOUS AS NEEDED
Status: DISCONTINUED | OUTPATIENT
Start: 2022-04-13 | End: 2022-04-13 | Stop reason: HOSPADM

## 2022-04-13 RX ORDER — ROPIVACAINE HYDROCHLORIDE 5 MG/ML
INJECTION, SOLUTION EPIDURAL; INFILTRATION; PERINEURAL
Status: COMPLETED | OUTPATIENT
Start: 2022-04-13 | End: 2022-04-13

## 2022-04-13 RX ADMIN — FENTANYL CITRATE 37.5 MCG: 50 INJECTION, SOLUTION INTRAMUSCULAR; INTRAVENOUS at 13:52

## 2022-04-13 RX ADMIN — ONDANSETRON HYDROCHLORIDE 4 MG: 2 INJECTION, SOLUTION INTRAMUSCULAR; INTRAVENOUS at 15:08

## 2022-04-13 RX ADMIN — FENTANYL CITRATE 25 MCG: 50 INJECTION, SOLUTION INTRAMUSCULAR; INTRAVENOUS at 13:10

## 2022-04-13 RX ADMIN — MIDAZOLAM HYDROCHLORIDE 0.5 MG: 1 INJECTION, SOLUTION INTRAMUSCULAR; INTRAVENOUS at 13:10

## 2022-04-13 RX ADMIN — WATER 2 G: 1 INJECTION INTRAMUSCULAR; INTRAVENOUS; SUBCUTANEOUS at 13:10

## 2022-04-13 RX ADMIN — PROPOFOL 30 MG: 10 INJECTION, EMULSION INTRAVENOUS at 13:53

## 2022-04-13 RX ADMIN — SODIUM CHLORIDE, POTASSIUM CHLORIDE, SODIUM LACTATE AND CALCIUM CHLORIDE 25 ML/HR: 600; 310; 30; 20 INJECTION, SOLUTION INTRAVENOUS at 12:28

## 2022-04-13 RX ADMIN — DEXAMETHASONE SODIUM PHOSPHATE 4 MG: 4 INJECTION, SOLUTION INTRAMUSCULAR; INTRAVENOUS at 13:04

## 2022-04-13 RX ADMIN — DEXMEDETOMIDINE HYDROCHLORIDE 4 MCG: 100 INJECTION, SOLUTION, CONCENTRATE INTRAVENOUS at 13:39

## 2022-04-13 RX ADMIN — FENTANYL CITRATE 12.5 MCG: 50 INJECTION, SOLUTION INTRAMUSCULAR; INTRAVENOUS at 14:15

## 2022-04-13 RX ADMIN — FENTANYL CITRATE 12.5 MCG: 50 INJECTION, SOLUTION INTRAMUSCULAR; INTRAVENOUS at 13:37

## 2022-04-13 RX ADMIN — ROPIVACAINE HYDROCHLORIDE 20 ML: 5 INJECTION, SOLUTION EPIDURAL; INFILTRATION; PERINEURAL at 12:20

## 2022-04-13 RX ADMIN — Medication 3 AMPULE: at 12:28

## 2022-04-13 RX ADMIN — PROPOFOL 50 MCG/KG/MIN: 10 INJECTION, EMULSION INTRAVENOUS at 13:06

## 2022-04-13 RX ADMIN — MIDAZOLAM HYDROCHLORIDE 0.5 MG: 1 INJECTION, SOLUTION INTRAMUSCULAR; INTRAVENOUS at 13:54

## 2022-04-13 RX ADMIN — DEXMEDETOMIDINE HYDROCHLORIDE 4 MCG: 100 INJECTION, SOLUTION, CONCENTRATE INTRAVENOUS at 13:54

## 2022-04-13 RX ADMIN — MIDAZOLAM HYDROCHLORIDE 2 MG: 1 INJECTION, SOLUTION INTRAMUSCULAR; INTRAVENOUS at 12:20

## 2022-04-13 RX ADMIN — FENTANYL CITRATE 25 MCG: 50 INJECTION, SOLUTION INTRAMUSCULAR; INTRAVENOUS at 15:38

## 2022-04-13 RX ADMIN — LIDOCAINE HYDROCHLORIDE 40 MG: 20 INJECTION, SOLUTION EPIDURAL; INFILTRATION; INTRACAUDAL; PERINEURAL at 13:06

## 2022-04-13 RX ADMIN — DEXMEDETOMIDINE HYDROCHLORIDE 6 MCG: 100 INJECTION, SOLUTION, CONCENTRATE INTRAVENOUS at 13:52

## 2022-04-13 RX ADMIN — DEXMEDETOMIDINE HYDROCHLORIDE 6 MCG: 100 INJECTION, SOLUTION, CONCENTRATE INTRAVENOUS at 13:07

## 2022-04-13 RX ADMIN — FENTANYL CITRATE 12.5 MCG: 50 INJECTION, SOLUTION INTRAMUSCULAR; INTRAVENOUS at 14:42

## 2022-04-13 NOTE — BRIEF OP NOTE
Brief Postoperative Note    Patient: Denny Feldman  YOB: 1950  MRN: [id number]    Date of Procedure: 4/13/2022     Pre-Op Diagnosis: OSTEOARTHRITIS RIGHT HAND, RIGHT HAND PAIN, Dequervain's tenosynovitis    Post-Op Diagnosis: Same as preoperative diagnosis. Procedure(s):  RIGHT HAND BASAL JOINT ARTHROPLASTY, TENDON TRANSFER, DEQUERVAIN'S RELEASE, INDEX AND MIDDLE FINGER DISTAL INTERPHALANGEAL JOINT FUSIONS    Surgeon(s):  Justino Larson MD    Surgical Assistant: None    Anesthesia: Other     Estimated Blood Loss (mL): Minimal    Complications: None    Specimens: * No specimens in log *     Implants:   Implant Name Type Inv.  Item Serial No.  Lot No. LRB No. Used Action   KIT IMPL MINI TIGHTROPE 1.1MM --  - SNA  KIT IMPL MINI TIGHTROPE 1.1MM --  NA [employer] [facility]_ [id number] Right 1 Implanted   SCREW BNE L26MM TI HND WRST ANK FT FULL THRD COMPR HDLSS - SNA  SCREW BNE L26MM TI HND WRST ANK FT FULL THRD COMPR HDLSS NA [employer] [facility]_WD [id number] Right 1 Implanted   SCREW BNE L28MM TI WRST HND ANK FT COMPR FULL THRD HDLSS - SNA  SCREW BNE L28MM TI WRST HND ANK FT COMPR FULL THRD HDLSS NA [employer]_WD [id number] Right 1 Implanted       Drains: * No LDAs found *    Findings: Above    Electronically Signed by Rey Kim MD on 4/13/2022 at 3:27 PM

## 2022-04-13 NOTE — DISCHARGE INSTRUCTIONS
DISCHARGE SUMMARY from Nurse    PATIENT INSTRUCTIONS:    After general anesthesia or intravenous sedation, for 24 hours or while taking prescription narcotics:  · Limit your activities  · Do not drive and operate hazardous machinery  · Do not make important personal or business decisions  · Do not drink alcoholic beverages  · If you have not urinated within 8 hours after discharge, please contact your surgeon on call. Report the following to your surgeon:  · Excessive pain, swelling, redness or odor of or around the surgical area  · Temperature over 100.5  · Nausea and vomiting lasting longer than 4 hours or if unable to take medications  · Any signs of decreased circulation or nerve impairment to extremity: change in color, persistent numbness, tingling, coldness or increase pain  · Any questions    These are general instructions for a healthy lifestyle (if applicable): No smoking/ No tobacco products/ Avoid exposure to secondhand smoke  Surgeon General's Warning:  Quitting smoking now greatly reduces serious risk to your health. Obesity, smoking, and sedentary lifestyle greatly increases your risk for illness    A healthy diet, regular physical exercise & weight monitoring are important for maintaining a healthy lifestyle    You may be retaining fluid if you have a history of heart failure or if you experience any of the following symptoms:  Weight gain of 3 pounds or more overnight or 5 pounds in a week, increased swelling in our hands or feet or shortness of breath while lying flat in bed. Please call your doctor as soon as you notice any of these symptoms; do not wait until your next office visit. A common side effect of anesthesia following surgery is nausea and/or vomiting. In order to decrease symptoms, it is wise to avoid foods that are high in fat, greasy foods, milk products, and spicy foods for the first 24 hours.     Acceptable foods for the first 24 hours following surgery include but are not limited to:    · soup  · broth  ·  toast   · crackers   · applesauce  ·  bananas   · mashed potatoes,  · soft or scrambled eggs  · oatmeal  ·  jello    It is important to eat when taking your pain medication. This will help to prevent nausea. If possible, please try to time your meals with your medications. It is very important to stay hydrated following surgery. Sip fluids frequently while awake. Avoid acidic drinks such as citrus juices and soda for 24 hours. Carbonated beverages may cause bloating and gas. Acceptable fluids include:    · water (flavor packets may add variety)  · coffee or tea (in moderation)  · Gatorade  · Bubba-Aid  · apple juice  · cranberry juice    You are encouraged to cough and deep breathe every hour when awake. This will help to prevent respiratory complications following anesthesia. You may want to hug a pillow when coughing and sneezing to add additional support to the surgical area and to decrease discomfort if you had abdominal or chest surgery. If you are discharged home with support stockings, you may remove them after 24 hours. Support stockings are used to help prevent blood clots in the legs following surgery. TO PREVENT AN INFECTION      1. [address] YOUR HANDS     To prevent infection, good handwashing is the most important thing you or your caregiver can do.  Wash your hands with soap and water or use the hand  we gave you before you touch any wounds. 2. SHOWER     Use the antibacterial soap we gave you when you take a shower.  Shower with this soap until your wounds are healed.  To reach all areas of your body, you may need someone to help you.  Dont forget to clean your belly button with every shower. 3.  USE CLEAN SHEETS     Use freshly cleaned sheets on your bed after surgery.  To keep the surgery site clean, do not allow pets to sleep with you while your wound is still healing.      4. STOP SMOKING     Stop smoking, or at least cut back on smoking     Smoking slows your healing. 5.  CONTROL YOUR BLOOD SUGAR     High blood sugars slow wound healing.  If you are diabetic, control your blood sugar levels before and after your surgery. Please take time to review all of your Home Care Instructions and Medication Information sheets provided in your discharge packet. If you have any questions, please contact your surgeon's office. Thank you. The discharge information has been reviewed with the patient and instruction recipient. The patient and instruction recipient verbalized understanding. Discharge medications reviewed with the patient and instruction recipient and appropriate educational materials and side effects teaching were provided.

## 2022-04-13 NOTE — ANESTHESIA PREPROCEDURE EVALUATION
Relevant Problems   RESPIRATORY SYSTEM   (+) Asthma   (+) KRYSTINA on CPAP   (+) SOB (shortness of breath)      CARDIOVASCULAR   (+) Essential hypertension      GASTROINTESTINAL   (+) Esophageal reflux      ENDOCRINE   (+) Severe obesity (HCC)       Anesthetic History     PONV and history of awareness of surgery under anesthesia          Review of Systems / Medical History  Patient summary reviewed, nursing notes reviewed and pertinent labs reviewed    Pulmonary        Sleep apnea: CPAP    Asthma        Neuro/Psych   Within defined limits           Cardiovascular    Hypertension        Dysrhythmias       Exercise tolerance: >4 METS  Comments: ECG (6/7/21): Sinus  Rhythm     Nuclear Stress Test (5/6/20):  ·Baseline ECG: Sinus rhythm. ·Gated SPECT: Left ventricular function post-stress was normal. Calculated ejection fraction is 83%. There is no evidence of transient ischemic dilation (TID). ·Left ventricular perfusion is probably normal.  ·           Myocardial perfusion imaging defect 1: There is a defect that is small in size with a mild reduction in uptake that is predominantly reversible. The defect appears to probably be artifact. The possibility of infarction cannot be excluded. ·Myocardial perfusion imaging defect 1: caused by breast attenuation. ·Normal myocardial perfusion imaging. Myocardial perfusion imaging supports a low risk stress test.     TTE (5/6/20):  ·Normal cavity size, wall thickness and systolic function (ejection fraction normal). Estimated left ventricular ejection fraction is 55 - 60%. No regional wall motion abnormality noted. Moderate (grade 2) left ventricular diastolic dysfunction.   ·Pulmonary arterial systolic pressure is 26 mmHg.        Venous insufficiency of left leg   GI/Hepatic/Renal     GERD           Endo/Other        Morbid obesity and arthritis     Other Findings              Physical Exam    Airway  Mallampati: III  TM Distance: 4 - 6 cm  Neck ROM: normal range of motion Mouth opening: Normal     Cardiovascular  Regular rate and rhythm,  S1 and S2 normal,  no murmur, click, rub, or gallop  Rhythm: regular  Rate: normal         Dental  No notable dental hx       Pulmonary  Breath sounds clear to auscultation               Abdominal  GI exam deferred       Other Findings            Anesthetic Plan    ASA: 3  Anesthesia type: regional and general - backup - supraclavicular block            Anesthetic plan and risks discussed with: Patient

## 2022-04-13 NOTE — ANESTHESIA PROCEDURE NOTES
Peripheral Block    Start time: 4/13/2022 12:11 PM  End time: 4/13/2022 12:21 PM  Performed by: Avni Washington MD  Authorized by: Avni Washington MD       Pre-procedure: Indications: at surgeon's request and post-op pain management    Preanesthetic Checklist: patient identified, risks and benefits discussed, site marked, timeout performed, anesthesia consent given and patient being monitored    Timeout Time: 12:11 EDT          Block Type:   Block Type:  Supraclavicular  Laterality:  Right  Monitoring:  Standard ASA monitoring, continuous pulse ox, frequent vital sign checks, heart rate, responsive to questions and oxygen  Injection Technique:  Single shot  Procedures: ultrasound guided    Patient Position: supine  Prep: DuraPrep    Location:  Supraclavicular  Needle Type:  Stimuplex  Needle Gauge:  22 G  Needle Localization:  Ultrasound guidance  Motor Response comment:   Motor Response: minimal motor response >0.4 mA   Medication Injected:  Midazolam (VERSED) injection, 2 mg  ropivacaine (PF) (NAROPIN)(0.5%) 5 mg/mL injection, 20 mL    Assessment:  Number of attempts:  1  Injection Assessment:  Incremental injection every 5 mL, local visualized surrounding nerve on ultrasound, negative aspiration for blood, no intravascular symptoms, no paresthesia and ultrasound image on chart  Patient tolerance:  Patient tolerated the procedure well with no immediate complications  Under ultrasound guidance, a 22 gauge needle was inserted and placed in close proximity to the nerve. Ultrasound was also used to visualize the spread of the anesthetic in close proximity to the nerve being blocked. The nerve appeared anatomicaly normal.  A permanent ultrasound image was saved in the patient's record.

## 2022-04-13 NOTE — ANESTHESIA POSTPROCEDURE EVALUATION
Procedure(s):  RIGHT HAND BASAL JOINT ARTHROPLASTY, TENDON TRANSFER, DEQUERVAIN'S RELEASE, INDEX AND MIDDLE FINGER DISTAL INTERPHALANGEAL JOINT FUSIONS.    regional, general - backup    Anesthesia Post Evaluation      Multimodal analgesia: multimodal analgesia used between 6 hours prior to anesthesia start to PACU discharge  Patient location during evaluation: PACU  Patient participation: complete - patient participated  Level of consciousness: sleepy but conscious and responsive to verbal stimuli  Pain score: 2  Pain management: adequate  Airway patency: patent  Anesthetic complications: no  Cardiovascular status: acceptable  Respiratory status: acceptable  Hydration status: acceptable  Comments: +Post-Anesthesia Evaluation and Assessment    Patient: Alanna Reyna MRN: [id number]  SSN: [id number]   YOB: 1950  Age: 67 y.o. Sex: female      Cardiovascular Function/Vital Signs    BP (!) 141/57   Pulse 64   Temp 36.7 °C (98.1 °F)   Resp 20   Ht 5' 6\" (1.676 m)   Wt 98.4 kg (216 lb 14.9 oz)   SpO2 98%   BMI 35.01 kg/m²     Patient is status post Procedure(s):  RIGHT HAND BASAL JOINT ARTHROPLASTY, TENDON TRANSFER, DEQUERVAIN'S RELEASE, INDEX AND MIDDLE FINGER DISTAL INTERPHALANGEAL JOINT FUSIONS. Nausea/Vomiting: Controlled. Postoperative hydration reviewed and adequate. Pain:  Pain Scale 1: Visual (04/13/22 1230)  Pain Intensity 1: 0 (04/13/22 1230)   Managed. Neurological Status:   Neuro (WDL): Within Defined Limits (04/13/22 1125)   At baseline. Mental Status and Level of Consciousness: Arousable. Pulmonary Status:   O2 Device: Nasal cannula (04/13/22 1527)   Adequate oxygenation and airway patent. Complications related to anesthesia: None    Post-anesthesia assessment completed. No concerns.     Signed By: Jace Gonzalez MD    4/13/2022  Post anesthesia nausea and vomiting:  controlled  Final Post Anesthesia Temperature Assessment:  Normothermia (36.0-37.5 degrees C)      INITIAL Post-op Vital signs:   Vitals Value Taken Time   /62 04/13/22 1530   Temp 36.7 °C (98.1 °F) 04/13/22 1527   Pulse 65 04/13/22 1534   Resp 17 04/13/22 1534   SpO2 96 % 04/13/22 1534   Vitals shown include unvalidated device data.

## 2022-04-13 NOTE — PERIOP NOTES
[address] from Operating Room to PACU    Report received from 3 Mariana Gonzalez and H Simin REESE regarding Paty Lund. Surgeon(s):  Samantha Morales MD  And Procedure(s) (LRB):  RIGHT HAND BASAL JOINT ARTHROPLASTY, TENDON TRANSFER, DEQUERVAIN'S RELEASE, INDEX AND MIDDLE FINGER DISTAL INTERPHALANGEAL JOINT FUSIONS (Right)  confirmed   with allergies and dressings discussed. Anesthesia type, drugs, patient history, complications, estimated blood loss, vital signs, intake and output, and last pain medication, lines and temperature were reviewed. 1700 Discharge instructions provided to pt's , Ron Polanco. Opportunity for questions/clarifications provided. Pt's R arm placed in sling, PIV removed, d/c'd via WC in NAD with all belongings accounted for.

## 2022-04-13 NOTE — H&P
History and Physical    Subjective:   CC:  OSTEOARTHRITIS RIGHT HAND, RIGHT HAND PAIN    HPI:  Denny Feldman is a 67y.o. year old female who presents with OSTEOARTHRITIS RIGHT HAND, RIGHT HAND PAIN for surgical revision carpometacarpal arthroplasty, dequervain's release, tendon transfer, index/middle DIP joint fusions.     Past Medical History:   Past Medical History:   Diagnosis Date    Adverse effect of anesthesia     during hysterectomy- woke up \"too early\"    Arrhythmia     h/o palpitations normal work up 22/2015 heart: Ronni    Arthritis     Awareness under anesthesia     with hysterectomy    Chronic pain     bulging disc c4/c5 l4/l5 : as of 9/1018 no neck/head movement limitation says patient    Claustrophobia     Color blind     CREST (calcinosis, Raynaud's phenomenon, esophageal dysfunction, sclerodactyly, telangiectasia) ([address] 2018    [facility], Dr. Darlin Darby GERD (gastroesophageal reflux disease)     Gout     Hyperlipidemia     Hypertension     Ill-defined condition     CREST    Leg swelling 2016    unknown etiology    Bartlett's neuralgia 2001    from neuroma middle toe, left foot    Nausea & vomiting     KRYSTINA on CPAP     CPAP- no using since Oct 2021     Pulmonary hypertension ([address] 08/2018    Heart: Dr. Michael Brown Seasonal asthma     allergy    Shortness of breath 2016    Pulmonary Dr. Hdez    Unspecified adverse effect of anesthesia     wakes up for anesthesia early      Past Surgical History:   Past Surgical History:   Procedure Laterality Date    COLONOSCOPY  04/20/2011    negative    COLONOSCOPY N/A 9/28/2018    COLONOSCOPY performed by Paula Stover MD at [address] [place] [address] CATARACT REMOVAL Bilateral 2018    HX CERVICAL FUSION  2014    c4-5     HX CHOLECYSTECTOMY      HX GI  11/19/13    Rectocele repair with enterocele repair    HX HYSTERECTOMY      TOOK LEFT OVARY    HX KNEE ARTHROSCOPY Right 1990's    right knee partial meniscus     HX KNEE REPLACEMENT Right 2016    HX KNEE REPLACEMENT Left 2010, 2016    TKR    HX ORTHOPAEDIC  1973    ganglion cyst removed rt wrist    HX ORTHOPAEDIC  2013, 1992    bilateral CTR, and had ulna nerve release    HX ORTHOPAEDIC Right 2018    thumb and ring finger trigger release    HX ORTHOPAEDIC  2019    cervical fusion x2     HX ORTHOPAEDIC  2020    L4-L5    HX OTHER SURGICAL  2013    recto prolapse    HX OTHER SURGICAL      Interstim System ( bladder pacemaker)    IR INJ SPINE THER SUBST LUM/SAC W IMG  12/19/2019    [facility]  2015    no blockages, card cath   [facility]ED[facility]LIST  05/2018    no blockages x 2 procedures     Family History:   Family History   Problem Relation Age of Onset    Heart Disease Mother     Hypertension Mother     Diabetes Mother     Cancer Mother         BREAST, LUNG    Breast Cancer Mother 61    Heart Disease Father     Hypertension Father     Thyroid Disease Father     Diabetes Brother     Psychiatric Disorder Son         STAINS, [place]IZ[place], [place] DEPRESSIVE    Anesth Problems Neg Hx       Social History:   Social History     Tobacco Use    Smoking status: Never Smoker    Smokeless tobacco: Never Used   Substance Use Topics    Alcohol use: Never       Home Medications:   Prior to Admission medications    Medication Sig Start Date End Date Taking? Authorizing Provider   HYDROcodone-acetaminophen (Norco) 5-325 mg per tablet Take 1 Tablet by mouth every four (4) hours as needed for Pain (post op Abdelrahman Molina MD) for up to 3 days. Max Daily Amount: 6 Tablets. 4/12/22 4/15/22 Yes Vincenzo Hong PA-C   benzonatate (TESSALON) 100 mg capsule Take 100 mg by mouth three (3) times daily as needed for Cough. Yes Provider, Historical   RABEprazole (ACIPHEX) 20 mg TbEC Take 20 mg by mouth daily. Yes Provider, Historical   calcium polycarbophiL (Fiber Laxative, ca polycarbo,) 625 mg tablet Take 625 mg by mouth daily. Yes Provider, Historical   diphenhydrAMINE (BenadryL) 25 mg capsule Take 25 mg by mouth every six (6) hours as needed. Yes Provider, Historical   pravastatin (PRAVACHOL) 40 mg tablet Take 40 mg by mouth nightly. Yes Provider, Historical   carboxymethylcellulose sodium (THERATEARS OP) Apply  to eye as needed. Yes Provider, Historical   telmisartan (MICARDIS) 40 mg tablet Take 40 mg by mouth daily. Yes Provider, Historical   metoprolol tartrate (LOPRESSOR) 25 mg tablet TAKE ONE TABLET BY MOUTH TWICE DAILY 4/9/19  Yes Hema Dela Cruz MD   isosorbide mononitrate ER (IMDUR) 30 mg tablet TAKE ONE-HALF (1/2) TABLET DAILY FOR RAYNAUDS PHENOMENON  Patient taking differently: Take 15 mg by mouth nightly. TAKE ONE-HALF (1/2) TABLET DAILY FOR RAYNAUDS PHENOMENON 4/9/19  Yes Hema Dela Cruz MD   baclofen (LIORESAL) 10 mg tablet Take 20 mg by mouth two (2) times a day. 3/3/18  Yes Provider, Historical   multivitamin (ONE A DAY) tablet Take 1 Tab by mouth daily. Yes Provider, Historical   aspirin delayed-release 81 mg tablet Take 81 mg by mouth nightly. Yes Provider, Historical   acetaminophen (TYLENOL EXTRA STRENGTH) 500 mg tablet Take 1,000 mg by mouth every six (6) hours as needed for Pain. Yes Provider, Historical   allopurinol (ZYLOPRIM) 100 mg tablet Take 100 mg by mouth daily. Yes Provider, Historical   cetirizine (ZYRTEC) 10 mg tablet Take 10 mg by mouth daily. Yes Provider, Historical   montelukast (SINGULAIR) 10 mg tablet Take 10 mg by mouth nightly. Yes Provider, Historical     Allergies:    Allergies   Allergen Reactions    Ciprofloxacin Shortness of Breath    Flagyl [Metronidazole] Shortness of Breath    Percocet [Oxycodone-Acetaminophen] Rash and Itching     Able to take if uses benadryl    Benzene Other (comments)     Blisters and burn area Benzene found to be on steri-strips    Betadine [Povidone-Iodine] Other (comments)     Blisters and burning    Naproxen Itching    Sulfa (Sulfonamide Antibiotics) Rash    Adhesive Rash     Redness and rash        Review of Systems:  A comprehensive review of systems was negative except for that written in the History of Present Illness. Objective:         Physical Exam:     Vitals: Blood pressure (!) 141/57, pulse 64, temperature 98.1 °F (36.7 °C), resp. rate 20, height 5' 6\" (1.676 m), weight 216 lb 14.9 oz (98.4 kg), SpO2 98 %. General:  Awake and alert  HEENT:  NCAT, neck supple without lymphadenopathy  Lungs:  CTA bilaterally  CV:  RRR  Abd:  Soft, non-tender, non-distended  Ext's: right thumb pain, proximal migration; + finkelstein test; index/middle dip joint DJD  Neuro:  A&O x 3      Data Review:   No results found for this or any previous visit (from the past 24 hour(s)). Assessment:     Active Problems:    * No active hospital problems. *      Plan:      To OR for revision right thumb CMC arthroplasty with dequervain's release, tendon transfer, suspensionplasty as well as right index and middle finger DIP joint fusions    Signed By: Pretty Addison MD     April 13, 2022

## 2022-04-14 NOTE — OP NOTES
[address]  OPERATIVE REPORT    Name:  Chase Fuller  MR#:  [id number]  :  1950  ACCOUNT #:  [de-identified]  DATE OF SERVICE:  2022    PREOPERATIVE DIAGNOSES:  1. Recurrent right thumb carpometacarpal joint osteoarthritis. 2.  Right wrist De Quervain's tenosynovitis. 3.  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 POSTOPERATIVE DIAGNOSES:  1. Recurrent right thumb carpometacarpal joint osteoarthritis. 2.  Right wrist De Quervain's tenosynovitis. 3.  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 PROCEDURES PERFORMED:  1. Revision right thumb carpometacarpal joint arthroplasty including abductor pollicis longus interpositional tendon transfer and Arthrex Mini TightRope suspension plasty. 2.  Right wrist De Quervain's release of the first dorsal extensor compartment tendon sheath. 3.  Right index and middle finger distal interphalangeal joint arthrodeses. SURGEON:  Sharif Guerin MD    ASSISTANT:  There were no surgical assistants. ANESTHESIA:  Regional nerve block and sedation with total IV anesthesia. COMPLICATIONS:  No complications. SPECIMENS REMOVED:  There are no specimens. IMPLANTS:  Arthrex Mini TightRope for thumb CMC suspension plasty and Acutrak, AcuTwist screws for index and middle DIP joint fusion. ESTIMATED BLOOD LOSS:  Less than 5 mL. DRAINS:  There were no drains placed. TOURNIQUET TIME:  103 minutes at 250 mmHg. INDICATIONS:  The patient is a 66-year-old right hand dominant woman who previously underwent a left index and middle DIP joint fusion in 2021 and did very well. I elected to take her to the operating room at this time to undergo similar surgery involving the right index and middle finger.   She had undergone a right thumb CMC arthroplasty many years ago by another physician and there was significant proximal migration of the thumb metacarpal.  She also has De Quervain's tenosynovitis. We elected to take her to the operating room to undergo the DIP fusion of the index and middle finger as well as revision of the right thumb CMC arthroplasty to include a De Quervain release and abductor pollicis longus interpositional tendon transfer with suspension plasty. REPORT OF OPERATION: The patient was identified, taken into the operating room, placed supine on the operating room table. She received intervenous sedation and regional nerve block by Anesthesia. Her right upper extremity was prepped and draped sterilely. After Esmarch exsanguination, tourniquet was inflated to 250 mmHg. The patient had a healed dorsal scar over the thumb CMC region which was reopened for a length of about 5 cm. Dissection was carried down carefully to the skin and subcutaneous tissue. Under loupe magnification, scar tissue was encountered. The area of the dorsal radial sensory nerve and artery were identified, protected and preserved. A capsulotomy was performed, the patient had a prior flexor carpi radialis interpositional tendon graft with trapeziectomy, some residual Ethibond and Prolene sutures were identified and removed. Micro oscillating saw was utilized to resect the proximal 2 mm or so the base of the thumb metacarpal to get a fresh edge of the healthier tissue. The Arthrex Mini TightRope guide pin was placed at the start point, a few millimeters distal to the proximal edge of the thumb metacarpal, it was passed perpendicular to the shaft exiting the ulnar base of the index metacarpal, this allowed passage of the FiberWire suture seating the button to the radial base of the thumb metacarpal.  Then in a separate incisional side, the first dorsal compartment was exposed. The retinaculum was released across the extensor pollicis brevis tendon. A partitional wall was removed to free up three pick abductor pollicis brevis tendon slips.   The most proximal ulnar slip was then released sharply and brought into the post trapeziectomy wound as it was dissected down to the base of the thumb metacarpal.  It was looped around scar tissue and ECRL region and back to itself where we secured to create initial suspension plasty support with 2-0 Prolene suture. After irrigation, the capsule was repaired with 3-0 Vicryl suture to present graft extrusion and the skin was closed with 3-0 Vicryl subcutaneously and 4-0 Monocryl for a subcuticular suture. The same wound closure was performed in the more proximal aspect for the [employer] release. Next, the second button was applied with a FiberWire suture. Tension was applied to hold the thumb with height of the index metacarpal.  The knot was tied and buried beneath the intrinsic muscle, which was repaired with 3-0 Vicryl suture. Final FluoroScan imaging was obtained that showed good post trapeziectomy space and height with good thumb metacarpal line and appropriate placement of the buttons and improved of the prior proximal migration of the thumb metacarpal.    Attention was then directed to the index and middle, DIP joint fusions. Curvilinear incisions were made to expose the DIP joints. The terminal extensor tendons were transected transversely across the DIP joint. The collateral ligaments were released radial and ulnarly of the middle phalangeal head. The joints were flexed. The cartilage that remained was removed in a cup and cone manner with a rongeur back to bleeding cancellous bone edges. Utilizing the Acumed, AcuTwist fluid system due to thinness of the distal phalanx shafts, a 0.045 guide pin, we passed central retrograde into each middle phalanx then central antegrade into each distal phalanx then brought back as temporary transarticular retrograde pins. These were checked in good position under FluoroScan guidance. AcuTwist screws were next inserted. The guide pins were removed.   A 26 mm AcuTwist was inserted in the index finger in good position, clinical alignment under AP and lateral C-arm imaging and a similar screw that was 28 mm in length was inserted into the middle finger. Each had followed rigid compression fixation and good position and alignment on AP and lateral x-ray imaging. The pins were snap-off types and they were snapped off without difficulty. The wounds were thoroughly irrigated with saline. Excess bony overgrowth was resected with a rongeur. Skin was trimmed where needed. Then after final irrigation, the wounds together were closed with multiple interrupted 5-0 plain gut simple sutures. Any residual wound at the hand level had been repaired with 3-0 Vicryl suture subcutaneously and then all wounds were ultimately sealed with Dermabond as she did have an ALLERGY TO ADHESIVE TAPE. Once that had hardened, Xeroform was still applied to the wounds followed by soft dressings. The tourniquet was deflated. The hand was pink and had good refill. She was placed in a thumb spica fiberglass splint and then transported into the recovery room postoperatively in good condition.       Emani Simmons MD      [id number]  D:  04/13/2022 15:25  T:  04/13/2022 22:24  JOB #:  [id number]

## 2022-04-19 NOTE — PROGRESS NOTES
HPI: Be Jimenez (: 1950) is a 67 y.o. female, patient, here for evaluation of the following chief complaint(s):    No chief complaint on file. Patient is seen today to evaluate her right hand. She remains pleased with the outcome of her prior left index and middle finger fusion in . She has advanced osteoarthritis in both hands and is now considering a right index and middle DIP fusion. She had a prior right distal ulna resection approximate 20 years ago by another physician and then later had a right thumb CMC arthroplasty. The thumb has proximal migration and she does have pain in that region but also demonstrates some findings consistent with de Quervain's tenosynovitis. She had a prior FCR tendon transfer. She did undergo a revision right thumb CMC arthroplasty with de Quervain's release, APL interpositional transfer as well as right index and middle finger DIP joint fusion on 2022. Vitals: There were no vitals taken for this visit. There is no height or weight on file to calculate BMI. Allergies   Allergen Reactions    Ciprofloxacin Shortness of Breath    Flagyl [Metronidazole] Shortness of Breath    Percocet [Oxycodone-Acetaminophen] Rash and Itching     Able to take if uses benadryl    Benzene Other (comments)     Blisters and burn area Benzene found to be on steri-strips    Betadine [Povidone-Iodine] Other (comments)     Blisters and burning    Naproxen Itching    Sulfa (Sulfonamide Antibiotics) Rash    Adhesive Rash     Redness and rash       Current Outpatient Medications   Medication Sig    benzonatate (TESSALON) 100 mg capsule Take 100 mg by mouth three (3) times daily as needed for Cough.  RABEprazole (ACIPHEX) 20 mg TbEC Take 20 mg by mouth daily.  calcium polycarbophiL (Fiber Laxative, ca polycarbo,) 625 mg tablet Take 625 mg by mouth daily.  diphenhydrAMINE (BenadryL) 25 mg capsule Take 25 mg by mouth every six (6) hours as needed.     pravastatin (PRAVACHOL) 40 mg tablet Take 40 mg by mouth nightly.  carboxymethylcellulose sodium (THERATEARS OP) Apply  to eye as needed.  telmisartan (MICARDIS) 40 mg tablet Take 40 mg by mouth daily.  metoprolol tartrate (LOPRESSOR) 25 mg tablet TAKE ONE TABLET BY MOUTH TWICE DAILY    isosorbide mononitrate ER (IMDUR) 30 mg tablet TAKE ONE-HALF (1/2) TABLET DAILY FOR RAYNAUDS PHENOMENON (Patient taking differently: Take 15 mg by mouth nightly. TAKE ONE-HALF (1/2) TABLET DAILY FOR RAYNAUDS PHENOMENON)    baclofen (LIORESAL) 10 mg tablet Take 20 mg by mouth two (2) times a day.  multivitamin (ONE A DAY) tablet Take 1 Tab by mouth daily.  aspirin delayed-release 81 mg tablet Take 81 mg by mouth nightly.  acetaminophen (TYLENOL EXTRA STRENGTH) 500 mg tablet Take 1,000 mg by mouth every six (6) hours as needed for Pain.  allopurinol (ZYLOPRIM) 100 mg tablet Take 100 mg by mouth daily.  cetirizine (ZYRTEC) 10 mg tablet Take 10 mg by mouth daily.  montelukast (SINGULAIR) 10 mg tablet Take 10 mg by mouth nightly. No current facility-administered medications for this visit.        Past Medical History:   Diagnosis Date    Adverse effect of anesthesia     during hysterectomy- woke up \"too early\"    Arrhythmia     h/o palpitations normal work up 22/2015 heart: Ronni    Arthritis     Awareness under anesthesia     with hysterectomy    Chronic pain     bulging disc c4/c5 l4/l5 : as of 9/1018 no neck/head movement limitation says patient    Claustrophobia     Color blind     CREST (calcinosis, Raynaud's phenomenon, esophageal dysfunction, sclerodactyly, telangiectasia) ([facility] [address] 2018    [address] [facility], Dr. Daniel Ortiz GERD (gastroesophageal reflux disease)     Gout     Hyperlipidemia     Hypertension     Ill-defined condition     CREST    Leg swelling 2016    unknown etiology    Bartlett's neuralgia 2001    from neuroma middle toe, left foot    Nausea & vomiting     KRYSTINA on CPAP     CPAP- no using since Oct 2021     Pulmonary hypertension ([address] 08/2018    Heart: Dr. Marley Givens asthma     allergy    Shortness of breath 2016    Pulmonary Dr. Karli Velázquez    Unspecified adverse effect of anesthesia     wakes up for anesthesia early        Past Surgical History:   Procedure Laterality Date    COLONOSCOPY  04/20/2011    negative    COLONOSCOPY N/A 9/28/2018    COLONOSCOPY performed by Seda Conley MD at [address] [facility] [address] CATARACT REMOVAL Bilateral 2018    HX CERVICAL FUSION  2014    c4-5     HX CHOLECYSTECTOMY      HX GI  11/19/13    Rectocele repair with enterocele repair    HX HYSTERECTOMY      TOOK LEFT OVARY    HX KNEE ARTHROSCOPY Right 1990's    right knee partial meniscus     HX KNEE REPLACEMENT Right 2016    HX KNEE REPLACEMENT Left 2010, 2016    TKR    HX ORTHOPAEDIC  1973    ganglion cyst removed rt wrist    HX ORTHOPAEDIC  2013, 1992    bilateral CTR, and had ulna nerve release    HX ORTHOPAEDIC Right 2018    thumb and ring finger trigger release    HX ORTHOPAEDIC  2019    cervical fusion x2     HX ORTHOPAEDIC  2020    L4-L5    HX OTHER SURGICAL  2013    recto prolapse    HX OTHER SURGICAL      Interstim System ( bladder pacemaker)    IR INJ SPINE THER SUBST LUM/SAC W IMG  12/19/2019    [place] CARD[facility] PROCEDURE UNLIST  2015    no blockages, card cath   [address] UNLIST  05/2018    no blockages x 2 procedures       Family History   Problem Relation Age of Onset    Heart Disease Mother     Hypertension Mother     Diabetes Mother     Cancer Mother         BREAST, LUNG    Breast Cancer Mother 61    Heart Disease Father     Hypertension Father     Thyroid Disease Father     Diabetes Brother     Psychiatric Disorder Son         Nancy MCNALLY [place] DEPRESSIVE    Anesth Problems Neg Hx         Social History     Tobacco Use    Smoking status: Never Smoker    Smokeless tobacco: Never Used   Vaping Use  Vaping Use: Never used   Substance Use Topics    Alcohol use: Never    Drug use: No        Review of Systems   All other systems reviewed and are negative. Physical Exam    Overall the patient's wounds are healing well with absorbing sutures and Steri-Strips in place. No redness drainage or sign infection. Good alignment of index and middle DIP. Good early thumb motion. Imaging:    XR Results (most recent):  Results from Appointment encounter on 04/20/22    XR HAND RT MIN 3 V    Narrative  AP, lateral oblique x-ray of the right hand shows the revision thumb CMC arthroplasty in good position and alignment with no change of the suspension plasty buttons. Also shows the fusion screws across the DIP joint to the index and middle finger thus far healing in satisfactory position alignment. Minimal chronic widening of scapholunate interval and prior distal ulna resection still has residual scalloping from prior ulnar impingement impaction on the distal radius. No fracture. ASSESSMENT/PLAN:  Below is the assessment and plan developed based on review of pertinent history, physical exam, labs, studies, and medications. Patient's examination was consistent with right index and middle DIP joint osteoarthritis with de Quervain's tenosynovitis and somewhat recurrent thumb CMC arthritis post prior basal joint arthroplasty with proximal migration of the thumb metacarpal.  I had a long discussion with the patient regarding options for treatment. She remains pleased with the outcome of more recent left index and middle DIP fusions. She would like to have that performed on the right side in conjunction with a revision right thumb CMC arthroplasty to include de Quervain's release, APL interpositional tendon transfer and suspension plasty.   She did undergo a revision right thumb CMC arthroplasty with de Quervain's release, APL interpositional transfer as well as right index and middle finger DIP joint fusion on 4/13/2022. I recommended a right thumb spica splint for comfort and support. Does have some pre-existing hyperextension of both thumb MP joints in the 40 to 45 degree range. This currently does not appear to be symptomatic but she does understand that fusion of the thumb MP joint could become necessary if this were to worsen. She may slowly work to restore mobility and strength with home exercises. Follow-up in 3 to 4 weeks. 1. Primary osteoarthritis of both hands  -     XR HAND RT MIN 3 V; Future  2. Primary osteoarthritis of first carpometacarpal joint of right hand  3. De Quervain's tenosynovitis, right  4. Primary osteoarthritis, right hand  5. Right hand pain      Return in about 4 weeks (around 5/18/2022). An electronic signature was used to authenticate this note.   -- Eulogio Tesfaye MD

## 2022-04-20 ENCOUNTER — OFFICE VISIT (OUTPATIENT)
Dept: ORTHOPEDIC SURGERY | Age: 72
End: 2022-04-20
Payer: MEDICARE

## 2022-04-20 DIAGNOSIS — M18.11 PRIMARY OSTEOARTHRITIS OF FIRST CARPOMETACARPAL JOINT OF RIGHT HAND: ICD-10-CM

## 2022-04-20 DIAGNOSIS — M19.042 PRIMARY OSTEOARTHRITIS OF BOTH HANDS: Primary | ICD-10-CM

## 2022-04-20 DIAGNOSIS — M19.041 PRIMARY OSTEOARTHRITIS OF BOTH HANDS: Primary | ICD-10-CM

## 2022-04-20 DIAGNOSIS — M19.041 PRIMARY OSTEOARTHRITIS, RIGHT HAND: ICD-10-CM

## 2022-04-20 DIAGNOSIS — M79.641 RIGHT HAND PAIN: ICD-10-CM

## 2022-04-20 DIAGNOSIS — M65.4 DE QUERVAIN'S TENOSYNOVITIS, RIGHT: ICD-10-CM

## 2022-04-20 PROCEDURE — 99024 POSTOP FOLLOW-UP VISIT: CPT | Performed by: ORTHOPAEDIC SURGERY

## 2022-04-20 PROCEDURE — L3809 WHFO W/O JOINTS PRE OTS: HCPCS | Performed by: ORTHOPAEDIC SURGERY

## 2022-04-20 NOTE — LETTER
4/20/2022    Patient: Jessica Rizzo   YOB: 1950   Date of Visit: 4/20/2022     Donya Kee MD  [address]  [address] [address]  Via Fax: [phone]    Dear Donya Kee MD,      Thank you for referring Ms. Antonia Correa to [facility] for evaluation. My notes for this consultation are attached. If you have questions, please do not hesitate to call me. I look forward to following your patient along with you.       Sincerely,    Meghan Sunshine MD

## 2022-04-20 NOTE — PATIENT INSTRUCTIONS
Thumb Arthritis: Exercises  Introduction  Here are some examples of exercises for you to try. The exercises may be suggested for a condition or for rehabilitation. Start each exercise slowly. Ease off the exercises if you start to have pain. You will be told when to start these exercises and which ones will work best for you. How to do the exercises  Thumb IP flexion    1. Place your forearm and hand on a table with your affected thumb pointing up. 2. With your other hand, hold your thumb steady just below the joint nearest your thumbnail. 3. Bend the tip of your thumb downward, then straighten it. 4. Repeat 8 to 12 times. 5. Switch hands and repeat steps 1 through 4, even if only one thumb is sore. Thumb MP flexion    1. Place your forearm and hand on a table with your affected thumb pointing up. 2. With your other hand, hold the base of your thumb and palm steady. 3. Bend your thumb downward where it meets your palm, then straighten it. 4. Repeat 8 to 12 times. 5. Switch hands and repeat steps 1 through 4, even if only one thumb is sore. Thumb opposition    1. With your affected hand, point your fingers and thumb straight up. Your wrist should be relaxed, following the line of your fingers and thumb. 2. Touch your affected thumb to each finger, one finger at a time. This will look like an \"okay\" sign, but try to keep your other fingers straight and pointing upward as much as you can. 3. Repeat 8 to 12 times. 4. Switch hands and repeat steps 1 through 3, even if only one thumb is sore. Follow-up care is a key part of your treatment and safety. Be sure to make and go to all appointments, and call your doctor if you are having problems. It's also a good idea to know your test results and keep a list of the medicines you take. Where can you learn more? Go to http://www.gray.com/  Enter S402 in the search box to learn more about \"Thumb Arthritis: Exercises. \"  Current as of: July 1, 2021               Content Version: 13.2  © 1577-0122 Healthwise, [facility]. Care instructions adapted under license by [employer] (which disclaims liability or warranty for this information). If you have questions about a medical condition or this instruction, always ask your healthcare professional. [place] [address] any warranty or liability for your use of this information.

## 2022-05-18 ENCOUNTER — OFFICE VISIT (OUTPATIENT)
Dept: ORTHOPEDIC SURGERY | Age: 72
End: 2022-05-18
Payer: MEDICARE

## 2022-05-18 DIAGNOSIS — M19.042 PRIMARY OSTEOARTHRITIS OF BOTH HANDS: Primary | ICD-10-CM

## 2022-05-18 DIAGNOSIS — M19.041 PRIMARY OSTEOARTHRITIS OF BOTH HANDS: Primary | ICD-10-CM

## 2022-05-18 DIAGNOSIS — M19.041 PRIMARY OSTEOARTHRITIS, RIGHT HAND: ICD-10-CM

## 2022-05-18 DIAGNOSIS — M79.641 RIGHT HAND PAIN: ICD-10-CM

## 2022-05-18 DIAGNOSIS — M18.11 PRIMARY OSTEOARTHRITIS OF FIRST CARPOMETACARPAL JOINT OF RIGHT HAND: ICD-10-CM

## 2022-05-18 DIAGNOSIS — M65.4 DE QUERVAIN'S TENOSYNOVITIS, RIGHT: ICD-10-CM

## 2022-05-18 PROCEDURE — 99024 POSTOP FOLLOW-UP VISIT: CPT | Performed by: ORTHOPAEDIC SURGERY

## 2022-05-18 NOTE — LETTER
5/18/2022    Patient: Sujey Blank   YOB: 1950   Date of Visit: 5/18/2022     Omar Werner MD  [address]  [address] [id number]  Via Fax: [phone]    Dear Omar Werner MD,      Thank you for referring Ms. Jennifer Viera to [facility] for evaluation. My notes for this consultation are attached. If you have questions, please do not hesitate to call me. I look forward to following your patient along with you.       Sincerely,    Chele Luna MD

## 2022-05-18 NOTE — PROGRESS NOTES
HPI: Rina Philadelphia (: 1950) is a 67 y.o. female, patient, here for evaluation of the following chief complaint(s):    No chief complaint on file. Patient is seen today to evaluate her right hand. She remains pleased with the outcome of her prior left index and middle finger fusion in . She has advanced osteoarthritis in both hands and is now considering a right index and middle DIP fusion. She had a prior right distal ulna resection approximate 20 years ago by another physician and then later had a right thumb CMC arthroplasty. The thumb has proximal migration and she does have pain in that region but also demonstrates some findings consistent with de Quervain's tenosynovitis. She had a prior FCR tendon transfer. She did undergo a revision right thumb CMC arthroplasty with de Quervain's release, APL interpositional transfer as well as right index and middle finger DIP joint fusion on 2022. Vitals: There were no vitals taken for this visit. There is no height or weight on file to calculate BMI. Allergies   Allergen Reactions    Ciprofloxacin Shortness of Breath    Flagyl [Metronidazole] Shortness of Breath    Percocet [Oxycodone-Acetaminophen] Rash and Itching     Able to take if uses benadryl    Benzene Other (comments)     Blisters and burn area Benzene found to be on steri-strips    Betadine [Povidone-Iodine] Other (comments)     Blisters and burning    Naproxen Itching    Sulfa (Sulfonamide Antibiotics) Rash    Adhesive Rash     Redness and rash       Current Outpatient Medications   Medication Sig    benzonatate (TESSALON) 100 mg capsule Take 100 mg by mouth three (3) times daily as needed for Cough.  RABEprazole (ACIPHEX) 20 mg TbEC Take 20 mg by mouth daily.  calcium polycarbophiL (Fiber Laxative, ca polycarbo,) 625 mg tablet Take 625 mg by mouth daily.  diphenhydrAMINE (BenadryL) 25 mg capsule Take 25 mg by mouth every six (6) hours as needed.     pravastatin (PRAVACHOL) 40 mg tablet Take 40 mg by mouth nightly.  carboxymethylcellulose sodium (THERATEARS OP) Apply  to eye as needed.  telmisartan (MICARDIS) 40 mg tablet Take 40 mg by mouth daily.  metoprolol tartrate (LOPRESSOR) 25 mg tablet TAKE ONE TABLET BY MOUTH TWICE DAILY    isosorbide mononitrate ER (IMDUR) 30 mg tablet TAKE ONE-HALF (1/2) TABLET DAILY FOR RAYNAUDS PHENOMENON (Patient taking differently: Take 15 mg by mouth nightly. TAKE ONE-HALF (1/2) TABLET DAILY FOR RAYNAUDS PHENOMENON)    baclofen (LIORESAL) 10 mg tablet Take 20 mg by mouth two (2) times a day.  multivitamin (ONE A DAY) tablet Take 1 Tab by mouth daily.  aspirin delayed-release 81 mg tablet Take 81 mg by mouth nightly.  acetaminophen (TYLENOL EXTRA STRENGTH) 500 mg tablet Take 1,000 mg by mouth every six (6) hours as needed for Pain.  allopurinol (ZYLOPRIM) 100 mg tablet Take 100 mg by mouth daily.  cetirizine (ZYRTEC) 10 mg tablet Take 10 mg by mouth daily.  montelukast (SINGULAIR) 10 mg tablet Take 10 mg by mouth nightly. No current facility-administered medications for this visit.        Past Medical History:   Diagnosis Date    Adverse effect of anesthesia     during hysterectomy- woke up \"too early\"    Arrhythmia     h/o palpitations normal work up 22/2015 heart: Ronni    Arthritis     Awareness under anesthesia     with hysterectomy    Chronic pain     bulging disc c4/c5 l4/l5 : as of 9/1018 no neck/head movement limitation says patient    Claustrophobia     Color blind     CREST (calcinosis, Raynaud's phenomenon, esophageal dysfunction, sclerodactyly, telangiectasia) ([address] 2018    [facility], Dr. Jill Suazo GERD (gastroesophageal reflux disease)     Gout     Hyperlipidemia     Hypertension     Ill-defined condition     CREST    Leg swelling 2016    unknown etiology    Bartlett's neuralgia 2001    from neuroma middle toe, left foot    Nausea & vomiting     KRYSTINA on CPAP     CPAP- no using since Oct 2021     Pulmonary hypertension ([address] 08/2018    Heart: Dr. Basilio Figueroa asthma     allergy    Shortness of breath 2016    Pulmonary Dr. Palomo Mathis    Unspecified adverse effect of anesthesia     wakes up for anesthesia early        Past Surgical History:   Procedure Laterality Date    COLONOSCOPY  04/20/2011    negative    COLONOSCOPY N/A 9/28/2018    COLONOSCOPY performed by Priscila Soler MD at [address] CATARACT REMOVAL Bilateral 2018    HX CERVICAL FUSION  2014    c4-5     HX CHOLECYSTECTOMY      HX GI  11/19/13    Rectocele repair with enterocele repair    HX HYSTERECTOMY      TOOK LEFT OVARY    HX KNEE ARTHROSCOPY Right 1990's    right knee partial meniscus     HX KNEE REPLACEMENT Right 2016    HX KNEE REPLACEMENT Left 2010, 2016    TKR    HX ORTHOPAEDIC  1973    ganglion cyst removed rt wrist    HX ORTHOPAEDIC  2013, 1992    bilateral CTR, and had ulna nerve release    HX ORTHOPAEDIC Right 2018    thumb and ring finger trigger release    HX ORTHOPAEDIC  2019    cervical fusion x2     HX ORTHOPAEDIC  2020    L4-L5    HX OTHER SURGICAL  2013    recto prolapse    HX OTHER SURGICAL      Interstim System ( bladder pacemaker)    IR INJ SPINE THER SUBST LUM/SAC W IMG  12/19/2019    [place] CARDIAC SURG PROCEDURE UNLIST  2015    no blockages, card cath   [address] UNLIST  05/2018    no blockages x 2 procedures       Family History   Problem Relation Age of Onset    Heart Disease Mother     Hypertension Mother     Diabetes Mother     Cancer Mother         BREAST, LUNG    Breast Cancer Mother 61    Heart Disease Father     Hypertension Father     Thyroid Disease Father     Diabetes Brother     Psychiatric Disorder Son         Nancy MCNALLY [place] DEPRESSIVE    Anesth Problems Neg Hx         Social History     Tobacco Use    Smoking status: Never Smoker    Smokeless tobacco: Never Used   Vaping Use  Vaping Use: Never used   Substance Use Topics    Alcohol use: Never    Drug use: No        Review of Systems   All other systems reviewed and are negative. Physical Exam    Overall the patient's wounds are healed. No redness drainage or sign infection. Good alignment of index and middle DIP. Good early thumb motion. Imaging:    XR Results (most recent):  Results from Appointment encounter on 04/20/22    XR HAND RT MIN 3 V    Narrative  AP, lateral oblique x-ray of the right hand shows the revision thumb CMC arthroplasty in good position and alignment with no change of the suspension plasty buttons. Also shows the fusion screws across the DIP joint to the index and middle finger thus far healing in satisfactory position alignment. Minimal chronic widening of scapholunate interval and prior distal ulna resection still has residual scalloping from prior ulnar impingement impaction on the distal radius. No fracture. ASSESSMENT/PLAN:  Below is the assessment and plan developed based on review of pertinent history, physical exam, labs, studies, and medications. Patient's examination was consistent with right index and middle DIP joint osteoarthritis with de Quervain's tenosynovitis and somewhat recurrent thumb CMC arthritis post prior basal joint arthroplasty with proximal migration of the thumb metacarpal.  I had a long discussion with the patient regarding options for treatment. She remains pleased with the outcome of more recent left index and middle DIP fusions. She would like to have that performed on the right side in conjunction with a revision right thumb CMC arthroplasty to include de Quervain's release, APL interpositional tendon transfer and suspension plasty. She did undergo a revision right thumb CMC arthroplasty with de Quervain's release, APL interpositional transfer as well as right index and middle finger DIP joint fusion on 4/13/2022.   I had previously recommended a right thumb spica splint for comfort and support. She does have some pre-existing hyperextension of the right thumb MP joint in the 40 to 45 degree range in the left is closer to 75 to 80 degrees. This currently does not appear to be symptomatic but she does understand that fusion of the thumb MP joint could become necessary if this were to worsen. She may slowly work to restore mobility and strength with home exercises. Follow-up in 6 weeks. She is considering left thumb CMC arthroplasty which would require an MP fusion as well of small and ring DIP joint fusions. She has already had left index and middle DIP fusion surgery. 1. Primary osteoarthritis of both hands  2. Primary osteoarthritis of first carpometacarpal joint of right hand  3. De Quervain's tenosynovitis, right  4. Primary osteoarthritis, right hand  5. Right hand pain      No follow-ups on file. An electronic signature was used to authenticate this note.   -- Oswald Jaramillo MD

## 2022-05-18 NOTE — PATIENT INSTRUCTIONS
Thumb Arthritis: Exercises  Introduction  Here are some examples of exercises for you to try. The exercises may be suggested for a condition or for rehabilitation. Start each exercise slowly. Ease off the exercises if you start to have pain. You will be told when to start these exercises and which ones will work best for you. How to do the exercises  Thumb IP flexion    1. Place your forearm and hand on a table with your affected thumb pointing up. 2. With your other hand, hold your thumb steady just below the joint nearest your thumbnail. 3. Bend the tip of your thumb downward, then straighten it. 4. Repeat 8 to 12 times. 5. Switch hands and repeat steps 1 through 4, even if only one thumb is sore. Thumb MP flexion    1. Place your forearm and hand on a table with your affected thumb pointing up. 2. With your other hand, hold the base of your thumb and palm steady. 3. Bend your thumb downward where it meets your palm, then straighten it. 4. Repeat 8 to 12 times. 5. Switch hands and repeat steps 1 through 4, even if only one thumb is sore. Thumb opposition    1. With your affected hand, point your fingers and thumb straight up. Your wrist should be relaxed, following the line of your fingers and thumb. 2. Touch your affected thumb to each finger, one finger at a time. This will look like an \"okay\" sign, but try to keep your other fingers straight and pointing upward as much as you can. 3. Repeat 8 to 12 times. 4. Switch hands and repeat steps 1 through 3, even if only one thumb is sore. Follow-up care is a key part of your treatment and safety. Be sure to make and go to all appointments, and call your doctor if you are having problems. It's also a good idea to know your test results and keep a list of the medicines you take. Where can you learn more? Go to http://www.gray.com/  Enter S402 in the search box to learn more about \"Thumb Arthritis: Exercises. \"  Current as of: July 1, 2021               Content Version: 13.2  © 3317-8458 Healthwise, [facility]. Care instructions adapted under license by [employer] (which disclaims liability or warranty for this information). If you have questions about a medical condition or this instruction, always ask your healthcare professional. [place] [address] any warranty or liability for your use of this information.

## 2022-06-03 ENCOUNTER — TRANSCRIBE ORDER (OUTPATIENT)
Dept: SCHEDULING | Age: 72
End: 2022-06-03

## 2022-06-03 DIAGNOSIS — Z12.31 SCREENING MAMMOGRAM FOR HIGH-RISK PATIENT: Primary | ICD-10-CM

## 2022-06-15 NOTE — PROGRESS NOTES
HPI: Mohsen Tony (: 1950) is a 67 y.o. female, patient, here for evaluation of the following chief complaint(s):    No chief complaint on file. Patient is seen today to evaluate her right hand. She remains pleased with the outcome of her prior left index and middle finger fusion in . She has advanced osteoarthritis in both hands and is now considering a right index and middle DIP fusion. She had a prior right distal ulna resection approximate 20 years ago by another physician and then later had a right thumb CMC arthroplasty. The thumb has proximal migration and she does have pain in that region but also demonstrates some findings consistent with de Quervain's tenosynovitis. She had a prior FCR tendon transfer. She did undergo a revision right thumb CMC arthroplasty with de Quervain's release, APL interpositional transfer as well as right index and middle finger DIP joint fusion on 2022. Vitals: There were no vitals taken for this visit. There is no height or weight on file to calculate BMI. Allergies   Allergen Reactions    Ciprofloxacin Shortness of Breath    Flagyl [Metronidazole] Shortness of Breath    Percocet [Oxycodone-Acetaminophen] Rash and Itching     Able to take if uses benadryl    Benzene Other (comments)     Blisters and burn area Benzene found to be on steri-strips    Betadine [Povidone-Iodine] Other (comments)     Blisters and burning    Naproxen Itching    Sulfa (Sulfonamide Antibiotics) Rash    Adhesive Rash     Redness and rash       Current Outpatient Medications   Medication Sig    benzonatate (TESSALON) 100 mg capsule Take 100 mg by mouth three (3) times daily as needed for Cough.  RABEprazole (ACIPHEX) 20 mg TbEC Take 20 mg by mouth daily.  calcium polycarbophiL (Fiber Laxative, ca polycarbo,) 625 mg tablet Take 625 mg by mouth daily.  diphenhydrAMINE (BenadryL) 25 mg capsule Take 25 mg by mouth every six (6) hours as needed.     pravastatin (PRAVACHOL) 40 mg tablet Take 40 mg by mouth nightly.  carboxymethylcellulose sodium (THERATEARS OP) Apply  to eye as needed.  telmisartan (MICARDIS) 40 mg tablet Take 40 mg by mouth daily.  metoprolol tartrate (LOPRESSOR) 25 mg tablet TAKE ONE TABLET BY MOUTH TWICE DAILY    isosorbide mononitrate ER (IMDUR) 30 mg tablet TAKE ONE-HALF (1/2) TABLET DAILY FOR RAYNAUDS PHENOMENON (Patient taking differently: Take 15 mg by mouth nightly. TAKE ONE-HALF (1/2) TABLET DAILY FOR RAYNAUDS PHENOMENON)    baclofen (LIORESAL) 10 mg tablet Take 20 mg by mouth two (2) times a day.  multivitamin (ONE A DAY) tablet Take 1 Tab by mouth daily.  aspirin delayed-release 81 mg tablet Take 81 mg by mouth nightly.  acetaminophen (TYLENOL EXTRA STRENGTH) 500 mg tablet Take 1,000 mg by mouth every six (6) hours as needed for Pain.  allopurinol (ZYLOPRIM) 100 mg tablet Take 100 mg by mouth daily.  cetirizine (ZYRTEC) 10 mg tablet Take 10 mg by mouth daily.  montelukast (SINGULAIR) 10 mg tablet Take 10 mg by mouth nightly. No current facility-administered medications for this visit.        Past Medical History:   Diagnosis Date    Adverse effect of anesthesia     during hysterectomy- woke up \"too early\"    Arrhythmia     h/o palpitations normal work up 22/2015 heart: Ronni    Arthritis     Awareness under anesthesia     with hysterectomy    Chronic pain     bulging disc c4/c5 l4/l5 : as of 9/1018 no neck/head movement limitation says patient    Claustrophobia     Color blind     CREST (calcinosis, Raynaud's phenomenon, esophageal dysfunction, sclerodactyly, telangiectasia) ([address] 2018    [facility], Dr. Eveline Alas GERD (gastroesophageal reflux disease)     Gout     Hyperlipidemia     Hypertension     Ill-defined condition     CREST    Leg swelling 2016    unknown etiology    Bartlett's neuralgia 2001    from neuroma middle toe, left foot    Nausea & vomiting     KRYSTINA on CPAP     CPAP- no using since Oct 2021     Pulmonary hypertension ([address] 08/2018    Heart: Dr. Ilir Aranda asthma     allergy    Shortness of breath 2016    Pulmonary Dr. Felix Phillips    Unspecified adverse effect of anesthesia     wakes up for anesthesia early        Past Surgical History:   Procedure Laterality Date    COLONOSCOPY  04/20/2011    negative    COLONOSCOPY N/A 9/28/2018    COLONOSCOPY performed by Josesito Mike MD at [address] CATARACT REMOVAL Bilateral 2018    HX CERVICAL FUSION  2014    c4-5     HX CHOLECYSTECTOMY      HX GI  11/19/13    Rectocele repair with enterocele repair    HX HYSTERECTOMY      TOOK LEFT OVARY    HX KNEE ARTHROSCOPY Right 1990's    right knee partial meniscus     HX KNEE REPLACEMENT Right 2016    HX KNEE REPLACEMENT Left 2010, 2016    TKR    HX ORTHOPAEDIC  1973    ganglion cyst removed rt wrist    HX ORTHOPAEDIC  2013, 1992    bilateral CTR, and had ulna nerve release    HX ORTHOPAEDIC Right 2018    thumb and ring finger trigger release    HX ORTHOPAEDIC  2019    cervical fusion x2     HX ORTHOPAEDIC  2020    L4-L5    HX OTHER SURGICAL  2013    recto prolapse    HX OTHER SURGICAL      Interstim System ( bladder pacemaker)    IR INJ SPINE THER SUBST LUM/SAC W IMG  12/19/2019    [place] CARDIAC SURG PROCEDURE UNLIST  2015    no blockages, card cath   [facility] UNLIST  05/2018    no blockages x 2 procedures       Family History   Problem Relation Age of Onset    Heart Disease Mother     Hypertension Mother     Diabetes Mother     Cancer Mother         BREAST, LUNG    Breast Cancer Mother 61    Heart Disease Father     Hypertension Father     Thyroid Disease Father     Diabetes Brother     Psychiatric Disorder Son         Nancy MCNALLY [place] DEPRESSIVE    Anesth Problems Neg Hx         Social History     Tobacco Use    Smoking status: Never Smoker    Smokeless tobacco: Never Used   Vaping Use  Vaping Use: Never used   Substance Use Topics    Alcohol use: Never    Drug use: No        Review of Systems   All other systems reviewed and are negative. Physical Exam    Overall the patient's wounds are healed. No redness drainage or sign infection. Good alignment of index and middle DIP. Good early thumb motion. She does have some hyperextension of thumb MP joint when pressed can really go to 455 to 60 degrees. Imaging:    XR Results (most recent):  Results from Appointment encounter on 04/20/22    XR HAND RT MIN 3 V    Narrative  AP, lateral oblique x-ray of the right hand shows the revision thumb CMC arthroplasty in good position and alignment with no change of the suspension plasty buttons. Also shows the fusion screws across the DIP joint to the index and middle finger thus far healing in satisfactory position alignment. Minimal chronic widening of scapholunate interval and prior distal ulna resection still has residual scalloping from prior ulnar impingement impaction on the distal radius. No fracture. XR Results (most recent):  Results from Appointment encounter on 06/16/22    XR HAND RT MIN 3 V    Narrative  AP, lateral oblique x-ray of the right hand lesions of the DIP joints of the index and middle finger DIP joints progressing. She has had a thumb CMC arthroplasty with trapezium resection and suspensioplasty buttons unchanged. She has some mild chronic widening of the scapholunate interval and is already undergone a distal ulna resection and still has scalloping on the ulnar metaphysis of the distal radius. Degenerative changes to the IP joints. No fracture. ASSESSMENT/PLAN:  Below is the assessment and plan developed based on review of pertinent history, physical exam, labs, studies, and medications.     Patient's examination was consistent with right index and middle DIP joint osteoarthritis with de Quervain's tenosynovitis and somewhat recurrent thumb CMC arthritis post prior basal joint arthroplasty with proximal migration of the thumb metacarpal.  I had a long discussion with the patient regarding options for treatment. She remains pleased with the outcome of more recent left index and middle DIP fusions. She would like to have that performed on the right side in conjunction with a revision right thumb CMC arthroplasty to include de Quervain's release, APL interpositional tendon transfer and suspension plasty. She did undergo a revision right thumb CMC arthroplasty with de Quervain's release, APL interpositional transfer as well as right index and middle finger DIP joint fusion on 4/13/2022. I had previously recommended a right thumb spica splint for comfort and support. She does have some pre-existing hyperextension of the right thumb MP joint in the 55-60 degree range in the left is closer to 75 to 80 degrees. She is now more agreement that she needs to consider the previously discussed MP fusion of the right thumb. This should hopefully better align the [facility] joint in general and provide good stability with  and pinch. I again reviewed risks that include but not limited to stiffness, pain, nerve or tendon damage, nonunion, malunion the possible need for delayed hardware removal.  Arrangements. Need for this to be performed in outpatient basis soon as possible. She is considering left thumb CMC arthroplasty which would require an MP fusion as well of small and ring DIP joint fusions. She has already had left index and middle DIP fusion surgery. 1. Primary osteoarthritis of both hands  2. Primary osteoarthritis of first carpometacarpal joint of right hand  3. De Quervain's tenosynovitis, right  4. Right hand pain      No follow-ups on file. An electronic signature was used to authenticate this note.   -- Debbie Haile MD

## 2022-06-16 ENCOUNTER — OFFICE VISIT (OUTPATIENT)
Dept: ORTHOPEDIC SURGERY | Age: 72
End: 2022-06-16
Payer: MEDICARE

## 2022-06-16 DIAGNOSIS — M19.041 PRIMARY OSTEOARTHRITIS OF BOTH HANDS: Primary | ICD-10-CM

## 2022-06-16 DIAGNOSIS — M79.641 RIGHT HAND PAIN: ICD-10-CM

## 2022-06-16 DIAGNOSIS — M65.4 DE QUERVAIN'S TENOSYNOVITIS, RIGHT: ICD-10-CM

## 2022-06-16 DIAGNOSIS — M19.041 PRIMARY OSTEOARTHRITIS, RIGHT HAND: Primary | ICD-10-CM

## 2022-06-16 DIAGNOSIS — M19.042 PRIMARY OSTEOARTHRITIS OF BOTH HANDS: Primary | ICD-10-CM

## 2022-06-16 DIAGNOSIS — M18.11 PRIMARY OSTEOARTHRITIS OF FIRST CARPOMETACARPAL JOINT OF RIGHT HAND: ICD-10-CM

## 2022-06-16 PROCEDURE — 99024 POSTOP FOLLOW-UP VISIT: CPT | Performed by: ORTHOPAEDIC SURGERY

## 2022-06-16 NOTE — LETTER
6/16/2022    Patient: Mohsen Tony   YOB: 1950   Date of Visit: 6/16/2022     Be Dickson MD  [address] [facility] [address]  Via Fax: [phone]    Dear Be Dickson MD,      Thank you for referring Ms. Shayy Meade to [facility] for evaluation. My notes for this consultation are attached. If you have questions, please do not hesitate to call me. I look forward to following your patient along with you.       Sincerely,    Karen Delaney MD

## 2022-06-30 ENCOUNTER — TELEPHONE (OUTPATIENT)
Dept: CARDIOLOGY CLINIC | Age: 72
End: 2022-06-30

## 2022-06-30 NOTE — TELEPHONE ENCOUNTER
Patient is calling because she said that  from [address] [facility] would like her to have an echocardiogram schedule with our office. I don't see an order for this or a referral. Patient said that PAR said they faxed something over to our office.     [phone] cell    [address] contact [phone] office

## 2022-07-01 NOTE — TELEPHONE ENCOUNTER
Left msge on voice mail to call office back at [phone]  Patient never seen by  before, appointment scheduled for august 15.

## 2022-07-06 ENCOUNTER — HOSPITAL ENCOUNTER (OUTPATIENT)
Dept: MAMMOGRAPHY | Age: 72
Discharge: HOME | End: 2022-07-06
Attending: FAMILY MEDICINE
Payer: MEDICARE

## 2022-07-06 DIAGNOSIS — Z12.31 SCREENING MAMMOGRAM FOR HIGH-RISK PATIENT: ICD-10-CM

## 2022-07-06 PROCEDURE — 77063 BREAST TOMOSYNTHESIS BI: CPT

## 2022-07-13 ENCOUNTER — TELEPHONE (OUTPATIENT)
Dept: CARDIOLOGY CLINIC | Age: 72
End: 2022-07-13

## 2022-07-13 NOTE — TELEPHONE ENCOUNTER
Patient is calling because she would like to have her records sent to our office from [facility]. She doesn't want the records sent to  office. Patient would like to know if we have received any medical records from [facility].     [phone]

## 2022-07-14 ENCOUNTER — TELEPHONE (OUTPATIENT)
Dept: CARDIOLOGY CLINIC | Age: 72
End: 2022-07-14

## 2022-07-14 NOTE — TELEPHONE ENCOUNTER
Patient said that her pulmonologist requested an echocardiogram to be done on her for Afib. I don't see any order in the chart. Please assist.    [phone]

## 2022-07-14 NOTE — TELEPHONE ENCOUNTER
Verified Patient with two identifiers. Ms. Huber Hall is a new patient for Dr. Tess Zavala has an appointment in August, patient  already made an appointment for an echocardiogram, stated.

## 2022-08-15 ENCOUNTER — OFFICE VISIT (OUTPATIENT)
Dept: CARDIOLOGY CLINIC | Age: 72
End: 2022-08-15
Payer: MEDICARE

## 2022-08-15 ENCOUNTER — TELEPHONE (OUTPATIENT)
Dept: CARDIOLOGY CLINIC | Age: 72
End: 2022-08-15

## 2022-08-15 VITALS
RESPIRATION RATE: 16 BRPM | HEIGHT: 66 IN | SYSTOLIC BLOOD PRESSURE: 112 MMHG | DIASTOLIC BLOOD PRESSURE: 60 MMHG | OXYGEN SATURATION: 98 % | WEIGHT: 223 LBS | HEART RATE: 73 BPM | BODY MASS INDEX: 35.84 KG/M2

## 2022-08-15 DIAGNOSIS — R06.09 DOE (DYSPNEA ON EXERTION): Primary | ICD-10-CM

## 2022-08-15 DIAGNOSIS — I87.2 VENOUS REFLUX: ICD-10-CM

## 2022-08-15 DIAGNOSIS — G47.33 OSA ON CPAP: ICD-10-CM

## 2022-08-15 DIAGNOSIS — Z99.89 OSA ON CPAP: ICD-10-CM

## 2022-08-15 DIAGNOSIS — I10 ESSENTIAL HYPERTENSION: ICD-10-CM

## 2022-08-15 DIAGNOSIS — E78.2 MIXED HYPERLIPIDEMIA: ICD-10-CM

## 2022-08-15 DIAGNOSIS — R07.89 ATYPICAL CHEST PAIN: ICD-10-CM

## 2022-08-15 PROCEDURE — 93000 ELECTROCARDIOGRAM COMPLETE: CPT | Performed by: INTERNAL MEDICINE

## 2022-08-15 PROCEDURE — G8754 DIAS BP LESS 90: HCPCS | Performed by: INTERNAL MEDICINE

## 2022-08-15 PROCEDURE — G9899 SCRN MAM PERF RSLTS DOC: HCPCS | Performed by: INTERNAL MEDICINE

## 2022-08-15 PROCEDURE — G8427 DOCREV CUR MEDS BY ELIG CLIN: HCPCS | Performed by: INTERNAL MEDICINE

## 2022-08-15 PROCEDURE — G8536 NO DOC ELDER MAL SCRN: HCPCS | Performed by: INTERNAL MEDICINE

## 2022-08-15 PROCEDURE — 99214 OFFICE O/P EST MOD 30 MIN: CPT | Performed by: INTERNAL MEDICINE

## 2022-08-15 PROCEDURE — G8510 SCR DEP NEG, NO PLAN REQD: HCPCS | Performed by: INTERNAL MEDICINE

## 2022-08-15 PROCEDURE — G8417 CALC BMI ABV UP PARAM F/U: HCPCS | Performed by: INTERNAL MEDICINE

## 2022-08-15 PROCEDURE — G8752 SYS BP LESS 140: HCPCS | Performed by: INTERNAL MEDICINE

## 2022-08-15 PROCEDURE — 1101F PT FALLS ASSESS-DOCD LE1/YR: CPT | Performed by: INTERNAL MEDICINE

## 2022-08-15 PROCEDURE — G8399 PT W/DXA RESULTS DOCUMENT: HCPCS | Performed by: INTERNAL MEDICINE

## 2022-08-15 PROCEDURE — 3017F COLORECTAL CA SCREEN DOC REV: CPT | Performed by: INTERNAL MEDICINE

## 2022-08-15 PROCEDURE — 1123F ACP DISCUSS/DSCN MKR DOCD: CPT | Performed by: INTERNAL MEDICINE

## 2022-08-15 PROCEDURE — 1090F PRES/ABSN URINE INCON ASSESS: CPT | Performed by: INTERNAL MEDICINE

## 2022-08-15 RX ORDER — ESTRADIOL 0.1 MG/G
CREAM VAGINAL
COMMUNITY
Start: 2022-05-16

## 2022-08-15 NOTE — PATIENT INSTRUCTIONS
You will be scheduled for an Echocardiogram and a Nuclear Stress Test after your appointment today. A 2 week event monitor will be mailed to your home. You should receive this in 7-10 days. Please call our office if you do not receive it. An order has been placed for you for Coronary Calcium Score(if your stress testing is normal). Please call to schedule this through [facility] at [phone]. Nuclear stress testing evaluates blood flow to your heart muscle and assesses cardiac function. There are 2 parts (Rest/Stress) to this procedure and will include either an exercise on a treadmill or an IV administration of a stressing medication called Lexiscan. Your cardiologist will determine which type of testing is best for you. This test can be performed in one day unless it is determined that better quality images will be obtained by performing the test over two days. *Please arrive 15 minutes prior to your appointment time    Test Duration:    -One day testing will take 4 hours    Day of testing instructions:    NO CAFFEINE (not even decaffeinated products) 24 HOURS PRIOR TO TESTING. This includes coffee, soda, tea, chocolate, multivitamins, and migraine medication, like Excedrin or Fioricet that contains caffeine. Nothing to eat or drink 4 HOURS prior to testing  NO NICOTINE 12 hours prior to testing  Hold any medications requested by your cardiologist. Otherwise take medications as directed with a few sips of water. If you are unsure you may bring your medications with you to take after instructed by your stressing nurse. It is recommended you hold IMDUR the day of the test. DIABETIC PATIENTS: Take half of your insulin with a light meal 4 hours before your test.  Wear comfortable clothes and shoes (Shirts with no metal, shorts or pants, tennis shoes, no heels or flip flops)    IMPORTANT: This testing involves a cardiac tracer ordered specifically for you.  If you are unable to make your appointment, please call to cancel/reschedule AT LEAST 24 hours prior to your appointment so your tracer can be cancelled. [phone].

## 2022-08-15 NOTE — LETTER
8/15/2022    Patient: Mecca Gregg   YOB: 1950   Date of Visit: 8/15/2022     Westley Frederick MD  [address] [facility]  [address] [address]  Via Fax: [phone]    Dear Westley Frederick MD,      Thank you for referring Ms. Rere Senior to [facility] for evaluation. My notes for this consultation are attached. If you have questions, please do not hesitate to call me. I look forward to following your patient along with you.       Sincerely,    Rick Rojas MD

## 2022-08-15 NOTE — PROGRESS NOTES
Kaylan , [place], [address] [place] [address]  [phone]     Subjective:      Sandy Smallwood is a 67 y.o. female is here for new patient consultation, to establish care with a new cardiologist to maintain care within the [facility] system. The patient was last seen by Dr. Saran Torres in November 2021. She states she has persistent shortness of breath with exertion, and intermittent spells of palpitations a couple times a week, sometimes at rest that proceeded there is episodes of shortness of breath. She has been frustrated by lots of testing that cannot find the answer. Noted that she had mild pulmonary hypertension by right heart catheterization in the past and is followed by Dr. Marlin Parker. Today, the patient denies chest pain/ orthopnea, PND, LE edema, syncope, or presyncope.        Patient Active Problem List    Diagnosis Date Noted    Localized primary osteoarthritis of left hand 02/24/2021    Spinal stenosis, lumbar 10/29/2020    Left chest pressure 04/27/2020    Cervical stenosis of spinal canal 03/09/2020    Essential hypertension 10/29/2019    KRYSTINA on CPAP 05/16/2019    Severe obesity ([address] 10/23/2018    Chronic venous hypertension (idiopathic) with inflammation of left lower extremity 02/06/2017    Venous insufficiency of left leg 11/22/2016    Venous reflux 08/16/2016    Osteoarthritis 01/21/2016    OA (osteoarthritis) of knee 01/21/2016    Abnormal ankle brachial index 04/08/2015    SOB (shortness of breath) 02/24/2015    Right leg pain 02/24/2015    HNP (herniated nucleus pulposus), cervical 07/07/2014    Esophageal reflux 06/27/2012    Mixed hyperlipidemia 06/27/2012    Foot pain 07/28/2011    Asthma 07/28/2011      Madonna Garcia MD  Past Medical History:   Diagnosis Date    Adverse effect of anesthesia     during hysterectomy- woke up \"too early\"    Arrhythmia     h/o palpitations normal work up 22/2015 heart: Ronni    Arthritis     Awareness under anesthesia     with hysterectomy Chronic pain     bulging disc c4/c5 l4/l5 : as of 9/1018 no neck/head movement limitation says patient    Claustrophobia     Color blind     CREST (calcinosis, Raynaud's phenomenon, esophageal dysfunction, sclerodactyly, telangiectasia) ([facility] 2018    [facility], Dr. Stephany Lyles    GERD (gastroesophageal reflux disease)     Gout     Hyperlipidemia     Hypertension     Ill-defined condition     CREST    Leg swelling 2016    unknown etiology    Bartlett's neuralgia 2001    from neuroma middle toe, left foot    Nausea & vomiting     KRYSTINA on CPAP     CPAP- no using since Oct 2021     Pulmonary hypertension ([facility] [address] 08/2018    Heart: Dr. Wei Lin    Seasonal asthma     allergy    Shortness of breath 2016    Pulmonary Dr. Estrella Cortez    Unspecified adverse effect of anesthesia     wakes up for anesthesia early      Past Surgical History:   Procedure Laterality Date    COLONOSCOPY  04/20/2011    negative    COLONOSCOPY N/A 9/28/2018    COLONOSCOPY performed by Kamaljit Gonzalez MD at [address]    Padminiean Dakins Bilateral 2018    HX CERVICAL FUSION  2014    c4-5     HX CHOLECYSTECTOMY      HX GI  11/19/13    Rectocele repair with enterocele repair    HX HYSTERECTOMY      TOOK LEFT OVARY    HX KNEE ARTHROSCOPY Right 1990's    right knee partial meniscus     HX KNEE REPLACEMENT Right 2016    HX KNEE REPLACEMENT Left 2010, 2016    TKR    HX ORTHOPAEDIC  1973    ganglion cyst removed rt wrist    HX ORTHOPAEDIC  2013, 1992    bilateral CTR, and had ulna nerve release    HX ORTHOPAEDIC Right 2018    thumb and ring finger trigger release    HX ORTHOPAEDIC  2019    cervical fusion x2     HX ORTHOPAEDIC  2020    L4-L5    HX OTHER SURGICAL  2013    recto prolapse    HX OTHER SURGICAL      Interstim System ( bladder pacemaker)    IR INJ SPINE THER SUBST LUM/SAC W IMG  12/19/2019    ID CARDIAC SURG PROCEDURE UNLIST  2015    no blockages, card cath    ID CARDIAC SURG PROCEDURE UNLIST  05/2018    no blockages x 2 procedures     Allergies   Allergen Reactions    Ciprofloxacin Shortness of Breath    Flagyl [Metronidazole] Shortness of Breath    Percocet [Oxycodone-Acetaminophen] Rash and Itching     Able to take if uses benadryl    Benzene Other (comments)     Blisters and burn area Benzene found to be on steri-strips    Betadine [Povidone-Iodine] Other (comments)     Blisters and burning    Naproxen Itching    Codeine-Guaifenesin Unknown (comments)    Lorazepam Unknown (comments)    Sulfa (Sulfonamide Antibiotics) Rash    Adhesive Rash     Redness and rash      Family History   Problem Relation Age of Onset    Heart Disease Mother     Hypertension Mother     Diabetes Mother     Cancer Mother         BREAST, LUNG    Breast Cancer Mother 61    Heart Disease Father     Hypertension Father     Thyroid Disease Father     Diabetes Brother     Psychiatric Disorder Son         BIPOLAR, SCHIZO, [place] DEPRESSIVE    Anesth Problems Neg Hx       Social History     Socioeconomic History    Marital status:      Spouse name: Not on file    Number of children: Not on file    Years of education: Not on file    Highest education level: Not on file   Occupational History    Not on file   Tobacco Use    Smoking status: Never    Smokeless tobacco: Never   Vaping Use    Vaping Use: Never used   Substance and Sexual Activity    Alcohol use: Never    Drug use: No    Sexual activity: Not on file   Other Topics Concern    Not on file   Social History Narrative    Not on file     Social Determinants of Health     Financial Resource Strain: Not on file   Food Insecurity: Not on file   Transportation Needs: Not on file   Physical Activity: Not on file   Stress: Not on file   Social Connections: Not on file   Intimate Partner Violence: Not on file   Housing Stability: Not on file      Current Outpatient Medications   Medication Sig    benzonatate (TESSALON) 100 mg capsule Take 100 mg by mouth three (3) times daily as needed for Cough. RABEprazole (ACIPHEX) 20 mg TbEC Take 20 mg by mouth daily. calcium polycarbophiL (FIBERCON) 625 mg tablet Take 625 mg by mouth daily. diphenhydrAMINE (BENADRYL) 25 mg capsule Take 25 mg by mouth every six (6) hours as needed. pravastatin (PRAVACHOL) 40 mg tablet Take 40 mg by mouth nightly. carboxymethylcellulose sodium (THERATEARS OP) Apply  to eye as needed. telmisartan (MICARDIS) 40 mg tablet Take 40 mg by mouth daily. metoprolol tartrate (LOPRESSOR) 25 mg tablet TAKE ONE TABLET BY MOUTH TWICE DAILY    isosorbide mononitrate ER (IMDUR) 30 mg tablet TAKE ONE-HALF (1/2) TABLET DAILY FOR RAYNAUDS PHENOMENON (Patient taking differently: Take 15 mg by mouth nightly. TAKE ONE-HALF (1/2) TABLET DAILY FOR RAYNAUDS PHENOMENON)    baclofen (LIORESAL) 10 mg tablet Take 20 mg by mouth two (2) times a day. multivitamin (ONE A DAY) tablet Take 1 Tab by mouth daily. aspirin delayed-release 81 mg tablet Take 81 mg by mouth nightly. acetaminophen (TYLENOL) 500 mg tablet Take 1,000 mg by mouth every six (6) hours as needed for Pain. allopurinol (ZYLOPRIM) 100 mg tablet Take 100 mg by mouth daily. cetirizine (ZYRTEC) 10 mg tablet Take 10 mg by mouth daily. montelukast (SINGULAIR) 10 mg tablet Take 10 mg by mouth nightly. estradioL (ESTRACE) 0.01 % (0.1 mg/gram) vaginal cream APPLY SMALL AMOUNT VAGINALLY 2 TIMES A WEEK     No current facility-administered medications for this visit. Review of Symptoms:  11 systems reviewed, negative other than as stated in the HPI    Physical ExamPhysical Exam:    Vitals:    08/15/22 0926   BP: 112/60   Pulse: 73   Resp: 16   SpO2: 98%   Weight: 223 lb (101.2 kg)   Height: 5' 6\" (1.676 m)     Body mass index is 35.99 kg/m². General PE  Gen:  NAD  Mental Status - Alert. General Appearance - Not in acute distress. HEENT:  PERRL, no carotid bruits or JVD  Chest and Lung Exam   Inspection: Accessory muscles - No use of accessory muscles in breathing. Auscultation:   Breath sounds: - Normal.   Cardiovascular   Inspection: Jugular vein - Bilateral - Inspection Normal.   Palpation/Percussion:   Apical Impulse: - Normal.   Auscultation: Rhythm - Regular. Heart Sounds - S1 WNL and S2 WNL. No S3 or S4. Murmurs & Other Heart Sounds: Auscultation of the heart reveals - No Murmurs. Peripheral Vascular   Upper Extremity: Inspection - Bilateral - No Cyanotic nailbeds or Digital clubbing. Lower Extremity:   Palpation: Edema - Bilateral - No edema. Abdomen:   Soft, non-tender, bowel sounds are active. Neuro: A&O times 3, CN and motor grossly WNL    Labs:   No results found for: CHOL, CHOLX, CHLST, CHOLV, 873484, HDL, HDLP, LDL, LDLC, DLDLP, TGLX, TRIGL, TRIGP, CHHD, CHHDX  No results found for: CPK, CPKX, CPX  Lab Results   Component Value Date/Time    Sodium 141 08/11/2021 10:59 AM    Potassium 4.3 08/11/2021 10:59 AM    Chloride 110 (H) 08/11/2021 10:59 AM    CO2 26 08/11/2021 10:59 AM    Anion gap 5 08/11/2021 10:59 AM    Glucose 99 08/11/2021 10:59 AM    BUN 14 08/11/2021 10:59 AM    Creatinine 0.93 08/11/2021 10:59 AM    BUN/Creatinine ratio 15 08/11/2021 10:59 AM    GFR est AA >60 08/11/2021 10:59 AM    GFR est non-AA 59 (L) 08/11/2021 10:59 AM    Calcium 8.9 08/11/2021 10:59 AM    Bilirubin, total 0.7 10/21/2020 09:19 AM    Alk. phosphatase 91 10/21/2020 09:19 AM    Protein, total 6.6 10/21/2020 09:19 AM    Albumin 3.4 (L) 10/21/2020 09:19 AM    Globulin 3.2 10/21/2020 09:19 AM    A-G Ratio 1.1 10/21/2020 09:19 AM    ALT (SGPT) 20 10/21/2020 09:19 AM       EKG:  NSR     05/06/20    ECHO ADULT COMPLETE 05/07/2020 5/7/2020    Interpretation Summary  · Normal cavity size, wall thickness and systolic function (ejection fraction normal). Estimated left ventricular ejection fraction is 55 - 60%. No regional wall motion abnormality noted. Moderate (grade 2) left ventricular diastolic dysfunction. · Pulmonary arterial systolic pressure is 26 mmHg.     Signed by: Leana Shelby MD on 2020  2:51 PM    Right heart catheterization May 2018:  HEMODYNAMIC TABLES     Pressures:  Baseline  Pressures:  - HR: 58  Pressures:  - Rhythm:  Pressures:  -- Pulmonary Artery (S/D/M):   Pressures:  -- Pulmonary Capillary Wedge:   Pressures:  -- Right Atrium (a/v/M):   Pressures:  -- Right Ventricle (s/edp): 42/19/--    Left heart catheterization May 2015:  CORONARY CIRCULATION: Left main: Normal. LAD: Normal. 1st diagonal:  Normal. 2nd diagonal: Normal. 3rd diagonal: Normal. The vessel was very  small sized. Circumflex: Normal. The vessel was large sized (dominant). 1st obtuse marginal: Normal. 2nd obtuse marginal: Normal. Left posterior  descending artery: Normal. RCA: Normal. The vessel was medium sized. RV  marginal 1: Normal.    20    NUCLEAR CARDIAC STRESS TEST 2020    Interpretation Summary  · Baseline ECG: Sinus rhythm. · Gated SPECT: Left ventricular function post-stress was normal. Calculated ejection fraction is 83%. There is no evidence of transient ischemic dilation (TID). · Left ventricular perfusion is probably normal.  · Myocardial perfusion imaging defect 1: There is a defect that is small in size with a mild reduction in uptake that is predominantly reversible. The defect appears to probably be artifact. The possibility of infarction cannot be excluded. · Myocardial perfusion imaging defect 1: caused by breast attenuation. · Normal myocardial perfusion imaging. Myocardial perfusion imaging supports a low risk stress test.    Signed by: Michael Coates MD on 2020 11:49 AM    Coronary calcium score 2018:  CALCIUM SCORING:  Left main: 26. Left anterior descendin. Left circumflex:  0. Right coronary:  0. Posterior descendin.     TOTAL CAC SCORE: 47. Your score of 47 places you in the 50th percentile rank.  That means 50% of the  females at the ages from 74-77 will have a higher calcium score than you. CT at that time was negative for significant obstructive coronary disease. Assessment:          ICD-10-CM ICD-9-CM    1. PAN (dyspnea on exertion)  R06.00 786.09 AMB POC EKG ROUTINE W/ 12 LEADS, INTER & REP      2. Essential hypertension  I10 401.9       3. Mixed hyperlipidemia  E78.2 272.2       4. KRYSTINA on CPAP  G47.33 327.23     Z99.89 V46.8       5. Venous reflux  I87.2 459.81           Orders Placed This Encounter    AMB POC EKG ROUTINE W/ 12 LEADS, INTER & REP     Order Specific Question:   Reason for Exam:     Answer:   routine    estradioL (ESTRACE) 0.01 % (0.1 mg/gram) vaginal cream     Sig: APPLY SMALL AMOUNT VAGINALLY 2 TIMES A WEEK        Plan:     PAN, precordial pain  No significant CAD per [facility] in 2015. Negative NST 5/2020. Cardiac CTA unimpressive. Low suspicion for new CAD, but will get Lexiscan Myoview, repeat echo for mild pulmonary hypertension. History of mildly elevated coronary calcium score of 47 in 2018: If Donovan Churches negative for ischemia, repeat coronary calcium score to evaluate for significant progression of CAD. Heart palpitations:  Check 2-week event monitor    Essential hypertension: Controlled with current meds. Mixed hyperlipidemia: On statin. Labs and lipids per PCP    KRYSTINA: on CPAP. H/o CREST syndrome. F/u with rheumatology, dr Marii Rucker HTN: Followed by Dr Chiara Stafford by right heart catheterization 2018. Repeat echo now for further assessment. Superficial venous reflux: s/p right GSV RF closure 10/2016. Left leg continues to do very well with conservative management. stable overall. Continue current care and f/u in 6 months if testing is low risk after gradual increase in exercise.         Fabian Branch MD

## 2022-08-17 ENCOUNTER — TELEPHONE (OUTPATIENT)
Dept: CARDIOLOGY CLINIC | Age: 72
End: 2022-08-17

## 2022-08-17 NOTE — TELEPHONE ENCOUNTER
Pt wanted to verify her Interstim system for her bladder pacer. Informed her it is ok for her to start wearing the loop monitor for 2 wks. Pt verified understanding.

## 2022-08-29 ENCOUNTER — ANCILLARY PROCEDURE (OUTPATIENT)
Dept: CARDIOLOGY CLINIC | Age: 72
End: 2022-08-29
Payer: MEDICARE

## 2022-08-29 VITALS — BODY MASS INDEX: 35.84 KG/M2 | WEIGHT: 223 LBS | HEIGHT: 66 IN

## 2022-08-29 DIAGNOSIS — Z99.89 OSA ON CPAP: ICD-10-CM

## 2022-08-29 DIAGNOSIS — R06.09 DOE (DYSPNEA ON EXERTION): ICD-10-CM

## 2022-08-29 DIAGNOSIS — I87.2 VENOUS REFLUX: ICD-10-CM

## 2022-08-29 DIAGNOSIS — E78.2 MIXED HYPERLIPIDEMIA: ICD-10-CM

## 2022-08-29 DIAGNOSIS — G47.33 OSA ON CPAP: ICD-10-CM

## 2022-08-29 DIAGNOSIS — I10 ESSENTIAL HYPERTENSION: ICD-10-CM

## 2022-08-29 PROCEDURE — 93306 TTE W/DOPPLER COMPLETE: CPT | Performed by: INTERNAL MEDICINE

## 2022-08-30 ENCOUNTER — ANCILLARY PROCEDURE (OUTPATIENT)
Dept: CARDIOLOGY CLINIC | Age: 72
End: 2022-08-30
Payer: MEDICARE

## 2022-08-30 VITALS
WEIGHT: 223 LBS | DIASTOLIC BLOOD PRESSURE: 70 MMHG | BODY MASS INDEX: 35.84 KG/M2 | SYSTOLIC BLOOD PRESSURE: 126 MMHG | HEIGHT: 66 IN

## 2022-08-30 DIAGNOSIS — I87.2 VENOUS REFLUX: ICD-10-CM

## 2022-08-30 DIAGNOSIS — G47.33 OSA ON CPAP: ICD-10-CM

## 2022-08-30 DIAGNOSIS — R06.09 DOE (DYSPNEA ON EXERTION): ICD-10-CM

## 2022-08-30 DIAGNOSIS — Z99.89 OSA ON CPAP: ICD-10-CM

## 2022-08-30 DIAGNOSIS — E78.2 MIXED HYPERLIPIDEMIA: ICD-10-CM

## 2022-08-30 DIAGNOSIS — I10 ESSENTIAL HYPERTENSION: ICD-10-CM

## 2022-08-30 LAB
ECHO AO ASC DIAM: 2.8 CM
ECHO AO ASCENDING AORTA INDEX: 1.34 CM/M2
ECHO AO ROOT DIAM: 2.9 CM
ECHO AO ROOT INDEX: 1.39 CM/M2
ECHO AV AREA PEAK VELOCITY: 1.9 CM2
ECHO AV AREA VTI: 2 CM2
ECHO AV AREA/BSA PEAK VELOCITY: 0.9 CM2/M2
ECHO AV AREA/BSA VTI: 1 CM2/M2
ECHO AV MEAN GRADIENT: 6 MMHG
ECHO AV MEAN VELOCITY: 1.1 M/S
ECHO AV PEAK GRADIENT: 9 MMHG
ECHO AV PEAK VELOCITY: 1.5 M/S
ECHO AV VELOCITY RATIO: 0.73
ECHO AV VTI: 35 CM
ECHO EST RA PRESSURE: 8 MMHG
ECHO LA DIAMETER INDEX: 1.91 CM/M2
ECHO LA DIAMETER: 4 CM
ECHO LA TO AORTIC ROOT RATIO: 1.38
ECHO LA VOL 2C: 71 ML (ref 22–52)
ECHO LA VOL 4C: 65 ML (ref 22–52)
ECHO LA VOL BP: 74 ML (ref 22–52)
ECHO LA VOL/BSA BIPLANE: 35 ML/M2 (ref 16–34)
ECHO LA VOLUME AREA LENGTH: 79 ML
ECHO LA VOLUME INDEX A2C: 34 ML/M2 (ref 16–34)
ECHO LA VOLUME INDEX A4C: 31 ML/M2 (ref 16–34)
ECHO LA VOLUME INDEX AREA LENGTH: 38 ML/M2 (ref 16–34)
ECHO LV E' LATERAL VELOCITY: 10 CM/S
ECHO LV E' SEPTAL VELOCITY: 10 CM/S
ECHO LV EDV A2C: 60 ML
ECHO LV EDV A4C: 63 ML
ECHO LV EDV BP: 64 ML (ref 56–104)
ECHO LV EDV INDEX A4C: 30 ML/M2
ECHO LV EDV INDEX BP: 31 ML/M2
ECHO LV EDV NDEX A2C: 29 ML/M2
ECHO LV EJECTION FRACTION A2C: 61 %
ECHO LV EJECTION FRACTION A4C: 53 %
ECHO LV EJECTION FRACTION BIPLANE: 58 % (ref 55–100)
ECHO LV ESV A2C: 23 ML
ECHO LV ESV A4C: 30 ML
ECHO LV ESV BP: 27 ML (ref 19–49)
ECHO LV ESV INDEX A2C: 11 ML/M2
ECHO LV ESV INDEX A4C: 14 ML/M2
ECHO LV ESV INDEX BP: 13 ML/M2
ECHO LV FRACTIONAL SHORTENING: 44 % (ref 28–44)
ECHO LV INTERNAL DIMENSION DIASTOLE INDEX: 2.39 CM/M2
ECHO LV INTERNAL DIMENSION DIASTOLIC: 5 CM (ref 3.9–5.3)
ECHO LV INTERNAL DIMENSION SYSTOLIC INDEX: 1.34 CM/M2
ECHO LV INTERNAL DIMENSION SYSTOLIC: 2.8 CM
ECHO LV IVSD: 1.3 CM (ref 0.6–0.9)
ECHO LV MASS 2D: 276.4 G (ref 67–162)
ECHO LV MASS INDEX 2D: 132.3 G/M2 (ref 43–95)
ECHO LV POSTERIOR WALL DIASTOLIC: 1.4 CM (ref 0.6–0.9)
ECHO LV RELATIVE WALL THICKNESS RATIO: 0.56
ECHO LVOT AREA: 2.8 CM2
ECHO LVOT AV VTI INDEX: 0.71
ECHO LVOT DIAM: 1.9 CM
ECHO LVOT MEAN GRADIENT: 2 MMHG
ECHO LVOT PEAK GRADIENT: 4 MMHG
ECHO LVOT PEAK VELOCITY: 1.1 M/S
ECHO LVOT STROKE VOLUME INDEX: 33.6 ML/M2
ECHO LVOT SV: 70.3 ML
ECHO LVOT VTI: 24.8 CM
ECHO MV A VELOCITY: 0.59 M/S
ECHO MV E DECELERATION TIME (DT): 207.6 MS
ECHO MV E VELOCITY: 0.96 M/S
ECHO MV E/A RATIO: 1.63
ECHO MV E/E' LATERAL: 9.6
ECHO MV E/E' RATIO (AVERAGED): 9.6
ECHO MV E/E' SEPTAL: 9.6
ECHO PV MAX VELOCITY: 1.1 M/S
ECHO PV PEAK GRADIENT: 5 MMHG
ECHO RA MINOR AXIS INDEX: 1.82 CM/M2
ECHO RA MINOR AXIS: 3.8 CM
ECHO RIGHT VENTRICULAR SYSTOLIC PRESSURE (RVSP): 34 MMHG
ECHO RV INTERNAL DIMENSION: 4 CM
ECHO RV TAPSE: 2.1 CM (ref 1.7–?)
ECHO TV REGURGITANT MAX VELOCITY: 2.57 M/S
ECHO TV REGURGITANT PEAK GRADIENT: 26 MMHG
NUC STRESS EJECTION FRACTION: 76 %
STRESS BASELINE DIAS BP: 76 MMHG
STRESS BASELINE HR: 56 BPM
STRESS BASELINE ST DEPRESSION: 0 MM
STRESS BASELINE SYS BP: 125 MMHG
STRESS O2 SAT PEAK: 100 %
STRESS O2 SAT REST: 100 %
STRESS PEAK DIAS BP: 64 MMHG
STRESS PEAK SYS BP: 124 MMHG
STRESS PERCENT HR ACHIEVED: 54 %
STRESS POST PEAK HR: 80 BPM
STRESS RATE PRESSURE PRODUCT: 9920 BPM*MMHG
STRESS ST DEPRESSION: 0 MM
STRESS TARGET HR: 148 BPM
TID: 1.1

## 2022-08-30 PROCEDURE — A9500 TC99M SESTAMIBI: HCPCS | Performed by: INTERNAL MEDICINE

## 2022-08-30 PROCEDURE — 78452 HT MUSCLE IMAGE SPECT MULT: CPT | Performed by: INTERNAL MEDICINE

## 2022-08-30 PROCEDURE — 93015 CV STRESS TEST SUPVJ I&R: CPT | Performed by: INTERNAL MEDICINE

## 2022-08-30 RX ORDER — AMINOPHYLLINE 25 MG/ML
100 INJECTION, SOLUTION INTRAVENOUS ONCE
Status: COMPLETED | OUTPATIENT
Start: 2022-08-30 | End: 2022-08-30

## 2022-08-30 RX ORDER — TETRAKIS(2-METHOXYISOBUTYLISOCYANIDE)COPPER(I) TETRAFLUOROBORATE 1 MG/ML
30 INJECTION, POWDER, LYOPHILIZED, FOR SOLUTION INTRAVENOUS ONCE
Status: COMPLETED | OUTPATIENT
Start: 2022-08-30 | End: 2022-08-30

## 2022-08-30 RX ORDER — TETRAKIS(2-METHOXYISOBUTYLISOCYANIDE)COPPER(I) TETRAFLUOROBORATE 1 MG/ML
10 INJECTION, POWDER, LYOPHILIZED, FOR SOLUTION INTRAVENOUS ONCE
Status: COMPLETED | OUTPATIENT
Start: 2022-08-30 | End: 2022-08-30

## 2022-08-30 RX ADMIN — AMINOPHYLLINE 100 MG: 25 INJECTION, SOLUTION INTRAVENOUS at 10:50

## 2022-08-30 RX ADMIN — TETRAKIS(2-METHOXYISOBUTYLISOCYANIDE)COPPER(I) TETRAFLUOROBORATE 25.7 MILLICURIE: 1 INJECTION, POWDER, LYOPHILIZED, FOR SOLUTION INTRAVENOUS at 10:40

## 2022-08-30 RX ADMIN — TETRAKIS(2-METHOXYISOBUTYLISOCYANIDE)COPPER(I) TETRAFLUOROBORATE 8 MILLICURIE: 1 INJECTION, POWDER, LYOPHILIZED, FOR SOLUTION INTRAVENOUS at 09:15

## 2022-09-01 ENCOUNTER — PATIENT MESSAGE (OUTPATIENT)
Dept: CARDIOLOGY CLINIC | Age: 72
End: 2022-09-01

## 2022-09-01 ENCOUNTER — TELEPHONE (OUTPATIENT)
Dept: CARDIOLOGY CLINIC | Age: 72
End: 2022-09-01

## 2022-09-01 NOTE — PROGRESS NOTES
Echo showed normal heart pumping strength, some mild thickening of heart muscle. Valves look good and healthy.   Continue good bp control, aim for < 130/80

## 2022-09-01 NOTE — TELEPHONE ENCOUNTER
Breonna Haynes., CHITO   Nurse Practitioner   Specialty:  Nurse Practitioner   Progress Notes       Signed   Encounter Date:  8/30/2022       Please advise patient stress test without evidence of flow-limiting blockages in the arteries of the heart. Proceed with CT heart scan. Echo showed normal heart pumping strength, some mild thickening of heart muscle. Valves look good and healthy.   Continue good bp control, aim for < 130/80

## 2022-09-01 NOTE — PROGRESS NOTES
Please advise patient stress test without evidence of flow-limiting blockages in the arteries of the heart.   Proceed with CT heart scan

## 2022-09-02 ENCOUNTER — TELEPHONE (OUTPATIENT)
Dept: CARDIOLOGY CLINIC | Age: 72
End: 2022-09-02

## 2022-09-02 ENCOUNTER — PATIENT MESSAGE (OUTPATIENT)
Dept: CARDIOLOGY CLINIC | Age: 72
End: 2022-09-02

## 2022-09-02 NOTE — TELEPHONE ENCOUNTER
----- Message from Luisa Lake, RN sent at 9/1/2022 12:06 PM EDT -----  Regarding: FW:  Appointment for CT scan [place]     ----- Message -----  From: Shaina Stout  Sent: 9/1/2022  11:54 AM EDT  To: Sheran Schirmer Nurse Pool  Subject:  Appointment for CT scan [place]                 I need to know if I have to make an appointment for this test with Radiology Doctor Silvio Sexton told me to wait to make it until he has all the results for the Heart monitor, ECHO and Stress test. I got the results for 2 of them on my chart.  Central schedule has called about the order for the test. Thank You Marti Mas

## 2022-09-13 ENCOUNTER — HOSPITAL ENCOUNTER (OUTPATIENT)
Dept: CT IMAGING | Age: 72
Discharge: HOME | End: 2022-09-13
Attending: INTERNAL MEDICINE
Payer: SELF-PAY

## 2022-09-13 DIAGNOSIS — I87.2 VENOUS REFLUX: ICD-10-CM

## 2022-09-13 DIAGNOSIS — G47.33 OSA ON CPAP: ICD-10-CM

## 2022-09-13 DIAGNOSIS — E78.2 MIXED HYPERLIPIDEMIA: ICD-10-CM

## 2022-09-13 DIAGNOSIS — I10 ESSENTIAL HYPERTENSION: ICD-10-CM

## 2022-09-13 DIAGNOSIS — R06.09 DOE (DYSPNEA ON EXERTION): ICD-10-CM

## 2022-09-13 DIAGNOSIS — R07.89 ATYPICAL CHEST PAIN: ICD-10-CM

## 2022-09-13 DIAGNOSIS — Z99.89 OSA ON CPAP: ICD-10-CM

## 2022-09-13 PROCEDURE — 75571 CT HRT W/O DYE W/CA TEST: CPT

## 2022-09-14 NOTE — PROGRESS NOTES
Moderate plaque build up in her coronary arteries. Prev coronary calcium score was  47 in 2018, now at 162. On ASA, statin. If feeling well, 6 mos with doc theresa.   If still symptomatic,  need to be seen sooner

## 2022-09-15 ENCOUNTER — TELEPHONE (OUTPATIENT)
Dept: CARDIOLOGY CLINIC | Age: 72
End: 2022-09-15

## 2022-09-15 NOTE — TELEPHONE ENCOUNTER
----- Message from Chris Cotton NP sent at 9/14/2022 11:01 AM EDT -----  Moderate plaque build up in her coronary arteries. Prev coronary calcium score was  47 in 2018, now at 162. On ASA, statin. If feeling well, 6 mos with deep cardenas.   If still symptomatic,  need to be seen sooner

## 2022-09-18 ENCOUNTER — TELEPHONE (OUTPATIENT)
Dept: CARDIOLOGY CLINIC | Age: 72
End: 2022-09-18

## 2022-09-18 NOTE — TELEPHONE ENCOUNTER
Please advise the patient her monitor was normal.  When she had episodes of lightheadedness, shortness of breath, chest discomfort, and heart racing, all strips showed normal rhythm. Follow-up as planned in 6 to 12 months.

## 2022-09-19 NOTE — TELEPHONE ENCOUNTER
I called and spoke with the patient, two identifiers verified. I have discussed in detailed about Dr. Segundo Lira message below. \"Please advise the patient her monitor was normal.  When she had episodes of lightheadedness, shortness of breath, chest discomfort, and heart racing, all strips showed normal rhythm. Follow-up as planned in 6 to 12 months. \"      Patient verbalized understanding. No concerns at this time.

## 2022-10-13 ENCOUNTER — TRANSCRIBE ORDER (OUTPATIENT)
Dept: SCHEDULING | Age: 72
End: 2022-10-13

## 2022-10-13 DIAGNOSIS — M72.2 PLANTAR FASCIITIS: Primary | ICD-10-CM

## 2022-11-04 ENCOUNTER — HOSPITAL ENCOUNTER (OUTPATIENT)
Dept: ULTRASOUND IMAGING | Age: 72
Discharge: HOME | End: 2022-11-04
Attending: STUDENT IN AN ORGANIZED HEALTH CARE EDUCATION/TRAINING PROGRAM
Payer: MEDICARE

## 2022-11-04 DIAGNOSIS — M72.2 PLANTAR FASCIITIS: ICD-10-CM

## 2022-11-04 PROCEDURE — 76881 US COMPL JOINT R-T W/IMG: CPT

## 2022-11-28 ENCOUNTER — HOSPITAL ENCOUNTER (OUTPATIENT)
Dept: GENERAL RADIOLOGY | Age: 72
Discharge: HOME | End: 2022-11-28
Payer: MEDICARE

## 2022-11-28 ENCOUNTER — TRANSCRIBE ORDER (OUTPATIENT)
Dept: REGISTRATION | Age: 72
End: 2022-11-28

## 2022-11-28 DIAGNOSIS — M54.9 BACKACHE: ICD-10-CM

## 2022-11-28 DIAGNOSIS — M25.551 RIGHT HIP PAIN: ICD-10-CM

## 2022-11-28 DIAGNOSIS — M54.9 DORSALGIA: ICD-10-CM

## 2022-11-28 DIAGNOSIS — M25.551 RIGHT HIP PAIN: Primary | ICD-10-CM

## 2022-11-28 DIAGNOSIS — M54.9 DORSALGIA: Primary | ICD-10-CM

## 2022-11-28 PROCEDURE — 72100 X-RAY EXAM L-S SPINE 2/3 VWS: CPT

## 2022-11-28 PROCEDURE — 73502 X-RAY EXAM HIP UNI 2-3 VIEWS: CPT

## 2022-12-05 NOTE — PROGRESS NOTES
Diagnostic catheter inserted over the wire. Catheter used: Right 4. Patient was admitted to [facility] on 10/29/20 and discharged on 10/30/20 for Scheduled Lumbar Lami L5-S1. Outreach made within 2 business days of discharge: Yes    Top Discharge Challenges to be reviewed by the provider   Additional needs identified to be addressed with provider yes, pt c/o rash on back and rash on LLE. Also c/o pain in both of her LEs that is worse when doing ankle pumps or putting on socks. She was started on medrol dose pack by her surgeon and advised to take her pain medication along with tylenol    Did not discuss COVID-19 related testing since it was done at pre-op testing prior to admission. Test results were negative. Patient informed of results, if available? n/a   Method of communication with provider : none       Advance Care Planning:   Does patient have an Advance Directive:  no- was provided 2 copies while in the hospital and states that she will review and discuss completing AMD once she is no longer taking narcotics    Inpatient Readmission Risk score: n/a  Was this a readmission? no   Patient stated reason for the admission: scheduled surgery  Patients top risk factors for readmission: functional physical ability, lack of knowledge about disease, medication management and multiple health system providers    Interventions to address risk factors: reached out to pts PCP to request their office to call patient to review how and why to take bumex    Care Transition Nurse (CTN) contacted the patient by telephone to perform post hospital discharge assessment. Verified name and  with patient as identifiers. Provided introduction to self, and explanation of the CTN role. CTN reviewed discharge instructions, medical action plan and red flags with patient who verbalized understanding. Patient given an opportunity to ask questions and does not have any further questions or concerns at this time.  The patient agrees to contact the PCP office for questions related to their healthcare. Medication reconciliation was performed with patient, who verbalizes understanding of administration of home medications. Advised obtaining a 90-day supply of all daily and as-needed medications. Referral to Pharm D needed: pt to review with provider first, but may benefit from PharmD referral     Home Health/Outpatient orders at discharge: no    Durable Medical Equipment ordered at discharge: none, pt already has cane, walker, raised toilet seat, shower chair    [facility] follow up appointment(s):   Future Appointments   Date Time Provider John Whatley   12/7/2020 10:30 AM Reza Rudolph MD [facility] BS [facility]     Non-BS follow up appointment(s): attended in person JESS jonast with Dr. Monique Joseph, partner to Dr. Dayan Gaffney on 11/3/20    Plan for follow-up call in 3-5 days based on severity of symptoms and risk factors. CTN provided contact information for future needs.

## 2023-01-10 ENCOUNTER — OFFICE VISIT (OUTPATIENT)
Dept: ORTHOPEDIC SURGERY | Age: 73
End: 2023-01-10
Payer: MEDICARE

## 2023-01-10 VITALS — HEIGHT: 66 IN | WEIGHT: 211 LBS | BODY MASS INDEX: 33.91 KG/M2

## 2023-01-10 DIAGNOSIS — M48.062 SPINAL STENOSIS OF LUMBAR REGION WITH NEUROGENIC CLAUDICATION: ICD-10-CM

## 2023-01-10 DIAGNOSIS — Z98.1 S/P SPINAL FUSION: Primary | ICD-10-CM

## 2023-01-10 RX ORDER — CYCLOBENZAPRINE HCL 10 MG
10 TABLET ORAL
Qty: 40 TABLET | Refills: 0 | Status: SHIPPED | OUTPATIENT
Start: 2023-01-10

## 2023-01-10 RX ORDER — MELOXICAM 7.5 MG/1
7.5 TABLET ORAL 2 TIMES DAILY
Qty: 60 TABLET | Refills: 1 | Status: SHIPPED | OUTPATIENT
Start: 2023-01-10

## 2023-01-10 NOTE — PATIENT INSTRUCTIONS
Spondylolysis and Spondylolisthesis: Exercises  Introduction  Here are some examples of exercises for you to try. The exercises may be suggested for a condition or for rehabilitation. Start each exercise slowly. Ease off the exercises if you start to have pain. You will be told when to start these exercises and which ones will work best for you. How to do the exercises  Single knee-to-chest  Lie on your back with your knees bent and your feet flat on the floor. You can put a small pillow under your head and neck if it is more comfortable. Bring one knee to your chest, keeping the other foot flat on the floor. Keep your lower back pressed to the floor. Hold for 15 to 30 seconds. Relax, and lower the knee to the starting position. Repeat with the other leg. Repeat 2 to 4 times with each leg. To get more stretch, put your other leg flat on the floor while pulling your knee to your chest.  Double knee-to-chest  Lie on your back with your knees bent and your feet flat on the floor. You can put a small pillow under your head and neck if it is more comfortable. Bring both knees to your chest.  Keep your lower back pressed to the floor. Hold for 15 to 30 seconds. Relax, and lower your knees to the starting position. Repeat 2 to 4 times. Alternate arm and leg (bird dog) exercise  Do this exercise slowly. Try to keep your body straight at all times. Start on the floor, on your hands and knees. Tighten your belly muscles by pulling your belly button in toward your spine. Be sure you continue to breathe normally and do not hold your breath. Raise one arm off the floor, and hold it straight out in front of you. Be careful not to let your shoulder drop down, because that will twist your trunk. Hold for about 6 seconds, then lower your arm and switch to your other arm. Repeat 8 to 12 times on each arm. When you can do this exercise with ease and no pain, repeat steps 1 through 5.  But this time do it with one leg raised off the floor, holding your leg straight out behind you. Be careful not to let your hip drop down, because that will twist your trunk. When holding your leg straight out becomes easier, try raising your opposite arm at the same time, and repeat steps 1 through 5. Bridging  Lie on your back with both knees bent. Your knees should be bent about 90 degrees. Then push your feet into the floor, squeeze your buttocks, and lift your hips off the floor until your shoulders, hips, and knees are all in a straight line. Hold for about 6 seconds as you continue to breathe normally, and then slowly lower your hips back down to the floor and rest for up to 10 seconds. Repeat 8 to 12 times. Curl-ups  Lie on the floor on your back with your knees bent at a 90-degree angle. Your feet should be flat on the floor, about 12 inches from your buttocks. Cross your arms over your chest. If this bothers your neck, try putting your hands behind your neck (not your head), with your elbows spread apart. Slowly tighten your belly muscles and raise your shoulder blades off the floor. Keep your head in line with your body, and do not press your chin to your chest.  Hold this position for 1 or 2 seconds, then slowly lower yourself back down to the floor. Repeat 8 to 12 times. Plank  Do this exercise slowly. Try to keep your body straight at all times, and do not let one hip drop lower than the other. Lie on your stomach, resting your upper body on your forearms. Tighten your belly muscles by pulling your belly button in toward your spine. Keeping your knees on the floor, press down with your forearms to lift your upper body off the floor. Hold for about 6 seconds, then lower your body to the floor. Rest for up to 10 seconds. Repeat 8 to 12 times. Over time, work up to holding for 15 to 30 seconds each time.   If this exercise is easy to do with your knees on the floor, try doing this exercise with your knees and legs straight, supported by your toes on the floor. Follow-up care is a key part of your treatment and safety. Be sure to make and go to all appointments, and call your doctor if you are having problems. It's also a good idea to know your test results and keep a list of the medicines you take. Current as of: March 9, 2022               Content Version: 13.4  © 2006-2022 Healthwise, [facility]. Care instructions adapted under license by [employer] (which disclaims liability or warranty for this information). If you have questions about a medical condition or this instruction, always ask your healthcare professional. [place] [address] any warranty or liability for your use of this information.

## 2023-01-10 NOTE — LETTER
1/10/2023    Patient: Anastacio Rogers   YOB: 1950   Date of Visit: 1/10/2023     Ruth Berumen MD  [address]  [place] [address]  Via Fax: [phone]    Dear uRth Berumen MD,      Thank you for referring Ms. Kimmie Warner to [facility] for evaluation. My notes for this consultation are attached. If you have questions, please do not hesitate to call me. I look forward to following your patient along with you.       Sincerely,    Trung Paz MD

## 2023-01-10 NOTE — PROGRESS NOTES
Johnathan Bass (: 1950) is a 67 y.o. female, patient, here for evaluation of the following chief complaint(s):  LOW BACK PAIN       ASSESSMENT/PLAN:    Below is the assessment and plan developed based on review of pertinent history, physical exam, labs, studies, and medications. At this point she has had some recurrent severe lower back pain and right leg radicular symptoms since bending and lifting approximately 2 months ago. It has not improved. She is getting a lot of burning in the right lower extremity despite medications. I am going to start some physical therapy as well as an anti-inflammatory and a muscle relaxant. I also am going to order an MRI to see if she is developed adjacent level disease. 1. S/P spinal fusion  -     XR SPINE LUMB MIN 4 V; Future      No follow-ups on file. SUBJECTIVE/OBJECTIVE:  Johnathan Bass (: 1950) is a 67 y.o. female. Pain Assessment  1/10/2023   Location of Pain Leg;Back   Location Modifiers Right   Severity of Pain 3   Aggravating Factors Bending;Squatting;Standing;Walking;Stairs   Limiting Behavior Yes        She comes in today for an evaluation. She is about 2 years out from a lumbar fusion at L5-S1. She was doing rather well until November of last year. She was bending over and lifting something heavier than she thought and she felt a pop. Since then she has had severe back pain and right leg symptoms. She denies any bowel bladder difficulties. Pain is worse with activities. Imaging:    XR Results (most recent):  Results from Appointment encounter on 01/10/23    XR SPINE LUMB MIN 4 V    Narrative  AP and lateral lumbar spine demonstrates a stable fusion L5-S1. No acute changes noted.                      MRI Results (most recent):  No recent imaging      Allergies   Allergen Reactions    Ciprofloxacin Shortness of Breath    Flagyl [Metronidazole] Shortness of Breath    Percocet [Oxycodone-Acetaminophen] Rash and Itching     Able to take if uses benadryl    Benzene Other (comments)     Blisters and burn area Benzene found to be on steri-strips    Betadine [Povidone-Iodine] Other (comments)     Blisters and burning    Naproxen Itching    Codeine-Guaifenesin Unknown (comments)    Lorazepam Unknown (comments)    Sulfa (Sulfonamide Antibiotics) Rash    Adhesive Rash     Redness and rash       Current Outpatient Medications   Medication Sig    benzonatate (TESSALON) 100 mg capsule Take 100 mg by mouth three (3) times daily as needed for Cough. RABEprazole (ACIPHEX) 20 mg TbEC Take 20 mg by mouth daily. calcium polycarbophiL (FIBERCON) 625 mg tablet Take 625 mg by mouth daily. diphenhydrAMINE (BENADRYL) 25 mg capsule Take 25 mg by mouth every six (6) hours as needed. pravastatin (PRAVACHOL) 40 mg tablet Take 40 mg by mouth nightly. carboxymethylcellulose sodium (THERATEARS OP) Apply  to eye as needed. telmisartan (MICARDIS) 40 mg tablet Take 40 mg by mouth daily. metoprolol tartrate (LOPRESSOR) 25 mg tablet TAKE ONE TABLET BY MOUTH TWICE DAILY    isosorbide mononitrate ER (IMDUR) 30 mg tablet TAKE ONE-HALF (1/2) TABLET DAILY FOR RAYNAUDS PHENOMENON (Patient taking differently: Take 15 mg by mouth nightly. TAKE ONE-HALF (1/2) TABLET DAILY FOR RAYNAUDS PHENOMENON)    baclofen (LIORESAL) 10 mg tablet Take 20 mg by mouth two (2) times a day. multivitamin (ONE A DAY) tablet Take 1 Tab by mouth daily. aspirin delayed-release 81 mg tablet Take 81 mg by mouth nightly. acetaminophen (TYLENOL) 500 mg tablet Take 1,000 mg by mouth every six (6) hours as needed for Pain. allopurinol (ZYLOPRIM) 100 mg tablet Take 100 mg by mouth daily. cetirizine (ZYRTEC) 10 mg tablet Take 10 mg by mouth daily. montelukast (SINGULAIR) 10 mg tablet Take 10 mg by mouth nightly.     estradioL (ESTRACE) 0.01 % (0.1 mg/gram) vaginal cream APPLY SMALL AMOUNT VAGINALLY 2 TIMES A WEEK     No current facility-administered medications for this visit.        Past Medical History:   Diagnosis Date    Adverse effect of anesthesia     during hysterectomy- woke up \"too early\"    Arrhythmia     h/o palpitations normal work up 22/2015 heart: [facility]    Arthritis     Awareness under anesthesia     with hysterectomy    Chronic pain     bulging disc c4/c5 l4/l5 : as of 9/1018 no neck/head movement limitation says patient    Claustrophobia     Color blind     CREST (calcinosis, Raynaud's phenomenon, esophageal dysfunction, sclerodactyly, telangiectasia) ([facility] [address] 2018    [address], [facility]    GERD (gastroesophageal reflux disease)     Gout     Hyperlipidemia     Hypertension     Ill-defined condition     CREST    Leg swelling 2016    unknown etiology    Bartlett's neuralgia 2001    from neuroma middle toe, left foot    Nausea & vomiting     KRYSTINA on CPAP     CPAP- no using since Oct 2021     Pulmonary hypertension ([facility] [address] 08/2018    Heart: Dr. Crispin Phillips    Seasonal asthma     allergy    Shortness of breath 2016    Pulmonary Dr. Surendra Novoa    Unspecified adverse effect of anesthesia     wakes up for anesthesia early        Past Surgical History:   Procedure Laterality Date    COLONOSCOPY  04/20/2011    negative    COLONOSCOPY N/A 9/28/2018    COLONOSCOPY performed by Kyle Ellison MD at [address]    [address] CATARACT REMOVAL Bilateral 2018    HX CERVICAL FUSION  2014    c4-5     HX CHOLECYSTECTOMY      HX GI  11/19/13    Rectocele repair with enterocele repair    HX HYSTERECTOMY      TOOK LEFT OVARY    HX KNEE ARTHROSCOPY Right 1990's    right knee partial meniscus     HX KNEE REPLACEMENT Right 2016    HX KNEE REPLACEMENT Left 2010, 2016    TKR    HX ORTHOPAEDIC  1973    ganglion cyst removed rt wrist    HX ORTHOPAEDIC  2013, 1992    bilateral CTR, and had ulna nerve release    HX ORTHOPAEDIC Right 2018    thumb and ring finger trigger release    HX ORTHOPAEDIC  2019    cervical fusion x2     HX ORTHOPAEDIC  2020    L4-L5 HX OTHER SURGICAL  2013    recto prolapse    HX OTHER SURGICAL      Interstim System ( bladder pacemaker)    IR INJ SPINE THER SUBST LUM/SAC W IMG  12/19/2019    [place] UNLISTED PROCEDURE CARDIAC SURGERY  2015    no blockages, card cath    [place] UNLISTED PROCEDURE CARDIAC SURGERY  05/2018    no blockages x 2 procedures       Family History   Problem Relation Age of Onset    Heart Disease Mother     Hypertension Mother     Diabetes Mother     Cancer Mother         BREAST, LUNG    Breast Cancer Mother 61    Heart Disease Father     Hypertension Father     Thyroid Disease Father     Diabetes Brother     Psychiatric Disorder Son         Lora Brandon, SCHIZO, MANIC DEPRESSIVE    Anesth Problems Neg Hx         Social History     Tobacco Use    Smoking status: Never    Smokeless tobacco: Never   Vaping Use    Vaping Use: Never used   Substance Use Topics    Alcohol use: Never    Drug use: No        Review of Systems       Vitals:  Ht 5' 6\" (1.676 m)   Wt 211 lb (95.7 kg)   BMI 34.06 kg/m²    Body mass index is 34.06 kg/m². Ortho Exam       Integumentary  Assessment of Surgical Incision - healing and consistent with normal anticipated wound healing. Neurologic  Sensory  Light Touch - Intact - Globally. Overall Assessment of Muscle Strength and Tone reveals  Lower Extremities - Right Iliopsoas - 5/5. Left Iliopsoas - 5/5. Right Tibialis Anterior - 4/5. Left Tibialis Anterior - 5/5. Right Gastroc-Soleus - 5/5. Left Gastroc-Soleus - 5/5. Right EHL - 4/5. Left EHL - 5/5. General Assessment of Reflexes  Right Ankle - Clonus is not present. Left Ankle - Clonus is not present. Reflexes (Dermatomes)  2/2 Normal - Left Achilles (L5-S2), Left Knee (L2-4), Right Achilles (L5-S2) and Right Knee (L2-4). Musculoskeletal  Global Assessment  Examination of related systems reveals - well-developed, well-nourished, in no acute distress, alert and oriented x 3 and normal coordination.   Spine/Ribs/Pelvis  Lumbosacral Spine - Examination of the lumbosacral spine reveals - no known fractures or deformities. Inspection and Palpation - Tenderness - moderate. Assessment of pain reveals the following findings - The pain is characterized as - moderate. Location - pain refers to lower back bilaterally. An electronic signature was used to authenticate this note.   -- Estrella Shirley MD

## 2023-01-11 ENCOUNTER — OFFICE VISIT (OUTPATIENT)
Dept: ORTHOPEDIC SURGERY | Age: 73
End: 2023-01-11
Payer: MEDICARE

## 2023-01-11 DIAGNOSIS — M65.4 DE QUERVAIN'S TENOSYNOVITIS, RIGHT: ICD-10-CM

## 2023-01-11 DIAGNOSIS — M79.641 RIGHT HAND PAIN: ICD-10-CM

## 2023-01-11 DIAGNOSIS — M19.042 PRIMARY OSTEOARTHRITIS OF BOTH HANDS: ICD-10-CM

## 2023-01-11 DIAGNOSIS — M18.11 PRIMARY OSTEOARTHRITIS OF FIRST CARPOMETACARPAL JOINT OF RIGHT HAND: Primary | ICD-10-CM

## 2023-01-11 DIAGNOSIS — M19.041 PRIMARY OSTEOARTHRITIS OF BOTH HANDS: ICD-10-CM

## 2023-01-11 NOTE — PATIENT INSTRUCTIONS
Thumb Arthritis: Exercises  Introduction  Here are some examples of exercises for you to try. The exercises may be suggested for a condition or for rehabilitation. Start each exercise slowly. Ease off the exercises if you start to have pain. You will be told when to start these exercises and which ones will work best for you. How to do the exercises  Thumb IP flexion  Place your forearm and hand on a table with your affected thumb pointing up. With your other hand, hold your thumb steady just below the joint nearest your thumbnail. Bend the tip of your thumb downward, then straighten it. Repeat 8 to 12 times. Switch hands and repeat steps 1 through 4, even if only one thumb is sore. Thumb MP flexion  Place your forearm and hand on a table with your affected thumb pointing up. With your other hand, hold the base of your thumb and palm steady. Bend your thumb downward where it meets your palm, then straighten it. Repeat 8 to 12 times. Switch hands and repeat steps 1 through 4, even if only one thumb is sore. Thumb opposition  With your affected hand, point your fingers and thumb straight up. Your wrist should be relaxed, following the line of your fingers and thumb. Touch your affected thumb to each finger, one finger at a time. This will look like an \"okay\" sign, but try to keep your other fingers straight and pointing upward as much as you can. Repeat 8 to 12 times. Switch hands and repeat steps 1 through 3, even if only one thumb is sore. Follow-up care is a key part of your treatment and safety. Be sure to make and go to all appointments, and call your doctor if you are having problems. It's also a good idea to know your test results and keep a list of the medicines you take. Current as of: March 9, 2022               Content Version: 13.4  © 5355-7783 [employer]wise, [employer].    Care instructions adapted under license by [email or web address] (which disclaims liability or warranty for this information). If you have questions about a medical condition or this instruction, always ask your healthcare professional. [place] [address] any warranty or liability for your use of this information.

## 2023-01-11 NOTE — PROGRESS NOTES
HPI: Radha Fuchs (: 1950) is a 67 y.o. female, patient, here for evaluation of the following chief complaint(s):    Hand Pain (H/o revision right thumb CMC joint arthroplasty with de Quervain's release and index and middle finger DIP joint fusions on 22. Difficulty with ROM of right thumb. C/o left ring finger DIP joint alignment. )  Patient is seen today to evaluate her right hand. She remains pleased with the outcome of her prior left index and middle finger fusion in . She has advanced osteoarthritis in both hands and is now considering a right index and middle DIP fusion. She had a prior right distal ulna resection approximate 20 years ago by another physician and then later had a right thumb CMC arthroplasty. The thumb has proximal migration and she does have pain in that region but also demonstrates some findings consistent with de Quervain's tenosynovitis. She had a prior FCR tendon transfer. She did undergo a revision right thumb CMC arthroplasty with de Quervain's release, APL interpositional transfer as well as right index and middle finger DIP joint fusion on 2022. Overall she recovered well in the right side but occasionally experiences some spasms. She still has the known hyperextension of the thumb MP joint but for now wants to hold off on a fusion. She has more complaints related in general to the left thumb including the [facility] and MP joint as well as the ring and small finger DIP joints. Vitals: There were no vitals taken for this visit. There is no height or weight on file to calculate BMI.     Allergies   Allergen Reactions    Ciprofloxacin Shortness of Breath    Flagyl [Metronidazole] Shortness of Breath    Percocet [Oxycodone-Acetaminophen] Rash and Itching     Able to take if uses benadryl    Benzene Other (comments)     Blisters and burn area Benzene found to be on steri-strips    Betadine [Povidone-Iodine] Other (comments)     Blisters and burning Naproxen Itching    Codeine-Guaifenesin Unknown (comments)    Lorazepam Unknown (comments)    Sulfa (Sulfonamide Antibiotics) Rash    Adhesive Rash     Redness and rash       Current Outpatient Medications   Medication Sig    cyclobenzaprine (FLEXERIL) 10 mg tablet Take 1 Tablet by mouth three (3) times daily as needed for Muscle Spasm(s). Indications: muscle spasm    meloxicam (MOBIC) 7.5 mg tablet Take 1 Tablet by mouth two (2) times a day. benzonatate (TESSALON) 100 mg capsule Take 100 mg by mouth three (3) times daily as needed for Cough. RABEprazole (ACIPHEX) 20 mg TbEC Take 20 mg by mouth daily. calcium polycarbophiL (FIBERCON) 625 mg tablet Take 625 mg by mouth daily. diphenhydrAMINE (BENADRYL) 25 mg capsule Take 25 mg by mouth every six (6) hours as needed. pravastatin (PRAVACHOL) 40 mg tablet Take 40 mg by mouth nightly. carboxymethylcellulose sodium (THERATEARS OP) Apply  to eye as needed. telmisartan (MICARDIS) 40 mg tablet Take 40 mg by mouth daily. metoprolol tartrate (LOPRESSOR) 25 mg tablet TAKE ONE TABLET BY MOUTH TWICE DAILY    isosorbide mononitrate ER (IMDUR) 30 mg tablet TAKE ONE-HALF (1/2) TABLET DAILY FOR RAYNAUDS PHENOMENON (Patient taking differently: Take 15 mg by mouth nightly. TAKE ONE-HALF (1/2) TABLET DAILY FOR RAYNAUDS PHENOMENON)    baclofen (LIORESAL) 10 mg tablet Take 20 mg by mouth two (2) times a day. multivitamin (ONE A DAY) tablet Take 1 Tab by mouth daily. aspirin delayed-release 81 mg tablet Take 81 mg by mouth nightly. acetaminophen (TYLENOL) 500 mg tablet Take 1,000 mg by mouth every six (6) hours as needed for Pain. allopurinol (ZYLOPRIM) 100 mg tablet Take 100 mg by mouth daily. cetirizine (ZYRTEC) 10 mg tablet Take 10 mg by mouth daily. montelukast (SINGULAIR) 10 mg tablet Take 10 mg by mouth nightly. No current facility-administered medications for this visit.        Past Medical History:   Diagnosis Date    Adverse effect of anesthesia     during hysterectomy- woke up \"too early\"    Arrhythmia     h/o palpitations normal work up 22/2015 heart: Ronni    Arthritis     Awareness under anesthesia     with hysterectomy    Chronic pain     bulging disc c4/c5 l4/l5 : as of 9/1018 no neck/head movement limitation says patient    Claustrophobia     Color blind     CREST (calcinosis, Raynaud's phenomenon, esophageal dysfunction, sclerodactyly, telangiectasia) ([address] 2018    [facility], Dr. Ryan Santos    GERD (gastroesophageal reflux disease)     Gout     Hyperlipidemia     Hypertension     Ill-defined condition     CREST    Leg swelling 2016    unknown etiology    Bartlett's neuralgia 2001    from neuroma middle toe, left foot    Nausea & vomiting     KRYSTINA on CPAP     CPAP- no using since Oct 2021     Pulmonary hypertension ([address] 08/2018    Heart: Dr. Fer Benitez    Seasonal asthma     allergy    Shortness of breath 2016    Pulmonary Dr. Joseluis Bolton    Unspecified adverse effect of anesthesia     wakes up for anesthesia early        Past Surgical History:   Procedure Laterality Date    COLONOSCOPY  04/20/2011    negative    COLONOSCOPY N/A 9/28/2018    COLONOSCOPY performed by Asher Bailey MD at [address]    [address] CATARACT REMOVAL Bilateral 2018    HX CERVICAL FUSION  2014    c4-5     HX CHOLECYSTECTOMY      HX GI  11/19/13    Rectocele repair with enterocele repair    HX HYSTERECTOMY      TOOK LEFT OVARY    HX KNEE ARTHROSCOPY Right 1990's    right knee partial meniscus     HX KNEE REPLACEMENT Right 2016    HX KNEE REPLACEMENT Left 2010, 2016    TKR    HX ORTHOPAEDIC  1973    ganglion cyst removed rt wrist    HX ORTHOPAEDIC  2013, 1992    bilateral CTR, and had ulna nerve release    HX ORTHOPAEDIC Right 2018    thumb and ring finger trigger release    HX ORTHOPAEDIC  2019    cervical fusion x2     HX ORTHOPAEDIC  2020    L4-L5    HX OTHER SURGICAL  2013    recto prolapse    HX OTHER SURGICAL      Interstim System ( bladder pacemaker)    IR INJ SPINE THER SUBST LUM/SAC W IMG  12/19/2019    CO UNLISTED PROCEDURE CARDIAC SURGERY  2015    no blockages, card cath    CO UNLISTED PROCEDURE CARDIAC SURGERY  05/2018    no blockages x 2 procedures       Family History   Problem Relation Age of Onset    Heart Disease Mother     Hypertension Mother     Diabetes Mother     Cancer Mother         BREAST, LUNG    Breast Cancer Mother 61    Heart Disease Father     Hypertension Father     Thyroid Disease Father     Diabetes Brother     Psychiatric Disorder Son         Eliseo Edward, SCHIZO, MANIC DEPRESSIVE    Anesth Problems Neg Hx         Social History     Tobacco Use    Smoking status: Never    Smokeless tobacco: Never   Vaping Use    Vaping Use: Never used   Substance Use Topics    Alcohol use: Never    Drug use: No        Review of Systems   All other systems reviewed and are negative. Physical Exam    Overall the patient's wounds are healed. No redness drainage or sign infection. Good alignment of index and middle DIP. Good early thumb motion. She does have some hyperextension of thumb MP joint of approximately 55 to 60 degrees. The left thumb has much more hyperextension of the metacarpal phalangeal joint nearly approaching 80 degrees. She also complains of osteoarthritis involving the DIP joints of the left ring and small finger. Imaging:    XR Results (most recent):  Results from Appointment encounter on 01/11/23    XR HAND BILAT AP LAT OBL (MIN 3 V)    Narrative  AP, lateral oblique x-ray of both hands were obtained. The right side has had a significant distal ulna resection with pencil shape of the distal aspect of the ulna. The thumb has some proximal migration but still a patent joint space post trapeziectomy with buttons relatively unchanged in position.   She has fusions of the DIP joints of both index and middle fingers bilaterally but still has more advanced arthritis of the DIP joints of the left more than right ring and small fingers. There is osteoarthritis of the left thumb STT more than basal joint. No fracture. ASSESSMENT/PLAN:  Below is the assessment and plan developed based on review of pertinent history, physical exam, labs, studies, and medications. Patient's examination was consistent with right index and middle DIP joint osteoarthritis with de Quervain's tenosynovitis and somewhat recurrent thumb CMC arthritis post prior basal joint arthroplasty with proximal migration of the thumb metacarpal.  I had a long discussion with the patient regarding options for treatment. She remains pleased with the outcome of more recent left index and middle DIP fusions. She would like to have that performed on the right side in conjunction with a revision right thumb CMC arthroplasty to include de Quervain's release, APL interpositional tendon transfer and suspension plasty. She did undergo a revision right thumb CMC arthroplasty with de Quervain's release, APL interpositional transfer as well as right index and middle finger DIP joint fusion on 4/13/2022. I had previously recommended a right thumb spica splint for comfort and support. She does have some pre-existing hyperextension of the right thumb MP joint in the 55-60 degree range in the left is closer to 75 to 80 degrees. She is now more agreement that she needs to consider the previously discussed MP fusion of the right thumb. This should hopefully better align the [facility] joint in general and provide good stability with  and pinch. I again reviewed risks that include but not limited to stiffness, pain, nerve or tendon damage, nonunion, malunion the possible need for delayed hardware removal.  Arrangements. Need for this to be performed in outpatient basis soon as possible. She is considering left thumb CMC arthroplasty which would require an MP fusion as well of small and ring DIP joint fusions.   She has already had left index and middle DIP fusion surgery. I reviewed treatment options and answered her questions. She is giving strong consideration now to undergo the left side procedure. For now she wants to hold off on the right thumb fusion but if the left thumb fusion successful she likely would undergo the right thumb arthrodesis at the MP joint at a later date. I reviewed risks that include but not limited to stiffness, pain, nerve or tendon damage and overall incomplete relief of pain. Arrangements can be made for her to undergo a left thumb CMC arthroplasty with trapezium and partial proximal trapezoid resection including FCR tendon transfer, MP fusion, ring and small finger DIP fusion on an outpatient basis at her convenience. 1. Primary osteoarthritis of first carpometacarpal joint of right hand  2. Primary osteoarthritis of both hands  -     XR HAND BILAT AP LAT OBL (MIN 3 V); Future  3. De Quervain's tenosynovitis, right  4. Right hand pain      Return for Follow-up 7 to 10 days after surgery. .    An electronic signature was used to authenticate this note.   -- Anna Marie Barber MD

## 2023-01-11 NOTE — LETTER
1/11/2023    Patient: Yamileth Webster   YOB: 1950   Date of Visit: 1/11/2023     Richi Ariza MD  [address]  [place] [address]  Via Fax: [phone]    Dear Richi Ariza MD,      Thank you for referring Ms. Flip Fry to [facility] for evaluation. My notes for this consultation are attached. If you have questions, please do not hesitate to call me. I look forward to following your patient along with you.       Sincerely,    Joe Sullivan MD

## 2023-01-17 DIAGNOSIS — M19.042 PRIMARY OSTEOARTHRITIS OF BOTH HANDS: Primary | ICD-10-CM

## 2023-01-17 DIAGNOSIS — M19.041 PRIMARY OSTEOARTHRITIS OF BOTH HANDS: Primary | ICD-10-CM

## 2023-01-17 DIAGNOSIS — M79.642 LEFT HAND PAIN: ICD-10-CM

## 2023-01-24 ENCOUNTER — TELEPHONE (OUTPATIENT)
Dept: ORTHOPEDIC SURGERY | Age: 73
End: 2023-01-24

## 2023-01-24 NOTE — TELEPHONE ENCOUNTER
[facility]          Outpatient Follow-Up note         8/12/2021 10:53 AM    Patient Name:   Estuardo Sutton  YOB: 1950  Primary Care Physician:  Gilma Hatfield [facility] 5/5/2013    Hyperglycemia 2/21/2013    Hypertension     Psychiatric problem     Pure hypercholesterolemia 4/21/5831    Systolic CHF, acute on chronic ([facility] Rachelle Guerra MD at [address] [facility]          [id number], hep C, negative RAJIV 

## 2023-01-25 ENCOUNTER — TELEPHONE (OUTPATIENT)
Dept: ORTHOPEDIC SURGERY | Age: 73
End: 2023-01-25

## 2023-01-25 DIAGNOSIS — Z98.1 S/P SPINAL FUSION: Primary | ICD-10-CM

## 2023-01-25 RX ORDER — DIAZEPAM 2 MG/1
5 TABLET ORAL
Qty: 2 TABLET | Refills: 0 | Status: SHIPPED | OUTPATIENT
Start: 2023-01-25

## 2023-01-26 DIAGNOSIS — Z98.1 S/P SPINAL FUSION: ICD-10-CM

## 2023-01-26 RX ORDER — DIAZEPAM 2 MG/1
TABLET ORAL
Qty: 5 TABLET | Refills: 0 | Status: SHIPPED | OUTPATIENT
Start: 2023-01-26

## 2023-02-06 ENCOUNTER — TELEPHONE (OUTPATIENT)
Dept: ORTHOPEDIC SURGERY | Age: 73
End: 2023-02-06

## 2023-02-06 DIAGNOSIS — M18.11 PRIMARY OSTEOARTHRITIS OF FIRST CARPOMETACARPAL JOINT OF RIGHT HAND: Primary | ICD-10-CM

## 2023-02-06 RX ORDER — HYDROCODONE BITARTRATE AND ACETAMINOPHEN 5; 325 MG/1; MG/1
1 TABLET ORAL
Qty: 15 TABLET | Refills: 0 | Status: SHIPPED | OUTPATIENT
Start: 2023-02-06 | End: 2023-02-13

## 2023-02-13 ENCOUNTER — HOSPITAL ENCOUNTER (OUTPATIENT)
Dept: MRI IMAGING | Age: 73
Discharge: HOME | End: 2023-02-13
Attending: ORTHOPAEDIC SURGERY
Payer: MEDICARE

## 2023-02-13 DIAGNOSIS — Z98.1 S/P SPINAL FUSION: ICD-10-CM

## 2023-02-13 DIAGNOSIS — M48.062 SPINAL STENOSIS OF LUMBAR REGION WITH NEUROGENIC CLAUDICATION: ICD-10-CM

## 2023-02-13 PROCEDURE — 72148 MRI LUMBAR SPINE W/O DYE: CPT

## 2023-02-15 ENCOUNTER — OFFICE VISIT (OUTPATIENT)
Dept: ORTHOPEDIC SURGERY | Age: 73
End: 2023-02-15
Payer: MEDICARE

## 2023-02-15 DIAGNOSIS — M18.11 PRIMARY OSTEOARTHRITIS OF FIRST CARPOMETACARPAL JOINT OF RIGHT HAND: ICD-10-CM

## 2023-02-15 DIAGNOSIS — M19.042 PRIMARY OSTEOARTHRITIS OF BOTH HANDS: Primary | ICD-10-CM

## 2023-02-15 DIAGNOSIS — M19.041 PRIMARY OSTEOARTHRITIS OF BOTH HANDS: Primary | ICD-10-CM

## 2023-02-15 DIAGNOSIS — M65.4 DE QUERVAIN'S TENOSYNOVITIS, RIGHT: ICD-10-CM

## 2023-02-15 DIAGNOSIS — Z98.1 STATUS POST FINGER JOINT FUSION: ICD-10-CM

## 2023-02-15 NOTE — PROGRESS NOTES
HPI: Kirsten Martinez (: 1950) is a 68 y.o. female, patient, here for evaluation of the following chief complaint(s):    No chief complaint on file. Patient is seen today to evaluate her right hand. She remains pleased with the outcome of her prior left index and middle finger fusion in . She has advanced osteoarthritis in both hands and is now considering a right index and middle DIP fusion. She had a prior right distal ulna resection approximate 20 years ago by another physician and then later had a right thumb CMC arthroplasty. The thumb has proximal migration and she does have pain in that region but also demonstrates some findings consistent with de Quervain's tenosynovitis. She had a prior FCR tendon transfer. She did undergo a revision right thumb CMC arthroplasty with de Quervain's release, APL interpositional transfer as well as right index and middle finger DIP joint fusion on 2022. Overall she recovered well in the right side but occasionally experiences some spasms. She still has the known hyperextension of the thumb MP joint but for now wants to hold off on a fusion. She has more complaints related in general to the left thumb including the [facility] and MP joint as well as the ring and small finger DIP joints. She underwent a left thumb carpometacarpal joint arthroplasty with trapezium and partial proximal trapezoid resection, FCR interpositional tendon transfer, left thumb metacarpal phalangeal joint arthrodesis with trapezial autograft as well as ring and small finger DIP joint fusions also with trapezial autograft on 2023. Vitals: There were no vitals taken for this visit. There is no height or weight on file to calculate BMI.     Allergies   Allergen Reactions    Ciprofloxacin Shortness of Breath    Flagyl [Metronidazole] Shortness of Breath    Percocet [Oxycodone-Acetaminophen] Rash and Itching     Able to take if uses benadryl    Benzene Other (comments) Blisters and burn area Benzene found to be on steri-strips    Betadine [Povidone-Iodine] Other (comments)     Blisters and burning    Naproxen Itching    Codeine-Guaifenesin Unknown (comments)    Lorazepam Unknown (comments)    Sulfa (Sulfonamide Antibiotics) Rash    Adhesive Rash     Redness and rash       Current Outpatient Medications   Medication Sig    diazePAM (VALIUM) 2 mg tablet Take one tablet one hour prior to MRI and one tablet at time of MRI    diazePAM (VALIUM) 2 mg tablet Take 2.5 Tablets by mouth every six (6) hours as needed for Anxiety. Max Daily Amount: 20 mg. Take one tablet one hour prior to MRI  and one tablet at time of MRI    cyclobenzaprine (FLEXERIL) 10 mg tablet Take 1 Tablet by mouth three (3) times daily as needed for Muscle Spasm(s). Indications: muscle spasm    meloxicam (MOBIC) 7.5 mg tablet Take 1 Tablet by mouth two (2) times a day. benzonatate (TESSALON) 100 mg capsule Take 100 mg by mouth three (3) times daily as needed for Cough. RABEprazole (ACIPHEX) 20 mg TbEC Take 20 mg by mouth daily. calcium polycarbophiL (FIBERCON) 625 mg tablet Take 625 mg by mouth daily. diphenhydrAMINE (BENADRYL) 25 mg capsule Take 25 mg by mouth every six (6) hours as needed. pravastatin (PRAVACHOL) 40 mg tablet Take 40 mg by mouth nightly. carboxymethylcellulose sodium (THERATEARS OP) Apply  to eye as needed. telmisartan (MICARDIS) 40 mg tablet Take 40 mg by mouth daily. metoprolol tartrate (LOPRESSOR) 25 mg tablet TAKE ONE TABLET BY MOUTH TWICE DAILY    isosorbide mononitrate ER (IMDUR) 30 mg tablet TAKE ONE-HALF (1/2) TABLET DAILY FOR RAYNAUDS PHENOMENON (Patient taking differently: Take 15 mg by mouth nightly. TAKE ONE-HALF (1/2) TABLET DAILY FOR RAYNAUDS PHENOMENON)    baclofen (LIORESAL) 10 mg tablet Take 20 mg by mouth two (2) times a day. multivitamin (ONE A DAY) tablet Take 1 Tab by mouth daily. aspirin delayed-release 81 mg tablet Take 81 mg by mouth nightly. acetaminophen (TYLENOL) 500 mg tablet Take 1,000 mg by mouth every six (6) hours as needed for Pain. allopurinol (ZYLOPRIM) 100 mg tablet Take 100 mg by mouth daily. cetirizine (ZYRTEC) 10 mg tablet Take 10 mg by mouth daily. montelukast (SINGULAIR) 10 mg tablet Take 10 mg by mouth nightly. No current facility-administered medications for this visit.        Past Medical History:   Diagnosis Date    Adverse effect of anesthesia     during hysterectomy- woke up \"too early\"    Arrhythmia     h/o palpitations normal work up 22/2015 heart: [facility]    Arthritis     Awareness under anesthesia     with hysterectomy    Chronic pain     bulging disc c4/c5 l4/l5 : as of 9/1018 no neck/head movement limitation says patient    Claustrophobia     Color blind     CREST (calcinosis, Raynaud's phenomenon, esophageal dysfunction, sclerodactyly, telangiectasia) ([address] 2018    [facility], Dr. Isabel Wright    GERD (gastroesophageal reflux disease)     Gout     Hyperlipidemia     Hypertension     Ill-defined condition     CREST    Leg swelling 2016    unknown etiology    Bartlett's neuralgia 2001    from neuroma middle toe, left foot    Nausea & vomiting     KRYSTINA on CPAP     CPAP- no using since Oct 2021     Pulmonary hypertension ([address] 08/2018    Heart: Dr. So Credigna    Seasonal asthma     allergy    Shortness of breath 2016    Pulmonary Dr. Haleigh Davis    Unspecified adverse effect of anesthesia     wakes up for anesthesia early        Past Surgical History:   Procedure Laterality Date    COLONOSCOPY  04/20/2011    negative    COLONOSCOPY N/A 9/28/2018    COLONOSCOPY performed by Leonie Whiting MD at [address]    [facility] Bilateral 2018    HX CERVICAL FUSION  2014    c4-5     HX CHOLECYSTECTOMY      HX GI  11/19/13    Rectocele repair with enterocele repair    HX HYSTERECTOMY      TOOK LEFT OVARY    HX KNEE ARTHROSCOPY Right 1990's    right knee partial meniscus     HX KNEE REPLACEMENT Right 2016    HX KNEE REPLACEMENT Left 2010, 2016    TKR    HX ORTHOPAEDIC  1973    ganglion cyst removed rt wrist    HX ORTHOPAEDIC  2013, 1992    bilateral CTR, and had ulna nerve release    HX ORTHOPAEDIC Right 2018    thumb and ring finger trigger release    HX ORTHOPAEDIC  2019    cervical fusion x2     HX ORTHOPAEDIC  2020    L4-L5    HX OTHER SURGICAL  2013    recto prolapse    HX OTHER SURGICAL      Interstim System ( bladder pacemaker)    IR INJ SPINE THER SUBST LUM/SAC W IMG  12/19/2019    [place] UNLISTED PROCEDURE CARDIAC SURGERY  2015    no blockages, card cath    NC UNLISTED PROCEDURE CARDIAC SURGERY  05/2018    no blockages x 2 procedures       Family History   Problem Relation Age of Onset    Heart Disease Mother     Hypertension Mother     Diabetes Mother     Cancer Mother         BREAST, LUNG    Breast Cancer Mother 61    Heart Disease Father     Hypertension Father     Thyroid Disease Father     Diabetes Brother     Psychiatric Disorder Son         BIPOLAR, SCHIZO, MANIC DEPRESSIVE    Anesth Problems Neg Hx         Social History     Tobacco Use    Smoking status: Never    Smokeless tobacco: Never   Vaping Use    Vaping Use: Never used   Substance Use Topics    Alcohol use: Never    Drug use: No        Review of Systems   All other systems reviewed and are negative. Physical Exam    Overall the patient's right hand scar are healed. No redness drainage or sign infection. Good alignment of index and middle DIP. Good early thumb motion. She does have some hyperextension of thumb MP joint of approximately 55 to 60 degrees. The left thumb has much more hyperextension of the metacarpal phalangeal joint nearly approaching 80 degrees. She also complains of osteoarthritis involving the DIP joints of the left ring and small finger.   Operatively, the patient's left thumb has marked improved appearance and the wounds are healing well at the fusion sites including the ring and small finger DIP joints. Imaging:    XR Results (most recent):  Results from Appointment encounter on 02/15/23    XR HAND LT MIN 3 V    Narrative  AP, lateral and oblique x-ray of the left hand shows separate retrograde fusion screws stabilizing the healing fusions to the left ring and small finger DIP joint and the previously healed fusions of the DIP to the middle and index fingers. She is status post thumb CMC arthroplasty with FiberWire mini tight rope technique with buttons unchanged. Also status post thumb MP arthrodesis and neutral to minimal flexion with dorsal plate and screw fixation. Overall good position alignment. Some previously noted minimal widening of the scapholunate interval.        ASSESSMENT/PLAN:  Below is the assessment and plan developed based on review of pertinent history, physical exam, labs, studies, and medications. Patient's examination was consistent with right index and middle DIP joint osteoarthritis with de Quervain's tenosynovitis and somewhat recurrent thumb CMC arthritis post prior basal joint arthroplasty with proximal migration of the thumb metacarpal.  I had a long discussion with the patient regarding options for treatment. She remains pleased with the outcome of more recent left index and middle DIP fusions. She would like to have that performed on the right side in conjunction with a revision right thumb CMC arthroplasty to include de Quervain's release, APL interpositional tendon transfer and suspension plasty. She did undergo a revision right thumb CMC arthroplasty with de Quervain's release, APL interpositional transfer as well as right index and middle finger DIP joint fusion on 4/13/2022. I had previously recommended a right thumb spica splint for comfort and support. She does have some pre-existing hyperextension of the right thumb MP joint in the 55-60 degree range in the left is closer to 75 to 80 degrees.   She is now more agreement that she needs to consider the previously discussed MP fusion of the right thumb. This should hopefully better align the [facility] joint in general and provide good stability with  and pinch. I again reviewed risks that include but not limited to stiffness, pain, nerve or tendon damage, nonunion, malunion the possible need for delayed hardware removal.  Arrangements. Need for this to be performed in outpatient basis soon as possible. She is considering left thumb CMC arthroplasty which would require an MP fusion as well of small and ring DIP joint fusions. She has already had left index and middle DIP fusion surgery. I reviewed treatment options and answered her questions. She is giving strong consideration now to undergo the left side procedure. For now she wants to hold off on the right thumb fusion but if the left thumb fusion successful she likely would undergo the right thumb arthrodesis at the MP joint at a later date. She underwent a left thumb CMC arthroplasty with trapezium and partial proximal trapezoid resection including FCR tendon transfer, MP fusion, ring and small finger DIP fusion on 2/7/2023. The 3 fusions performed also received trapezial autograft. I recommended thumb spica splint for comfort and support with gradual overall hand motion and strengthening return in 3 to 4 weeks. 1. Primary osteoarthritis of both hands  -     XR HAND LT MIN 3 V; Future  2. Primary osteoarthritis of first carpometacarpal joint of right hand  -     [place] [facility] W/O JOINTS PRE OTS  -     REFERRAL TO DME  3. De Quervain's tenosynovitis, right  4. Status post finger joint fusion      Return in about 3 weeks (around 3/8/2023). An electronic signature was used to authenticate this note.   -- Patricia Montesinos MD

## 2023-02-15 NOTE — LETTER
2/15/2023    Patient: Vicky Ortega   YOB: 1950   Date of Visit: 2/15/2023     Surya Cope MD  [address]  [address] [address]  Via Fax: [phone]    Dear Surya Cope MD,      Thank you for referring Ms. Leonie Phillips to [facility] for evaluation. My notes for this consultation are attached. If you have questions, please do not hesitate to call me. I look forward to following your patient along with you.       Sincerely,    Rodolfo Ritchie MD

## 2023-02-15 NOTE — PATIENT INSTRUCTIONS
Thumb Arthritis: Exercises  Introduction  Here are some examples of exercises for you to try. The exercises may be suggested for a condition or for rehabilitation. Start each exercise slowly. Ease off the exercises if you start to have pain. You will be told when to start these exercises and which ones will work best for you. How to do the exercises  Thumb IP flexion  Place your forearm and hand on a table with your affected thumb pointing up. With your other hand, hold your thumb steady just below the joint nearest your thumbnail. Bend the tip of your thumb downward, then straighten it. Repeat 8 to 12 times. Switch hands and repeat steps 1 through 4, even if only one thumb is sore. Thumb MP flexion  Place your forearm and hand on a table with your affected thumb pointing up. With your other hand, hold the base of your thumb and palm steady. Bend your thumb downward where it meets your palm, then straighten it. Repeat 8 to 12 times. Switch hands and repeat steps 1 through 4, even if only one thumb is sore. Thumb opposition  With your affected hand, point your fingers and thumb straight up. Your wrist should be relaxed, following the line of your fingers and thumb. Touch your affected thumb to each finger, one finger at a time. This will look like an \"okay\" sign, but try to keep your other fingers straight and pointing upward as much as you can. Repeat 8 to 12 times. Switch hands and repeat steps 1 through 3, even if only one thumb is sore. Follow-up care is a key part of your treatment and safety. Be sure to make and go to all appointments, and call your doctor if you are having problems. It's also a good idea to know your test results and keep a list of the medicines you take. Current as of: March 9, 2022               Content Version: 13.4  © 4970-0245 [employer].    Care instructions adapted under license by [employer] (which disclaims liability or warranty for this information). If you have questions about a medical condition or this instruction, always ask your healthcare professional. [place] [address] any warranty or liability for your use of this information.

## 2023-02-28 ENCOUNTER — OFFICE VISIT (OUTPATIENT)
Dept: ORTHOPEDIC SURGERY | Age: 73
End: 2023-02-28

## 2023-02-28 VITALS — WEIGHT: 210 LBS | BODY MASS INDEX: 33.75 KG/M2 | HEIGHT: 66 IN

## 2023-02-28 DIAGNOSIS — Z98.1 S/P SPINAL FUSION: Primary | ICD-10-CM

## 2023-02-28 DIAGNOSIS — M48.062 SPINAL STENOSIS OF LUMBAR REGION WITH NEUROGENIC CLAUDICATION: ICD-10-CM

## 2023-02-28 NOTE — PROGRESS NOTES
Keesha Mcdowell (: 1950) is a 68 y.o. female, patient, here for evaluation of the following chief complaint(s):  LOW BACK PAIN       ASSESSMENT/PLAN:    Below is the assessment and plan developed based on review of pertinent history, physical exam, labs, studies, and medications. At this point she has had some recurrent severe lower back pain and right leg radicular symptoms since bending and lifting approximately 2 months ago. It has not improved. She is getting a lot of burning in the right lower extremity despite medications. The MRI was reviewed. She does have some fairly significant foraminal narrowing on the right at L4-5. I am recommending right L3-4 L4-5 CAROLEE's. She will see us back after they are completed. 1. S/P spinal fusion  2. Spinal stenosis of lumbar region with neurogenic claudication      No follow-ups on file. SUBJECTIVE/OBJECTIVE:  Keesha Mcdowell (: 1950) is a 68 y.o. female. Pain Assessment  2023   Location of Pain Back   Location Modifiers Posterior   Severity of Pain 5   Aggravating Factors -   Limiting Behavior -        She comes in today for an evaluation. She is about 2 years out from a lumbar fusion at L5-S1. She was doing rather well until November of last year. She was bending over and lifting something heavier than she thought and she felt a pop. Since then she has had severe back pain and right leg symptoms. She denies any bowel bladder difficulties. Pain is worse with activities. She has her MRI for follow-up    Imaging:    XR Results (most recent):  Results from Appointment encounter on 02/15/23    XR HAND LT MIN 3 V    Narrative  AP, lateral and oblique x-ray of the left hand shows separate retrograde fusion screws stabilizing the healing fusions to the left ring and small finger DIP joint and the previously healed fusions of the DIP to the middle and index fingers.   She is status post thumb CMC arthroplasty with FiberWire mini tight rope technique with buttons unchanged. Also status post thumb MP arthrodesis and neutral to minimal flexion with dorsal plate and screw fixation. Overall good position alignment. Some previously noted minimal widening of the scapholunate interval.       MRI Results (most recent):  Results from Hospital Encounter encounter on 02/13/23    MRI LUMB SPINE WO CONT    Narrative  EXAM: MRI LUMB SPINE WO CONT    INDICATION:  Arthrodesis status / Please call and schedule  w/ sedation @[facility]. COMPARISON: Lumbosacral spine radiographs 1/10/2023, CT abdomen and pelvis  4/14/2021, MRI lumbar spine 12/5/2019, CT lumbar spine 4/17/2019    TECHNIQUE: MR imaging of the lumbar spine was performed using the following  sequences: sagittal T1, T2, STIR;  axial T1, T2.    CONTRAST:  None. FINDINGS:  L5-S1 posterior spinal instrumentation, interbody graft, and laminectomies. 4.2  cm x 0.7 cm x 0.7 cm tubular fluid signal focus within the L3-L4 interspinous  space and superficial to the L3 spinous process (4-11, 8-25). Stepwise retrolisthesis from L1-L3. Mild lumbar dextroscoliosis. Vertebral body  heights are maintained. Multilevel degenerative endplate osteophytes. Degenerative endplate marrow edema signal at several levels, most pronounced at  L2-L3 anteriorly. Scattered Schmorl's nodes. The conus medullaris terminates at L1-L2. Signal of the distal spinal cord are  within normal limits. T2 hyperintense right lower pole renal lesion may reflect a cyst. Sacral nerve  stimulator. T10-T11: Disc bulge. Facet arthropathy. Epidural lipomatosis. Mild spinal  stenosis. Mild right foraminal stenosis. T11-T12: Disc bulge with broad-based right subarticular disc extrusion. Endplate  osteophytes. Facet arthropathy. Epidural lipomatosis. Chronic Moderate spinal  stenosis. Chronic Mild cord compression. Mild right foraminal stenosis. T12-L1: Disc bulge. Moderate facet arthropathy.  No stenosis. L1-L2: Retrolisthesis. Disc bulge. Endplate osteophytes. Moderate facet  arthropathy with effusions. No stenosis. L2-L3: Retrolisthesis. Disc bulge. Endplate osteophytes. Moderate facet  arthropathy with right-sided effusion. No spinal stenosis. Moderate left and  mild right foraminal stenosis. L3-L4: Disc bulge. Marked facet arthropathy. Ligament of flavum thickening. Epidural lipomatosis. No spinal stenosis. Mild bilateral foraminal stenosis. L4-L5: Disc bulge. Endplate osteophytes. Marked facet arthropathy with  effusions. Ligament of flavum thickening. Dorsal epidural scarring. No spinal  stenosis. Moderate right and mild left foraminal stenosis. L5-S1: Disc bulge. Endplate osteophytes. Marked facet arthropathy. Dorsal  epidural scarring. No spinal stenosis. Moderate right and mild left foraminal  stenosis. Impression  1. L5-S1 posterior spinal instrumentation, interbody graft, and laminectomies. Tubular fluid signal focus within the L3-L4 interspinous space and superficial  to the L3 spinous process, may be postsurgical.  2.  Multilevel degenerative changes, disc disease, and listhesis with mild  dextroscoliosis. No significant lumbar spinal canal stenosis. Multilevel  mild-to-moderate neural foraminal stenosis as outlined above. 3.  Incidental chronic T11-T12 moderate spinal canal stenosis and mild cord  compression.          Allergies   Allergen Reactions    Ciprofloxacin Shortness of Breath    Flagyl [Metronidazole] Shortness of Breath    Percocet [Oxycodone-Acetaminophen] Rash and Itching     Able to take if uses benadryl    Benzene Other (comments)     Blisters and burn area Benzene found to be on steri-strips    Betadine [Povidone-Iodine] Other (comments)     Blisters and burning    Naproxen Itching    Codeine-Guaifenesin Unknown (comments)    Lorazepam Unknown (comments)    Sulfa (Sulfonamide Antibiotics) Rash    Adhesive Rash     Redness and rash       Current Outpatient Medications   Medication Sig    benzonatate (TESSALON) 100 mg capsule Take 100 mg by mouth three (3) times daily as needed for Cough. RABEprazole (ACIPHEX) 20 mg TbEC Take 20 mg by mouth daily. calcium polycarbophiL (FIBERCON) 625 mg tablet Take 625 mg by mouth daily. diphenhydrAMINE (BENADRYL) 25 mg capsule Take 25 mg by mouth every six (6) hours as needed. pravastatin (PRAVACHOL) 40 mg tablet Take 40 mg by mouth nightly. carboxymethylcellulose sodium (THERATEARS OP) Apply  to eye as needed. telmisartan (MICARDIS) 40 mg tablet Take 40 mg by mouth daily. metoprolol tartrate (LOPRESSOR) 25 mg tablet TAKE ONE TABLET BY MOUTH TWICE DAILY    isosorbide mononitrate ER (IMDUR) 30 mg tablet TAKE ONE-HALF (1/2) TABLET DAILY FOR RAYNAUDS PHENOMENON (Patient taking differently: Take 15 mg by mouth nightly. TAKE ONE-HALF (1/2) TABLET DAILY FOR RAYNAUDS PHENOMENON)    baclofen (LIORESAL) 10 mg tablet Take 20 mg by mouth two (2) times a day. multivitamin (ONE A DAY) tablet Take 1 Tab by mouth daily. aspirin delayed-release 81 mg tablet Take 81 mg by mouth nightly. acetaminophen (TYLENOL) 500 mg tablet Take 1,000 mg by mouth every six (6) hours as needed for Pain. allopurinol (ZYLOPRIM) 100 mg tablet Take 100 mg by mouth daily. cetirizine (ZYRTEC) 10 mg tablet Take 10 mg by mouth daily. montelukast (SINGULAIR) 10 mg tablet Take 10 mg by mouth nightly. diazePAM (VALIUM) 2 mg tablet Take 2.5 Tablets by mouth every six (6) hours as needed for Anxiety. Max Daily Amount: 20 mg. Take one tablet one hour prior to MRI  and one tablet at time of MRI    meloxicam (MOBIC) 7.5 mg tablet Take 1 Tablet by mouth two (2) times a day. No current facility-administered medications for this visit.        Past Medical History:   Diagnosis Date    Adverse effect of anesthesia     during hysterectomy- woke up \"too early\"    Arrhythmia     h/o palpitations normal work up 22/2015 heart: Ronni    Arthritis Awareness under anesthesia     with hysterectomy    Chronic pain     bulging disc c4/c5 l4/l5 : as of 9/1018 no neck/head movement limitation says patient    Claustrophobia     Color blind     CREST (calcinosis, Raynaud's phenomenon, esophageal dysfunction, sclerodactyly, telangiectasia) ([facility] [address] 2018    [address], Dr. Avalos Phlegm    GERD (gastroesophageal reflux disease)     Gout     Hyperlipidemia     Hypertension     Ill-defined condition     CREST    Leg swelling 2016    unknown etiology    Bartlett's neuralgia 2001    from neuroma middle toe, left foot    Nausea & vomiting     KRYSTINA on CPAP     CPAP- no using since Oct 2021     Pulmonary hypertension ([facility] [address] 08/2018    Heart: Dr. Daniel Arcos    Seasonal asthma     allergy    Shortness of breath 2016    Pulmonary Dr. Bojorquez Payment    Unspecified adverse effect of anesthesia     wakes up for anesthesia early        Past Surgical History:   Procedure Laterality Date    COLONOSCOPY  04/20/2011    negative    COLONOSCOPY N/A 9/28/2018    COLONOSCOPY performed by Shayy Posey MD at [address]    [address] CATARACT REMOVAL Bilateral 2018    HX CERVICAL FUSION  2014    c4-5     HX CHOLECYSTECTOMY      HX GI  11/19/13    Rectocele repair with enterocele repair    HX HYSTERECTOMY      TOOK LEFT OVARY    HX KNEE ARTHROSCOPY Right 1990's    right knee partial meniscus     HX KNEE REPLACEMENT Right 2016    HX KNEE REPLACEMENT Left 2010, 2016    TKR    HX ORTHOPAEDIC  1973    ganglion cyst removed rt wrist    HX ORTHOPAEDIC  2013, 1992    bilateral CTR, and had ulna nerve release    HX ORTHOPAEDIC Right 2018    thumb and ring finger trigger release    HX ORTHOPAEDIC  2019    cervical fusion x2     HX ORTHOPAEDIC  2020    L4-L5    HX OTHER SURGICAL  2013    recto prolapse    HX OTHER SURGICAL      Interstim System ( bladder pacemaker)    IR INJ SPINE THER SUBST LUM/SAC W IMG  12/19/2019    PA UNLISTED PROCEDURE CARDIAC SURGERY  2015    no blockages, card cath ME UNLISTED PROCEDURE CARDIAC SURGERY  05/2018    no blockages x 2 procedures       Family History   Problem Relation Age of Onset    Heart Disease Mother     Hypertension Mother     Diabetes Mother     Cancer Mother         BREAST, LUNG    Breast Cancer Mother 61    Heart Disease Father     Hypertension Father     Thyroid Disease Father     Diabetes Brother     Psychiatric Disorder Son         Ratna Hence, SCHIZO, MANIC DEPRESSIVE    Anesth Problems Neg Hx         Social History     Tobacco Use    Smoking status: Never    Smokeless tobacco: Never   Vaping Use    Vaping Use: Never used   Substance Use Topics    Alcohol use: Never    Drug use: No        Review of Systems   All other systems reviewed and are negative. Vitals:  Ht 5' 6\" (1.676 m)   Wt 210 lb (95.3 kg)   BMI 33.89 kg/m²    Body mass index is 33.89 kg/m². Ortho Exam       Integumentary  Assessment of Surgical Incision - healing and consistent with normal anticipated wound healing. Neurologic  Sensory  Light Touch - Intact - Globally. Overall Assessment of Muscle Strength and Tone reveals  Lower Extremities - Right Iliopsoas - 5/5. Left Iliopsoas - 5/5. Right Tibialis Anterior - 4/5. Left Tibialis Anterior - 5/5. Right Gastroc-Soleus - 5/5. Left Gastroc-Soleus - 5/5. Right EHL - 4/5. Left EHL - 5/5. General Assessment of Reflexes  Right Ankle - Clonus is not present. Left Ankle - Clonus is not present. Reflexes (Dermatomes)  2/2 Normal - Left Achilles (L5-S2), Left Knee (L2-4), Right Achilles (L5-S2) and Right Knee (L2-4). Musculoskeletal  Global Assessment  Examination of related systems reveals - well-developed, well-nourished, in no acute distress, alert and oriented x 3 and normal coordination. Spine/Ribs/Pelvis  Lumbosacral Spine - Examination of the lumbosacral spine reveals - no known fractures or deformities. Inspection and Palpation - Tenderness - moderate.  Assessment of pain reveals the following findings - The pain is characterized as - moderate. Location - pain refers to lower back bilaterally. An electronic signature was used to authenticate this note.   -- Ofe Garcias MD

## 2023-02-28 NOTE — PATIENT INSTRUCTIONS
Spondylolysis and Spondylolisthesis: Exercises  Introduction  Here are some examples of exercises for you to try. The exercises may be suggested for a condition or for rehabilitation. Start each exercise slowly. Ease off the exercises if you start to have pain. You will be told when to start these exercises and which ones will work best for you. How to do the exercises  Single knee-to-chest  Lie on your back with your knees bent and your feet flat on the floor. You can put a small pillow under your head and neck if it is more comfortable. Bring one knee to your chest, keeping the other foot flat on the floor. Keep your lower back pressed to the floor. Hold for 15 to 30 seconds. Relax, and lower the knee to the starting position. Repeat with the other leg. Repeat 2 to 4 times with each leg. To get more stretch, put your other leg flat on the floor while pulling your knee to your chest.  Double knee-to-chest  Lie on your back with your knees bent and your feet flat on the floor. You can put a small pillow under your head and neck if it is more comfortable. Bring both knees to your chest.  Keep your lower back pressed to the floor. Hold for 15 to 30 seconds. Relax, and lower your knees to the starting position. Repeat 2 to 4 times. Alternate arm and leg (bird dog) exercise  Do this exercise slowly. Try to keep your body straight at all times. Start on the floor, on your hands and knees. Tighten your belly muscles by pulling your belly button in toward your spine. Be sure you continue to breathe normally and do not hold your breath. Raise one arm off the floor, and hold it straight out in front of you. Be careful not to let your shoulder drop down, because that will twist your trunk. Hold for about 6 seconds, then lower your arm and switch to your other arm. Repeat 8 to 12 times on each arm. When you can do this exercise with ease and no pain, repeat steps 1 through 5.  But this time do it with one leg raised off the floor, holding your leg straight out behind you. Be careful not to let your hip drop down, because that will twist your trunk. When holding your leg straight out becomes easier, try raising your opposite arm at the same time, and repeat steps 1 through 5. Bridging  Lie on your back with both knees bent. Your knees should be bent about 90 degrees. Then push your feet into the floor, squeeze your buttocks, and lift your hips off the floor until your shoulders, hips, and knees are all in a straight line. Hold for about 6 seconds as you continue to breathe normally, and then slowly lower your hips back down to the floor and rest for up to 10 seconds. Repeat 8 to 12 times. Curl-ups  Lie on the floor on your back with your knees bent at a 90-degree angle. Your feet should be flat on the floor, about 12 inches from your buttocks. Cross your arms over your chest. If this bothers your neck, try putting your hands behind your neck (not your head), with your elbows spread apart. Slowly tighten your belly muscles and raise your shoulder blades off the floor. Keep your head in line with your body, and do not press your chin to your chest.  Hold this position for 1 or 2 seconds, then slowly lower yourself back down to the floor. Repeat 8 to 12 times. Plank  Do this exercise slowly. Try to keep your body straight at all times, and do not let one hip drop lower than the other. Lie on your stomach, resting your upper body on your forearms. Tighten your belly muscles by pulling your belly button in toward your spine. Keeping your knees on the floor, press down with your forearms to lift your upper body off the floor. Hold for about 6 seconds, then lower your body to the floor. Rest for up to 10 seconds. Repeat 8 to 12 times. Over time, work up to holding for 15 to 30 seconds each time.   If this exercise is easy to do with your knees on the floor, try doing this exercise with your knees and legs straight, supported by your toes on the floor. Follow-up care is a key part of your treatment and safety. Be sure to make and go to all appointments, and call your doctor if you are having problems. It's also a good idea to know your test results and keep a list of the medicines you take. Current as of: March 9, 2022               Content Version: 13.4  © 2006-2022 [employer], [employer]. Care instructions adapted under license by [employer] (which disclaims liability or warranty for this information). If you have questions about a medical condition or this instruction, always ask your healthcare professional. [place] [address] any warranty or liability for your use of this information.

## 2023-02-28 NOTE — LETTER
2/28/2023    Patient: Jim Palacios   YOB: 1950   Date of Visit: 2/28/2023     Graciela Sauceda MD  [address]  [address] [address]  Via Fax: [phone]    Dear Graciela Sauceda MD,      Thank you for referring Ms. Zeenat Key to [facility] for evaluation. My notes for this consultation are attached. If you have questions, please do not hesitate to call me. I look forward to following your patient along with you.       Sincerely,    Jamal Riedel, MD

## 2023-03-07 NOTE — PROGRESS NOTES
HPI: Brice York (: 1950) is a 68 y.o. female, patient, here for evaluation of the following chief complaint(s):    Surgical Follow-up (Left thumb CMC joint arthroplasty with MP joint arthrodesis and trapezial autograft with ring and small finger DIP joint fusion with autograft on 23.)  Patient is seen today to evaluate her right hand. She remains pleased with the outcome of her prior left index and middle finger fusion in . She has advanced osteoarthritis in both hands and is now considering a right index and middle DIP fusion. She had a prior right distal ulna resection approximate 20 years ago by another physician and then later had a right thumb CMC arthroplasty. The thumb has proximal migration and she does have pain in that region but also demonstrates some findings consistent with de Quervain's tenosynovitis. She had a prior FCR tendon transfer. She did undergo a revision right thumb CMC arthroplasty with de Quervain's release, APL interpositional transfer as well as right index and middle finger DIP joint fusion on 2022. Overall she recovered well in the right side but occasionally experiences some spasms. She still has the known hyperextension of the thumb MP joint but for now wants to hold off on a fusion. She has more complaints related in general to the left thumb including the [facility] and MP joint as well as the ring and small finger DIP joints. She underwent a left thumb carpometacarpal joint arthroplasty with trapezium and partial proximal trapezoid resection, FCR interpositional tendon transfer, left thumb metacarpal phalangeal joint arthrodesis with trapezial autograft as well as ring and small finger DIP joint fusions also with trapezial autograft on 2023. Vitals: There were no vitals taken for this visit. There is no height or weight on file to calculate BMI.     Allergies   Allergen Reactions    Ciprofloxacin Shortness of Breath    Flagyl [Metronidazole] Shortness of Breath    Percocet [Oxycodone-Acetaminophen] Rash and Itching     Able to take if uses benadryl    Benzene Other (comments)     Blisters and burn area Benzene found to be on steri-strips    Betadine [Povidone-Iodine] Other (comments)     Blisters and burning    Naproxen Itching    Codeine-Guaifenesin Unknown (comments)    Lorazepam Unknown (comments)    Sulfa (Sulfonamide Antibiotics) Rash    Adhesive Rash     Redness and rash       Current Outpatient Medications   Medication Sig    benzonatate (TESSALON) 100 mg capsule Take 100 mg by mouth three (3) times daily as needed for Cough. RABEprazole (ACIPHEX) 20 mg TbEC Take 20 mg by mouth daily. calcium polycarbophiL (FIBERCON) 625 mg tablet Take 625 mg by mouth daily. diphenhydrAMINE (BENADRYL) 25 mg capsule Take 25 mg by mouth every six (6) hours as needed. pravastatin (PRAVACHOL) 40 mg tablet Take 40 mg by mouth nightly. carboxymethylcellulose sodium (THERATEARS OP) Apply  to eye as needed. telmisartan (MICARDIS) 40 mg tablet Take 40 mg by mouth daily. metoprolol tartrate (LOPRESSOR) 25 mg tablet TAKE ONE TABLET BY MOUTH TWICE DAILY    isosorbide mononitrate ER (IMDUR) 30 mg tablet TAKE ONE-HALF (1/2) TABLET DAILY FOR RAYNAUDS PHENOMENON (Patient taking differently: Take 15 mg by mouth nightly. TAKE ONE-HALF (1/2) TABLET DAILY FOR RAYNAUDS PHENOMENON)    baclofen (LIORESAL) 10 mg tablet Take 20 mg by mouth two (2) times a day. multivitamin (ONE A DAY) tablet Take 1 Tab by mouth daily. aspirin delayed-release 81 mg tablet Take 81 mg by mouth nightly. acetaminophen (TYLENOL) 500 mg tablet Take 1,000 mg by mouth every six (6) hours as needed for Pain. allopurinol (ZYLOPRIM) 100 mg tablet Take 100 mg by mouth daily. cetirizine (ZYRTEC) 10 mg tablet Take 10 mg by mouth daily. montelukast (SINGULAIR) 10 mg tablet Take 10 mg by mouth nightly. No current facility-administered medications for this visit. Past Medical History:   Diagnosis Date    Adverse effect of anesthesia     during hysterectomy- woke up \"too early\"    Arrhythmia     h/o palpitations normal work up 22/2015 heart: Ronni    Arthritis     Awareness under anesthesia     with hysterectomy    Chronic pain     bulging disc c4/c5 l4/l5 : as of 9/1018 no neck/head movement limitation says patient    Claustrophobia     Color blind     CREST (calcinosis, Raynaud's phenomenon, esophageal dysfunction, sclerodactyly, telangiectasia) ([facility] [address] 2018    [address], Dr. 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 Vernon Padgett    Seasonal asthma     allergy    Shortness of breath 2016    Pulmonary Dr. Aishwarya Carias    Unspecified adverse effect of anesthesia     wakes up for anesthesia early        Past Surgical History:   Procedure Laterality Date    COLONOSCOPY  04/20/2011    negative    COLONOSCOPY N/A 9/28/2018    COLONOSCOPY performed by Abundio Saenz MD at [address]    [address] CATARACT REMOVAL Bilateral 2018    HX CERVICAL FUSION  2014    c4-5     HX CHOLECYSTECTOMY      HX GI  11/19/13    Rectocele repair with enterocele repair    HX HYSTERECTOMY      TOOK LEFT OVARY    HX KNEE ARTHROSCOPY Right 1990's    right knee partial meniscus     HX KNEE REPLACEMENT Right 2016    HX KNEE REPLACEMENT Left 2010, 2016    TKR    HX ORTHOPAEDIC  1973    ganglion cyst removed rt wrist    HX ORTHOPAEDIC  2013, 1992    bilateral CTR, and had ulna nerve release    HX ORTHOPAEDIC Right 2018    thumb and ring finger trigger release    HX ORTHOPAEDIC  2019    cervical fusion x2     HX ORTHOPAEDIC  2020    L4-L5    HX OTHER SURGICAL  2013 recto prolapse    HX OTHER SURGICAL      Interstim System ( bladder pacemaker)    IR INJ SPINE THER SUBST LUM/SAC W IMG  12/19/2019    [place] UNLISTED PROCEDURE CARDIAC SURGERY  2015    no blockages, card cath    [place] UNLISTED PROCEDURE CARDIAC SURGERY  05/2018    no blockages x 2 procedures       Family History   Problem Relation Age of Onset    Heart Disease Mother     Hypertension Mother     Diabetes Mother     Cancer Mother         BREAST, LUNG    Breast Cancer Mother 61    Heart Disease Father     Hypertension Father     Thyroid Disease Father     Diabetes Brother     Psychiatric Disorder Son         Anevivian Commander, SCHIZO, MANIC DEPRESSIVE    Anesth Problems Neg Hx         Social History     Tobacco Use    Smoking status: Never    Smokeless tobacco: Never   Vaping Use    Vaping Use: Never used   Substance Use Topics    Alcohol use: Never    Drug use: No        Review of Systems   All other systems reviewed and are negative. Physical Exam    Overall the patient's right hand scar are healed. No redness drainage or sign infection. Good alignment of index and middle DIP. Good early thumb motion. She does have some hyperextension of thumb MP joint of approximately 55 to 60 degrees. The left thumb previously had much more hyperextension of the metacarpal phalangeal joint nearly approaching 80 degrees. She also complains of osteoarthritis involving the DIP joints of the left ring and small finger. Postoperatively, the patient's left thumb has marked improved appearance and the wounds are well-healed at the fusion sites including the ring and small finger DIP joints. She has good MP and PIP motion of her fingers. She can flex the IP joint of the thumb about 20 degrees and is working on pinch.       Imaging:    XR Results (most recent):  Results from Appointment encounter on 03/08/23    XR HAND LT MIN 3 V    Narrative  AP, lateral and oblique x-ray of the left hand shows for fusions of the finger DIP joints with retrograde AccuTrack screws in good position and alignment and overall healing. There is fusion of the thumb MP joint with plate and screw technique healing in good position and alignment. There is maintained post trapeziectomy space in height with good thumb metacarpal alignment and no changes suspensioplasty buttons. ASSESSMENT/PLAN:  Below is the assessment and plan developed based on review of pertinent history, physical exam, labs, studies, and medications. Patient's examination was consistent with right index and middle DIP joint osteoarthritis with de Quervain's tenosynovitis and somewhat recurrent thumb CMC arthritis post prior basal joint arthroplasty with proximal migration of the thumb metacarpal.  I had a long discussion with the patient regarding options for treatment. She remains pleased with the outcome of more recent left index and middle DIP fusions. She would like to have that performed on the right side in conjunction with a revision right thumb CMC arthroplasty to include de Quervain's release, APL interpositional tendon transfer and suspension plasty. She did undergo a revision right thumb CMC arthroplasty with de Quervain's release, APL interpositional transfer as well as right index and middle finger DIP joint fusion on 4/13/2022. I had previously recommended a right thumb spica splint for comfort and support. She does have some pre-existing hyperextension of the right thumb MP joint in the 55-60 degree range in the left is closer to 75 to 80 degrees. She is now more agreement that she needs to consider the previously discussed MP fusion of the right thumb. This should hopefully better align the [facility] joint in general and provide good stability with  and pinch. I again reviewed risks that include but not limited to stiffness, pain, nerve or tendon damage, nonunion, malunion the possible need for delayed hardware removal.  Arrangements.   Need for this to be performed in outpatient basis soon as possible. She is considering left thumb CMC arthroplasty which would require an MP fusion as well of small and ring DIP joint fusions. She has already had left index and middle DIP fusion surgery. I reviewed treatment options and answered her questions. She is giving strong consideration now to undergo the left side procedure. For now she wants to hold off on the right thumb fusion but if the left thumb fusion successful she likely would undergo the right thumb arthrodesis at the MP joint at a later date. She underwent a left thumb CMC arthroplasty with trapezium and partial proximal trapezoid resection including FCR tendon transfer, MP fusion, ring and small finger DIP fusion on 2/7/2023. The 3 fusions performed also received trapezial autograft. Overall she is healed very well. She may continue with motion and strength to her tolerance. She deferred the need for outpatient therapy as she states that rarely can she find a therapy center that accepts her insurance. She will work with motion and strength through home exercises and wean from her brace. I plan to see her back in 6 to 8 weeks and sooner if needed. 1. Primary osteoarthritis of both hands  -     XR HAND LT MIN 3 V; Future  2. Primary osteoarthritis of first carpometacarpal joint of right hand  3. De Quervain's tenosynovitis, right  4. Status post finger joint fusion  5. Primary osteoarthritis, right hand  6. Right hand pain      Return in about 6 weeks (around 4/19/2023). An electronic signature was used to authenticate this note.   -- Spencer Mendez MD

## 2023-03-08 ENCOUNTER — OFFICE VISIT (OUTPATIENT)
Dept: ORTHOPEDIC SURGERY | Age: 73
End: 2023-03-08

## 2023-03-08 DIAGNOSIS — M79.641 RIGHT HAND PAIN: ICD-10-CM

## 2023-03-08 DIAGNOSIS — M19.041 PRIMARY OSTEOARTHRITIS, RIGHT HAND: ICD-10-CM

## 2023-03-08 DIAGNOSIS — M18.11 PRIMARY OSTEOARTHRITIS OF FIRST CARPOMETACARPAL JOINT OF RIGHT HAND: ICD-10-CM

## 2023-03-08 DIAGNOSIS — Z98.1 STATUS POST FINGER JOINT FUSION: ICD-10-CM

## 2023-03-08 DIAGNOSIS — M19.041 PRIMARY OSTEOARTHRITIS OF BOTH HANDS: Primary | ICD-10-CM

## 2023-03-08 DIAGNOSIS — M19.042 PRIMARY OSTEOARTHRITIS OF BOTH HANDS: Primary | ICD-10-CM

## 2023-03-08 DIAGNOSIS — M65.4 DE QUERVAIN'S TENOSYNOVITIS, RIGHT: ICD-10-CM

## 2023-03-08 NOTE — LETTER
3/8/2023    Patient: Chelo Limon   YOB: 1950   Date of Visit: 3/8/2023     Sarah Sawyer MD  [address] [facility]  [address] [address]  Via Fax: [phone]    Dear Sarah Sawyer MD,      Thank you for referring Ms. Isabel Amezquita to [facility] for evaluation. My notes for this consultation are attached. If you have questions, please do not hesitate to call me. I look forward to following your patient along with you.       Sincerely,    Maciel Mae MD

## 2023-03-21 ENCOUNTER — OFFICE VISIT (OUTPATIENT)
Dept: CARDIOLOGY CLINIC | Age: 73
End: 2023-03-21
Payer: MEDICARE

## 2023-03-21 VITALS
RESPIRATION RATE: 16 BRPM | BODY MASS INDEX: 34.87 KG/M2 | HEIGHT: 66 IN | SYSTOLIC BLOOD PRESSURE: 108 MMHG | WEIGHT: 217 LBS | OXYGEN SATURATION: 97 % | HEART RATE: 71 BPM | DIASTOLIC BLOOD PRESSURE: 58 MMHG

## 2023-03-21 DIAGNOSIS — I25.10 CORONARY ARTERY DISEASE INVOLVING NATIVE CORONARY ARTERY OF NATIVE HEART WITHOUT ANGINA PECTORIS: Primary | ICD-10-CM

## 2023-03-21 DIAGNOSIS — G47.33 OSA ON CPAP: ICD-10-CM

## 2023-03-21 DIAGNOSIS — Z99.89 OSA ON CPAP: ICD-10-CM

## 2023-03-21 DIAGNOSIS — R06.09 DOE (DYSPNEA ON EXERTION): ICD-10-CM

## 2023-03-21 DIAGNOSIS — E78.2 MIXED HYPERLIPIDEMIA: ICD-10-CM

## 2023-03-21 DIAGNOSIS — I10 ESSENTIAL HYPERTENSION: ICD-10-CM

## 2023-03-21 PROCEDURE — 3078F DIAST BP <80 MM HG: CPT | Performed by: INTERNAL MEDICINE

## 2023-03-21 PROCEDURE — 3017F COLORECTAL CA SCREEN DOC REV: CPT | Performed by: INTERNAL MEDICINE

## 2023-03-21 PROCEDURE — G8417 CALC BMI ABV UP PARAM F/U: HCPCS | Performed by: INTERNAL MEDICINE

## 2023-03-21 PROCEDURE — 99214 OFFICE O/P EST MOD 30 MIN: CPT | Performed by: INTERNAL MEDICINE

## 2023-03-21 PROCEDURE — 93000 ELECTROCARDIOGRAM COMPLETE: CPT | Performed by: INTERNAL MEDICINE

## 2023-03-21 PROCEDURE — G8399 PT W/DXA RESULTS DOCUMENT: HCPCS | Performed by: INTERNAL MEDICINE

## 2023-03-21 PROCEDURE — 3074F SYST BP LT 130 MM HG: CPT | Performed by: INTERNAL MEDICINE

## 2023-03-21 PROCEDURE — G8510 SCR DEP NEG, NO PLAN REQD: HCPCS | Performed by: INTERNAL MEDICINE

## 2023-03-21 PROCEDURE — G9899 SCRN MAM PERF RSLTS DOC: HCPCS | Performed by: INTERNAL MEDICINE

## 2023-03-21 PROCEDURE — 1123F ACP DISCUSS/DSCN MKR DOCD: CPT | Performed by: INTERNAL MEDICINE

## 2023-03-21 PROCEDURE — 1101F PT FALLS ASSESS-DOCD LE1/YR: CPT | Performed by: INTERNAL MEDICINE

## 2023-03-21 PROCEDURE — G8427 DOCREV CUR MEDS BY ELIG CLIN: HCPCS | Performed by: INTERNAL MEDICINE

## 2023-03-21 PROCEDURE — G8536 NO DOC ELDER MAL SCRN: HCPCS | Performed by: INTERNAL MEDICINE

## 2023-03-21 PROCEDURE — 1090F PRES/ABSN URINE INCON ASSESS: CPT | Performed by: INTERNAL MEDICINE

## 2023-03-21 NOTE — LETTER
3/21/2023    Patient: Mitch Merida   YOB: 1950   Date of Visit: 3/21/2023     King Ross MD  [address] [facility]  [address] [address]  Via Fax: [phone]    Dear King Ross MD,      Thank you for referring Ms. Stefanie Schaefer to [address] [place] [address] for evaluation. My notes for this consultation are attached. If you have questions, please do not hesitate to call me. I look forward to following your patient along with you.       Sincerely,    Desmond Briones MD

## 2023-03-21 NOTE — PROGRESS NOTES
[address], [place], [address] [place] [address]  [phone]     Subjective:      Janeth Valencia is a 68 y.o. female is here for 6-month follow-up. Recall she came to establish care with me in August 2022 to maintain care within the [facility] system: \"The patient was last seen by Dr. Dillon Castro in November 2021. She states she has persistent shortness of breath with exertion, and intermittent spells of palpitations a couple times a week, sometimes at rest that proceeded there is episodes of shortness of breath. She has been frustrated by lots of testing that cannot find the answer. Noted that she had mild pulmonary hypertension by right heart catheterization in the past and is followed by Dr. Yoandy Kothari. \"    Today, the patient notes persistent shortness of breath and mild leg swelling with venous varicosities visible. She is seeing Dr. Yoandy Kothari for her shortness of breath and Dr. Yoandy Kothari has ordered repeat pulmonary function tests and to follow-up with her thereafter. 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 The patient denies chest pain/ orthopnea, PND, LE edema, syncope, or presyncope.        Patient Active Problem List    Diagnosis Date Noted    Localized primary osteoarthritis of left hand 02/24/2021    Spinal stenosis, lumbar 10/29/2020    Left chest pressure 04/27/2020    Cervical stenosis of spinal canal 03/09/2020    Essential hypertension 10/29/2019    KRYSTINA on CPAP 05/16/2019    Severe obesity ([address] 10/23/2018    Chronic venous hypertension (idiopathic) with inflammation of left lower extremity 02/06/2017    Venous insufficiency of left leg 11/22/2016    Venous reflux 08/16/2016    Osteoarthritis 01/21/2016    OA (osteoarthritis) of knee 01/21/2016    Abnormal ankle brachial index 04/08/2015    SOB (shortness of breath) 02/24/2015    Right leg pain 02/24/2015    HNP (herniated nucleus pulposus), cervical 07/07/2014    Esophageal reflux 06/27/2012    [facility] [place]  [facility]  Advanced Heart Failure Visit       Date of visit: 9/24/2018  Patient Name: Rosemarie Lopez  Medical Record Number: [id number]  YOB: 1956   Primary Physician: Rm Holm MD.   Referring Physician: Self referral    No chief complaint on file.    SUBJECTIVE     HISTORY OF PRESENT ILLNESS:Rosemarie Lopez is a 61 year old female who presents to the clinic with the following CV (cardiovascular) history:    1996 diagnosed with heart failure /cardiomyopathy/AF was on heart transplant list for 6 yrs; de-listed because LVEF improved. 2007 right frontal mass lesion, left occipital infarct , RLE arterial emboli S/P embolectomies right leg femoral and popliteal,  2014 NICMP EF 25 % life vest     NICMP, chronic AF, VT s/p ICD, SANDHYA on CPAP, CVA 2007, severe MR, HTN, HLD, DM type II During last hospitalization, she was evaluated by neurosurgery fora mass in the right frontal lobe of her brain which has been relatively stable and cleared her for anticoagulation. No need for biopsy.    5/19/14 -VF arrest -life vest shock -s/p single chamber ICD   April 2016 - hospitalized for mechanical fall small ICH , CHF PNA   05/17 hospitalized for AMS , dig toxicity and ALIVIA requiring HD   11/3-22/2017 hospitalized for an elective LHC on 11/3 for cardiac evaluation/clearance for severe mitral regurgitation complicated by mechanical fall and RCA air embolism - admitted to CICU for close monitoring and further evaluation   12/18-12/27/2017: admit for decompensated heart failure. Diuretics per nephrology  9/5-9/14/2018: Admitted with R LE Cellulitis. Frequent nonsustained arrhythmias noted; amiodarone and Mexiletine discontinued. Required IV diuretics during hospitalizations for HF management.     Since Last Visit:  She presents accompanied by her   She was recently admitted for R LE Cellulitis. Under wound care  hyperlipidemia 06/27/2012    Foot pain 07/28/2011    Asthma 07/28/2011      Chana Zurita MD  Past Medical History:   Diagnosis Date    Adverse effect of anesthesia     during hysterectomy- woke up \"too early\"    Arrhythmia     h/o palpitations normal work up 22/2015 heart: Ronni    Arthritis     Awareness under anesthesia     with hysterectomy    Chronic pain     bulging disc c4/c5 l4/l5 : as of 9/1018 no neck/head movement limitation says patient    Claustrophobia     Color blind     CREST (calcinosis, Raynaud's phenomenon, esophageal dysfunction, sclerodactyly, telangiectasia) ([address] 2018    [address], Dr. Jennifer Marcelo    GERD (gastroesophageal reflux disease)     Gout     Hyperlipidemia     Hypertension     Ill-defined condition     CREST    Leg swelling 2016    unknown etiology    Bartlett's neuralgia 2001    from neuroma middle toe, left foot    Nausea & vomiting     KRYSTINA on CPAP     CPAP- no using since Oct 2021     Pulmonary hypertension ([facility] 08/2018    Heart: Dr. Deni Vazquez    Seasonal asthma     allergy    Shortness of breath 2016    Pulmonary Dr. Duane Levin    Unspecified adverse effect of anesthesia     wakes up for anesthesia early      Past Surgical History:   Procedure Laterality Date    COLONOSCOPY  04/20/2011    negative    COLONOSCOPY N/A 9/28/2018    COLONOSCOPY performed by Justice Card MD at [address]    [address] CATARACT REMOVAL Bilateral 2018    HX CERVICAL FUSION  2014    c4-5     HX CHOLECYSTECTOMY      HX GI  11/19/13    Rectocele repair with enterocele repair    HX HYSTERECTOMY      TOOK LEFT OVARY    HX KNEE ARTHROSCOPY Right 1990's    right knee partial meniscus     HX KNEE REPLACEMENT Right 2016    HX KNEE REPLACEMENT Left 2010, 2016    TKR    HX ORTHOPAEDIC  1973    ganglion cyst removed rt wrist    HX ORTHOPAEDIC  2013, 1992    bilateral CTR, and had ulna nerve release    HX ORTHOPAEDIC Right 2018    thumb and ring finger trigger release    HX ORTHOPAEDIC management and gradually improving.   She reports otherwise doing well. She denies any worsening SOB at rest. She is fairly sedentary and does not walk due to her stroke. She is not even independent with transfers (unable to stand) and uses a scooter to get around.   She denies any chest pain, palpitations, dizziness, lightheadedness, orthopnea, PND, lower extremity swelling or abdominal bloating.     HEART FAILURE MEDICATIONS   [x] ACEi [] ARB [] BB (beta blocker) [] CCB (calcium channel blocker)   [] Corlanor [] DIG (digoxin) [x] Diuretic  [] Entresto [] Hydralazine [x] MRA [] Nitrate       DEVICES   [x] ICD (implantable cardioverter-defibrillator)  [] BIV (biventricular) - ICD [] PPM (permanent pacemaker) [] Life Vest  [] CardioMEMS  Denies any discharges from ICD    Frequency / Description   []  YES    [x]  NO Angina:   []  YES    [x]  NO Claudication:   []  YES    [x]  NO Syncope:   []  YES    [x]  NO Orthopnea:   []  YES    [x]  NO PND (paroxysmal nocturnal dyspnea):   [x]  YES    []  NO Pedal edema: chronic  []  YES    [x]  NO Abd Swelling:   []  YES    [x]  NO Early Satiety:   []  YES    [x]  NO Hospitalization:   []  YES    [x]  NO ER Visit:   []  YES    [x]  NO Weight gain: Unable to obtain weight   []  YES    [x]  NO Other:    COMPLIANCE   Description   [x]  YES    []  NO Medication: did not take AM meds prior to clinic visit  [x]  YES    []  NO Diet:    REVIEW OF SYSTEMS:    A complete review of systems was performed, and aside from what is mentioned in HPI, was negative.       MEDICATIONS  Outpatient Medications Marked as Taking for the 9/24/18 encounter (Office Visit) with Rajiv Jenkins MD   Medication Sig Dispense Refill   • guaiFENesin (MUCINEX) 600 MG 12 hr tablet Take 2 tablets by mouth every 12 hours. 60 tablet 0   • albuterol 108 (90 Base) MCG/ACT inhaler Inhale 2 puffs into the lungs every 4 hours as needed for Shortness of Breath or Wheezing. 1 Inhaler 5   • sodium hypochlorite (DAKIN'S)  0.125 % Solution Apply as directed daily and as needed 1 Bottle 0   • carvedilol (COREG) 6.25 MG tablet Take 1 tablet by mouth 2 times daily (with meals). 60 tablet 0   • metOLazone (ZAROXOLYN) 2.5 MG tablet Take 1 tablet by mouth 1 day a week. 4 tablet 0   • torsemide (DEMADEX) 20 MG tablet Take 3 tablets by mouth daily. 30 tablet 0   • escitalopram (LEXAPRO) 20 MG tablet Take 20 mg by mouth daily (at noon).     • pantoprazole (PROTONIX) 40 MG tablet Take 40 mg by mouth daily (at noon).     • warfarin (COUMADIN) 4 MG tablet Take 2-4 mg by mouth as directed. Take 1/2 tab (= 2 mg) every Sunday, Monday, Wednesday and Friday evening and take 1 tab (= 4 mg) every Tuesday, Thursday and Saturday evening.     • simvastatin (ZOCOR) 20 MG tablet Take 1 tablet by mouth nightly. 90 tablet 1   • lisinopril (ZESTRIL) 2.5 MG tablet Take 1 tablet by mouth daily. 30 tablet 0   • spironolactone (ALDACTONE) 25 MG tablet Take 1 tablet by mouth daily. 90 tablet 3   • aspirin 81 MG chewable tablet Chew 1 tablet by mouth daily. 30 tablet 0   • acetaminophen (TYLENOL) 500 MG tablet Take 500 mg by mouth every 6 hours as needed for Pain.      • ALPRAZolam (XANAX) 0.5 MG tablet Take 0.5 mg by mouth nightly as needed for Sleep.     • Exenatide (BYETTA 10 MCG PEN) 10 MCG/0.04ML SOLN Inject 0.04 mLs into the skin 2 times daily. 2.4 mL 0   • cholecalciferol (VITAMIN D3) 1000 UNITS tablet Take 1 tablet by mouth daily.     • Omega-3 Fatty Acids (FISH OIL) 1000 MG capsule Take 2 g by mouth 2 times daily.      • metformin (GLUCOPHAGE-XR) 500 MG 24 hr tablet Take 1,000 mg by mouth 2 times daily (with meals). Dose: 2 tabs (=1,000mg)       ALLERGIES   ALLERGIES:  No Known Allergies    OBJECTIVE  PAST HISTORY:  Past Medical History:   Diagnosis Date   • Acute, but ill-defined, cerebrovascular disease 11/27/2007   • Benign neoplasm of cerebral meninges (CMS/HCC) 5/29/2008   • Blood clot associated with vein wall inflammation 2007    6 RLE, 2 in brain   •  2019    cervical fusion x2     HX ORTHOPAEDIC  2020    L4-L5    HX OTHER SURGICAL  2013    recto prolapse    HX OTHER SURGICAL      Interstim System ( bladder pacemaker)    IR INJ SPINE THER SUBST LUM/SAC W IMG  12/19/2019    HI UNLISTED PROCEDURE CARDIAC SURGERY  2015    no blockages, card cath    HI UNLISTED PROCEDURE CARDIAC SURGERY  05/2018    no blockages x 2 procedures     Allergies   Allergen Reactions    Ciprofloxacin Shortness of Breath    Flagyl [Metronidazole] Shortness of Breath    Percocet [Oxycodone-Acetaminophen] Rash and Itching     Able to take if uses benadryl    Benzene Other (comments)     Blisters and burn area Benzene found to be on steri-strips    Betadine [Povidone-Iodine] Other (comments)     Blisters and burning    Naproxen Itching    Codeine-Guaifenesin Unknown (comments)    Lorazepam Unknown (comments)    Sulfa (Sulfonamide Antibiotics) Rash    Adhesive Rash     Redness and rash    Adhesive Tape-Silicones Rash     Redness and rash      Family History   Problem Relation Age of Onset    Heart Disease Mother     Hypertension Mother     Diabetes Mother     Cancer Mother         BREAST, LUNG    Breast Cancer Mother 61    Heart Disease Father     Hypertension Father     Thyroid Disease Father     Diabetes Brother     Psychiatric Disorder Son         BIPOLAR, SCHIZO, [place] DEPRESSIVE    Anesth Problems Neg Hx       Social History     Socioeconomic History    Marital status:      Spouse name: Not on file    Number of children: Not on file    Years of education: Not on file    Highest education level: Not on file   Occupational History    Not on file   Tobacco Use    Smoking status: Never    Smokeless tobacco: Never   Vaping Use    Vaping Use: Never used   Substance and Sexual Activity    Alcohol use: Never    Drug use: No    Sexual activity: Yes     Partners: Male   Other Topics Concern    Not on file   Social History Narrative    Not on file     Social Determinants of Health     Financial Brain tumor (CMS/[facility]) 04/18/2013    Right Frontal Mass   • Cardiac arrest (CMS/[facility]) 2014    LifeVest -> several times defib's-> AICD placed   • Cerebral infarction (CMS/[facility]) 2007   • Essential (primary) hypertension    • High cholesterol    • Impaired mobility     Uses Motorized Scooter   • Obesity    • SANDHYA on CPAP    • Osteoarthritis    • Traumatic open wound of lower leg, right, subsequent encounter 9/20/2018   • Type II diabetes mellitus (CMS/[facility])     Checks blood sugar at home BID   • Urinary incontinence    • Urinary tract infection      Past Surgical History:   Procedure Laterality Date   • Cardiac defibrillator placement  05/2014   • Embolectomy Right 2007    Lower Leg (Femoral & Popliteal)   • Joint replacement Left 1980    Ankle   • Removal gallbladder  2014   • Right and left heart cath  11/03/2017    NICM EF ~25% severely dilation with secondary functional mitral regurgitation     Social History     Social History   • Marital status: , lives with  4 children ,      Spouse name: N/A   • Number of children: N/A   • Years of education: N/A     Social History Main Topics   • Smoking status: Former Smoker     Packs/day: 1.00     Years: 10.00     Types: Cigarettes     Quit date: 9/13/1994   • Smokeless tobacco: None   • Alcohol use None   • Drug use: Unknown   • Sexual activity: Not Asked       History   Smoking Status   • Former Smoker   • Packs/day: 1.00   • Years: 10.00   • Types: Cigarettes   • Quit date: 9/13/1994   Smokeless Tobacco   • Not on file     History reviewed. No pertinent family history of heart disease    PHYSICAL EXAMINATION:  Vitals:    Visit Vitals  /66 (BP Location: [facility], Patient Position: Sitting, Cuff Size: Regular)   Pulse 83   Ht 5' 11\" (1.803 m)   SpO2 94%   BMI (body mass index):There is no height or weight on file to calculate BMI.  Constitutional: well developed 61 year old female in no acute distress.  Skin: Warm, dry, intact, no lesions.  HEENT: Normocephalic,  Resource Strain: Not on file   Food Insecurity: Not on file   Transportation Needs: Not on file   Physical Activity: Not on file   Stress: Not on file   Social Connections: Not on file   Intimate Partner Violence: Not on file   Housing Stability: Not on file      Current Outpatient Medications   Medication Sig    benzonatate (TESSALON) 100 mg capsule Take 100 mg by mouth three (3) times daily as needed for Cough. RABEprazole (ACIPHEX) 20 mg TbEC Take 20 mg by mouth daily. calcium polycarbophiL (FIBERCON) 625 mg tablet Take 625 mg by mouth daily. diphenhydrAMINE (BENADRYL) 25 mg capsule Take 25 mg by mouth every six (6) hours as needed. pravastatin (PRAVACHOL) 40 mg tablet Take 40 mg by mouth nightly. carboxymethylcellulose sodium (THERATEARS OP) Apply  to eye as needed. telmisartan (MICARDIS) 40 mg tablet Take 40 mg by mouth daily. metoprolol tartrate (LOPRESSOR) 25 mg tablet TAKE ONE TABLET BY MOUTH TWICE DAILY    isosorbide mononitrate ER (IMDUR) 30 mg tablet TAKE ONE-HALF (1/2) TABLET DAILY FOR RAYNAUDS PHENOMENON (Patient taking differently: Take 15 mg by mouth nightly. TAKE ONE-HALF (1/2) TABLET DAILY FOR RAYNAUDS PHENOMENON)    baclofen (LIORESAL) 10 mg tablet Take 20 mg by mouth two (2) times a day. multivitamin (ONE A DAY) tablet Take 1 Tab by mouth daily. aspirin delayed-release 81 mg tablet Take 81 mg by mouth nightly. allopurinol (ZYLOPRIM) 100 mg tablet Take 100 mg by mouth daily. cetirizine (ZYRTEC) 10 mg tablet Take 10 mg by mouth daily. montelukast (SINGULAIR) 10 mg tablet Take 10 mg by mouth nightly. acetaminophen (TYLENOL) 500 mg tablet Take 1,000 mg by mouth every six (6) hours as needed for Pain. No current facility-administered medications for this visit.          Review of Symptoms:  11 systems reviewed, negative other than as stated in the HPI    Physical ExamPhysical Exam:    Vitals:    03/21/23 0918   BP: (!) 108/58   Pulse: 71   Resp: 16   SpO2: 97% Weight: 217 lb (98.4 kg)   Height: 5' 6\" (1.676 m)     Body mass index is 35.02 kg/m². General PE  Gen:  NAD  Mental Status - Alert. General Appearance - Not in acute distress. HEENT:  PERRL, no carotid bruits or JVD  Chest and Lung Exam   Inspection: Accessory muscles - No use of accessory muscles in breathing. Auscultation:   Breath sounds: - Normal.   Cardiovascular   Inspection: Jugular vein - Bilateral - Inspection Normal.   Palpation/Percussion:   Apical Impulse: - Normal.   Auscultation: Rhythm - Regular. Heart Sounds - S1 WNL and S2 WNL. No S3 or S4. Murmurs & Other Heart Sounds: Auscultation of the heart reveals - No Murmurs. Peripheral Vascular   Upper Extremity: Inspection - Bilateral - No Cyanotic nailbeds or Digital clubbing. Lower Extremity:   Palpation: Edema - Bilateral - No edema. Abdomen:   Soft, non-tender, bowel sounds are active. Neuro: A&O times 3, CN and motor grossly WNL    Labs:   No results found for: CHOL, CHOLX, CHLST, CHOLV, [id number], HDL, HDLP, LDL, LDLC, DLDLP, TGLX, TRIGL, TRIGP, CHHD, CHHDX  No results found for: CPK, CPKX, CPX  Lab Results   Component Value Date/Time    Sodium 141 08/11/2021 10:59 AM    Potassium 4.3 08/11/2021 10:59 AM    Chloride 110 (H) 08/11/2021 10:59 AM    CO2 26 08/11/2021 10:59 AM    Anion gap 5 08/11/2021 10:59 AM    Glucose 99 08/11/2021 10:59 AM    BUN 14 08/11/2021 10:59 AM    Creatinine 0.93 08/11/2021 10:59 AM    BUN/Creatinine ratio 15 08/11/2021 10:59 AM    GFR est AA >60 08/11/2021 10:59 AM    GFR est non-AA 59 (L) 08/11/2021 10:59 AM    Calcium 8.9 08/11/2021 10:59 AM    Bilirubin, total 0.7 10/21/2020 09:19 AM    Alk.  phosphatase 91 10/21/2020 09:19 AM    Protein, total 6.6 10/21/2020 09:19 AM    Albumin 3.4 (L) 10/21/2020 09:19 AM    Globulin 3.2 10/21/2020 09:19 AM    A-G Ratio 1.1 10/21/2020 09:19 AM    ALT (SGPT) 20 10/21/2020 09:19 AM       EKG:  NSR     08/29/22    ECHO ADULT COMPLETE 08/30/2022 8/30/2022    Interpretation atraumatic. Oral mucous membranes moist, EOMs (extraocular movements) intact.  Neck: Supple, trachea midline, unable to assess JVD or HJR as patient is unable to get up on exam table, negative carotid bruits  No thyromegaly.  Cardiovascular: Normal S1, S2. irregular rhythm. Murmur: holosystolic.no S3 S4  Respiratory: Anterior/posterior lung sounds Clear  to auscultation bilaterally.   Abdomen: Soft, nontender, negative hepatomegaly and normal bowel sounds.  Musculoskeletal/Extremities: CRT (capillary refill time) <3, no clubbing, no cyanosis, + 2 BLE (bilateral lower extremities).  Neurological: No focal neurological deficits and speech normal. Sensation grossly intact.  Psychiatric: Alert and oriented to person, place and time.    LABORATORY:  Lab Results   Component Value Date    POTASSIUM 4.5 09/24/2018    SODIUM 133 (L) 09/24/2018    CO2 32 09/24/2018    CHLORIDE 96 (L) 09/24/2018    BUN 40 (H) 09/24/2018    CREATININE 1.21 (H) 09/24/2018    GLUCOSE 169 (H) 09/24/2018    CALCIUM 9.4 09/24/2018    WBC 4.9 09/12/2018    RBC 4.58 09/12/2018    HCT 34.6 (L) 09/12/2018    HGB 9.4 (L) 09/12/2018     09/12/2018    TSH 1.63 03/22/2018    CHOLESTEROL 66 11/03/2017    HDL 34 (L) 11/03/2017    CHOHDL 1.9 11/03/2017    TRIGLYCERIDE 48 11/03/2017    CALCLDL 22 11/03/2017    INR 3.7 09/24/2018     DIAGNOSTICS:  The following diagnostics were reviewed.    Cardiac  ECHO 8/9/2018  Severely increased LV cavity size (7.7 cm) with severely decreased systolic function, EF 29%.  Regional wall motion abnormalities (see diagram). Increased left ventricular filling pressure.  Mildly increased RV size with severely decreased radial function, FAC 22%.  Normal right ventricular longitudinal function. RV TAPSE 19.7 mm.  Moderate pulmonary hypertension, PASP 64 mmHg.  Severe mitral valve regurgitation. MV PISA RV is 101 cm³, ERO is 0.78 cm².  Severe tricuspid regurgitation. Systolic flow reversals visualized in the hepatic veins every  Summary    Left Ventricle: Normal left ventricular systolic function with a visually estimated EF of 55 - 60%. Left ventricle size is normal. Increased wall thickness. Findings consistent with mild concentric hypertrophy. Normal wall motion. Grade II diastolic dysfunction with increased LAP. Left Atrium: Left atrium is mildly dilated. Left atrial volume index is mildly increased (35-41 mL/m2). Signed by: Sangeeta Brewer MD on 8/30/2022  8:28 PM      05/06/20    ECHO ADULT COMPLETE 05/07/2020 5/7/2020    Interpretation Summary  · Normal cavity size, wall thickness and systolic function (ejection fraction normal). Estimated left ventricular ejection fraction is 55 - 60%. No regional wall motion abnormality noted. Moderate (grade 2) left ventricular diastolic dysfunction. · Pulmonary arterial systolic pressure is 26 mmHg. Signed by: Oc Rutherford MD on 5/7/2020  2:51 PM    Right heart catheterization May 2018:  HEMODYNAMIC TABLES     Pressures:  Baseline  Pressures:  - HR: 58  Pressures:  - Rhythm:  Pressures:  -- Pulmonary Artery (S/D/M): 41/19/28  Pressures:  -- Pulmonary Capillary Wedge: 21/22/18  Pressures:  -- Right Atrium (a/v/M): 21/19/18  Pressures:  -- Right Ventricle (s/edp): 42/19/--    Left heart catheterization May 2015:  CORONARY CIRCULATION: Left main: Normal. LAD: Normal. 1st diagonal:  Normal. 2nd diagonal: Normal. 3rd diagonal: Normal. The vessel was very  small sized. Circumflex: Normal. The vessel was large sized (dominant). 1st obtuse marginal: Normal. 2nd obtuse marginal: Normal. Left posterior  descending artery: Normal. RCA: Normal. The vessel was medium sized. RV  marginal 1: Normal.    08/30/22    NUCLEAR CARDIAC STRESS TEST 08/30/2022 8/31/2022    Interpretation Summary    Stress Test: A pharmacological stress test was performed using lexiscan. Blood pressure demonstrated a normal response and heart rate demonstrated a normal response to stress.  The patient's heart rate cycle.  Severe biatrial enlargement.  No pericardial effusion.  Compared to the prior limited study (12/22/17), the TR is now reported as severe. No other significant changes.    ECHO 12/22/2017  Limited TTE for re-assessment of MR and TR  Severe mitral regurgitation  Moderate-to-severe tricuspid regurgitation  No significant change compared to prior study. KARLEE cancelled today due to significant tachypnea and oxygen requirement.  LVEF 30%     Cath 11/3/2017  FINDINGS:  · No coronary artery disease  · RCA air embolism - resolved  · Severe LV dilation and systolic dysfunction.  · Severe pulmonary hypertension     HEMODYNAMICS:   RA: 34 mmHg  RV: 69/31 mmHg  PA: 71/51/58 mmHg  PCWP: 56 (67 v-wave) mmHg  CO: 3.1   VARUN  CO: 5.4   Thermodiluation  PVR: <1 Wood  LVEDP 35 mmHg     Sat:  PA: 28%  AO: 93%     ECHO 10/11/2017  Severely increased LV size. Severely decreased global LV systolic function. LVEF 27%.  Mildly increased RV size. Mildly decreased RV systolic function. Moderate pulmonary hypertension, PASP 57mmHg.  Severe torrential functional mitral regurgitation. MV PISA regurgitant volume is 81.4 cm³. ERO is 0.79 cm².  Moderate-to-severe tricuspid regurgitation  Markedly enlarged left atrium  Mildly dilated proximal ascending aorta.  No pericardial effusion  No prior study available for comparison. Compared to outside TTE reports, LVEF has declined slightly (40-->27) - no change in MR,  TR.    KARLEE 7/20/17 (@ [facility])   Images available & reviewed  Massively dilated LA   Severe wide open MR     ECHO 7/18/17 (@ [facility])   LV EF 40-45 %  PASP 80 mmHg  Severe wide open MR     STRESS TEST 2015 (@ [facility])  Negative for ischemia   LV EF 39%    Per pt no cath in past     Non-Cardiac   CT HEAD 10/2/17  IMPRESSION:   1. 3.8 x 3.5 cm right frontal lobe lesion with high attenuation along its margin suggesting blood products unchanged compared to the most recent study.  2. No significant enhancement is identified on the postcontrast  images.  3. Stable left occipital lobe infarct.    MRI BRAIN 7/12   IMPRESSION:  1. Mass in the right frontal lobe, increased in size when compared to the prior study, suspicious for neoplasm.  2. Old infarct in the left occipital lobe.  3. A partially empty sella.   4. Sinus inflammation    ASSESSMENT/PLAN:  Nonischemic Cardiomyopathy: ACC/ AHA Stage C, [place] Heart Association Classification: NYHA Class III: Significant HF symptoms/activity intolerance (Only able to do light housework, can walk slowly on level ground) (limited functional assessment due to LE weakness)   Diagnostic workup:   Cath normal coronaries     OK reflex - negative    ESR - negative    Troponin - normal    Thyroid function abnormal - follows w/ PCP/Endocrinology.     Iron panel - unremarkable    SPEP/UPEP with immunofixation - Decreased total protein and/or albumin with essentially normal electrophoretic   pattern. No monoclonal protein is identified.         Volume status is elevated but improved; hyperkalemia has resolved. NT-ProBNP 4247 => 3041   Perfusion status is normal   Labs pending     Based on objective data and clinical data will augment medical therapy as follows:  OMT: No need for Veltassa, as K has improved with dietary modifcations.    Continue with Metolazone 2.5 mg Q weekly & current torsemide dose.    Insurance approval for [facility] not needed; awaiting LE cellulitis to heal before scheduling elective implant. I believe this intervention will be useful to avoid future hospitalizations and facilitate monitoring as its difficult to assess volume status given inability to weight and her body habitus. Patient is agreeable with this plan. (Chest Circumference 47 in).   ICD: Normal function. Follows with Dr Bolaños ([facility])   Education: HF Education provided in clinic.     Valvular Dzs: Hx of MV prolapse. Has Severe MR (functional)- not a candidate for MV clip/TMVR (Jaren) in past.    Refer back to Dr Eastman in light of recent COAPT  recovery was normal.    ECG: Resting ECG demonstrates normal sinus rhythm. ECG: Stress ECG was negative for ischemia. Post-stress ejection fraction is 76%. Nuclear Findings: LV perfusion is normal.    Nuclear Findings: The study is negative for myocardial ischemia. Signed by: Cisco Wills MD on 8/30/2022  1:13 PM      05/06/20    NUCLEAR CARDIAC STRESS TEST 05/07/2020 5/7/2020    Interpretation Summary  · Baseline ECG: Sinus rhythm. · Gated SPECT: Left ventricular function post-stress was normal. Calculated ejection fraction is 83%. There is no evidence of transient ischemic dilation (TID). · Left ventricular perfusion is probably normal.  · Myocardial perfusion imaging defect 1: There is a defect that is small in size with a mild reduction in uptake that is predominantly reversible. The defect appears to probably be artifact. The possibility of infarction cannot be excluded. · Myocardial perfusion imaging defect 1: caused by breast attenuation. · Normal myocardial perfusion imaging. Myocardial perfusion imaging supports a low risk stress test.    Signed by: Abby Clifford MD on 5/7/2020 11:49 AM    CT Results (most recent):  Results from [place] encounter on 09/13/22    CT HEART W/O CONT WITH CALCIUM    Narrative  EXAM: CT HEART W/O CONT WITH CALCIUM    INDICATION: cp, shortness of breath. Dyspnea, unspecified    COMPARISON: None. TECHNIQUE: Unenhanced multislice helical CT of the heart was performed on a  64-slice multiple detector row CT system ([employer]). Quantitative coronary artery  CT calcium scoring was performed using [employer] software. No intravenous contrast was  administered. CT dose reduction was achieved through use of a standardized  protocol tailored for this examination and automatic exposure control for dose  modulation.     FINDINGS:  The coronary calcium in each vessel is as follows:    Left main coronary artery: 81  Left anterior descending coronary Study results. Hopefully, she would be a candidate for MV clip given recent favorable data. There are no other viable advanced HF therapy options.    Plan to repeat Echocardiogram to assess MV every 6 months          Chronic AF: Rate controlled. On long-term anti-coagulation- managed by 2nd floor pharmacy. Follows with Dr Bolaños & Dr Strickland.   Anti-arrhythmics have been doscontinued.      VT: s/p single chamber ICD. No ICD firing since 2014. Continue to monitor.     HTN: Target SBP < 130 mmHg. 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 No need for any intervention.     DM II: Controlled on current medications. Follows with endocrinology  Hemoglobin A1C (no units)   Date Value   03/22/2018 7.4       Obesity: There is no height or weight on file to calculate BMI.   Patient advised dietary and lifestyle changes to help loose some weight. Continue to work with therapy at home.     SANDHYA: Wears CPAP when she has a cough. Does not wear every night.   Machine recently broke. Awaiting replacement.      AHFT Options: Patient has been informed that she is not eligible for heart transplant or LVAD surgery due to the following:   - Morbid obesity (BMI > 40)   - Inability to walk; functional status limited due to     Follow-up:  Clinic: 4 weeks MD  Testing: BMP, NT ProBNP    Visit coordinated by: DELMIS Caldwell     Patient understands she can return sooner if any issues should arise.     Rajiv Jenkins MD   artery: 81  Left circumflex coronary artery: 0  Right coronary artery: 0  Posterior descending coronary artery: 0    Total calcium score: 162    Calcium score interpretation:  0-0 = No evidence of CAD  1-10 = Minimal evidence of CAD   = Mild evidence of CAD  101-400 = Moderate evidence of CAD  >400 = Extensive evidence of CAD    A score of 162 is at the 60th percentile rank. Therefore, 40% of the females  aged 71-75 will have a higher calcium score. Chest CT findings:  Stable 3 mm pulmonary nodule left lower lobe. The entire  lung fields are not imaged on this examination. No evidence of mass or  lymphadenopathy. The incidentally imaged portions of the upper abdominal organs  are within normal limits on this unenhanced examination. Degenerative changes  are seen in the visualized thoracic spine. Impression  Total calcium score of 162. This indicates moderate evidence of plaque making  moderate nonobstructive coronary artery disease highly likely. The result  should be discussed with the patient taking into account other risk factors such  as age, gender, family history, diabetes, smoking or high cholesterol levels. Stable 3 mm pulmonary nodule left lower lobe. Guidelines for Management of Incidental Pulmonary Nodules Detected on CT Images:  from the Fleischner Society 2017    LOW-RISK PATIENTS:    LESS THAN OR EQUAL TO 6 MM:  No routine follow-up needed. GREATER THAN 6-8 MM: CT at 6-12 months, if multiple at 3-6 months, then consider  CT at 18-24 months. GREATER THAN 8 MM:  Consider CT at 3 months, PET/CT, or tissue sampling. If  multiple CT at 3-6 months, then consider CT at 18-24 months. HIGH-RISK PATIENTS:    LESS THAN OR EQUAL TO 6 MM:  Optional CT at 12 months. GREATER THAN 6-8 MM: CT at 6-12 months, if multiple at 3-6 months, then CT at  18-24 months. GREATER THAN 8 MM:  Consider CT at 3 months, PET/CT, or tissue sampling.  If  multiple CT at 3-6 months, then at 18-24 months. Guidelines for Management of Incidental Pulmonary Nodules Detected on CT : A  Statement from the [address] [place] [address]  2017\"  Yevgeniy Meraz. Radiology  2017; 000:1-16      23X     Coronary calcium score 2018:  CALCIUM SCORING:  Left main: 26. Left anterior descendin. Left circumflex:  0. Right coronary:  0. Posterior descendin.     TOTAL CAC SCORE: 47. Your score of 47 places you in the 50th percentile rank. That means 50% of the  females at the ages from 74-77 will have a higher calcium score than you. CT at that time was negative for significant obstructive coronary disease. Event monitor 2022:  Normal sinus rhythm throughout, even with multiple symptoms. Assessment:          ICD-10-CM ICD-9-CM    1. Coronary artery disease involving native coronary artery of native heart without angina pectoris  I25.10 414.01       2. Essential hypertension  I10 401.9 AMB POC EKG ROUTINE W/ 12 LEADS, INTER & REP      3. PAN (dyspnea on exertion)  R06.09 786.09       4. Mixed hyperlipidemia  E78.2 272.2       5. KRYSTINA on CPAP  G47.33 327.23     Z99.89 V46.8           Orders Placed This Encounter    AMB POC EKG ROUTINE W/ 12 LEADS, INTER & REP     Order Specific Question:   Reason for Exam:     Answer:   routine        Plan:     PAN, precordial pain  No significant CAD per [facility] in . Negative NST 2022, and previously in 2020. Cardiac CTA unimpressive. History of mildly elevated coronary calcium score of 47 in 2018, increased to 162 (LM and LAD) in 2022:  Continue with aggressive medical management including aspirin and statin. Heart palpitations:  2-week event monitor normal even with symptoms in 2022    Essential hypertension: Controlled with current meds. Mixed hyperlipidemia: On statin. Labs and lipids per PCP.   Labs not available to me currently, but patient reports that Dr. Claudean Nicolas says cholesterol was at goal.  Advised patient of LDL goal of 70 or less. KRYSTINA: on CPAP. H/o CREST syndrome. F/u with rheumatology, dr Sandy Patel    Pulm HTN: Followed by Dr Mirna Brady by right heart catheterization 2018. Repeat echo August 2022 showed normal PA systolic pressure, 34 mm  Patient is seeing Dr. Camilla Pisano for her shortness of breath and Dr. Camilla Pisano has ordered repeat pulmonary function tests and to follow-up with her thereafter. 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 Superficial venous reflux: s/p right GSV RF closure 10/2016. Left leg continues to do very well with conservative management. stable overall. Status post cervical spinal fusion 2020    Counseled on diet and exercise- eventual goal of 30-60 minutes 5-7 times a week as per AHA guidelines. Continue current care and f/u in 1 year after gradual increase in exercise.         Desmond Briones MD

## 2023-04-24 ENCOUNTER — TRANSCRIBE ORDER (OUTPATIENT)
Dept: SCHEDULING | Age: 73
End: 2023-04-24

## 2023-04-24 DIAGNOSIS — Z78.0 MENOPAUSE: Primary | ICD-10-CM

## 2023-04-25 ENCOUNTER — TRANSCRIBE ORDERS (OUTPATIENT)
Facility: HOSPITAL | Age: 73
End: 2023-04-25

## 2023-04-25 DIAGNOSIS — Z78.0 MENOPAUSE: Primary | ICD-10-CM

## 2023-04-27 ENCOUNTER — OFFICE VISIT (OUTPATIENT)
Dept: ORTHOPEDIC SURGERY | Age: 73
End: 2023-04-27
Payer: MEDICARE

## 2023-04-27 VITALS — HEIGHT: 66 IN | WEIGHT: 217 LBS | BODY MASS INDEX: 34.87 KG/M2

## 2023-04-27 DIAGNOSIS — M48.062 SPINAL STENOSIS OF LUMBAR REGION WITH NEUROGENIC CLAUDICATION: ICD-10-CM

## 2023-04-27 DIAGNOSIS — Z98.1 S/P SPINAL FUSION: Primary | ICD-10-CM

## 2023-04-27 DIAGNOSIS — M43.16 SPONDYLOLISTHESIS OF LUMBAR REGION: ICD-10-CM

## 2023-04-27 PROCEDURE — G8510 SCR DEP NEG, NO PLAN REQD: HCPCS | Performed by: ORTHOPAEDIC SURGERY

## 2023-04-27 PROCEDURE — G8536 NO DOC ELDER MAL SCRN: HCPCS | Performed by: ORTHOPAEDIC SURGERY

## 2023-04-27 PROCEDURE — 1123F ACP DISCUSS/DSCN MKR DOCD: CPT | Performed by: ORTHOPAEDIC SURGERY

## 2023-04-27 PROCEDURE — 1090F PRES/ABSN URINE INCON ASSESS: CPT | Performed by: ORTHOPAEDIC SURGERY

## 2023-04-27 PROCEDURE — 99214 OFFICE O/P EST MOD 30 MIN: CPT | Performed by: ORTHOPAEDIC SURGERY

## 2023-04-27 PROCEDURE — 3017F COLORECTAL CA SCREEN DOC REV: CPT | Performed by: ORTHOPAEDIC SURGERY

## 2023-04-27 PROCEDURE — G8427 DOCREV CUR MEDS BY ELIG CLIN: HCPCS | Performed by: ORTHOPAEDIC SURGERY

## 2023-04-27 PROCEDURE — G9899 SCRN MAM PERF RSLTS DOC: HCPCS | Performed by: ORTHOPAEDIC SURGERY

## 2023-04-27 PROCEDURE — G8417 CALC BMI ABV UP PARAM F/U: HCPCS | Performed by: ORTHOPAEDIC SURGERY

## 2023-04-27 PROCEDURE — 1101F PT FALLS ASSESS-DOCD LE1/YR: CPT | Performed by: ORTHOPAEDIC SURGERY

## 2023-04-27 PROCEDURE — G8399 PT W/DXA RESULTS DOCUMENT: HCPCS | Performed by: ORTHOPAEDIC SURGERY

## 2023-04-27 NOTE — PATIENT INSTRUCTIONS
Spondylolysis and Spondylolisthesis: Exercises  Introduction  Here are some examples of exercises for you to try. The exercises may be suggested for a condition or for rehabilitation. Start each exercise slowly. Ease off the exercises if you start to have pain. You will be told when to start these exercises and which ones will work best for you. How to do the exercises  Single knee-to-chest  Lie on your back with your knees bent and your feet flat on the floor. You can put a small pillow under your head and neck if it is more comfortable. Bring one knee to your chest, keeping the other foot flat on the floor. Keep your lower back pressed to the floor. Hold for 15 to 30 seconds. Relax, and lower the knee to the starting position. Repeat with the other leg. Repeat 2 to 4 times with each leg. To get more stretch, put your other leg flat on the floor while pulling your knee to your chest.  Double knee-to-chest  Lie on your back with your knees bent and your feet flat on the floor. You can put a small pillow under your head and neck if it is more comfortable. Bring both knees to your chest.  Keep your lower back pressed to the floor. Hold for 15 to 30 seconds. Relax, and lower your knees to the starting position. Repeat 2 to 4 times. Alternate arm and leg (bird dog) exercise  Do this exercise slowly. Try to keep your body straight at all times. Start on the floor, on your hands and knees. Tighten your belly muscles by pulling your belly button in toward your spine. Be sure you continue to breathe normally and do not hold your breath. Raise one arm off the floor, and hold it straight out in front of you. Be careful not to let your shoulder drop down, because that will twist your trunk. Hold for about 6 seconds, then lower your arm and switch to your other arm. Repeat 8 to 12 times on each arm. When you can do this exercise with ease and no pain, repeat steps 1 through 5.  But this time do it with one leg raised off the floor, holding your leg straight out behind you. Be careful not to let your hip drop down, because that will twist your trunk. When holding your leg straight out becomes easier, try raising your opposite arm at the same time, and repeat steps 1 through 5. Bridging (feet flat)  Lie on your back with both knees bent and your feet flat on the floor. Your knees should be bent about 90 degrees. Tighten your belly muscles by pulling your belly button in toward your spine. Push your feet into the floor, squeeze your buttocks, and lift your hips off the floor until your shoulders, hips, and knees are all in a straight line. Keep your hips level. Hold about 6 seconds as you continue to breathe normally. Slowly lower your hips back to the floor. Repeat 8 to 12 times. Curl-ups  Lie on the floor on your back with your knees bent at a 90-degree angle. Your feet should be flat on the floor, about 12 inches from your buttocks. Cross your arms over your chest. If this bothers your neck, try putting your hands behind your neck (not your head), with your elbows spread apart. Slowly tighten your belly muscles and raise your shoulder blades off the floor. Keep your head in line with your body, and do not press your chin to your chest.  Hold this position for 1 or 2 seconds, then slowly lower yourself back down to the floor. Repeat 8 to 12 times. Plank  Do this exercise slowly. Try to keep your body straight at all times, and do not let one hip drop lower than the other. Lie on your stomach, resting your upper body on your forearms. Tighten your belly muscles by pulling your belly button in toward your spine. Keeping your knees on the floor, press down with your forearms to lift your upper body off the floor. Hold for about 6 seconds, then lower your body to the floor. Rest for up to 10 seconds. Repeat 8 to 12 times. Over time, work up to holding for 15 to 30 seconds each time.   If this exercise is easy to do with your knees on the floor, try doing this exercise with your knees and legs straight, supported by your toes on the floor. Follow-up care is a key part of your treatment and safety. Be sure to make and go to all appointments, and call your doctor if you are having problems. It's also a good idea to know your test results and keep a list of the medicines you take. Current as of: November 9, 2022               Content Version: 13.6  © 2006-2023 [employer]. Care instructions adapted under license by [employer] (which disclaims liability or warranty for this information). If you have questions about a medical condition or this instruction, always ask your healthcare professional. [place] [address] any warranty or liability for your use of this information. Lumbar Spinal Fusion: Before Your Surgery  What is lumbar spinal fusion? Spinal fusion is surgery that joins, or fuses, two or more vertebrae together. The joints will no longer be able to move. The surgery is also called arthrodesis. Most of the time, bone from your pelvic bone or from a bone bank is used. Or sometimes human-made bone is used. This bone is used to make a \"bridge\" between the vertebrae to be joined. Metal rods, wires, or screws are often attached to the vertebrae. This will hold them together until new bone grows between them. Spinal fusion surgery usually takes a few hours. It involves making a cut in your back, belly, or side. The cuts, called incisions, leave scars that fade with time. You can expect your back to feel stiff and sore after surgery. You will be given pain medicine. You will probably get up and walk at the hospital. Juliana Laughlin will have a short hospital stay. It may take 4 to 6 weeks to get back to doing simple activities, such as light housework or office work. How do you prepare for surgery? Surgery can be stressful.  This information will help you understand what you can expect. And it will help you safely prepare for surgery. Preparing for surgery    Be sure you have someone to take you home. Anesthesia and pain medicine will make it unsafe for you to drive or get home on your own. Understand exactly what surgery is planned, along with the risks, benefits, and other options. If you take a medicine that prevents blood clots, your doctor may tell you to stop taking it before your surgery. Or your doctor may tell you to keep taking it. (These medicines include aspirin and other blood thinners.) Make sure that you understand exactly what your doctor wants you to do. Tell your doctor ALL the medicines, vitamins, supplements, and herbal remedies you take. Some may increase the risk of problems during your surgery. Your doctor will tell you if you should stop taking any of them before the surgery and how soon to do it. Make sure your doctor and the hospital have a copy of your advance directive. If you don't have one, you may want to prepare one. It lets others know your health care wishes. It's a good thing to have before any type of surgery or procedure. What happens on the day of surgery? Follow the instructions exactly about when to stop eating and drinking. If you don't, your surgery may be canceled. If your doctor told you to take your medicines on the day of surgery, take them with only a sip of water. Take a bath or shower before you come in for your surgery. Do not apply lotions, perfumes, deodorants, or nail polish. Do not shave the surgical site yourself. Take off all jewelry and piercings. And take out contact lenses, if you wear them. At the hospital or surgery center   Bring a picture ID. The area for surgery is often marked to make sure there are no errors. You will be kept comfortable and safe by your anesthesia provider. You will be asleep during the surgery. Surgery will take a few hours.    When should you call your doctor? You have questions or concerns. You do not understand how to prepare for your surgery. You become ill before surgery (such as fever, cold or flu, chest pain, or shortness of breath). You need to reschedule or have changed your mind about having the surgery. Where can you learn more? Go to http://www.gray.com/  Enter [id number] in the search box to learn more about \"Lumbar Spinal Fusion: Before Your Surgery. \"  Current as of: November 9, 2022               Content Version: 13.6  © 6272-2252 [employer]. Care instructions adapted under license by [employer] (which disclaims liability or warranty for this information). If you have questions about a medical condition or this instruction, always ask your healthcare professional. [address] any warranty or liability for your use of this information.

## 2023-04-27 NOTE — PROGRESS NOTES
Harshal Tinajero (: 1950) is a 68 y.o. female, patient, here for evaluation of the following chief complaint(s):  LOW BACK PAIN, Leg Pain, Hip Pain, and Back Pain       ASSESSMENT/PLAN:    Below is the assessment and plan developed based on review of pertinent history, physical exam, labs, studies, and medications. At this point she has had some recurrent severe lower back pain and right leg radicular symptoms since bending and lifting approximately 4 months ago. It has not improved. She is getting a lot of burning in the right lower extremity despite medications. Patient that she obtained helped for approximately 1 week but now the pain is returned into the right hip and right leg region. We discussed treatment options. She does not want a second injection. She is due to have some hand surgery soon. Ultimately I think she does have a small spondylosis at L4-5 as well as severe foraminal stenosis on the right. I think she is a candidate for revision laminectomy at L3-4 and L4-5 with extension of her fusion to L4. Risk and benefits were discussed with her. Procedure: Lumbar revision laminectomy L4-5 and revision fusion L4-S1      The risks and benefits were discussed at length with the patient and the patient has elected to proceed. Indications for surgery include failed conservative treatment. Alternative treatments, risks and the perioperative course were discussed with the patient. All questions were answered. The risks and benefits of the procedure were explained. Benefits include definitive diagnosis, relief of pain, elimination of deformity and improved function. Risks of surgery including bleeding, infection, weakness, numbness, CSF leak, failure to improve symptoms, exacerbation of medical co-morbidities and even death were discussed with the patient. 1. S/P spinal fusion  2. Spinal stenosis of lumbar region with neurogenic claudication  3.  Spondylolisthesis of lumbar region        No follow-ups on file. SUBJECTIVE/OBJECTIVE:  Angel Asencio (: 1950) is a 68 y.o. female. Pain Assessment  2023   Location of Pain Leg;Back;Hip;Buttocks   Location Modifiers Left   Severity of Pain 4   Quality of Pain Aching   Aggravating Factors -   Limiting Behavior -        She comes in today for an evaluation. She is about 2 years out from a lumbar fusion at L5-S1. She was doing rather well until November of last year. She was bending over and lifting something heavier than she thought and she felt a pop. Since then she has had severe back pain and right leg symptoms. She denies any bowel bladder difficulties. Pain is worse with activities. She has her MRI for follow-up    Imaging:    XR Results (most recent):  Results from Appointment encounter on 23    XR HAND LT MIN 3 V    Narrative  AP, lateral and oblique x-ray of the left hand shows for fusions of the finger DIP joints with retrograde AccuTrack screws in good position and alignment and overall healing. There is fusion of the thumb MP joint with plate and screw technique healing in good position and alignment. There is maintained post trapeziectomy space in height with good thumb metacarpal alignment and no changes suspensioplasty buttons. MRI Results (most recent):  Results from [place] encounter on 23    MRI LUMB SPINE WO CONT    Narrative  EXAM: MRI LUMB SPINE WO CONT    INDICATION:  Arthrodesis status / Please call and schedule  w/ sedation @[facility]. COMPARISON: Lumbosacral spine radiographs 1/10/2023, CT abdomen and pelvis  2021, MRI lumbar spine 2019, CT lumbar spine 2019    TECHNIQUE: MR imaging of the lumbar spine was performed using the following  sequences: sagittal T1, T2, STIR;  axial T1, T2.    CONTRAST:  None. FINDINGS:  L5-S1 posterior spinal instrumentation, interbody graft, and laminectomies.  4.2  cm x 0.7 cm x 0.7 cm tubular fluid signal focus within the L3-L4 interspinous  space and superficial to the L3 spinous process (4-11, 8-25). Stepwise retrolisthesis from L1-L3. Mild lumbar dextroscoliosis. Vertebral body  heights are maintained. Multilevel degenerative endplate osteophytes. Degenerative endplate marrow edema signal at several levels, most pronounced at  L2-L3 anteriorly. Scattered Schmorl's nodes. The conus medullaris terminates at L1-L2. Signal of the distal spinal cord are  within normal limits. T2 hyperintense right lower pole renal lesion may reflect a cyst. Sacral nerve  stimulator. T10-T11: Disc bulge. Facet arthropathy. Epidural lipomatosis. Mild spinal  stenosis. Mild right foraminal stenosis. T11-T12: Disc bulge with broad-based right subarticular disc extrusion. Endplate  osteophytes. Facet arthropathy. Epidural lipomatosis. Chronic Moderate spinal  stenosis. Chronic Mild cord compression. Mild right foraminal stenosis. T12-L1: Disc bulge. Moderate facet arthropathy. No stenosis. L1-L2: Retrolisthesis. Disc bulge. Endplate osteophytes. Moderate facet  arthropathy with effusions. No stenosis. L2-L3: Retrolisthesis. Disc bulge. Endplate osteophytes. Moderate facet  arthropathy with right-sided effusion. No spinal stenosis. Moderate left and  mild right foraminal stenosis. L3-L4: Disc bulge. Marked facet arthropathy. Ligament of flavum thickening. Epidural lipomatosis. No spinal stenosis. Mild bilateral foraminal stenosis. L4-L5: Disc bulge. Endplate osteophytes. Marked facet arthropathy with  effusions. Ligament of flavum thickening. Dorsal epidural scarring. No spinal  stenosis. Moderate right and mild left foraminal stenosis. L5-S1: Disc bulge. Endplate osteophytes. Marked facet arthropathy. Dorsal  epidural scarring. No spinal stenosis. Moderate right and mild left foraminal  stenosis. Impression  1. L5-S1 posterior spinal instrumentation, interbody graft, and laminectomies.   Tubular fluid signal focus within the L3-L4 interspinous space and superficial  to the L3 spinous process, may be postsurgical.  2.  Multilevel degenerative changes, disc disease, and listhesis with mild  dextroscoliosis. No significant lumbar spinal canal stenosis. Multilevel  mild-to-moderate neural foraminal stenosis as outlined above. 3.  Incidental chronic T11-T12 moderate spinal canal stenosis and mild cord  compression. Allergies   Allergen Reactions    Ciprofloxacin Shortness of Breath    Flagyl [Metronidazole] Shortness of Breath    Percocet [Oxycodone-Acetaminophen] Rash and Itching     Able to take if uses benadryl    Benzene Other (comments)     Blisters and burn area Benzene found to be on steri-strips    Betadine [Povidone-Iodine] Other (comments)     Blisters and burning    Naproxen Itching    Codeine-Guaifenesin Unknown (comments)    Lorazepam Unknown (comments)    Sulfa (Sulfonamide Antibiotics) Rash    Adhesive Rash     Redness and rash    Adhesive Tape-Silicones Rash     Redness and rash       Current Outpatient Medications   Medication Sig    benzonatate (TESSALON) 100 mg capsule Take 1 Capsule by mouth three (3) times daily as needed for Cough. RABEprazole (ACIPHEX) 20 mg TbEC Take 1 Tablet by mouth daily. calcium polycarbophiL (FIBERCON) 625 mg tablet Take 1 Tablet by mouth daily. diphenhydrAMINE (BENADRYL) 25 mg capsule Take 1 Capsule by mouth every six (6) hours as needed. pravastatin (PRAVACHOL) 40 mg tablet Take 1 Tablet by mouth nightly. carboxymethylcellulose sodium (THERATEARS OP) Apply  to eye as needed. telmisartan (MICARDIS) 40 mg tablet Take 1 Tablet by mouth daily. metoprolol tartrate (LOPRESSOR) 25 mg tablet TAKE ONE TABLET BY MOUTH TWICE DAILY    isosorbide mononitrate ER (IMDUR) 30 mg tablet TAKE ONE-HALF (1/2) TABLET DAILY FOR RAYNAUDS PHENOMENON (Patient taking differently: Take 0.5 Tablets by mouth nightly.  TAKE ONE-HALF (1/2) TABLET DAILY FOR RAYNAUDS PHENOMENON)    baclofen (LIORESAL) 10 mg tablet Take 2 Tablets by mouth two (2) times a day. multivitamin (ONE A DAY) tablet Take 1 Tablet by mouth daily. aspirin delayed-release 81 mg tablet Take 1 Tablet by mouth nightly. acetaminophen (TYLENOL) 500 mg tablet Take 2 Tablets by mouth every six (6) hours as needed for Pain. allopurinol (ZYLOPRIM) 100 mg tablet Take 1 Tablet by mouth daily. cetirizine (ZYRTEC) 10 mg tablet Take 1 Tablet by mouth daily. montelukast (SINGULAIR) 10 mg tablet Take 1 Tablet by mouth nightly. No current facility-administered medications for this visit.        Past Medical History:   Diagnosis Date    Adverse effect of anesthesia     during hysterectomy- woke up \"too early\"    Arrhythmia     h/o palpitations normal work up 22/2015 heart: Ronni    Arthritis     Awareness under anesthesia     with hysterectomy    Chronic pain     bulging disc c4/c5 l4/l5 : as of 9/1018 no neck/head movement limitation says patient    Claustrophobia     Color blind     CREST (calcinosis, Raynaud's phenomenon, esophageal dysfunction, sclerodactyly, telangiectasia) ([facility] [address] 2018    [address], Dr. Sharif Anthony    GERD (gastroesophageal reflux disease)     Gout     Hyperlipidemia     Hypertension     Ill-defined condition     CREST    Leg swelling 2016    unknown etiology    Bartlett's neuralgia 2001    from neuroma middle toe, left foot    Nausea & vomiting     KRYSTINA on CPAP     CPAP- no using since Oct 2021     Pulmonary hypertension ([facility] [address] 08/2018    Heart: Dr. Mariel Pratt    Seasonal asthma     allergy    Shortness of breath 2016    Pulmonary Dr. Broussard Apt    Unspecified adverse effect of anesthesia     wakes up for anesthesia early        Past Surgical History:   Procedure Laterality Date    COLONOSCOPY  04/20/2011    negative    COLONOSCOPY N/A 9/28/2018    COLONOSCOPY performed by Juliana Gutierrez MD at [facility] Bilateral 2018    HX CERVICAL FUSION  2014    c4-5     HX CHOLECYSTECTOMY      HX GI  11/19/13    Rectocele repair with enterocele repair    HX HYSTERECTOMY      TOOK LEFT OVARY    HX KNEE ARTHROSCOPY Right 1990's    right knee partial meniscus     HX KNEE REPLACEMENT Right 2016    HX KNEE REPLACEMENT Left 2010, 2016    TKR    HX ORTHOPAEDIC  1973    ganglion cyst removed rt wrist    HX ORTHOPAEDIC  2013, 1992    bilateral CTR, and had ulna nerve release    HX ORTHOPAEDIC Right 2018    thumb and ring finger trigger release    HX ORTHOPAEDIC  2019    cervical fusion x2     HX ORTHOPAEDIC  2020    L4-L5    HX OTHER SURGICAL  2013    recto prolapse    HX OTHER SURGICAL      Interstim System ( bladder pacemaker)    IR INJ SPINE THER SUBST LUM/SAC W IMG  12/19/2019    WA UNLISTED PROCEDURE CARDIAC SURGERY  2015    no blockages, card cath    WA UNLISTED PROCEDURE CARDIAC SURGERY  05/2018    no blockages x 2 procedures       Family History   Problem Relation Age of Onset    Heart Disease Mother     Hypertension Mother     Diabetes Mother     Cancer Mother         BREAST, LUNG    Breast Cancer Mother 61    Heart Disease Father     Hypertension Father     Thyroid Disease Father     Diabetes Brother     Psychiatric Disorder Son         BIPOLAR, SCHIZO, MANIC DEPRESSIVE    Anesth Problems Neg Hx         Social History     Tobacco Use    Smoking status: Never    Smokeless tobacco: Never   Vaping Use    Vaping Use: Never used   Substance Use Topics    Alcohol use: Never    Drug use: No        Review of Systems   Musculoskeletal:  Positive for back pain and gait problem. All other systems reviewed and are negative. Vitals:  Ht 5' 6\" (1.676 m)   Wt 217 lb (98.4 kg)   BMI 35.02 kg/m²    Body mass index is 35.02 kg/m². Ortho Exam       Integumentary  Assessment of Surgical Incision - healing and consistent with normal anticipated wound healing. Neurologic  Sensory  Light Touch - Intact - Globally.   Overall Assessment of Muscle Strength and Tone reveals  Lower Extremities - Right Iliopsoas - 5/5. Left Iliopsoas - 5/5. Right Tibialis Anterior - 4/5. Left Tibialis Anterior - 5/5. Right Gastroc-Soleus - 5/5. Left Gastroc-Soleus - 5/5. Right EHL - 4/5. Left EHL - 5/5. General Assessment of Reflexes  Right Ankle - Clonus is not present. Left Ankle - Clonus is not present. Reflexes (Dermatomes)  2/2 Normal - Left Achilles (L5-S2), Left Knee (L2-4), Right Achilles (L5-S2) and Right Knee (L2-4). Musculoskeletal  Global Assessment  Examination of related systems reveals - well-developed, well-nourished, in no acute distress, alert and oriented x 3 and normal coordination. Spine/Ribs/Pelvis  Lumbosacral Spine - Examination of the lumbosacral spine reveals - no known fractures or deformities. Inspection and Palpation - Tenderness - moderate. Assessment of pain reveals the following findings - The pain is characterized as - moderate. Location - pain refers to lower back bilaterally. An electronic signature was used to authenticate this note.   -- Edwin Carson MD

## 2023-05-09 ENCOUNTER — TELEPHONE (OUTPATIENT)
Age: 73
End: 2023-05-09

## 2023-05-09 NOTE — TELEPHONE ENCOUNTER
Pt is calling because she saw her Pulmonologist doctor on yesterday  and she is a candidate for Jardiance and she would like to know what's the doctor recommendation.     [phone] mobile

## 2023-05-10 ENCOUNTER — TELEPHONE (OUTPATIENT)
Age: 73
End: 2023-05-10

## 2023-05-10 NOTE — TELEPHONE ENCOUNTER
This nurse attempted to contact patient, mailbox full unable to leave a message. Message forward to Dr. Bushra Henao for recommendation.

## 2023-05-10 NOTE — TELEPHONE ENCOUNTER
This nurse attempted to call patient one more time. unable to leave a message box mail full. No need for patient to call back, I will be contacting her after 's advise.

## 2023-05-12 NOTE — TELEPHONE ENCOUNTER
Juliozachariah Webb has many benefits and I do not see any contraindication from a cardiac standpoint for her to take it. Please advise.

## 2023-05-12 NOTE — TELEPHONE ENCOUNTER
This nurse attempted to call patient unable to leave any message, box mail full. Message left in  phone number.

## 2023-06-30 ENCOUNTER — HOSPITAL ENCOUNTER (OUTPATIENT)
Facility: HOSPITAL | Age: 73
End: 2023-06-30
Payer: MEDICARE

## 2023-06-30 VITALS
SYSTOLIC BLOOD PRESSURE: 149 MMHG | RESPIRATION RATE: 18 BRPM | WEIGHT: 217.15 LBS | DIASTOLIC BLOOD PRESSURE: 63 MMHG | BODY MASS INDEX: 34.9 KG/M2 | TEMPERATURE: 98 F | HEIGHT: 66 IN | HEART RATE: 60 BPM

## 2023-06-30 LAB
ANION GAP SERPL CALC-SCNC: 3 MMOL/L (ref 5–15)
APPEARANCE UR: CLEAR
BACTERIA URNS QL MICRO: NEGATIVE /HPF
BILIRUB UR QL: NEGATIVE
BUN SERPL-MCNC: 14 MG/DL (ref 6–20)
BUN/CREAT SERPL: 15 (ref 12–20)
CALCIUM SERPL-MCNC: 9.3 MG/DL (ref 8.5–10.1)
CHLORIDE SERPL-SCNC: 109 MMOL/L (ref 97–108)
CO2 SERPL-SCNC: 28 MMOL/L (ref 21–32)
COLOR UR: ABNORMAL
CREAT SERPL-MCNC: 0.96 MG/DL (ref 0.55–1.02)
EPITH CASTS URNS QL MICRO: ABNORMAL /LPF
ERYTHROCYTE [DISTWIDTH] IN BLOOD BY AUTOMATED COUNT: 15 % (ref 11.5–14.5)
EST. AVERAGE GLUCOSE BLD GHB EST-MCNC: 97 MG/DL
GLUCOSE SERPL-MCNC: 115 MG/DL (ref 65–100)
GLUCOSE UR STRIP.AUTO-MCNC: NEGATIVE MG/DL
HBA1C MFR BLD: 5 % (ref 4–5.6)
HCT VFR BLD AUTO: 35.9 % (ref 35–47)
HGB BLD-MCNC: 11.3 G/DL (ref 11.5–16)
HGB UR QL STRIP: NEGATIVE
HYALINE CASTS URNS QL MICRO: ABNORMAL /LPF (ref 0–5)
INR PPP: 1 (ref 0.9–1.1)
KETONES UR QL STRIP.AUTO: NEGATIVE MG/DL
LEUKOCYTE ESTERASE UR QL STRIP.AUTO: ABNORMAL
MCH RBC QN AUTO: 30 PG (ref 26–34)
MCHC RBC AUTO-ENTMCNC: 31.5 G/DL (ref 30–36.5)
MCV RBC AUTO: 95.2 FL (ref 80–99)
NITRITE UR QL STRIP.AUTO: NEGATIVE
NRBC # BLD: 0 K/UL (ref 0–0.01)
NRBC BLD-RTO: 0 PER 100 WBC
PH UR STRIP: 6.5 (ref 5–8)
PLATELET # BLD AUTO: 197 K/UL (ref 150–400)
PMV BLD AUTO: 10.4 FL (ref 8.9–12.9)
POTASSIUM SERPL-SCNC: 4.6 MMOL/L (ref 3.5–5.1)
PROT UR STRIP-MCNC: NEGATIVE MG/DL
PROTHROMBIN TIME: 10.2 SEC (ref 9–11.1)
RBC # BLD AUTO: 3.77 M/UL (ref 3.8–5.2)
RBC #/AREA URNS HPF: ABNORMAL /HPF (ref 0–5)
SODIUM SERPL-SCNC: 140 MMOL/L (ref 136–145)
SP GR UR REFRACTOMETRY: 1.01 (ref 1–1.03)
URINE CULTURE IF INDICATED: ABNORMAL
UROBILINOGEN UR QL STRIP.AUTO: 0.2 EU/DL (ref 0.2–1)
WBC # BLD AUTO: 5.8 K/UL (ref 3.6–11)
WBC URNS QL MICRO: ABNORMAL /HPF (ref 0–4)

## 2023-06-30 PROCEDURE — 85610 PROTHROMBIN TIME: CPT

## 2023-06-30 PROCEDURE — 80048 BASIC METABOLIC PNL TOTAL CA: CPT

## 2023-06-30 PROCEDURE — 86901 BLOOD TYPING SEROLOGIC RH(D): CPT

## 2023-06-30 PROCEDURE — 85027 COMPLETE CBC AUTOMATED: CPT

## 2023-06-30 PROCEDURE — 83036 HEMOGLOBIN GLYCOSYLATED A1C: CPT

## 2023-06-30 PROCEDURE — 86850 RBC ANTIBODY SCREEN: CPT

## 2023-06-30 PROCEDURE — 81001 URINALYSIS AUTO W/SCOPE: CPT

## 2023-06-30 PROCEDURE — 36415 COLL VENOUS BLD VENIPUNCTURE: CPT

## 2023-06-30 PROCEDURE — NSP99 NSP99 NON-BILLABLE CODE: Performed by: NURSE PRACTITIONER

## 2023-06-30 PROCEDURE — 86900 BLOOD TYPING SEROLOGIC ABO: CPT

## 2023-06-30 RX ORDER — KETOTIFEN FUMARATE 0.35 MG/ML
1 SOLUTION/ DROPS OPHTHALMIC AS NEEDED
COMMUNITY

## 2023-06-30 RX ORDER — HYDROCHLOROTHIAZIDE 12.5 MG/1
12.5 CAPSULE, GELATIN COATED ORAL DAILY
COMMUNITY

## 2023-06-30 ASSESSMENT — PAIN DESCRIPTION - PAIN TYPE: TYPE: CHRONIC PAIN

## 2023-06-30 ASSESSMENT — PAIN SCALES - GENERAL: PAINLEVEL_OUTOF10: 5

## 2023-06-30 ASSESSMENT — PAIN DESCRIPTION - ORIENTATION: ORIENTATION: LOWER;RIGHT

## 2023-06-30 ASSESSMENT — PAIN DESCRIPTION - DESCRIPTORS: DESCRIPTORS: ACHING;SHOOTING;SORE

## 2023-06-30 ASSESSMENT — PAIN DESCRIPTION - LOCATION: LOCATION: BACK;LEG

## 2023-06-30 ASSESSMENT — PAIN DESCRIPTION - FREQUENCY: FREQUENCY: CONTINUOUS

## 2023-07-01 LAB
ABO + RH BLD: NORMAL
BACTERIA SPEC CULT: NORMAL
BACTERIA SPEC CULT: NORMAL
BLOOD GROUP ANTIBODIES SERPL: NORMAL
SERVICE CMNT-IMP: NORMAL
SPECIMEN EXP DATE BLD: NORMAL

## 2023-07-03 PROBLEM — Z01.818 ENCOUNTER FOR PREADMISSION TESTING: Status: ACTIVE | Noted: 2023-07-03

## 2023-07-10 ENCOUNTER — HOSPITAL ENCOUNTER (OUTPATIENT)
Facility: HOSPITAL | Age: 73
Discharge: HOME | End: 2023-07-13
Payer: MEDICARE

## 2023-07-10 DIAGNOSIS — Z12.31 SCREENING MAMMOGRAM FOR HIGH-RISK PATIENT: ICD-10-CM

## 2023-07-10 DIAGNOSIS — Z78.0 MENOPAUSE: ICD-10-CM

## 2023-07-10 PROCEDURE — 77080 DXA BONE DENSITY AXIAL: CPT

## 2023-07-10 PROCEDURE — 77063 BREAST TOMOSYNTHESIS BI: CPT

## 2023-07-12 ENCOUNTER — APPOINTMENT (OUTPATIENT)
Facility: HOSPITAL | Age: 73
End: 2023-07-12
Attending: ORTHOPAEDIC SURGERY
Payer: MEDICARE

## 2023-07-12 ENCOUNTER — ANESTHESIA EVENT (OUTPATIENT)
Facility: HOSPITAL | Age: 73
End: 2023-07-12
Payer: MEDICARE

## 2023-07-12 ENCOUNTER — ANESTHESIA (OUTPATIENT)
Facility: HOSPITAL | Age: 73
End: 2023-07-12
Payer: MEDICARE

## 2023-07-12 ENCOUNTER — HOSPITAL ENCOUNTER (INPATIENT)
Facility: HOSPITAL | Age: 73
LOS: 3 days | Discharge: HOME HEALTH | End: 2023-07-15
Attending: ORTHOPAEDIC SURGERY | Admitting: ORTHOPAEDIC SURGERY
Payer: MEDICARE

## 2023-07-12 DIAGNOSIS — Z98.1 STATUS POST LUMBAR SPINAL FUSION: Primary | ICD-10-CM

## 2023-07-12 PROBLEM — G97.1 HEADACHE AFTER SPINAL PUNCTURE: Status: ACTIVE | Noted: 2023-07-12

## 2023-07-12 PROBLEM — R53.1 RIGHT SIDED WEAKNESS: Status: ACTIVE | Noted: 2023-07-12

## 2023-07-12 PROBLEM — M48.062 LUMBAR STENOSIS WITH NEUROGENIC CLAUDICATION: Status: ACTIVE | Noted: 2023-07-12

## 2023-07-12 LAB
GLUCOSE BLD STRIP.AUTO-MCNC: 157 MG/DL (ref 65–117)
GLUCOSE BLD STRIP.AUTO-MCNC: 95 MG/DL (ref 65–117)
SERVICE CMNT-IMP: ABNORMAL
SERVICE CMNT-IMP: NORMAL

## 2023-07-12 PROCEDURE — 2500000003 HC RX 250 WO HCPCS: Performed by: ORTHOPAEDIC SURGERY

## 2023-07-12 PROCEDURE — 2500000003 HC RX 250 WO HCPCS: Performed by: NURSE ANESTHETIST, CERTIFIED REGISTERED

## 2023-07-12 PROCEDURE — 6370000000 HC RX 637 (ALT 250 FOR IP): Performed by: ORTHOPAEDIC SURGERY

## 2023-07-12 PROCEDURE — 2580000003 HC RX 258: Performed by: ANESTHESIOLOGY

## 2023-07-12 PROCEDURE — 63052 LAM FACETC/FRMT ARTHRD LUM 1: CPT | Performed by: ORTHOPAEDIC SURGERY

## 2023-07-12 PROCEDURE — 6360000004 HC RX CONTRAST MEDICATION: Performed by: RADIOLOGY

## 2023-07-12 PROCEDURE — 0042T CT BRAIN PERFUSION: CPT

## 2023-07-12 PROCEDURE — 0ST20ZZ RESECTION OF LUMBAR VERTEBRAL DISC, OPEN APPROACH: ICD-10-PCS | Performed by: ORTHOPAEDIC SURGERY

## 2023-07-12 PROCEDURE — 22853 INSJ BIOMECHANICAL DEVICE: CPT | Performed by: STUDENT IN AN ORGANIZED HEALTH CARE EDUCATION/TRAINING PROGRAM

## 2023-07-12 PROCEDURE — 6370000000 HC RX 637 (ALT 250 FOR IP): Performed by: PHYSICIAN ASSISTANT

## 2023-07-12 PROCEDURE — 6360000002 HC RX W HCPCS: Performed by: NURSE ANESTHETIST, CERTIFIED REGISTERED

## 2023-07-12 PROCEDURE — 6360000002 HC RX W HCPCS: Performed by: ORTHOPAEDIC SURGERY

## 2023-07-12 PROCEDURE — 6360000002 HC RX W HCPCS: Performed by: PHYSICIAN ASSISTANT

## 2023-07-12 PROCEDURE — 1100000000 HC RM PRIVATE

## 2023-07-12 PROCEDURE — 2580000003 HC RX 258: Performed by: PHYSICIAN ASSISTANT

## 2023-07-12 PROCEDURE — 0SG00AJ FUSION OF LUMBAR VERTEBRAL JOINT WITH INTERBODY FUSION DEVICE, POSTERIOR APPROACH, ANTERIOR COLUMN, OPEN APPROACH: ICD-10-PCS | Performed by: ORTHOPAEDIC SURGERY

## 2023-07-12 PROCEDURE — 99024 POSTOP FOLLOW-UP VISIT: CPT | Performed by: PHYSICIAN ASSISTANT

## 2023-07-12 PROCEDURE — 82962 GLUCOSE BLOOD TEST: CPT

## 2023-07-12 PROCEDURE — 7100000000 HC PACU RECOVERY - FIRST 15 MIN: Performed by: ORTHOPAEDIC SURGERY

## 2023-07-12 PROCEDURE — 2709999900 HC NON-CHARGEABLE SUPPLY: Performed by: ORTHOPAEDIC SURGERY

## 2023-07-12 PROCEDURE — 99221 1ST HOSP IP/OBS SF/LOW 40: CPT

## 2023-07-12 PROCEDURE — 2580000003 HC RX 258: Performed by: STUDENT IN AN ORGANIZED HEALTH CARE EDUCATION/TRAINING PROGRAM

## 2023-07-12 PROCEDURE — 22842 INSERT SPINE FIXATION DEVICE: CPT | Performed by: STUDENT IN AN ORGANIZED HEALTH CARE EDUCATION/TRAINING PROGRAM

## 2023-07-12 PROCEDURE — 22842 INSERT SPINE FIXATION DEVICE: CPT | Performed by: ORTHOPAEDIC SURGERY

## 2023-07-12 PROCEDURE — 3700000000 HC ANESTHESIA ATTENDED CARE: Performed by: ORTHOPAEDIC SURGERY

## 2023-07-12 PROCEDURE — C1889 IMPLANT/INSERT DEVICE, NOC: HCPCS | Performed by: ORTHOPAEDIC SURGERY

## 2023-07-12 PROCEDURE — 22853 INSJ BIOMECHANICAL DEVICE: CPT | Performed by: ORTHOPAEDIC SURGERY

## 2023-07-12 PROCEDURE — 63053 LAM FACTC/FRMT ARTHRD LUM EA: CPT | Performed by: ORTHOPAEDIC SURGERY

## 2023-07-12 PROCEDURE — 70450 CT HEAD/BRAIN W/O DYE: CPT

## 2023-07-12 PROCEDURE — 22633 ARTHRD CMBN 1NTRSPC LUMBAR: CPT | Performed by: STUDENT IN AN ORGANIZED HEALTH CARE EDUCATION/TRAINING PROGRAM

## 2023-07-12 PROCEDURE — 6370000000 HC RX 637 (ALT 250 FOR IP): Performed by: STUDENT IN AN ORGANIZED HEALTH CARE EDUCATION/TRAINING PROGRAM

## 2023-07-12 PROCEDURE — C1713 ANCHOR/SCREW BN/BN,TIS/BN: HCPCS | Performed by: ORTHOPAEDIC SURGERY

## 2023-07-12 PROCEDURE — 22633 ARTHRD CMBN 1NTRSPC LUMBAR: CPT | Performed by: ORTHOPAEDIC SURGERY

## 2023-07-12 PROCEDURE — 70498 CT ANGIOGRAPHY NECK: CPT

## 2023-07-12 PROCEDURE — 01NB0ZZ RELEASE LUMBAR NERVE, OPEN APPROACH: ICD-10-PCS | Performed by: ORTHOPAEDIC SURGERY

## 2023-07-12 PROCEDURE — 0SG3071 FUSION OF LUMBOSACRAL JOINT WITH AUTOLOGOUS TISSUE SUBSTITUTE, POSTERIOR APPROACH, POSTERIOR COLUMN, OPEN APPROACH: ICD-10-PCS | Performed by: ORTHOPAEDIC SURGERY

## 2023-07-12 PROCEDURE — 4A03X5D MEASUREMENT OF ARTERIAL FLOW, INTRACRANIAL, EXTERNAL APPROACH: ICD-10-PCS | Performed by: RADIOLOGY

## 2023-07-12 PROCEDURE — 63052 LAM FACETC/FRMT ARTHRD LUM 1: CPT | Performed by: STUDENT IN AN ORGANIZED HEALTH CARE EDUCATION/TRAINING PROGRAM

## 2023-07-12 PROCEDURE — 3600000014 HC SURGERY LEVEL 4 ADDTL 15MIN: Performed by: ORTHOPAEDIC SURGERY

## 2023-07-12 PROCEDURE — 2720000010 HC SURG SUPPLY STERILE: Performed by: ORTHOPAEDIC SURGERY

## 2023-07-12 PROCEDURE — 3700000001 HC ADD 15 MINUTES (ANESTHESIA): Performed by: ORTHOPAEDIC SURGERY

## 2023-07-12 PROCEDURE — C9359 IMPLNT,BON VOID FILLER-PUTTY: HCPCS | Performed by: ORTHOPAEDIC SURGERY

## 2023-07-12 PROCEDURE — 6370000000 HC RX 637 (ALT 250 FOR IP): Performed by: NURSE ANESTHETIST, CERTIFIED REGISTERED

## 2023-07-12 PROCEDURE — 63053 LAM FACTC/FRMT ARTHRD LUM EA: CPT | Performed by: STUDENT IN AN ORGANIZED HEALTH CARE EDUCATION/TRAINING PROGRAM

## 2023-07-12 PROCEDURE — 7100000001 HC PACU RECOVERY - ADDTL 15 MIN: Performed by: ORTHOPAEDIC SURGERY

## 2023-07-12 PROCEDURE — 6360000002 HC RX W HCPCS: Performed by: STUDENT IN AN ORGANIZED HEALTH CARE EDUCATION/TRAINING PROGRAM

## 2023-07-12 PROCEDURE — 3600000004 HC SURGERY LEVEL 4 BASE: Performed by: ORTHOPAEDIC SURGERY

## 2023-07-12 PROCEDURE — 2580000003 HC RX 258: Performed by: NURSE ANESTHETIST, CERTIFIED REGISTERED

## 2023-07-12 DEVICE — GRAFT BNE SUB L CANC FRZN MORSELIZED W/ VIABLE CELL TRINITY: Type: IMPLANTABLE DEVICE | Site: SPINE LUMBAR | Status: FUNCTIONAL

## 2023-07-12 DEVICE — SPACER 6068073 CATALYFT PL SHORT 7MM
Type: IMPLANTABLE DEVICE | Site: SPINE LUMBAR | Status: FUNCTIONAL
Brand: CATALYFT PL EXPANDABLE INTERBODY SYSTEM

## 2023-07-12 DEVICE — ALLOGRAFT GRAFTON DBF 6CC W/DELIVERY TUBE: Type: IMPLANTABLE DEVICE | Site: SPINE LUMBAR | Status: FUNCTIONAL

## 2023-07-12 DEVICE — GRAFT HUM TISS 3X4 CM WND COVERING AMNIO MEMBRN VERSASHIELD: Type: IMPLANTABLE DEVICE | Site: SPINE LUMBAR | Status: FUNCTIONAL

## 2023-07-12 DEVICE — GRAFT BNE SUB 15GM GRN CA COMPND PTTY AND SILICATE BASE INJ: Type: IMPLANTABLE DEVICE | Site: SPINE LUMBAR | Status: FUNCTIONAL

## 2023-07-12 RX ORDER — SODIUM CHLORIDE 0.9 % (FLUSH) 0.9 %
5-40 SYRINGE (ML) INJECTION PRN
Status: DISCONTINUED | OUTPATIENT
Start: 2023-07-12 | End: 2023-07-15 | Stop reason: HOSPADM

## 2023-07-12 RX ORDER — FENTANYL CITRATE 50 UG/ML
25 INJECTION, SOLUTION INTRAMUSCULAR; INTRAVENOUS EVERY 5 MIN PRN
Status: DISCONTINUED | OUTPATIENT
Start: 2023-07-12 | End: 2023-07-12 | Stop reason: HOSPADM

## 2023-07-12 RX ORDER — POLYETHYLENE GLYCOL 3350 17 G/17G
17 POWDER, FOR SOLUTION ORAL DAILY
Status: DISCONTINUED | OUTPATIENT
Start: 2023-07-12 | End: 2023-07-15 | Stop reason: HOSPADM

## 2023-07-12 RX ORDER — ONDANSETRON 2 MG/ML
INJECTION INTRAMUSCULAR; INTRAVENOUS PRN
Status: DISCONTINUED | OUTPATIENT
Start: 2023-07-12 | End: 2023-07-12 | Stop reason: SDUPTHER

## 2023-07-12 RX ORDER — SUCCINYLCHOLINE/SOD CL,ISO/PF 200MG/10ML
SYRINGE (ML) INTRAVENOUS PRN
Status: DISCONTINUED | OUTPATIENT
Start: 2023-07-12 | End: 2023-07-12 | Stop reason: SDUPTHER

## 2023-07-12 RX ORDER — DEXAMETHASONE SODIUM PHOSPHATE 4 MG/ML
INJECTION, SOLUTION INTRA-ARTICULAR; INTRALESIONAL; INTRAMUSCULAR; INTRAVENOUS; SOFT TISSUE PRN
Status: DISCONTINUED | OUTPATIENT
Start: 2023-07-12 | End: 2023-07-12 | Stop reason: SDUPTHER

## 2023-07-12 RX ORDER — SODIUM CHLORIDE 9 MG/ML
INJECTION, SOLUTION INTRAVENOUS CONTINUOUS
Status: DISCONTINUED | OUTPATIENT
Start: 2023-07-12 | End: 2023-07-15 | Stop reason: HOSPADM

## 2023-07-12 RX ORDER — MONTELUKAST SODIUM 10 MG/1
10 TABLET ORAL NIGHTLY
Status: DISCONTINUED | OUTPATIENT
Start: 2023-07-12 | End: 2023-07-15 | Stop reason: HOSPADM

## 2023-07-12 RX ORDER — CETIRIZINE HYDROCHLORIDE 10 MG/1
10 TABLET ORAL DAILY
Status: DISCONTINUED | OUTPATIENT
Start: 2023-07-13 | End: 2023-07-15 | Stop reason: HOSPADM

## 2023-07-12 RX ORDER — HYDROMORPHONE HYDROCHLORIDE 1 MG/ML
0.5 INJECTION, SOLUTION INTRAMUSCULAR; INTRAVENOUS; SUBCUTANEOUS
Status: DISCONTINUED | OUTPATIENT
Start: 2023-07-12 | End: 2023-07-15 | Stop reason: HOSPADM

## 2023-07-12 RX ORDER — PROCHLORPERAZINE MALEATE 10 MG
10 TABLET ORAL EVERY 6 HOURS PRN
Status: DISCONTINUED | OUTPATIENT
Start: 2023-07-12 | End: 2023-07-15 | Stop reason: HOSPADM

## 2023-07-12 RX ORDER — DIPHENHYDRAMINE HYDROCHLORIDE 50 MG/ML
25 INJECTION INTRAMUSCULAR; INTRAVENOUS EVERY 6 HOURS PRN
Status: DISCONTINUED | OUTPATIENT
Start: 2023-07-12 | End: 2023-07-15 | Stop reason: HOSPADM

## 2023-07-12 RX ORDER — PROPOFOL 10 MG/ML
INJECTION, EMULSION INTRAVENOUS PRN
Status: DISCONTINUED | OUTPATIENT
Start: 2023-07-12 | End: 2023-07-12 | Stop reason: SDUPTHER

## 2023-07-12 RX ORDER — BENZONATATE 100 MG/1
100 CAPSULE ORAL 3 TIMES DAILY PRN
Status: DISCONTINUED | OUTPATIENT
Start: 2023-07-12 | End: 2023-07-15 | Stop reason: HOSPADM

## 2023-07-12 RX ORDER — VANCOMYCIN HYDROCHLORIDE 1 G/20ML
INJECTION, POWDER, LYOPHILIZED, FOR SOLUTION INTRAVENOUS PRN
Status: DISCONTINUED | OUTPATIENT
Start: 2023-07-12 | End: 2023-07-12 | Stop reason: HOSPADM

## 2023-07-12 RX ORDER — ACETAMINOPHEN 500 MG
1000 TABLET ORAL ONCE
Status: COMPLETED | OUTPATIENT
Start: 2023-07-12 | End: 2023-07-12

## 2023-07-12 RX ORDER — SODIUM CHLORIDE 0.9 % (FLUSH) 0.9 %
5-40 SYRINGE (ML) INJECTION PRN
Status: DISCONTINUED | OUTPATIENT
Start: 2023-07-12 | End: 2023-07-12 | Stop reason: HOSPADM

## 2023-07-12 RX ORDER — OXYCODONE HYDROCHLORIDE 5 MG/1
5 TABLET ORAL
Status: DISCONTINUED | OUTPATIENT
Start: 2023-07-12 | End: 2023-07-12 | Stop reason: HOSPADM

## 2023-07-12 RX ORDER — HYDROMORPHONE HYDROCHLORIDE 1 MG/ML
0.5 INJECTION, SOLUTION INTRAMUSCULAR; INTRAVENOUS; SUBCUTANEOUS EVERY 5 MIN PRN
Status: DISCONTINUED | OUTPATIENT
Start: 2023-07-12 | End: 2023-07-12 | Stop reason: HOSPADM

## 2023-07-12 RX ORDER — SODIUM CHLORIDE 0.9 % (FLUSH) 0.9 %
5-40 SYRINGE (ML) INJECTION EVERY 12 HOURS SCHEDULED
Status: DISCONTINUED | OUTPATIENT
Start: 2023-07-12 | End: 2023-07-12 | Stop reason: HOSPADM

## 2023-07-12 RX ORDER — ROCURONIUM BROMIDE 10 MG/ML
INJECTION, SOLUTION INTRAVENOUS PRN
Status: DISCONTINUED | OUTPATIENT
Start: 2023-07-12 | End: 2023-07-12 | Stop reason: SDUPTHER

## 2023-07-12 RX ORDER — CALCIUM POLYCARBOPHIL 625 MG
625 TABLET ORAL DAILY
Status: DISCONTINUED | OUTPATIENT
Start: 2023-07-12 | End: 2023-07-12 | Stop reason: CLARIF

## 2023-07-12 RX ORDER — ALLOPURINOL 100 MG/1
200 TABLET ORAL DAILY
Status: DISCONTINUED | OUTPATIENT
Start: 2023-07-13 | End: 2023-07-15 | Stop reason: HOSPADM

## 2023-07-12 RX ORDER — PRAVASTATIN SODIUM 40 MG
40 TABLET ORAL NIGHTLY
Status: DISCONTINUED | OUTPATIENT
Start: 2023-07-12 | End: 2023-07-15 | Stop reason: HOSPADM

## 2023-07-12 RX ORDER — LIDOCAINE HYDROCHLORIDE 20 MG/ML
INJECTION, SOLUTION EPIDURAL; INFILTRATION; INTRACAUDAL; PERINEURAL PRN
Status: DISCONTINUED | OUTPATIENT
Start: 2023-07-12 | End: 2023-07-12 | Stop reason: SDUPTHER

## 2023-07-12 RX ORDER — PROCHLORPERAZINE EDISYLATE 5 MG/ML
5 INJECTION INTRAMUSCULAR; INTRAVENOUS
Status: DISCONTINUED | OUTPATIENT
Start: 2023-07-12 | End: 2023-07-12 | Stop reason: HOSPADM

## 2023-07-12 RX ORDER — PREGABALIN 75 MG/1
75 CAPSULE ORAL ONCE
Status: COMPLETED | OUTPATIENT
Start: 2023-07-12 | End: 2023-07-12

## 2023-07-12 RX ORDER — GLYCOPYRROLATE 0.2 MG/ML
INJECTION INTRAMUSCULAR; INTRAVENOUS PRN
Status: DISCONTINUED | OUTPATIENT
Start: 2023-07-12 | End: 2023-07-12 | Stop reason: SDUPTHER

## 2023-07-12 RX ORDER — MAGNESIUM SULFATE IN WATER 40 MG/ML
INJECTION, SOLUTION INTRAVENOUS PRN
Status: DISCONTINUED | OUTPATIENT
Start: 2023-07-12 | End: 2023-07-12 | Stop reason: SDUPTHER

## 2023-07-12 RX ORDER — SODIUM CHLORIDE 9 MG/ML
INJECTION, SOLUTION INTRAVENOUS PRN
Status: DISCONTINUED | OUTPATIENT
Start: 2023-07-12 | End: 2023-07-15 | Stop reason: HOSPADM

## 2023-07-12 RX ORDER — ASPIRIN 81 MG/1
81 TABLET ORAL NIGHTLY
Status: DISCONTINUED | OUTPATIENT
Start: 2023-07-13 | End: 2023-07-15 | Stop reason: HOSPADM

## 2023-07-12 RX ORDER — SODIUM CHLORIDE 9 MG/ML
INJECTION, SOLUTION INTRAVENOUS PRN
Status: DISCONTINUED | OUTPATIENT
Start: 2023-07-12 | End: 2023-07-12 | Stop reason: HOSPADM

## 2023-07-12 RX ORDER — BACLOFEN 10 MG/1
10 TABLET ORAL NIGHTLY
Status: DISCONTINUED | OUTPATIENT
Start: 2023-07-12 | End: 2023-07-15 | Stop reason: HOSPADM

## 2023-07-12 RX ORDER — ONDANSETRON 2 MG/ML
4 INJECTION INTRAMUSCULAR; INTRAVENOUS
Status: DISCONTINUED | OUTPATIENT
Start: 2023-07-12 | End: 2023-07-12 | Stop reason: HOSPADM

## 2023-07-12 RX ORDER — HYDROMORPHONE HYDROCHLORIDE 4 MG/1
4 TABLET ORAL EVERY 4 HOURS PRN
Status: DISCONTINUED | OUTPATIENT
Start: 2023-07-12 | End: 2023-07-15 | Stop reason: HOSPADM

## 2023-07-12 RX ORDER — TRANEXAMIC ACID 100 MG/ML
INJECTION, SOLUTION INTRAVENOUS PRN
Status: DISCONTINUED | OUTPATIENT
Start: 2023-07-12 | End: 2023-07-12 | Stop reason: SDUPTHER

## 2023-07-12 RX ORDER — LOSARTAN POTASSIUM 50 MG/1
50 TABLET ORAL DAILY
Status: DISCONTINUED | OUTPATIENT
Start: 2023-07-12 | End: 2023-07-15 | Stop reason: HOSPADM

## 2023-07-12 RX ORDER — SODIUM CHLORIDE 0.9 % (FLUSH) 0.9 %
5-40 SYRINGE (ML) INJECTION EVERY 12 HOURS SCHEDULED
Status: DISCONTINUED | OUTPATIENT
Start: 2023-07-12 | End: 2023-07-15 | Stop reason: HOSPADM

## 2023-07-12 RX ORDER — CELECOXIB 100 MG/1
100 CAPSULE ORAL ONCE
Status: COMPLETED | OUTPATIENT
Start: 2023-07-12 | End: 2023-07-12

## 2023-07-12 RX ORDER — HYDRALAZINE HYDROCHLORIDE 20 MG/ML
10 INJECTION INTRAMUSCULAR; INTRAVENOUS
Status: DISCONTINUED | OUTPATIENT
Start: 2023-07-12 | End: 2023-07-12 | Stop reason: HOSPADM

## 2023-07-12 RX ORDER — HYDROCHLOROTHIAZIDE 25 MG/1
12.5 TABLET ORAL DAILY
Status: DISCONTINUED | OUTPATIENT
Start: 2023-07-12 | End: 2023-07-15 | Stop reason: HOSPADM

## 2023-07-12 RX ORDER — FAMOTIDINE 20 MG/1
20 TABLET, FILM COATED ORAL 2 TIMES DAILY
Status: DISCONTINUED | OUTPATIENT
Start: 2023-07-12 | End: 2023-07-12

## 2023-07-12 RX ORDER — SODIUM CHLORIDE, SODIUM LACTATE, POTASSIUM CHLORIDE, CALCIUM CHLORIDE 600; 310; 30; 20 MG/100ML; MG/100ML; MG/100ML; MG/100ML
INJECTION, SOLUTION INTRAVENOUS CONTINUOUS
Status: DISCONTINUED | OUTPATIENT
Start: 2023-07-12 | End: 2023-07-12 | Stop reason: HOSPADM

## 2023-07-12 RX ORDER — PROCHLORPERAZINE EDISYLATE 5 MG/ML
10 INJECTION INTRAMUSCULAR; INTRAVENOUS EVERY 6 HOURS PRN
Status: DISCONTINUED | OUTPATIENT
Start: 2023-07-12 | End: 2023-07-15 | Stop reason: HOSPADM

## 2023-07-12 RX ORDER — ACETAMINOPHEN 500 MG
1000 TABLET ORAL EVERY 6 HOURS
Status: DISCONTINUED | OUTPATIENT
Start: 2023-07-12 | End: 2023-07-15 | Stop reason: HOSPADM

## 2023-07-12 RX ORDER — MIDAZOLAM HYDROCHLORIDE 1 MG/ML
INJECTION INTRAMUSCULAR; INTRAVENOUS PRN
Status: DISCONTINUED | OUTPATIENT
Start: 2023-07-12 | End: 2023-07-12 | Stop reason: SDUPTHER

## 2023-07-12 RX ORDER — PANTOPRAZOLE SODIUM 40 MG/1
40 TABLET, DELAYED RELEASE ORAL
Status: DISCONTINUED | OUTPATIENT
Start: 2023-07-13 | End: 2023-07-15 | Stop reason: HOSPADM

## 2023-07-12 RX ORDER — ISOSORBIDE MONONITRATE 30 MG/1
15 TABLET, EXTENDED RELEASE ORAL NIGHTLY
Status: DISCONTINUED | OUTPATIENT
Start: 2023-07-12 | End: 2023-07-15 | Stop reason: HOSPADM

## 2023-07-12 RX ORDER — PHENYLEPHRINE HCL IN 0.9% NACL 0.4MG/10ML
SYRINGE (ML) INTRAVENOUS PRN
Status: DISCONTINUED | OUTPATIENT
Start: 2023-07-12 | End: 2023-07-12 | Stop reason: SDUPTHER

## 2023-07-12 RX ORDER — MIDAZOLAM HYDROCHLORIDE 2 MG/2ML
2 INJECTION, SOLUTION INTRAMUSCULAR; INTRAVENOUS
Status: DISCONTINUED | OUTPATIENT
Start: 2023-07-12 | End: 2023-07-12 | Stop reason: HOSPADM

## 2023-07-12 RX ORDER — FENTANYL CITRATE 50 UG/ML
100 INJECTION, SOLUTION INTRAMUSCULAR; INTRAVENOUS
Status: DISCONTINUED | OUTPATIENT
Start: 2023-07-12 | End: 2023-07-12 | Stop reason: HOSPADM

## 2023-07-12 RX ORDER — CALCIUM POLYCARBOPHIL 625 MG 625 MG/1
625 TABLET ORAL DAILY
Status: DISCONTINUED | OUTPATIENT
Start: 2023-07-12 | End: 2023-07-15 | Stop reason: HOSPADM

## 2023-07-12 RX ORDER — KETOTIFEN FUMARATE 0.35 MG/ML
1 SOLUTION/ DROPS OPHTHALMIC AS NEEDED
Status: DISCONTINUED | OUTPATIENT
Start: 2023-07-12 | End: 2023-07-15 | Stop reason: HOSPADM

## 2023-07-12 RX ORDER — ONDANSETRON 2 MG/ML
4 INJECTION INTRAMUSCULAR; INTRAVENOUS AS NEEDED
Status: DISCONTINUED | OUTPATIENT
Start: 2023-07-12 | End: 2023-07-12 | Stop reason: HOSPADM

## 2023-07-12 RX ORDER — HYDROMORPHONE HYDROCHLORIDE 2 MG/1
2 TABLET ORAL EVERY 4 HOURS PRN
Status: DISCONTINUED | OUTPATIENT
Start: 2023-07-12 | End: 2023-07-15 | Stop reason: HOSPADM

## 2023-07-12 RX ORDER — SCOLOPAMINE TRANSDERMAL SYSTEM 1 MG/1
PATCH, EXTENDED RELEASE TRANSDERMAL PRN
Status: DISCONTINUED | OUTPATIENT
Start: 2023-07-12 | End: 2023-07-12 | Stop reason: SDUPTHER

## 2023-07-12 RX ORDER — HYDROMORPHONE HCL 110MG/55ML
PATIENT CONTROLLED ANALGESIA SYRINGE INTRAVENOUS PRN
Status: DISCONTINUED | OUTPATIENT
Start: 2023-07-12 | End: 2023-07-12 | Stop reason: SDUPTHER

## 2023-07-12 RX ORDER — ACETAMINOPHEN 500 MG
1000 TABLET ORAL ONCE
Status: DISCONTINUED | OUTPATIENT
Start: 2023-07-12 | End: 2023-07-12 | Stop reason: SDUPTHER

## 2023-07-12 RX ORDER — KETAMINE HCL IN NACL, ISO-OSM 100MG/10ML
SYRINGE (ML) INJECTION PRN
Status: DISCONTINUED | OUTPATIENT
Start: 2023-07-12 | End: 2023-07-12 | Stop reason: SDUPTHER

## 2023-07-12 RX ORDER — DIPHENHYDRAMINE HCL 25 MG
25 CAPSULE ORAL EVERY 6 HOURS PRN
Status: DISCONTINUED | OUTPATIENT
Start: 2023-07-12 | End: 2023-07-15 | Stop reason: HOSPADM

## 2023-07-12 RX ORDER — LIDOCAINE HYDROCHLORIDE 10 MG/ML
1 INJECTION, SOLUTION EPIDURAL; INFILTRATION; INTRACAUDAL; PERINEURAL
Status: DISCONTINUED | OUTPATIENT
Start: 2023-07-12 | End: 2023-07-12 | Stop reason: HOSPADM

## 2023-07-12 RX ORDER — SENNA AND DOCUSATE SODIUM 50; 8.6 MG/1; MG/1
1 TABLET, FILM COATED ORAL 2 TIMES DAILY
Status: DISCONTINUED | OUTPATIENT
Start: 2023-07-12 | End: 2023-07-15 | Stop reason: HOSPADM

## 2023-07-12 RX ORDER — FENTANYL CITRATE 50 UG/ML
INJECTION, SOLUTION INTRAMUSCULAR; INTRAVENOUS PRN
Status: DISCONTINUED | OUTPATIENT
Start: 2023-07-12 | End: 2023-07-12 | Stop reason: SDUPTHER

## 2023-07-12 RX ADMIN — HYDROCHLOROTHIAZIDE 12.5 MG: 25 TABLET ORAL at 13:27

## 2023-07-12 RX ADMIN — ACETAMINOPHEN 1000 MG: 500 TABLET ORAL at 13:27

## 2023-07-12 RX ADMIN — DEXAMETHASONE SODIUM PHOSPHATE 8 MG: 4 INJECTION, SOLUTION INTRAMUSCULAR; INTRAVENOUS at 09:05

## 2023-07-12 RX ADMIN — PROPOFOL 50 MCG/KG/MIN: 10 INJECTION, EMULSION INTRAVENOUS at 09:05

## 2023-07-12 RX ADMIN — MAGNESIUM SULFATE IN WATER 2000 MG: 2 INJECTION, SOLUTION INTRAVENOUS at 09:56

## 2023-07-12 RX ADMIN — FAMOTIDINE 20 MG: 20 TABLET, FILM COATED ORAL at 13:27

## 2023-07-12 RX ADMIN — WATER 2000 MG: 1 INJECTION INTRAMUSCULAR; INTRAVENOUS; SUBCUTANEOUS at 17:07

## 2023-07-12 RX ADMIN — Medication 80 MCG: at 08:59

## 2023-07-12 RX ADMIN — SODIUM CHLORIDE, PRESERVATIVE FREE 10 ML: 5 INJECTION INTRAVENOUS at 20:52

## 2023-07-12 RX ADMIN — ONDANSETRON HYDROCHLORIDE 4 MG: 2 INJECTION, SOLUTION INTRAMUSCULAR; INTRAVENOUS at 11:00

## 2023-07-12 RX ADMIN — ACETAMINOPHEN 1000 MG: 500 TABLET ORAL at 19:06

## 2023-07-12 RX ADMIN — PHENYLEPHRINE HYDROCHLORIDE 40 MCG/MIN: 10 INJECTION INTRAVENOUS at 09:12

## 2023-07-12 RX ADMIN — CELECOXIB 100 MG: 100 CAPSULE ORAL at 08:37

## 2023-07-12 RX ADMIN — Medication 80 MCG: at 09:04

## 2023-07-12 RX ADMIN — ROCURONIUM BROMIDE 10 MG: 10 SOLUTION INTRAVENOUS at 08:59

## 2023-07-12 RX ADMIN — IOPAMIDOL 80 ML: 755 INJECTION, SOLUTION INTRAVENOUS at 16:38

## 2023-07-12 RX ADMIN — HYDROMORPHONE HYDROCHLORIDE 0.6 MG: 2 INJECTION, SOLUTION INTRAMUSCULAR; INTRAVENOUS; SUBCUTANEOUS at 11:26

## 2023-07-12 RX ADMIN — Medication 10 MG: at 08:59

## 2023-07-12 RX ADMIN — HYDROMORPHONE HYDROCHLORIDE 0.5 MG: 2 INJECTION, SOLUTION INTRAMUSCULAR; INTRAVENOUS; SUBCUTANEOUS at 11:32

## 2023-07-12 RX ADMIN — PREGABALIN 75 MG: 75 CAPSULE ORAL at 08:37

## 2023-07-12 RX ADMIN — POLYETHYLENE GLYCOL 3350 17 G: 17 POWDER, FOR SOLUTION ORAL at 13:27

## 2023-07-12 RX ADMIN — PROPOFOL 75 MCG/KG/MIN: 10 INJECTION, EMULSION INTRAVENOUS at 10:00

## 2023-07-12 RX ADMIN — TRANEXAMIC ACID 1000 MG: 100 INJECTION, SOLUTION INTRAVENOUS at 09:25

## 2023-07-12 RX ADMIN — WATER 2000 MG: 1 INJECTION INTRAMUSCULAR; INTRAVENOUS; SUBCUTANEOUS at 09:20

## 2023-07-12 RX ADMIN — PRAVASTATIN SODIUM 40 MG: 40 TABLET ORAL at 20:51

## 2023-07-12 RX ADMIN — Medication 120 MCG: at 09:05

## 2023-07-12 RX ADMIN — Medication 120 MCG: at 09:11

## 2023-07-12 RX ADMIN — IOPAMIDOL 40 ML: 755 INJECTION, SOLUTION INTRAVENOUS at 16:34

## 2023-07-12 RX ADMIN — FENTANYL CITRATE 50 MCG: 50 INJECTION, SOLUTION INTRAMUSCULAR; INTRAVENOUS at 08:59

## 2023-07-12 RX ADMIN — BACLOFEN 10 MG: 10 TABLET ORAL at 20:51

## 2023-07-12 RX ADMIN — SCOPALAMINE 1 PATCH: 1 PATCH, EXTENDED RELEASE TRANSDERMAL at 08:51

## 2023-07-12 RX ADMIN — FENTANYL CITRATE 50 MCG: 50 INJECTION, SOLUTION INTRAMUSCULAR; INTRAVENOUS at 10:11

## 2023-07-12 RX ADMIN — Medication 140 MG: at 09:00

## 2023-07-12 RX ADMIN — SENNOSIDES AND DOCUSATE SODIUM 1 TABLET: 50; 8.6 TABLET ORAL at 13:27

## 2023-07-12 RX ADMIN — SODIUM CHLORIDE, POTASSIUM CHLORIDE, SODIUM LACTATE AND CALCIUM CHLORIDE: 600; 310; 30; 20 INJECTION, SOLUTION INTRAVENOUS at 08:38

## 2023-07-12 RX ADMIN — MIDAZOLAM 2 MG: 1 INJECTION INTRAMUSCULAR; INTRAVENOUS at 08:52

## 2023-07-12 RX ADMIN — Medication 30 MG: at 09:53

## 2023-07-12 RX ADMIN — PROPOFOL 125 MG: 10 INJECTION, EMULSION INTRAVENOUS at 08:59

## 2023-07-12 RX ADMIN — SENNOSIDES AND DOCUSATE SODIUM 1 TABLET: 50; 8.6 TABLET ORAL at 20:51

## 2023-07-12 RX ADMIN — PROPOFOL 50 MG: 10 INJECTION, EMULSION INTRAVENOUS at 09:31

## 2023-07-12 RX ADMIN — FENTANYL CITRATE 50 MCG: 50 INJECTION, SOLUTION INTRAMUSCULAR; INTRAVENOUS at 09:28

## 2023-07-12 RX ADMIN — SODIUM CHLORIDE: 9 INJECTION, SOLUTION INTRAVENOUS at 11:44

## 2023-07-12 RX ADMIN — PROPOFOL 25 MG: 10 INJECTION, EMULSION INTRAVENOUS at 09:35

## 2023-07-12 RX ADMIN — FENTANYL CITRATE 50 MCG: 50 INJECTION, SOLUTION INTRAMUSCULAR; INTRAVENOUS at 11:10

## 2023-07-12 RX ADMIN — HYDROMORPHONE HYDROCHLORIDE 0.4 MG: 2 INJECTION, SOLUTION INTRAMUSCULAR; INTRAVENOUS; SUBCUTANEOUS at 11:15

## 2023-07-12 RX ADMIN — GLYCOPYRROLATE 0.2 MG: 0.2 INJECTION INTRAMUSCULAR; INTRAVENOUS at 09:06

## 2023-07-12 RX ADMIN — Medication 10 MG: at 09:28

## 2023-07-12 RX ADMIN — SODIUM CHLORIDE: 9 INJECTION, SOLUTION INTRAVENOUS at 20:37

## 2023-07-12 RX ADMIN — LIDOCAINE HYDROCHLORIDE 80 MG: 20 INJECTION, SOLUTION EPIDURAL; INFILTRATION; INTRACAUDAL; PERINEURAL at 08:59

## 2023-07-12 RX ADMIN — ACETAMINOPHEN 1000 MG: 500 TABLET ORAL at 08:37

## 2023-07-12 RX ADMIN — MONTELUKAST 10 MG: 10 TABLET, FILM COATED ORAL at 20:51

## 2023-07-12 ASSESSMENT — PAIN SCALES - GENERAL
PAINLEVEL_OUTOF10: 6
PAINLEVEL_OUTOF10: 0
PAINLEVEL_OUTOF10: 10
PAINLEVEL_OUTOF10: 5

## 2023-07-12 ASSESSMENT — PAIN DESCRIPTION - LOCATION
LOCATION: BACK
LOCATION: BACK

## 2023-07-12 ASSESSMENT — PAIN DESCRIPTION - DESCRIPTORS
DESCRIPTORS: ACHING
DESCRIPTORS: ACHING;CRAMPING

## 2023-07-12 ASSESSMENT — ENCOUNTER SYMPTOMS: SHORTNESS OF BREATH: 1

## 2023-07-12 ASSESSMENT — PAIN DESCRIPTION - ORIENTATION
ORIENTATION: MID
ORIENTATION: MID

## 2023-07-12 ASSESSMENT — PAIN - FUNCTIONAL ASSESSMENT: PAIN_FUNCTIONAL_ASSESSMENT: 0-10

## 2023-07-12 NOTE — PERIOP NOTE
Pt turned off bladder stimulator per anesthesia and it was placed in her belonging bag at her request

## 2023-07-12 NOTE — FLOWSHEET NOTE
07/12/23 0951   Family Communication    Relationship to Patient Spouse    Phone Number [phone]   Family/Significant Other Update Called;Updated   Delivery Origin Nurse   Message Disposition Family present - message delivered   Update Given Yes   Family Communication   Family Update Message Procedure started

## 2023-07-12 NOTE — ANESTHESIA POSTPROCEDURE EVALUATION
None    Post-anesthesia assessment completed. No concerns. I have evaluated the patient and the patient is stable and ready to be discharged from PACU .     Signed By: Garth Laguna MD    7/12/2023

## 2023-07-12 NOTE — OP NOTE
[facility]  OPERATIVE REPORT    Name:  Wilman Small  MR#:  [id number]  :  1950  ACCOUNT #:  [de-identified]  DATE OF SERVICE:  2023    PREOPERATIVE DIAGNOSES:  Status post L5-S1 fusion, lumbar stenosis L4-5. POSTOPERATIVE DIAGNOSES:  Status post L5-S1 fusion, lumbar stenosis L4-5. PROCEDURE PERFORMED:  Lumbar laminectomy revision with facetectomy with decompression of bilateral L4 and L5 nerve roots, transforaminal interbody fusion L4-5, posterior fusion L4-S1. SURGEON:  Ceci Rodriguez MD    ASSISTANT:  Arne Mortimer, PA-C    ANESTHESIA:  General anesthesia. COMPLICATIONS:  No complications. SPECIMENS REMOVED:  None    IMPLANTS:  Medtronic Solera and Medtronic Catalyft    ESTIMATED BLOOD LOSS:  Minimal    INDICATIONS:  This is a pleasant 35-year-old female several years out from an L5-S1 fusion, now with spondylosis and stenosis, particularly foramina at L4 5 and Grade 1 anterolisthesis. She had failed conservative treatment, understood the risks and benefits, elected to proceed. PROCEDURE:  The patient was identified, brought to the operative suite, underwent general anesthesia without difficulty. Placed in prone position on the [place] frame with all bony prominences well padded. Back was prepped and draped sterilely. Time-out confirmed. Perioperative antibiotics given to the patient. Preoperative neuromonitoring placed, baselines obtained and these remained stable throughout the surgical procedure. After prepping and draping, time-out was reconfirmed. Midline incision was used and extended proximally. We dissected down the previous fusion mass at L5-S1, and fusion was noted and stable. We exposed transverse processes of L4 as well as the hypertrophic L4-5 facet. Confirmed this on lateral fluoroscopy. At which time, we then started with a wide revision laminectomy. We removed the remainder of the L4 spinous process, inferior portion of L3.

## 2023-07-12 NOTE — BRIEF OP NOTE
Brief Postoperative Note      Patient: Ramonita Baker  YOB: 1950  MRN: [id number]    Date of Procedure: 7/12/2023    Pre-Op Diagnosis Codes: * Spondylolisthesis, lumbar region [M43.16]     * Spinal stenosis, lumbar region with neurogenic claudication [M48.062]    Post-Op Diagnosis: Same       Procedure(s):  REVISION LUMBAR LAMINECTOMY L4/5, REVISION FUSION L4-S1 ([facility]    Surgeon(s):  Jeanette Dick MD    Assistant:  Physician Assistant: TARIQ Pruitt    Anesthesia: General    Estimated Blood Loss (mL): less than 929     Complications: None    Specimens:   * No specimens in log *    Implants:  Implant Name Type Inv. Item Serial No.  Lot No. LRB No. Used Action   GRAFT BNE SUB L CANC FRZN MORSELIZED W/ VIABLE CELL TYLER - [id number]  GRAFT BNE SUB L CANC FRZN MORSELIZED W/ VIABLE CELL TYLER [id number] [facility] TRANSPLANT [facility] NA N/A 1 Implanted   ALLOGRAFT KHADRA DBF 6CC W/DELIVERY TUBE - [id number]  ALLOGRAFT KHADRA DBF 6CC W/DELIVERY TUBE [id number] [employer]- NA N/A 1 Implanted   SPACER SPNL SHT 7 MM CATALYFT PL - [id number]  SPACER SPNL SHT 7 MM CATALYFT PL  [employer] SOFAMOR DANEK- [id number] N/A 2 Implanted   GRAFT BNE SUB 15GM GRN CA COMPND PTTY AND SILICATE BASE INJ - SNA  GRAFT BNE SUB 15GM GRN CA COMPND PTTY AND SILICATE BASE INJ NA [employer]-WD [id number] N/A 1 Implanted   GRAFT HUM TISS 3X4 CM WND COVERING AMNIO MEMBRN VERSASHIELD - [id number]  GRAFT HUM TISS 3X4 CM WND COVERING AMNIO MEMBRN VERSASHIELD [id number] [facility] TRANSPLANT [facility] NA N/A 1 Implanted   SCREW SPNL L45MM DIA6. 5MM POST THORACOLUMBOSACRAL CO CHROM - SNA  SCREW SPNL L45MM DIA6. 5MM POST THORACOLUMBOSACRAL CO CHROM NA [employer] DANEK- NA N/A 2 Implanted   SET SCR SPNL L6MM DIA5. 5MM TI BRK OFF LA W/ DETACH [address] - SNA  SET SCR SPNL L6MM DIA5. 5MM TI BRK OFF LA W/ DETACH [address] NA [employer] SOFAMOR

## 2023-07-12 NOTE — CONSULTS
History and Physical    Date of Service:  7/12/2023  Primary Care Provider: Fabricio Driver MD  Source of information: electronic medical record    Chief Complaint: No chief complaint on file. History of Presenting Illness:   Tiesha Adame is a 68 y.o. female who presents with s/p Status post L5-S1 fusion, lumbar stenosis L4-5. Consult was placed for hospitalist service for medical management. The patient was resting comfortably surgery discussed with spinal surgery team.  Later patient was found to have headache dizziness right-sided weakness right eye heaviness and a code stroke was called and neuro interventional surgery team responded. Head CT and CTA was ordered . The patient denies any headache, blurry vision, sore throat, trouble swallowing, trouble with speech, chest pain, SOB, cough, fever, chills, N/V/D, abd pain, urinary symptoms, constipation, recent travels, sick contacts, focal or generalized neurological symptoms, falls, injuries, rashes, contact with COVID-19 diagnosed patients, hematemesis, melena, hemoptysis, hematuria, rashes, denies starting any new medications and denies any other concerns or problems besides as mentioned above. REVIEW OF SYSTEMS:  Pertinent items are noted in the History of Present Illness.      Past Medical History:   Diagnosis Date    Adverse effect of anesthesia     during hysterectomy- woke up \"too early\"    Anxiety     Arrhythmia     h/o palpitations normal work up 22/2015 heart: David    Arthritis     Awareness under anesthesia     with hysterectomy    Chronic pain     bulging disc c4/c5 l4/l5 : as of 9/1018 no neck/head movement limitation says patient    Claustrophobia     Color blind     CAN'T DISTINGUISH BETWEEN BLACK AND BLUE    CREST (calcinosis, Raynaud's phenomenon, esophageal dysfunction, sclerodactyly, telangiectasia) ([address]) 2018    [facility], Dr. Radha Childs    GERD (gastroesophageal reflux disease)     Gout     Hyperlipidemia

## 2023-07-12 NOTE — CARE COORDINATION
Care Management Initial Assessment       RUR: 4%  Readmission? No  1st IM letter given?  Yes - 7/12/23  1st  letter given: No

## 2023-07-12 NOTE — SIGNIFICANT EVENT
Code Stroke room 541 called at this time. RRT responding. Pt experiencing sensory deficit on the L side and dizziness. LKW 1530. . Sonya Faith NP at bedside. Pt to CT and back on travel monitor with this RN and primary RN. Pt seen by tele-neuro in CT after scans. Checked in with primary RN prior to leaving. Opportunity for questions and concerns provided.

## 2023-07-12 NOTE — CARE COORDINATION
Care Management Initial Assessment       RUR: 4%  Readmission? No  1st IM letter given? Yes - 7/12/23  1st  letter given: No   07/12/23 1610   Service Assessment   Patient Orientation Alert and Oriented   Cognition Alert   History Provided By Patient   Primary Caregiver Self   Support Systems Spouse/Significant Other   Patient's Healthcare Decision Maker is: Legal Next of Kin   PCP Verified by CM Yes   Last Visit to PCP Within last 6 months   Prior Functional Level Independent in ADLs/IADLs   Current Functional Level Independent in ADLs/IADLs   Can patient return to prior living arrangement Yes   Ability to make needs known: Good   Family able to assist with home care needs: Yes   Would you like for me to discuss the discharge plan with any other family members/significant others, and if so, who? No   Financial Resources Medicare   Social/Functional History   Lives With Spouse      CM met with pt and her spouse at bedside. Pt is alert and oriented x4. Demographics and insurance confirmed. Pt lives with his wife. She uses no DME and is independent with ADL's. Plan is to return home pending consults and medical progression. CM following.     Saeid Joshua RN, BSN, [address] [facility]

## 2023-07-12 NOTE — ANESTHESIA PRE PROCEDURE
vascular ROS. Other Findings:           Anesthesia Plan      general     ASA 3       Induction: intravenous. MIPS: Postoperative opioids intended and Prophylactic antiemetics administered. Anesthetic plan and risks discussed with patient. Use of blood products discussed with patient whom consented to blood products.    Plan discussed with CRNA and surgical team.    Attending anesthesiologist reviewed and agrees with Preprocedure content                Ирина Morrison MD   7/12/2023

## 2023-07-13 LAB
ANION GAP SERPL CALC-SCNC: 8 MMOL/L (ref 5–15)
BUN SERPL-MCNC: 33 MG/DL (ref 6–20)
BUN/CREAT SERPL: 26 (ref 12–20)
CALCIUM SERPL-MCNC: 8.8 MG/DL (ref 8.5–10.1)
CHLORIDE SERPL-SCNC: 107 MMOL/L (ref 97–108)
CO2 SERPL-SCNC: 24 MMOL/L (ref 21–32)
CREAT SERPL-MCNC: 1.29 MG/DL (ref 0.55–1.02)
GLUCOSE SERPL-MCNC: 134 MG/DL (ref 65–100)
HCT VFR BLD AUTO: 34 % (ref 35–47)
HGB BLD-MCNC: 10.8 G/DL (ref 11.5–16)
POTASSIUM SERPL-SCNC: 4.6 MMOL/L (ref 3.5–5.1)
SODIUM SERPL-SCNC: 139 MMOL/L (ref 136–145)

## 2023-07-13 PROCEDURE — L0637 LSO SC R ANT/POS PNL PRE CST: HCPCS

## 2023-07-13 PROCEDURE — 94760 N-INVAS EAR/PLS OXIMETRY 1: CPT

## 2023-07-13 PROCEDURE — 97161 PT EVAL LOW COMPLEX 20 MIN: CPT

## 2023-07-13 PROCEDURE — 6370000000 HC RX 637 (ALT 250 FOR IP): Performed by: STUDENT IN AN ORGANIZED HEALTH CARE EDUCATION/TRAINING PROGRAM

## 2023-07-13 PROCEDURE — 80048 BASIC METABOLIC PNL TOTAL CA: CPT

## 2023-07-13 PROCEDURE — 97116 GAIT TRAINING THERAPY: CPT

## 2023-07-13 PROCEDURE — 97165 OT EVAL LOW COMPLEX 30 MIN: CPT

## 2023-07-13 PROCEDURE — 6370000000 HC RX 637 (ALT 250 FOR IP): Performed by: ORTHOPAEDIC SURGERY

## 2023-07-13 PROCEDURE — 1100000000 HC RM PRIVATE

## 2023-07-13 PROCEDURE — 97535 SELF CARE MNGMENT TRAINING: CPT

## 2023-07-13 PROCEDURE — 85018 HEMOGLOBIN: CPT

## 2023-07-13 PROCEDURE — 6360000002 HC RX W HCPCS: Performed by: STUDENT IN AN ORGANIZED HEALTH CARE EDUCATION/TRAINING PROGRAM

## 2023-07-13 PROCEDURE — 2580000003 HC RX 258: Performed by: STUDENT IN AN ORGANIZED HEALTH CARE EDUCATION/TRAINING PROGRAM

## 2023-07-13 PROCEDURE — 85014 HEMATOCRIT: CPT

## 2023-07-13 PROCEDURE — 36415 COLL VENOUS BLD VENIPUNCTURE: CPT

## 2023-07-13 RX ORDER — HYDROMORPHONE HYDROCHLORIDE 2 MG/1
2-4 TABLET ORAL EVERY 4 HOURS PRN
Qty: 60 TABLET | Refills: 0 | Status: SHIPPED | OUTPATIENT
Start: 2023-07-13 | End: 2023-07-20

## 2023-07-13 RX ORDER — NALOXONE HYDROCHLORIDE 4 MG/.1ML
1 SPRAY NASAL PRN
Qty: 1 EACH | Refills: 0 | Status: SHIPPED | OUTPATIENT
Start: 2023-07-13

## 2023-07-13 RX ORDER — SENNA AND DOCUSATE SODIUM 50; 8.6 MG/1; MG/1
1 TABLET, FILM COATED ORAL 2 TIMES DAILY
Qty: 60 TABLET | Refills: 0 | Status: SHIPPED | OUTPATIENT
Start: 2023-07-13

## 2023-07-13 RX ADMIN — WATER 2000 MG: 1 INJECTION INTRAMUSCULAR; INTRAVENOUS; SUBCUTANEOUS at 00:52

## 2023-07-13 RX ADMIN — LOSARTAN POTASSIUM 50 MG: 50 TABLET, FILM COATED ORAL at 09:51

## 2023-07-13 RX ADMIN — MONTELUKAST 10 MG: 10 TABLET, FILM COATED ORAL at 20:50

## 2023-07-13 RX ADMIN — SENNOSIDES AND DOCUSATE SODIUM 1 TABLET: 50; 8.6 TABLET ORAL at 09:51

## 2023-07-13 RX ADMIN — POLYETHYLENE GLYCOL 3350 17 G: 17 POWDER, FOR SOLUTION ORAL at 09:50

## 2023-07-13 RX ADMIN — CALCIUM POLYCARBOPHIL 625 MG: 625 TABLET, FILM COATED ORAL at 09:50

## 2023-07-13 RX ADMIN — PANTOPRAZOLE SODIUM 40 MG: 40 TABLET, DELAYED RELEASE ORAL at 06:57

## 2023-07-13 RX ADMIN — HYDROMORPHONE HYDROCHLORIDE 4 MG: 4 TABLET ORAL at 09:51

## 2023-07-13 RX ADMIN — HYDROCHLOROTHIAZIDE 12.5 MG: 25 TABLET ORAL at 09:50

## 2023-07-13 RX ADMIN — ACETAMINOPHEN 1000 MG: 500 TABLET ORAL at 13:31

## 2023-07-13 RX ADMIN — ASPIRIN 81 MG: 81 TABLET, COATED ORAL at 20:50

## 2023-07-13 RX ADMIN — ACETAMINOPHEN 1000 MG: 500 TABLET ORAL at 09:51

## 2023-07-13 RX ADMIN — BACLOFEN 10 MG: 10 TABLET ORAL at 20:51

## 2023-07-13 RX ADMIN — HYDROMORPHONE HYDROCHLORIDE 4 MG: 4 TABLET ORAL at 13:33

## 2023-07-13 RX ADMIN — SODIUM CHLORIDE, PRESERVATIVE FREE 10 ML: 5 INJECTION INTRAVENOUS at 20:52

## 2023-07-13 RX ADMIN — METOPROLOL TARTRATE 25 MG: 25 TABLET, FILM COATED ORAL at 09:51

## 2023-07-13 RX ADMIN — PRAVASTATIN SODIUM 40 MG: 40 TABLET ORAL at 20:51

## 2023-07-13 RX ADMIN — HYDROMORPHONE HYDROCHLORIDE 4 MG: 4 TABLET ORAL at 20:51

## 2023-07-13 RX ADMIN — ALLOPURINOL 200 MG: 100 TABLET ORAL at 09:51

## 2023-07-13 RX ADMIN — ACETAMINOPHEN 1000 MG: 500 TABLET ORAL at 20:51

## 2023-07-13 RX ADMIN — HYDROMORPHONE HYDROCHLORIDE 2 MG: 2 TABLET ORAL at 00:51

## 2023-07-13 RX ADMIN — CETIRIZINE HYDROCHLORIDE 10 MG: 10 TABLET, FILM COATED ORAL at 09:51

## 2023-07-13 ASSESSMENT — PAIN DESCRIPTION - FREQUENCY: FREQUENCY: CONTINUOUS

## 2023-07-13 ASSESSMENT — PAIN DESCRIPTION - ORIENTATION: ORIENTATION: MID

## 2023-07-13 ASSESSMENT — PAIN DESCRIPTION - PAIN TYPE: TYPE: SURGICAL PAIN

## 2023-07-13 ASSESSMENT — PAIN SCALES - GENERAL
PAINLEVEL_OUTOF10: 7
PAINLEVEL_OUTOF10: 8

## 2023-07-13 ASSESSMENT — PAIN - FUNCTIONAL ASSESSMENT: PAIN_FUNCTIONAL_ASSESSMENT: ACTIVITIES ARE NOT PREVENTED

## 2023-07-13 ASSESSMENT — PAIN DESCRIPTION - LOCATION: LOCATION: BACK

## 2023-07-13 ASSESSMENT — PAIN DESCRIPTION - DESCRIPTORS: DESCRIPTORS: ACHING

## 2023-07-13 ASSESSMENT — PAIN DESCRIPTION - ONSET: ONSET: ON-GOING

## 2023-07-13 NOTE — DISCHARGE INSTRUCTIONS
After Hospital Care Plan:  Discharge Instructions Lumbar Fusion Surgery   Dr. Boris Najera   Patient Name: Yuliet Delacruz    Date of procedure: 7/12/2023    Date of discharge: 7/ /2023    Procedure: Follow up appointments  -follow up with Dr. Dr. Boris Najera in 2 weeks. Call [phone] to make an appointment as soon as you get home from the hospital.    [address]: ____________________   phone: _______________________  The agency will contact you to arrange dates/times for visits. Please call them if you do not hear from them within 24 hours after you are discharged  Physical therapy 3 times a week for 3 weeks  Nursing-initial assessment and as needed    When to call your Orthopaedic Surgeon:  -Signs of infection-if your incision is red; continues to have drainage; drainage has a foul odor or if you have a persistent fever over 101 degrees for 24 hours  -Nausea or vomiting, severe headache  -Loss of bowel or bladder function, inability to urinate  -Changes in sensation in your arms or legs (numbness, tingling, loss of color)  -Increased weakness-greater than before your surgery  -Severe pain or pain not relieved by medications  -Signs of a blood clot in your leg-calf pain, tenderness, redness, swelling of lower leg    When to call your Primary Care Physician:  -Concerns about medical conditions such as diabetes, high blood pressure, asthma, congestive heart failure  -Call if blood sugars are elevated, persistent headache or dizziness, coughing or congestion, constipation or diarrhea, burning with urination, abnormal heart rate    When to call 911 and go to the nearest emergency room:  -Acute onset of chest pain, shortness of breath, difficulty breathing    Activity  -You are going home a well person, be as active as possible. Your only exercise should be walking.   Start with short frequent walks and increase your walking distance each day.  -Limit the amount of time you sit to 20-30 minute

## 2023-07-14 ENCOUNTER — HOME HEALTH ADMISSION (OUTPATIENT)
Dept: HOME HEALTH SERVICES | Facility: HOME HEALTH | Age: 73
End: 2023-07-14
Payer: MEDICARE

## 2023-07-14 ENCOUNTER — APPOINTMENT (OUTPATIENT)
Facility: HOSPITAL | Age: 73
End: 2023-07-14
Attending: ORTHOPAEDIC SURGERY
Payer: MEDICARE

## 2023-07-14 LAB
HCT VFR BLD AUTO: 30.2 % (ref 35–47)
HGB BLD-MCNC: 9.4 G/DL (ref 11.5–16)

## 2023-07-14 PROCEDURE — 2580000003 HC RX 258: Performed by: STUDENT IN AN ORGANIZED HEALTH CARE EDUCATION/TRAINING PROGRAM

## 2023-07-14 PROCEDURE — 97116 GAIT TRAINING THERAPY: CPT

## 2023-07-14 PROCEDURE — 6370000000 HC RX 637 (ALT 250 FOR IP): Performed by: ORTHOPAEDIC SURGERY

## 2023-07-14 PROCEDURE — 36415 COLL VENOUS BLD VENIPUNCTURE: CPT

## 2023-07-14 PROCEDURE — 97535 SELF CARE MNGMENT TRAINING: CPT

## 2023-07-14 PROCEDURE — 93970 EXTREMITY STUDY: CPT

## 2023-07-14 PROCEDURE — 85014 HEMATOCRIT: CPT

## 2023-07-14 PROCEDURE — 1100000000 HC RM PRIVATE

## 2023-07-14 PROCEDURE — 6370000000 HC RX 637 (ALT 250 FOR IP): Performed by: STUDENT IN AN ORGANIZED HEALTH CARE EDUCATION/TRAINING PROGRAM

## 2023-07-14 PROCEDURE — 85018 HEMOGLOBIN: CPT

## 2023-07-14 RX ADMIN — SODIUM CHLORIDE, PRESERVATIVE FREE 10 ML: 5 INJECTION INTRAVENOUS at 20:39

## 2023-07-14 RX ADMIN — HYDROMORPHONE HYDROCHLORIDE 2 MG: 2 TABLET ORAL at 14:39

## 2023-07-14 RX ADMIN — SODIUM CHLORIDE, PRESERVATIVE FREE 10 ML: 5 INJECTION INTRAVENOUS at 08:40

## 2023-07-14 RX ADMIN — SENNOSIDES AND DOCUSATE SODIUM 1 TABLET: 50; 8.6 TABLET ORAL at 20:32

## 2023-07-14 RX ADMIN — PRAVASTATIN SODIUM 40 MG: 40 TABLET ORAL at 20:33

## 2023-07-14 RX ADMIN — HYDROMORPHONE HYDROCHLORIDE 4 MG: 4 TABLET ORAL at 05:48

## 2023-07-14 RX ADMIN — ASPIRIN 81 MG: 81 TABLET, COATED ORAL at 20:32

## 2023-07-14 RX ADMIN — ACETAMINOPHEN 1000 MG: 500 TABLET ORAL at 14:33

## 2023-07-14 RX ADMIN — BACLOFEN 10 MG: 10 TABLET ORAL at 20:32

## 2023-07-14 RX ADMIN — ALLOPURINOL 200 MG: 100 TABLET ORAL at 08:37

## 2023-07-14 RX ADMIN — HYDROMORPHONE HYDROCHLORIDE 4 MG: 4 TABLET ORAL at 01:28

## 2023-07-14 RX ADMIN — CETIRIZINE HYDROCHLORIDE 10 MG: 10 TABLET, FILM COATED ORAL at 08:37

## 2023-07-14 RX ADMIN — ISOSORBIDE MONONITRATE 15 MG: 30 TABLET, EXTENDED RELEASE ORAL at 20:33

## 2023-07-14 RX ADMIN — MONTELUKAST 10 MG: 10 TABLET, FILM COATED ORAL at 20:33

## 2023-07-14 RX ADMIN — METOPROLOL TARTRATE 25 MG: 25 TABLET, FILM COATED ORAL at 20:33

## 2023-07-14 RX ADMIN — PANTOPRAZOLE SODIUM 40 MG: 40 TABLET, DELAYED RELEASE ORAL at 05:49

## 2023-07-14 RX ADMIN — CALCIUM POLYCARBOPHIL 625 MG: 625 TABLET, FILM COATED ORAL at 08:37

## 2023-07-14 RX ADMIN — ACETAMINOPHEN 1000 MG: 500 TABLET ORAL at 01:24

## 2023-07-14 ASSESSMENT — PAIN SCALES - GENERAL
PAINLEVEL_OUTOF10: 7
PAINLEVEL_OUTOF10: 7
PAINLEVEL_OUTOF10: 9
PAINLEVEL_OUTOF10: 9

## 2023-07-14 NOTE — CARE COORDINATION
Transition of Care Plan:    RUR: 12%   Prior Level of Functioning: Independent   Disposition: Home with Home Health; Accepted    [facility]; 7/17  Follow up appointments: PCP   DME needed: None   Transportation at discharge: Family   IM/IMM Medicare/ letter given: 2nd IMM Letter Received   Caregiver Contact: Lennox Congo [phone]; Spouse   Care Conference needed? No   Barriers to discharge: Medical       07/14/23 0954   Condition of Participation: Discharge Planning   The Plan for Transition of Care is related to the following treatment goals: Home Health   The Patient and/or Patient Representative was provided with a Choice of Provider? Patient   The Patient and/Or Patient Representative agree with the Discharge Plan? Yes   Freedom of Choice list was provided with basic dialogue that supports the patient's individualized plan of care/goals, treatment preferences, and shares the quality data associated with the providers?   Yes

## 2023-07-15 VITALS
OXYGEN SATURATION: 95 % | DIASTOLIC BLOOD PRESSURE: 62 MMHG | TEMPERATURE: 98.1 F | HEART RATE: 71 BPM | RESPIRATION RATE: 16 BRPM | SYSTOLIC BLOOD PRESSURE: 105 MMHG

## 2023-07-15 PROCEDURE — 6370000000 HC RX 637 (ALT 250 FOR IP): Performed by: STUDENT IN AN ORGANIZED HEALTH CARE EDUCATION/TRAINING PROGRAM

## 2023-07-15 PROCEDURE — 2580000003 HC RX 258: Performed by: STUDENT IN AN ORGANIZED HEALTH CARE EDUCATION/TRAINING PROGRAM

## 2023-07-15 PROCEDURE — 97116 GAIT TRAINING THERAPY: CPT

## 2023-07-15 PROCEDURE — 97530 THERAPEUTIC ACTIVITIES: CPT

## 2023-07-15 PROCEDURE — 6370000000 HC RX 637 (ALT 250 FOR IP): Performed by: ORTHOPAEDIC SURGERY

## 2023-07-15 RX ADMIN — ACETAMINOPHEN 1000 MG: 500 TABLET ORAL at 14:59

## 2023-07-15 RX ADMIN — SODIUM CHLORIDE, PRESERVATIVE FREE 10 ML: 5 INJECTION INTRAVENOUS at 09:00

## 2023-07-15 RX ADMIN — ALLOPURINOL 200 MG: 100 TABLET ORAL at 09:24

## 2023-07-15 RX ADMIN — CETIRIZINE HYDROCHLORIDE 10 MG: 10 TABLET, FILM COATED ORAL at 09:23

## 2023-07-15 RX ADMIN — ACETAMINOPHEN 1000 MG: 500 TABLET ORAL at 07:13

## 2023-07-15 RX ADMIN — PANTOPRAZOLE SODIUM 40 MG: 40 TABLET, DELAYED RELEASE ORAL at 07:14

## 2023-07-15 RX ADMIN — ACETAMINOPHEN 1000 MG: 500 TABLET ORAL at 01:45

## 2023-07-15 RX ADMIN — POLYETHYLENE GLYCOL 3350 17 G: 17 POWDER, FOR SOLUTION ORAL at 09:23

## 2023-07-15 RX ADMIN — CALCIUM POLYCARBOPHIL 625 MG: 625 TABLET, FILM COATED ORAL at 09:23

## 2023-07-15 NOTE — DISCHARGE SUMMARY
Lumbar Surgery Discharge Instructions    Activities  You are going home a well person, be as active as possible. Your only exercise should be walking. Start with short frequent walks and increase your walking distance each day. Limit the amount of time you sit to 20-30 minute intervals. Sitting for prolonged periods of time will be uncomfortable for you following your surgery. Do not lift anything over five pounds. Remember that a gallon of milk weighs about 8 pounds. Do not do any straining, twisting or bending. When you are in the bed, you may lay on your back or on either side. Do not lay on your stomach. Diet  You may resume your normal diet. If your throat is sore, you may want to eat soft foods for a few days. Be sure to drink plenty of fluids, it is important to keep yourself hydrated. Avoid alcoholic beverages and tobacco products. Medications  Do not take anti-inflammatory medications or aspirin unless instructed by your physician. Take your pain medication as directed. It is important to have regular bowel movements. Pain medications may cause constipation. Stool softeners, prune juice, and increasing your water and fiber intake may help in preventing constipation. Laxatives should only be used if the above preventative measures have failed and you still have not had a bowel movement after three days. Current Discharge Medication List        START taking these medications    Details   sennosides-docusate sodium (SENOKOT-S) 8.6-50 MG tablet Take 1 tablet by mouth 2 times daily  Qty: 60 tablet, Refills: 0      HYDROmorphone (DILAUDID) 2 MG tablet Take 1-2 tablets by mouth every 4 hours as needed for Pain for up to 7 days.  Max Daily Amount: 24 mg  Qty: 60 tablet, Refills: 0    Comments: Supervising Physician: Carloz Hope MD NPI: [id number] ALENA: [id number] Reduce doses taken as pain becomes manageable  Associated Diagnoses: Status post lumbar spinal fusion      naloxone 4

## 2023-07-16 ENCOUNTER — HOME CARE VISIT (OUTPATIENT)
Facility: HOME HEALTH | Age: 73
End: 2023-07-16
Payer: MEDICARE

## 2023-07-16 VITALS
SYSTOLIC BLOOD PRESSURE: 108 MMHG | DIASTOLIC BLOOD PRESSURE: 64 MMHG | HEART RATE: 80 BPM | RESPIRATION RATE: 20 BRPM | TEMPERATURE: 98.5 F | OXYGEN SATURATION: 97 %

## 2023-07-16 PROCEDURE — G0151 HHCP-SERV OF PT,EA 15 MIN: HCPCS

## 2023-07-16 PROCEDURE — 0221000100 HH NO PAY CLAIM PROCEDURE

## 2023-07-16 ASSESSMENT — ENCOUNTER SYMPTOMS
HEMOPTYSIS: 0
DYSPNEA ACTIVITY LEVEL: AFTER AMBULATING 10 - 20 FT
PAIN LOCATION - PAIN QUALITY: ACHING/SHARP

## 2023-07-18 ENCOUNTER — HOME CARE VISIT (OUTPATIENT)
Facility: HOME HEALTH | Age: 73
End: 2023-07-18
Payer: MEDICARE

## 2023-07-18 VITALS
SYSTOLIC BLOOD PRESSURE: 144 MMHG | TEMPERATURE: 98.5 F | DIASTOLIC BLOOD PRESSURE: 88 MMHG | HEART RATE: 76 BPM | OXYGEN SATURATION: 94 %

## 2023-07-18 PROCEDURE — G0157 HHC PT ASSISTANT EA 15: HCPCS

## 2023-07-18 ASSESSMENT — ENCOUNTER SYMPTOMS: PAIN LOCATION - PAIN QUALITY: THROBBING

## 2023-07-19 NOTE — HOME HEALTH
Subjective: I think I am doing good, I've had many surgeries before. Falls since last visit No(if yes complete the Fall Tracking Form and include bsrifallreport):   Caregiver involvement changes:  present for visit  Home health supplies by type and quantity ordered/delivered this visit include: n/a    Clinician asked if patient has had any physician contact since last home care visit and patient states: NO  Clinician asked if patient has any new or changed medications and patient states:  NO   If Yes, were medications reconciled? N/A   Was the certifying physician notified of changes in medications? N/A     Clinical assessment (what this visit means for the patient overall and need for ongoing skilled care) and progress or lack of progress towards SPECIFIC goals: Pt able to correctly maintain lumbar precautions with transfers and bed mobility. Cont to require skilled intervention to ensure pt can safely exit home as she lives in [address] so has many stairs to exit home. Written Teaching Material Utilized: written HEP in home    Interdisciplinary communication with: Emily Castro PT for the purpose of POC collaboration    Discharge planning as follows:  Will discharge when the patient has reached their maximum functional potential and maximum safety in their home    Specific plan for next visit: progress strengthening ex to standing position

## 2023-07-20 ENCOUNTER — HOME CARE VISIT (OUTPATIENT)
Facility: HOME HEALTH | Age: 73
End: 2023-07-20
Payer: MEDICARE

## 2023-07-20 PROCEDURE — G0157 HHC PT ASSISTANT EA 15: HCPCS

## 2023-07-21 VITALS
DIASTOLIC BLOOD PRESSURE: 68 MMHG | OXYGEN SATURATION: 97 % | TEMPERATURE: 98.2 F | HEART RATE: 61 BPM | SYSTOLIC BLOOD PRESSURE: 124 MMHG

## 2023-07-21 ASSESSMENT — ENCOUNTER SYMPTOMS: PAIN LOCATION - PAIN QUALITY: ACHING

## 2023-07-21 NOTE — HOME HEALTH
Subjective: Pt reporting bone stimulator is interfering with Interstim in bladder. Falls since last visit No }(if yes complete the Fall Tracking Form and include bsrifallreport):   Caregiver involvement changes: pt's  present for visit  Home health supplies by type and quantity ordered/delivered this visit include: n/a    Clinician asked if patient has had any physician contact since last home care visit and patient states: No  Clinician asked if patient has any new or changed medications and patient states:  No  If Yes, were medications reconciled? NA  Was the certifying physician notified of changes in medications? NA    Clinical assessment (what this visit means for the patient overall and need for ongoing skilled care) and progress or lack of progress towards SPECIFIC goals: Noted some drainage in bandage today with dressing change, notifed Dr. Rosa Holguin office of change and awaiting return call. Pt to contact vendor to bone stimulator to ask if she should turn off bladder stimulator when using bone stimulator. Written Teaching Material Utilized: written HEP in home     Interdisciplinary communication with: Delta Salazar PT for the purpose of POC collaboration    Discharge planning as follows:  When goals are met    Specific plan for next visit: cont LE strengthening, transfer and stair training

## 2023-07-24 ENCOUNTER — HOME CARE VISIT (OUTPATIENT)
Facility: HOME HEALTH | Age: 73
End: 2023-07-24
Payer: MEDICARE

## 2023-07-24 PROCEDURE — G0157 HHC PT ASSISTANT EA 15: HCPCS

## 2023-07-25 VITALS
HEART RATE: 69 BPM | SYSTOLIC BLOOD PRESSURE: 144 MMHG | DIASTOLIC BLOOD PRESSURE: 90 MMHG | TEMPERATURE: 97.9 F | OXYGEN SATURATION: 93 %

## 2023-07-25 NOTE — HOME HEALTH
Subjective: I still had alittle drainage this weekend. MD started her on antibiotic due to ongoing redness along incision. Falls since last visit No (if yes complete the Fall Tracking Form and include bsrifallreport):   Caregiver involvement changes:  present for visit  Home health supplies by type and quantity ordered/delivered this visit include: n/a    Clinician asked if patient has had any physician contact since last home care visit and patient states: Yes  Clinician asked if patient has any new or changed medications and patient states: yes  If Yes, were medications reconciled? yes  Was the certifying physician notified of changes in medications? N/A    Clinical assessment (what this visit means for the patient overall and need for ongoing skilled care) and progress or lack of progress towards SPECIFIC goals: Focused on stair training and gait training with cane during visit today. Pt still presenting with some redness and drainage in lumbar incision, pt was started on antibiotic by surgeon and has f/u appt on Thursday. Written Teaching Material Utilized: N/A    Interdisciplinary communication with: Wilda Li PT    Discharge planning as follows:  Will discharge when the patient has reached their maximum functional potential and maximum safety in their home    Specific plan for next visit: PT re-eval

## 2023-07-26 ENCOUNTER — HOME CARE VISIT (OUTPATIENT)
Facility: HOME HEALTH | Age: 73
End: 2023-07-26
Payer: MEDICARE

## 2023-07-26 VITALS
HEART RATE: 63 BPM | SYSTOLIC BLOOD PRESSURE: 104 MMHG | TEMPERATURE: 97.5 F | RESPIRATION RATE: 17 BRPM | OXYGEN SATURATION: 98 % | DIASTOLIC BLOOD PRESSURE: 60 MMHG

## 2023-07-26 PROCEDURE — G0151 HHCP-SERV OF PT,EA 15 MIN: HCPCS

## 2023-07-26 ASSESSMENT — ENCOUNTER SYMPTOMS: PAIN LOCATION - PAIN QUALITY: ACHE/SHARP

## 2023-07-27 NOTE — HOME HEALTH
Subjective: \" I am doing so well and am so pleased by minimal pain but it is still draining yesterday\"  Falls since last visit : none  Caregiver involvement changes: NA  Home health supplies by type and quantity ordered/delivered this visit include: NA    Clinician asked if patient has had any physician contact since last home care visit and patient states: no  Clinician asked if patient has any new or changed medications and patient states:  no  If Yes, were medications reconciled? yes  Was the certifying physician notified of changes in medications? NA    Clinical assessment (what this visit means for the patient overall and need for ongoing skilled care) and progress or lack of progress towards SPECIFIC goals: The Pt is making good progress and is using her SPC in the house on level surfaces and is able to go up and down the steps in the house using B handrail. The pt sees the MD tomorrow and the PT changed the dressing as the pt noted that there is drainage at the distal end of the wound. She stated her understanding od the need to walk often but not to walk outside during this heat The PT instructed the pt on the s/s of infection as the pt's wound looks slightly irritated and red with drinage at dital en of incision. She will need to continue to work on ambulation strength and endurance    Written Teaching Material Utilized: HEP cue sheet    Interdisciplinary communication with: Selin Zafar PTA about the pt's POC and progress    Discharge planning as follows:  Is no longer homebound, Per physician order, Will discharge when the patient has reached their maximum functional potential and maximum safety in their home and When goals are met    Specific plan for next visit: Re-instruct patient/caregiver on HEP, gait, transfers, Educate in s/s of infection and when to call [facility], MD or 911 and Instruct in safety issues regarding Proper use of assistive device and Tripping hazards

## 2023-07-31 ENCOUNTER — HOME CARE VISIT (OUTPATIENT)
Facility: HOME HEALTH | Age: 73
End: 2023-07-31
Payer: MEDICARE

## 2023-07-31 VITALS
RESPIRATION RATE: 17 BRPM | SYSTOLIC BLOOD PRESSURE: 118 MMHG | TEMPERATURE: 98.5 F | DIASTOLIC BLOOD PRESSURE: 60 MMHG | HEART RATE: 72 BPM | OXYGEN SATURATION: 97 %

## 2023-07-31 PROCEDURE — G0151 HHCP-SERV OF PT,EA 15 MIN: HCPCS

## 2023-07-31 ASSESSMENT — ENCOUNTER SYMPTOMS: PAIN LOCATION - PAIN QUALITY: ACHE

## 2023-08-01 NOTE — HOME HEALTH
Subjective: \" I am feeling bad today with this headache I have had for 2 days\"  Falls since last visit no falls  Caregiver involvement changes:  NA  Home health supplies by type and quantity ordered/delivered this visit include: *NA    Clinician asked if patient has had any physician contact since last home care visit and patient states: no  Clinician asked if patient has any new or changed medications and patient states:  {no  If Yes, were medications reconciled? yes  Was the certifying physician notified of changes in medications? NA    Clinical assessment (what this visit means for the patient overall and need for ongoing skilled care) and progress or lack of progress towards SPECIFIC goals: The pt has had a headache for two days that is in the center of the crown of her head but speads down each side. The Pt takes tylenol but it is not affecting the pain that much and lying down will make it ease ever so slightly. The PT called and spoke with Antonette Ramirez in the surgeon's office to let her know and she stated she would tell the surgeon and for the pt to continue to take the tylenol as needed, and stay hydrated and if there is any worsening of the symptoms, or extreme change in alertness to call 911. The pt is able to get in and out of bed and chair but did not want to change position too frequently today and just did not want to complete standing HEP so the PT had the pt complete seated HEP of LAQ, heel toe riase and LAQ keeping it low to avoid any pulling in the low back. She completed these without pain and swelling  The Written Teaching Material [address] [facility]  Interdisciplinary communication with: ALEJANDRO  Discharge planning as follows:  Is no longer homebound, Per physician order, Will discharge when the patient has reached their maximum functional potential and maximum safety in their home and When goals are met    Specific plan for next visit: Re-instruct patient/caregiver on HEP gait transfers, Educate in s/s of

## 2023-08-03 ENCOUNTER — HOME CARE VISIT (OUTPATIENT)
Facility: HOME HEALTH | Age: 73
End: 2023-08-03
Payer: MEDICARE

## 2023-08-03 ENCOUNTER — HOME CARE VISIT (OUTPATIENT)
Dept: HOME HEALTH SERVICES | Facility: HOME HEALTH | Age: 73
End: 2023-08-03
Payer: MEDICARE

## 2023-08-03 VITALS
OXYGEN SATURATION: 98 % | SYSTOLIC BLOOD PRESSURE: 102 MMHG | DIASTOLIC BLOOD PRESSURE: 70 MMHG | TEMPERATURE: 98.3 F | HEART RATE: 61 BPM | RESPIRATION RATE: 16 BRPM

## 2023-08-03 PROCEDURE — G0151 HHCP-SERV OF PT,EA 15 MIN: HCPCS

## 2023-08-03 ASSESSMENT — ENCOUNTER SYMPTOMS
PAIN LOCATION - PAIN QUALITY: SORENESS
CONTUSION: 1

## 2023-08-04 ENCOUNTER — APPOINTMENT (OUTPATIENT)
Facility: HOSPITAL | Age: 73
DRG: 029 | End: 2023-08-04
Payer: MEDICARE

## 2023-08-04 ENCOUNTER — HOSPITAL ENCOUNTER (EMERGENCY)
Facility: HOSPITAL | Age: 73
Discharge: HOME | DRG: 029 | End: 2023-08-04
Attending: STUDENT IN AN ORGANIZED HEALTH CARE EDUCATION/TRAINING PROGRAM
Payer: MEDICARE

## 2023-08-04 VITALS
DIASTOLIC BLOOD PRESSURE: 74 MMHG | OXYGEN SATURATION: 99 % | BODY MASS INDEX: 33.38 KG/M2 | WEIGHT: 207.67 LBS | TEMPERATURE: 98.2 F | HEART RATE: 69 BPM | RESPIRATION RATE: 16 BRPM | HEIGHT: 66 IN | SYSTOLIC BLOOD PRESSURE: 173 MMHG

## 2023-08-04 DIAGNOSIS — R51.9 ACUTE NONINTRACTABLE HEADACHE, UNSPECIFIED HEADACHE TYPE: Primary | ICD-10-CM

## 2023-08-04 LAB
ALBUMIN SERPL-MCNC: 3.5 G/DL (ref 3.5–5)
ALBUMIN/GLOB SERPL: 0.9 (ref 1.1–2.2)
ALP SERPL-CCNC: 111 U/L (ref 45–117)
ALT SERPL-CCNC: 18 U/L (ref 12–78)
ANION GAP SERPL CALC-SCNC: 6 MMOL/L (ref 5–15)
APPEARANCE UR: CLEAR
AST SERPL-CCNC: 19 U/L (ref 15–37)
BACTERIA URNS QL MICRO: NEGATIVE /HPF
BASOPHILS # BLD: 0.1 K/UL (ref 0–0.1)
BASOPHILS NFR BLD: 1 % (ref 0–1)
BILIRUB SERPL-MCNC: 0.7 MG/DL (ref 0.2–1)
BILIRUB UR QL: NEGATIVE
BUN SERPL-MCNC: 16 MG/DL (ref 6–20)
BUN/CREAT SERPL: 14 (ref 12–20)
CALCIUM SERPL-MCNC: 9.4 MG/DL (ref 8.5–10.1)
CHLORIDE SERPL-SCNC: 106 MMOL/L (ref 97–108)
CO2 SERPL-SCNC: 27 MMOL/L (ref 21–32)
COLOR UR: NORMAL
COMMENT:: NORMAL
CREAT SERPL-MCNC: 1.13 MG/DL (ref 0.55–1.02)
DIFFERENTIAL METHOD BLD: NORMAL
EKG ATRIAL RATE: 62 BPM
EKG DIAGNOSIS: NORMAL
EKG P AXIS: 61 DEGREES
EKG P-R INTERVAL: 170 MS
EKG Q-T INTERVAL: 412 MS
EKG QRS DURATION: 100 MS
EKG QTC CALCULATION (BAZETT): 418 MS
EKG R AXIS: -21 DEGREES
EKG T AXIS: 86 DEGREES
EKG VENTRICULAR RATE: 62 BPM
EOSINOPHIL # BLD: 0.4 K/UL (ref 0–0.4)
EOSINOPHIL NFR BLD: 5 % (ref 0–7)
EPITH CASTS URNS QL MICRO: NORMAL /LPF
ERYTHROCYTE [DISTWIDTH] IN BLOOD BY AUTOMATED COUNT: 14.4 % (ref 11.5–14.5)
GLOBULIN SER CALC-MCNC: 4 G/DL (ref 2–4)
GLUCOSE SERPL-MCNC: 117 MG/DL (ref 65–100)
GLUCOSE UR STRIP.AUTO-MCNC: NEGATIVE MG/DL
HCT VFR BLD AUTO: 36.9 % (ref 35–47)
HGB BLD-MCNC: 11.7 G/DL (ref 11.5–16)
HGB UR QL STRIP: NEGATIVE
IMM GRANULOCYTES # BLD AUTO: 0 K/UL (ref 0–0.04)
IMM GRANULOCYTES NFR BLD AUTO: 0 % (ref 0–0.5)
KETONES UR QL STRIP.AUTO: NEGATIVE MG/DL
LACTATE SERPL-SCNC: 1.2 MMOL/L (ref 0.4–2)
LEUKOCYTE ESTERASE UR QL STRIP.AUTO: NEGATIVE
LYMPHOCYTES # BLD: 1.5 K/UL (ref 0.8–3.5)
LYMPHOCYTES NFR BLD: 19 % (ref 12–49)
MAGNESIUM SERPL-MCNC: 2.2 MG/DL (ref 1.6–2.4)
MCH RBC QN AUTO: 29.1 PG (ref 26–34)
MCHC RBC AUTO-ENTMCNC: 31.7 G/DL (ref 30–36.5)
MCV RBC AUTO: 91.8 FL (ref 80–99)
MONOCYTES # BLD: 0.7 K/UL (ref 0–1)
MONOCYTES NFR BLD: 9 % (ref 5–13)
NEUTS SEG # BLD: 5.5 K/UL (ref 1.8–8)
NEUTS SEG NFR BLD: 66 % (ref 32–75)
NITRITE UR QL STRIP.AUTO: NEGATIVE
NRBC # BLD: 0 K/UL (ref 0–0.01)
NRBC BLD-RTO: 0 PER 100 WBC
PH UR STRIP: 6.5 (ref 5–8)
PLATELET # BLD AUTO: 316 K/UL (ref 150–400)
PMV BLD AUTO: 9.6 FL (ref 8.9–12.9)
POTASSIUM SERPL-SCNC: 4.5 MMOL/L (ref 3.5–5.1)
PROT SERPL-MCNC: 7.5 G/DL (ref 6.4–8.2)
PROT UR STRIP-MCNC: NEGATIVE MG/DL
RBC # BLD AUTO: 4.02 M/UL (ref 3.8–5.2)
RBC #/AREA URNS HPF: NORMAL /HPF (ref 0–5)
SARS-COV-2 RDRP RESP QL NAA+PROBE: NOT DETECTED
SODIUM SERPL-SCNC: 139 MMOL/L (ref 136–145)
SOURCE: NORMAL
SP GR UR REFRACTOMETRY: 1.02 (ref 1–1.03)
SPECIMEN HOLD: NORMAL
TROPONIN I SERPL HS-MCNC: 29 NG/L (ref 0–51)
UROBILINOGEN UR QL STRIP.AUTO: 0.2 EU/DL (ref 0.2–1)
WBC # BLD AUTO: 8.3 K/UL (ref 3.6–11)
WBC URNS QL MICRO: NORMAL /HPF (ref 0–4)

## 2023-08-04 PROCEDURE — 87635 SARS-COV-2 COVID-19 AMP PRB: CPT

## 2023-08-04 PROCEDURE — 96375 TX/PRO/DX INJ NEW DRUG ADDON: CPT

## 2023-08-04 PROCEDURE — 93005 ELECTROCARDIOGRAM TRACING: CPT | Performed by: STUDENT IN AN ORGANIZED HEALTH CARE EDUCATION/TRAINING PROGRAM

## 2023-08-04 PROCEDURE — 84484 ASSAY OF TROPONIN QUANT: CPT

## 2023-08-04 PROCEDURE — 70496 CT ANGIOGRAPHY HEAD: CPT

## 2023-08-04 PROCEDURE — 81001 URINALYSIS AUTO W/SCOPE: CPT

## 2023-08-04 PROCEDURE — 83605 ASSAY OF LACTIC ACID: CPT

## 2023-08-04 PROCEDURE — 80053 COMPREHEN METABOLIC PANEL: CPT

## 2023-08-04 PROCEDURE — 6360000004 HC RX CONTRAST MEDICATION: Performed by: RADIOLOGY

## 2023-08-04 PROCEDURE — 85025 COMPLETE CBC W/AUTO DIFF WBC: CPT

## 2023-08-04 PROCEDURE — 2580000003 HC RX 258: Performed by: EMERGENCY MEDICINE

## 2023-08-04 PROCEDURE — 6360000002 HC RX W HCPCS: Performed by: STUDENT IN AN ORGANIZED HEALTH CARE EDUCATION/TRAINING PROGRAM

## 2023-08-04 PROCEDURE — 96374 THER/PROPH/DIAG INJ IV PUSH: CPT

## 2023-08-04 PROCEDURE — 83735 ASSAY OF MAGNESIUM: CPT

## 2023-08-04 PROCEDURE — 70450 CT HEAD/BRAIN W/O DYE: CPT

## 2023-08-04 PROCEDURE — 99285 EMERGENCY DEPT VISIT HI MDM: CPT

## 2023-08-04 PROCEDURE — 36415 COLL VENOUS BLD VENIPUNCTURE: CPT

## 2023-08-04 PROCEDURE — 4A03X5D MEASUREMENT OF ARTERIAL FLOW, INTRACRANIAL, EXTERNAL APPROACH: ICD-10-PCS | Performed by: RADIOLOGY

## 2023-08-04 RX ORDER — DROPERIDOL 2.5 MG/ML
0.62 INJECTION, SOLUTION INTRAMUSCULAR; INTRAVENOUS ONCE
Status: COMPLETED | OUTPATIENT
Start: 2023-08-04 | End: 2023-08-04

## 2023-08-04 RX ORDER — DIPHENHYDRAMINE HYDROCHLORIDE 50 MG/ML
25 INJECTION INTRAMUSCULAR; INTRAVENOUS
Status: COMPLETED | OUTPATIENT
Start: 2023-08-04 | End: 2023-08-04

## 2023-08-04 RX ORDER — ONDANSETRON 2 MG/ML
4 INJECTION INTRAMUSCULAR; INTRAVENOUS
Status: DISCONTINUED | OUTPATIENT
Start: 2023-08-04 | End: 2023-08-04

## 2023-08-04 RX ORDER — PROCHLORPERAZINE EDISYLATE 5 MG/ML
10 INJECTION INTRAMUSCULAR; INTRAVENOUS ONCE
Status: COMPLETED | OUTPATIENT
Start: 2023-08-04 | End: 2023-08-04

## 2023-08-04 RX ORDER — 0.9 % SODIUM CHLORIDE 0.9 %
500 INTRAVENOUS SOLUTION INTRAVENOUS ONCE
Status: COMPLETED | OUTPATIENT
Start: 2023-08-04 | End: 2023-08-04

## 2023-08-04 RX ADMIN — DIPHENHYDRAMINE HYDROCHLORIDE 25 MG: 50 INJECTION INTRAMUSCULAR; INTRAVENOUS at 14:33

## 2023-08-04 RX ADMIN — IOPAMIDOL 100 ML: 755 INJECTION, SOLUTION INTRAVENOUS at 13:46

## 2023-08-04 RX ADMIN — DROPERIDOL 0.62 MG: 2.5 INJECTION, SOLUTION INTRAMUSCULAR; INTRAVENOUS at 17:59

## 2023-08-04 RX ADMIN — PROCHLORPERAZINE EDISYLATE 10 MG: 5 INJECTION INTRAMUSCULAR; INTRAVENOUS at 14:33

## 2023-08-04 RX ADMIN — SODIUM CHLORIDE 500 ML: 9 INJECTION, SOLUTION INTRAVENOUS at 14:33

## 2023-08-04 ASSESSMENT — PAIN - FUNCTIONAL ASSESSMENT
PAIN_FUNCTIONAL_ASSESSMENT: 0-10
PAIN_FUNCTIONAL_ASSESSMENT: 0-10

## 2023-08-04 ASSESSMENT — PAIN DESCRIPTION - LOCATION
LOCATION: HEAD
LOCATION: HEAD

## 2023-08-04 ASSESSMENT — PAIN SCALES - GENERAL
PAINLEVEL_OUTOF10: 4
PAINLEVEL_OUTOF10: 10

## 2023-08-04 ASSESSMENT — PAIN DESCRIPTION - DESCRIPTORS: DESCRIPTORS: ACHING

## 2023-08-04 NOTE — ED TRIAGE NOTES
Pt states she had back surgery 3 weeks ago and she states she has had bad headaches that started Monday. Pt states only relief is from laying down.  PCP sent her to ER

## 2023-08-04 NOTE — ED PROVIDER NOTES
[facility] EMERGENCY DEP  EMERGENCY DEPARTMENT ENCOUNTER      Pt Name: Prosper Matos  MRN: [id number]  [address] [facility] 1950  Date of evaluation: 8/4/2023  Provider: Kylie Calderón MD    CHIEF COMPLAINT       Chief Complaint   Patient presents with    Headache         HISTORY OF PRESENT ILLNESS    Review of External Medical Records: N/A    Nursing Notes were reviewed. HPI    Prosper Matos is a 68 y.o. female with past medical history significant for hypertension, hyperlipidemia, migraines, spinal stenosis status post surgery 3 weeks ago who presents to the emergency department for evaluation of headache. Patient states that she has had severe headache since Monday. Called into her family medicine doctor's office then and was told to try Tylenol and report to the ED for evaluation if symptoms worsen. Seen by PCP this AM and sent here for further evaluation. Patient states that she took 2 extra strength Tylenol tablets this morning without improvement in her headache. Complains of severe 10 out of 10 nonradiating bilateral headache. Reports that her only relief is when she lies down. Reports she does have a history of migraines, but feels that this headache is out of proportion to her usual migraine. Denies any new weakness, visual changes, dizziness, numbness, or tingling.       PAST MEDICAL HISTORY     Past Medical History:   Diagnosis Date    Adverse effect of anesthesia     during hysterectomy- woke up \"too early\"    Anxiety     Arrhythmia     h/o palpitations normal work up 22/2015 heart: David    Arthritis     Awareness under anesthesia     with hysterectomy    Chronic pain     bulging disc c4/c5 l4/l5 : as of 9/1018 no neck/head movement limitation says patient    Claustrophobia     Color blind     CAN'T DISTINGUISH BETWEEN BLACK AND BLUE    CREST (calcinosis, Raynaud's phenomenon, esophageal dysfunction, sclerodactyly, telangiectasia) ([address] [facility]) 2018    [address] Yeni Mayfield    GERD

## 2023-08-04 NOTE — HOME HEALTH
Subjective: \" I am feeling much better today now that headaches have eased'  Falls since last visit : no falls  Caregiver involvement changes: NA  Home health supplies by type and quantity ordered/delivered this visit include: NA    Clinician asked if patient has had any physician contact since last home care visit and patient states: no  Clinician asked if patient has any new or changed medications and patient states:  no  If Yes, were medications reconciled? yes  Was the certifying physician notified of changes in medications? NA     Clinical assessment (what this visit means for the patient overall and need for ongoing skilled care) and progress or lack of progress towards SPECIFIC goals: The Pt has had a slow progression with her healing complicated by wound that would not stop drainaing and then headaches that came and would not go away for several days. The PT called the MD last visit about the headaches and she said to take tylenol and if the headache gets worse or does not go away then she should call back and come in to see the MD. The Pt is now able to dress without A but still needs some help with bathing lower legs and back as well as picking things up fro the floor as she cannot lean over . She no longer has to wear the brace in the house but if she is going out to walk a long distance the MD wants her to wear the back brace. The pt is to work on walking outside and increasing the distance she can tolerate without increase in back pain. The PT understands her medications and is taking them safely and apporpriately at this time without A from cg. She has not het been cleared to drive due to slow reaction time from accelerator to brake. The Pt instructed the pt to maintain her spinal precautions. The pt stated her understanding and her spouse was able to teach back to PT the s/s of infection of the wound.  No further HHPT Needed at this time    Written Teaching Material Utilized: Discharge

## 2023-08-06 ENCOUNTER — HOSPITAL ENCOUNTER (INPATIENT)
Facility: HOSPITAL | Age: 73
LOS: 7 days | Discharge: HOME HEALTH | DRG: 029 | End: 2023-08-13
Attending: ORTHOPAEDIC SURGERY | Admitting: ORTHOPAEDIC SURGERY
Payer: MEDICARE

## 2023-08-06 DIAGNOSIS — Z98.1 S/P LUMBAR FUSION: Primary | ICD-10-CM

## 2023-08-06 PROBLEM — T81.89XA LUMBAR SURGICAL WOUND FLUID COLLECTION, INITIAL ENCOUNTER: Status: ACTIVE | Noted: 2023-08-06

## 2023-08-06 PROCEDURE — 2580000003 HC RX 258: Performed by: PHYSICIAN ASSISTANT

## 2023-08-06 PROCEDURE — 6370000000 HC RX 637 (ALT 250 FOR IP): Performed by: PHYSICIAN ASSISTANT

## 2023-08-06 PROCEDURE — 1100000000 HC RM PRIVATE

## 2023-08-06 RX ORDER — MONTELUKAST SODIUM 10 MG/1
10 TABLET ORAL NIGHTLY
Status: DISCONTINUED | OUTPATIENT
Start: 2023-08-06 | End: 2023-08-13 | Stop reason: HOSPADM

## 2023-08-06 RX ORDER — SODIUM CHLORIDE 9 MG/ML
INJECTION, SOLUTION INTRAVENOUS CONTINUOUS
Status: DISCONTINUED | OUTPATIENT
Start: 2023-08-06 | End: 2023-08-08

## 2023-08-06 RX ORDER — HYDROCODONE BITARTRATE AND ACETAMINOPHEN 5; 325 MG/1; MG/1
1 TABLET ORAL EVERY 6 HOURS PRN
Status: DISCONTINUED | OUTPATIENT
Start: 2023-08-06 | End: 2023-08-13 | Stop reason: HOSPADM

## 2023-08-06 RX ORDER — ACETAMINOPHEN 325 MG/1
650 TABLET ORAL EVERY 4 HOURS PRN
Status: DISCONTINUED | OUTPATIENT
Start: 2023-08-06 | End: 2023-08-13 | Stop reason: HOSPADM

## 2023-08-06 RX ORDER — HYDROCHLOROTHIAZIDE 25 MG/1
12.5 TABLET ORAL DAILY
Status: DISCONTINUED | OUTPATIENT
Start: 2023-08-06 | End: 2023-08-13 | Stop reason: HOSPADM

## 2023-08-06 RX ORDER — HYDROMORPHONE HYDROCHLORIDE 1 MG/ML
0.5 INJECTION, SOLUTION INTRAMUSCULAR; INTRAVENOUS; SUBCUTANEOUS
Status: DISCONTINUED | OUTPATIENT
Start: 2023-08-06 | End: 2023-08-13 | Stop reason: HOSPADM

## 2023-08-06 RX ORDER — CETIRIZINE HYDROCHLORIDE 10 MG/1
10 TABLET ORAL DAILY
Status: DISCONTINUED | OUTPATIENT
Start: 2023-08-06 | End: 2023-08-13 | Stop reason: HOSPADM

## 2023-08-06 RX ORDER — POLYETHYLENE GLYCOL 3350 17 G/17G
17 POWDER, FOR SOLUTION ORAL DAILY PRN
Status: DISCONTINUED | OUTPATIENT
Start: 2023-08-06 | End: 2023-08-13 | Stop reason: HOSPADM

## 2023-08-06 RX ORDER — ISOSORBIDE MONONITRATE 30 MG/1
15 TABLET, EXTENDED RELEASE ORAL NIGHTLY
Status: DISCONTINUED | OUTPATIENT
Start: 2023-08-06 | End: 2023-08-13

## 2023-08-06 RX ORDER — LOSARTAN POTASSIUM 50 MG/1
50 TABLET ORAL DAILY
Status: DISCONTINUED | OUTPATIENT
Start: 2023-08-06 | End: 2023-08-09

## 2023-08-06 RX ORDER — CALCIUM POLYCARBOPHIL 625 MG 625 MG/1
625 TABLET ORAL DAILY
Status: DISCONTINUED | OUTPATIENT
Start: 2023-08-06 | End: 2023-08-13 | Stop reason: HOSPADM

## 2023-08-06 RX ORDER — ONDANSETRON 2 MG/ML
4 INJECTION INTRAMUSCULAR; INTRAVENOUS EVERY 6 HOURS PRN
Status: DISCONTINUED | OUTPATIENT
Start: 2023-08-06 | End: 2023-08-13 | Stop reason: HOSPADM

## 2023-08-06 RX ORDER — SODIUM CHLORIDE 9 MG/ML
INJECTION, SOLUTION INTRAVENOUS PRN
Status: DISCONTINUED | OUTPATIENT
Start: 2023-08-06 | End: 2023-08-13 | Stop reason: HOSPADM

## 2023-08-06 RX ORDER — PANTOPRAZOLE SODIUM 40 MG/1
40 TABLET, DELAYED RELEASE ORAL DAILY
Status: DISCONTINUED | OUTPATIENT
Start: 2023-08-06 | End: 2023-08-13 | Stop reason: HOSPADM

## 2023-08-06 RX ORDER — SODIUM CHLORIDE 0.9 % (FLUSH) 0.9 %
5-40 SYRINGE (ML) INJECTION PRN
Status: DISCONTINUED | OUTPATIENT
Start: 2023-08-06 | End: 2023-08-13 | Stop reason: HOSPADM

## 2023-08-06 RX ORDER — PRAVASTATIN SODIUM 40 MG
40 TABLET ORAL NIGHTLY
Status: DISCONTINUED | OUTPATIENT
Start: 2023-08-06 | End: 2023-08-13 | Stop reason: HOSPADM

## 2023-08-06 RX ORDER — SENNA AND DOCUSATE SODIUM 50; 8.6 MG/1; MG/1
1 TABLET, FILM COATED ORAL 2 TIMES DAILY
Status: DISCONTINUED | OUTPATIENT
Start: 2023-08-06 | End: 2023-08-13 | Stop reason: HOSPADM

## 2023-08-06 RX ORDER — TRAMADOL HYDROCHLORIDE 50 MG/1
100 TABLET ORAL EVERY 6 HOURS PRN
Status: DISCONTINUED | OUTPATIENT
Start: 2023-08-06 | End: 2023-08-13 | Stop reason: HOSPADM

## 2023-08-06 RX ORDER — ONDANSETRON 4 MG/1
4 TABLET, ORALLY DISINTEGRATING ORAL EVERY 8 HOURS PRN
Status: DISCONTINUED | OUTPATIENT
Start: 2023-08-06 | End: 2023-08-13 | Stop reason: HOSPADM

## 2023-08-06 RX ORDER — DIPHENHYDRAMINE HYDROCHLORIDE 50 MG/ML
25 INJECTION INTRAMUSCULAR; INTRAVENOUS EVERY 6 HOURS PRN
Status: DISCONTINUED | OUTPATIENT
Start: 2023-08-06 | End: 2023-08-13 | Stop reason: HOSPADM

## 2023-08-06 RX ORDER — ALLOPURINOL 100 MG/1
200 TABLET ORAL DAILY
Status: DISCONTINUED | OUTPATIENT
Start: 2023-08-06 | End: 2023-08-13 | Stop reason: HOSPADM

## 2023-08-06 RX ORDER — SODIUM CHLORIDE 0.9 % (FLUSH) 0.9 %
5-40 SYRINGE (ML) INJECTION EVERY 12 HOURS SCHEDULED
Status: DISCONTINUED | OUTPATIENT
Start: 2023-08-06 | End: 2023-08-13 | Stop reason: HOSPADM

## 2023-08-06 RX ORDER — KETOTIFEN FUMARATE 0.35 MG/ML
1 SOLUTION/ DROPS OPHTHALMIC AS NEEDED
Status: DISCONTINUED | OUTPATIENT
Start: 2023-08-06 | End: 2023-08-13 | Stop reason: HOSPADM

## 2023-08-06 RX ORDER — BACLOFEN 10 MG/1
10 TABLET ORAL NIGHTLY
Status: DISCONTINUED | OUTPATIENT
Start: 2023-08-06 | End: 2023-08-13 | Stop reason: HOSPADM

## 2023-08-06 RX ORDER — TRAMADOL HYDROCHLORIDE 50 MG/1
50 TABLET ORAL EVERY 6 HOURS PRN
Status: DISCONTINUED | OUTPATIENT
Start: 2023-08-06 | End: 2023-08-13 | Stop reason: HOSPADM

## 2023-08-06 RX ADMIN — CETIRIZINE HYDROCHLORIDE 10 MG: 10 TABLET, FILM COATED ORAL at 21:28

## 2023-08-06 RX ADMIN — BACLOFEN 10 MG: 10 TABLET ORAL at 21:27

## 2023-08-06 RX ADMIN — METOPROLOL TARTRATE 25 MG: 25 TABLET, FILM COATED ORAL at 21:28

## 2023-08-06 RX ADMIN — SODIUM CHLORIDE, PRESERVATIVE FREE 10 ML: 5 INJECTION INTRAVENOUS at 21:31

## 2023-08-06 RX ADMIN — LOSARTAN POTASSIUM 50 MG: 50 TABLET, FILM COATED ORAL at 21:29

## 2023-08-06 RX ADMIN — MONTELUKAST 10 MG: 10 TABLET, FILM COATED ORAL at 21:27

## 2023-08-06 RX ADMIN — PRAVASTATIN SODIUM 40 MG: 40 TABLET ORAL at 21:27

## 2023-08-06 RX ADMIN — ALLOPURINOL 200 MG: 100 TABLET ORAL at 21:28

## 2023-08-06 RX ADMIN — SENNOSIDES AND DOCUSATE SODIUM 1 TABLET: 50; 8.6 TABLET ORAL at 21:28

## 2023-08-06 RX ADMIN — PANTOPRAZOLE SODIUM 40 MG: 40 TABLET, DELAYED RELEASE ORAL at 21:28

## 2023-08-06 RX ADMIN — CALCIUM POLYCARBOPHIL 625 MG: 625 TABLET, FILM COATED ORAL at 21:51

## 2023-08-06 RX ADMIN — ISOSORBIDE MONONITRATE 15 MG: 30 TABLET, EXTENDED RELEASE ORAL at 21:28

## 2023-08-06 ASSESSMENT — PAIN SCALES - GENERAL: PAINLEVEL_OUTOF10: 0

## 2023-08-06 NOTE — H&P
ORTHO SPINE ADMISSION NOTE        Subjective:     Date of Consultation:  August 6, 2023    Referring Physician: Kiya Geronimo MD  Attending Physician:  Chucho Child MD     Hortencia Hagen is a 68 y.o. female with a past medical history significant for hypertension, hyperlipidemia, migraines, spinal stenosis and is 3 weeks status post revision lumbar laminectomy L4-5 with revision lumbar fusion L4-S1 performed on 7/12/2023 who presented to the [facility] ED for evaluation of headache on 8/4/2023. Head and neck imaging was unremarkable and the patient discharged home and asked to follow up with neurology for presumed migraine headaches. The patient continued to have worsening, severe bitemporal headaches and presented to [facility] ED today. The patient reports that her only relief is when she lies down and drinks a cup of coffee. She reports a history of previous dural leak following an epidural procedure in the past. She reports that this headache feels similar to that one but much worse. [facility] demonstrates large fluid collection centered on L3 measuring 10.5 cm x  5.9 cm x 5.6 cm. The patient denies any new weakness, visual changes, photophobia, dizziness, numbness, or tingling.      Patient Active Problem List    Diagnosis Date Noted    Lumbar surgical wound fluid collection, initial encounter 08/06/2023    Lumbar stenosis with neurogenic claudication 07/12/2023    S/P lumbar fusion 07/12/2023    Right sided weakness 07/12/2023    Headache after spinal puncture 07/12/2023    Encounter for preadmission testing 07/03/2023    Localized primary osteoarthritis of left hand 02/24/2021    Spinal stenosis, lumbar 10/29/2020    Left chest pressure 04/27/2020    Cervical stenosis of spinal canal 03/09/2020    Essential hypertension 10/29/2019    GENEVIEVE on CPAP 05/16/2019    Severe obesity ([address]) 10/23/2018    Chronic venous hypertension (idiopathic) with inflammation of left lower

## 2023-08-07 ENCOUNTER — APPOINTMENT (OUTPATIENT)
Facility: HOSPITAL | Age: 73
DRG: 029 | End: 2023-08-07
Attending: ORTHOPAEDIC SURGERY
Payer: MEDICARE

## 2023-08-07 LAB
ANION GAP SERPL CALC-SCNC: 4 MMOL/L (ref 5–15)
BASOPHILS # BLD: 0.1 K/UL (ref 0–0.1)
BASOPHILS NFR BLD: 1 % (ref 0–1)
BUN SERPL-MCNC: 11 MG/DL (ref 6–20)
BUN/CREAT SERPL: 13 (ref 12–20)
CALCIUM SERPL-MCNC: 9 MG/DL (ref 8.5–10.1)
CHLORIDE SERPL-SCNC: 111 MMOL/L (ref 97–108)
CO2 SERPL-SCNC: 26 MMOL/L (ref 21–32)
CREAT SERPL-MCNC: 0.87 MG/DL (ref 0.55–1.02)
DIFFERENTIAL METHOD BLD: ABNORMAL
EOSINOPHIL # BLD: 0.5 K/UL (ref 0–0.4)
EOSINOPHIL NFR BLD: 8 % (ref 0–7)
ERYTHROCYTE [DISTWIDTH] IN BLOOD BY AUTOMATED COUNT: 14.7 % (ref 11.5–14.5)
GLUCOSE SERPL-MCNC: 93 MG/DL (ref 65–100)
HCT VFR BLD AUTO: 33.8 % (ref 35–47)
HGB BLD-MCNC: 10.6 G/DL (ref 11.5–16)
IMM GRANULOCYTES # BLD AUTO: 0 K/UL (ref 0–0.04)
IMM GRANULOCYTES NFR BLD AUTO: 0 % (ref 0–0.5)
INR PPP: 1.1 (ref 0.9–1.1)
LYMPHOCYTES # BLD: 1.9 K/UL (ref 0.8–3.5)
LYMPHOCYTES NFR BLD: 27 % (ref 12–49)
MCH RBC QN AUTO: 29 PG (ref 26–34)
MCHC RBC AUTO-ENTMCNC: 31.4 G/DL (ref 30–36.5)
MCV RBC AUTO: 92.3 FL (ref 80–99)
MONOCYTES # BLD: 0.6 K/UL (ref 0–1)
MONOCYTES NFR BLD: 9 % (ref 5–13)
NEUTS SEG # BLD: 3.8 K/UL (ref 1.8–8)
NEUTS SEG NFR BLD: 55 % (ref 32–75)
NRBC # BLD: 0 K/UL (ref 0–0.01)
NRBC BLD-RTO: 0 PER 100 WBC
PLATELET # BLD AUTO: 253 K/UL (ref 150–400)
PMV BLD AUTO: 9.6 FL (ref 8.9–12.9)
POTASSIUM SERPL-SCNC: 3.8 MMOL/L (ref 3.5–5.1)
PROTHROMBIN TIME: 11 SEC (ref 9–11.1)
RBC # BLD AUTO: 3.66 M/UL (ref 3.8–5.2)
SODIUM SERPL-SCNC: 141 MMOL/L (ref 136–145)
WBC # BLD AUTO: 6.9 K/UL (ref 3.6–11)

## 2023-08-07 PROCEDURE — 70553 MRI BRAIN STEM W/O & W/DYE: CPT

## 2023-08-07 PROCEDURE — 2580000003 HC RX 258: Performed by: PHYSICIAN ASSISTANT

## 2023-08-07 PROCEDURE — 6360000002 HC RX W HCPCS: Performed by: PHYSICIAN ASSISTANT

## 2023-08-07 PROCEDURE — 80048 BASIC METABOLIC PNL TOTAL CA: CPT

## 2023-08-07 PROCEDURE — 85610 PROTHROMBIN TIME: CPT

## 2023-08-07 PROCEDURE — 6370000000 HC RX 637 (ALT 250 FOR IP): Performed by: PHYSICIAN ASSISTANT

## 2023-08-07 PROCEDURE — 36415 COLL VENOUS BLD VENIPUNCTURE: CPT

## 2023-08-07 PROCEDURE — 1100000000 HC RM PRIVATE

## 2023-08-07 PROCEDURE — 85025 COMPLETE CBC W/AUTO DIFF WBC: CPT

## 2023-08-07 PROCEDURE — 72148 MRI LUMBAR SPINE W/O DYE: CPT

## 2023-08-07 RX ADMIN — SODIUM CHLORIDE, PRESERVATIVE FREE 10 ML: 5 INJECTION INTRAVENOUS at 09:55

## 2023-08-07 RX ADMIN — CETIRIZINE HYDROCHLORIDE 10 MG: 10 TABLET, FILM COATED ORAL at 09:55

## 2023-08-07 RX ADMIN — METOPROLOL TARTRATE 25 MG: 25 TABLET, FILM COATED ORAL at 09:55

## 2023-08-07 RX ADMIN — METOPROLOL TARTRATE 25 MG: 25 TABLET, FILM COATED ORAL at 22:46

## 2023-08-07 RX ADMIN — SENNOSIDES AND DOCUSATE SODIUM 1 TABLET: 50; 8.6 TABLET ORAL at 09:55

## 2023-08-07 RX ADMIN — CALCIUM POLYCARBOPHIL 625 MG: 625 TABLET, FILM COATED ORAL at 09:55

## 2023-08-07 RX ADMIN — SENNOSIDES AND DOCUSATE SODIUM 1 TABLET: 50; 8.6 TABLET ORAL at 22:44

## 2023-08-07 RX ADMIN — ALLOPURINOL 200 MG: 100 TABLET ORAL at 09:55

## 2023-08-07 RX ADMIN — BACLOFEN 10 MG: 10 TABLET ORAL at 22:43

## 2023-08-07 RX ADMIN — PANTOPRAZOLE SODIUM 40 MG: 40 TABLET, DELAYED RELEASE ORAL at 09:55

## 2023-08-07 RX ADMIN — MONTELUKAST 10 MG: 10 TABLET, FILM COATED ORAL at 22:44

## 2023-08-07 RX ADMIN — DIPHENHYDRAMINE HYDROCHLORIDE 25 MG: 50 INJECTION, SOLUTION INTRAMUSCULAR; INTRAVENOUS at 23:50

## 2023-08-07 RX ADMIN — SODIUM CHLORIDE: 9 INJECTION, SOLUTION INTRAVENOUS at 04:59

## 2023-08-07 RX ADMIN — PRAVASTATIN SODIUM 40 MG: 40 TABLET ORAL at 22:44

## 2023-08-07 ASSESSMENT — PAIN SCALES - GENERAL
PAINLEVEL_OUTOF10: 5
PAINLEVEL_OUTOF10: 0
PAINLEVEL_OUTOF10: 6

## 2023-08-07 ASSESSMENT — PAIN DESCRIPTION - LOCATION
LOCATION: BACK;HEAD
LOCATION: BACK;HEAD

## 2023-08-07 ASSESSMENT — PAIN DESCRIPTION - DESCRIPTORS
DESCRIPTORS: ACHING;THROBBING;POUNDING
DESCRIPTORS: ACHING;POUNDING;THROBBING

## 2023-08-07 NOTE — CONSULTS
Consultation report     Date of Service:  8/7/2023        History of Presenting Illness:   Hortencia Hagen is a 68 y.o. female who we are consulted for medical management/perioperative evaluation/clearance. Patient underwent spinal surgery on July 12. She returned to the ED for severe positional headache and back pain. Pain is relieved when she lays flat. She presented to [facility] ED on 8/4 and neuroimaging with CT, CT angiogram of the head and neck negative for any acute processes. On 8/6 she reportedly underwent lumbar CT at an outside hospital and a CT demonstrated large fluid collection around L3. Patient's medical history significant for hypertension, hyperlipidemia. She has history of palpitations, had reportedly been evaluated by pulmonary and cardiology including a 1 month cardiac monitor, per patient report workup negative thus far. She denied history of A-fib or arrhythmia, MI, CHF, stroke or any other cardiovascular events. REVIEW OF SYSTEMS:  A comprehensive review of systems was negative except for that written in the History of Present Illness.      Past Medical History:   Diagnosis Date    Adverse effect of anesthesia     during hysterectomy- woke up \"too early\"    Anxiety     Arrhythmia     h/o palpitations normal work up 22/2015 heart: David    Arthritis     Awareness under anesthesia     with hysterectomy    Chronic pain     bulging disc c4/c5 l4/l5 : as of 9/1018 no neck/head movement limitation says patient    Claustrophobia     Color blind     CAN'T DISTINGUISH BETWEEN BLACK AND BLUE    CREST (calcinosis, Raynaud's phenomenon, esophageal dysfunction, sclerodactyly, telangiectasia) ([address]) 2018    [address] [place] Pablo Celaya, Dr. 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

## 2023-08-07 NOTE — PLAN OF CARE
Problem: Safety - Adult  Goal: Free from fall injury  8/7/2023 0017 by Kalyani Bernard, RN  Outcome: Progressing  8/6/2023 2119 by Kalyani Bernard, RN  Outcome: Progressing

## 2023-08-07 NOTE — CARE COORDINATION
Care Management Initial Assessment       RUR: 12%   Readmission? Yes - 7/12-7/15   1st IM letter given? Yes -   1st  letter given: Yes -   Dispo: Home with Family Assistance  Open to [address] [place]   Transport: Family   Barrier; Medical, MRI pending   Preferred Pharmacy: [facility]        08/07/23 1601   Service Assessment   Patient Orientation Alert and Oriented   Cognition Alert   History Provided By Patient   Primary Caregiver Self   Support Systems Spouse/Significant Other   Patient's Healthcare Decision Maker is: Legal Next of Kin   PCP Verified by CM Yes   Last Visit to PCP Within last 3 months   Prior Functional Level Independent in ADLs/IADLs   Current Functional Level Independent in ADLs/IADLs   Can patient return to prior living arrangement Yes   Ability to make needs known: Good   Would you like for me to discuss the discharge plan with any other family members/significant others, and if so, who? Yes   Financial Resources Medicare   Social/Functional History   Lives With Spouse; Son   Home Layout Multi-level   Home Access Stairs to enter with rails   Bathroom Shower/Tub Tub/Shower unit   Bathroom Equipment Shower chair   [address], rolling;Cane   ADL Assistance Independent   Homemaking Assistance Independent   Ambulation Assistance Independent   Transfer Assistance Independent   Active  Yes   Mode of Transportation Car   Discharge Planning   Type of [address] Spouse/Significant Other   Current Services Prior To Admission None   [address]   DME Ordered?  No   Potential Assistance Purchasing Medications No   Type of Home Care Services PT;OT   Patient expects to be discharged to: [facility]   Transition of Care Consult (CM Consult) Discharge Planning   Mode of Transport at Discharge Other (see comment)   Confirm Follow Up Transport Self

## 2023-08-07 NOTE — CARE COORDINATION
08/07/23 1615   Readmission Assessment   Number of Days since last admission? 8-30 days   Previous Disposition Home with Home Health   Who is being Interviewed Patient   What was the patient's/caregiver's perception as to why they think they needed to return back to the hospital? Other (Comment)   Did you visit your Primary Care Physician after you left the hospital, before you returned this time? No   Why weren't you able to visit your PCP? Did not have an appointment   Did you see a specialist, such as Cardiac, Pulmonary, Orthopedic Physician, etc. after you left the hospital? No   Who advised the patient to return to the hospital? Self-referral   Does the patient report anything that got in the way of taking their medications? No   In our efforts to provide the best possible care to you and others like you, can you think of anything that we could have done to help you after you left the hospital the first time, so that you might not have needed to return so soon? Teaching during hospitalization regarding your illness; Discharge instructions that are concise, clear, and non contradictory

## 2023-08-08 ENCOUNTER — APPOINTMENT (OUTPATIENT)
Facility: HOSPITAL | Age: 73
DRG: 029 | End: 2023-08-08
Attending: ORTHOPAEDIC SURGERY
Payer: MEDICARE

## 2023-08-08 PROCEDURE — A9579 GAD-BASE MR CONTRAST NOS,1ML: HCPCS

## 2023-08-08 PROCEDURE — 6370000000 HC RX 637 (ALT 250 FOR IP): Performed by: STUDENT IN AN ORGANIZED HEALTH CARE EDUCATION/TRAINING PROGRAM

## 2023-08-08 PROCEDURE — 6370000000 HC RX 637 (ALT 250 FOR IP): Performed by: PHYSICIAN ASSISTANT

## 2023-08-08 PROCEDURE — 6360000004 HC RX CONTRAST MEDICATION

## 2023-08-08 PROCEDURE — 1100000000 HC RM PRIVATE

## 2023-08-08 PROCEDURE — 2580000003 HC RX 258: Performed by: PHYSICIAN ASSISTANT

## 2023-08-08 RX ORDER — BUTALBITAL, ACETAMINOPHEN AND CAFFEINE 50; 325; 40 MG/1; MG/1; MG/1
1 TABLET ORAL EVERY 4 HOURS PRN
Status: DISCONTINUED | OUTPATIENT
Start: 2023-08-08 | End: 2023-08-13 | Stop reason: HOSPADM

## 2023-08-08 RX ORDER — SODIUM CHLORIDE 9 MG/ML
INJECTION, SOLUTION INTRAVENOUS CONTINUOUS
Status: DISCONTINUED | OUTPATIENT
Start: 2023-08-09 | End: 2023-08-11

## 2023-08-08 RX ADMIN — CETIRIZINE HYDROCHLORIDE 10 MG: 10 TABLET, FILM COATED ORAL at 08:23

## 2023-08-08 RX ADMIN — METOPROLOL TARTRATE 25 MG: 25 TABLET, FILM COATED ORAL at 22:50

## 2023-08-08 RX ADMIN — CALCIUM POLYCARBOPHIL 625 MG: 625 TABLET, FILM COATED ORAL at 08:23

## 2023-08-08 RX ADMIN — ALLOPURINOL 200 MG: 100 TABLET ORAL at 08:22

## 2023-08-08 RX ADMIN — SODIUM CHLORIDE: 9 INJECTION, SOLUTION INTRAVENOUS at 05:48

## 2023-08-08 RX ADMIN — METOPROLOL TARTRATE 25 MG: 25 TABLET, FILM COATED ORAL at 08:22

## 2023-08-08 RX ADMIN — PANTOPRAZOLE SODIUM 40 MG: 40 TABLET, DELAYED RELEASE ORAL at 08:22

## 2023-08-08 RX ADMIN — BACLOFEN 10 MG: 10 TABLET ORAL at 22:50

## 2023-08-08 RX ADMIN — BUTALBITAL, ACETAMINOPHEN, AND CAFFEINE 1 TABLET: 50; 325; 40 TABLET ORAL at 22:50

## 2023-08-08 RX ADMIN — GADOTERIDOL 20 ML: 279.3 INJECTION, SOLUTION INTRAVENOUS at 00:17

## 2023-08-08 RX ADMIN — MONTELUKAST 10 MG: 10 TABLET, FILM COATED ORAL at 22:50

## 2023-08-08 RX ADMIN — PRAVASTATIN SODIUM 40 MG: 40 TABLET ORAL at 22:50

## 2023-08-08 ASSESSMENT — PAIN SCALES - GENERAL: PAINLEVEL_OUTOF10: 7

## 2023-08-08 NOTE — PLAN OF CARE
Problem: Safety - Adult  Goal: Free from fall injury  8/8/2023 0712 by Roula Melo RN  Outcome: Progressing  8/7/2023 1722 by Mela Guillen RN  Outcome: Progressing     Problem: ABCDS Injury Assessment  Goal: Absence of physical injury  8/8/2023 8984 by Roula Melo RN  Outcome: Progressing  8/7/2023 1722 by Mela Guillen RN  Outcome: Progressing

## 2023-08-08 NOTE — PLAN OF CARE
Problem: Discharge Planning  Goal: Discharge to home or other facility with appropriate resources  Outcome: Progressing  Flowsheets (Taken 8/8/2023 0100 by Geovanna Crook RN)  Discharge to home or other facility with appropriate resources: Identify barriers to discharge with patient and caregiver

## 2023-08-09 ENCOUNTER — APPOINTMENT (OUTPATIENT)
Facility: HOSPITAL | Age: 73
DRG: 029 | End: 2023-08-09
Attending: ORTHOPAEDIC SURGERY
Payer: MEDICARE

## 2023-08-09 LAB
COMMENT:: NORMAL
SPECIMEN HOLD: NORMAL

## 2023-08-09 PROCEDURE — 87015 SPECIMEN INFECT AGNT CONCNTJ: CPT

## 2023-08-09 PROCEDURE — 87070 CULTURE OTHR SPECIMN AEROBIC: CPT

## 2023-08-09 PROCEDURE — 87205 SMEAR GRAM STAIN: CPT

## 2023-08-09 PROCEDURE — 6370000000 HC RX 637 (ALT 250 FOR IP): Performed by: STUDENT IN AN ORGANIZED HEALTH CARE EDUCATION/TRAINING PROGRAM

## 2023-08-09 PROCEDURE — 87075 CULTR BACTERIA EXCEPT BLOOD: CPT

## 2023-08-09 PROCEDURE — 1100000000 HC RM PRIVATE

## 2023-08-09 PROCEDURE — 10005 FNA BX W/US GDN 1ST LES: CPT

## 2023-08-09 PROCEDURE — 6370000000 HC RX 637 (ALT 250 FOR IP): Performed by: PHYSICIAN ASSISTANT

## 2023-08-09 PROCEDURE — 6370000000 HC RX 637 (ALT 250 FOR IP): Performed by: NURSE PRACTITIONER

## 2023-08-09 RX ORDER — LOSARTAN POTASSIUM 50 MG/1
50 TABLET ORAL DAILY
Status: DISCONTINUED | OUTPATIENT
Start: 2023-08-09 | End: 2023-08-13 | Stop reason: HOSPADM

## 2023-08-09 RX ADMIN — MONTELUKAST 10 MG: 10 TABLET, FILM COATED ORAL at 22:46

## 2023-08-09 RX ADMIN — ISOSORBIDE MONONITRATE 15 MG: 30 TABLET, EXTENDED RELEASE ORAL at 22:46

## 2023-08-09 RX ADMIN — PRAVASTATIN SODIUM 40 MG: 40 TABLET ORAL at 22:46

## 2023-08-09 RX ADMIN — CETIRIZINE HYDROCHLORIDE 10 MG: 10 TABLET, FILM COATED ORAL at 10:55

## 2023-08-09 RX ADMIN — HYDROCODONE BITARTRATE AND ACETAMINOPHEN 1 TABLET: 5; 325 TABLET ORAL at 12:25

## 2023-08-09 RX ADMIN — BACLOFEN 10 MG: 10 TABLET ORAL at 22:46

## 2023-08-09 RX ADMIN — BUTALBITAL, ACETAMINOPHEN, AND CAFFEINE 1 TABLET: 50; 325; 40 TABLET ORAL at 11:00

## 2023-08-09 RX ADMIN — SENNOSIDES AND DOCUSATE SODIUM 1 TABLET: 50; 8.6 TABLET ORAL at 22:46

## 2023-08-09 RX ADMIN — METOPROLOL TARTRATE 25 MG: 25 TABLET, FILM COATED ORAL at 22:46

## 2023-08-09 RX ADMIN — BUTALBITAL, ACETAMINOPHEN, AND CAFFEINE 1 TABLET: 50; 325; 40 TABLET ORAL at 22:46

## 2023-08-09 RX ADMIN — CALCIUM POLYCARBOPHIL 625 MG: 625 TABLET, FILM COATED ORAL at 10:56

## 2023-08-09 RX ADMIN — LOSARTAN POTASSIUM 50 MG: 50 TABLET, FILM COATED ORAL at 11:28

## 2023-08-09 RX ADMIN — ALLOPURINOL 200 MG: 100 TABLET ORAL at 10:56

## 2023-08-09 RX ADMIN — BUTALBITAL, ACETAMINOPHEN, AND CAFFEINE 1 TABLET: 50; 325; 40 TABLET ORAL at 16:14

## 2023-08-09 RX ADMIN — PANTOPRAZOLE SODIUM 40 MG: 40 TABLET, DELAYED RELEASE ORAL at 10:56

## 2023-08-09 RX ADMIN — HYDROCODONE BITARTRATE AND ACETAMINOPHEN 1 TABLET: 5; 325 TABLET ORAL at 18:06

## 2023-08-09 RX ADMIN — METOPROLOL TARTRATE 25 MG: 25 TABLET, FILM COATED ORAL at 10:55

## 2023-08-09 RX ADMIN — HYDROCHLOROTHIAZIDE 12.5 MG: 25 TABLET ORAL at 12:20

## 2023-08-09 ASSESSMENT — PAIN - FUNCTIONAL ASSESSMENT
PAIN_FUNCTIONAL_ASSESSMENT: ACTIVITIES ARE NOT PREVENTED
PAIN_FUNCTIONAL_ASSESSMENT: ACTIVITIES ARE NOT PREVENTED

## 2023-08-09 ASSESSMENT — PAIN SCALES - GENERAL
PAINLEVEL_OUTOF10: 8
PAINLEVEL_OUTOF10: 10
PAINLEVEL_OUTOF10: 6

## 2023-08-09 ASSESSMENT — PAIN DESCRIPTION - LOCATION
LOCATION: HEAD

## 2023-08-09 ASSESSMENT — PAIN DESCRIPTION - PAIN TYPE: TYPE: ACUTE PAIN

## 2023-08-09 ASSESSMENT — PAIN DESCRIPTION - FREQUENCY: FREQUENCY: CONTINUOUS

## 2023-08-09 ASSESSMENT — PAIN DESCRIPTION - DESCRIPTORS
DESCRIPTORS: ACHING
DESCRIPTORS: ACHING

## 2023-08-09 ASSESSMENT — PAIN DESCRIPTION - ONSET: ONSET: ON-GOING

## 2023-08-09 ASSESSMENT — PAIN DESCRIPTION - ORIENTATION
ORIENTATION: ANTERIOR
ORIENTATION: ANTERIOR

## 2023-08-09 NOTE — PLAN OF CARE
Problem: Discharge Planning  Goal: Discharge to home or other facility with appropriate resources  8/9/2023 1210 by Marques Self RN  Outcome: Progressing  Flowsheets (Taken 8/9/2023 7123)  Discharge to home or other facility with appropriate resources: Identify barriers to discharge with patient and caregiver  8/9/2023 0326 by Lucía Godfrey, RN  Outcome: Progressing

## 2023-08-10 ENCOUNTER — ANESTHESIA (OUTPATIENT)
Facility: HOSPITAL | Age: 73
End: 2023-08-10
Payer: MEDICARE

## 2023-08-10 ENCOUNTER — ANESTHESIA EVENT (OUTPATIENT)
Facility: HOSPITAL | Age: 73
End: 2023-08-10
Payer: MEDICARE

## 2023-08-10 PROCEDURE — 3700000000 HC ANESTHESIA ATTENDED CARE: Performed by: ORTHOPAEDIC SURGERY

## 2023-08-10 PROCEDURE — C1713 ANCHOR/SCREW BN/BN,TIS/BN: HCPCS | Performed by: ORTHOPAEDIC SURGERY

## 2023-08-10 PROCEDURE — 6360000002 HC RX W HCPCS: Performed by: NURSE ANESTHETIST, CERTIFIED REGISTERED

## 2023-08-10 PROCEDURE — 3600000002 HC SURGERY LEVEL 2 BASE: Performed by: ORTHOPAEDIC SURGERY

## 2023-08-10 PROCEDURE — 6370000000 HC RX 637 (ALT 250 FOR IP): Performed by: ORTHOPAEDIC SURGERY

## 2023-08-10 PROCEDURE — 2580000003 HC RX 258: Performed by: NURSE ANESTHETIST, CERTIFIED REGISTERED

## 2023-08-10 PROCEDURE — 2500000003 HC RX 250 WO HCPCS: Performed by: NURSE ANESTHETIST, CERTIFIED REGISTERED

## 2023-08-10 PROCEDURE — 87205 SMEAR GRAM STAIN: CPT

## 2023-08-10 PROCEDURE — 6360000002 HC RX W HCPCS: Performed by: PHYSICIAN ASSISTANT

## 2023-08-10 PROCEDURE — 2580000003 HC RX 258: Performed by: PHYSICIAN ASSISTANT

## 2023-08-10 PROCEDURE — 6360000002 HC RX W HCPCS: Performed by: ANESTHESIOLOGY

## 2023-08-10 PROCEDURE — 2720000010 HC SURG SUPPLY STERILE: Performed by: ORTHOPAEDIC SURGERY

## 2023-08-10 PROCEDURE — 2709999900 HC NON-CHARGEABLE SUPPLY: Performed by: ORTHOPAEDIC SURGERY

## 2023-08-10 PROCEDURE — 87070 CULTURE OTHR SPECIMN AEROBIC: CPT

## 2023-08-10 PROCEDURE — 3600000012 HC SURGERY LEVEL 2 ADDTL 15MIN: Performed by: ORTHOPAEDIC SURGERY

## 2023-08-10 PROCEDURE — 6370000000 HC RX 637 (ALT 250 FOR IP): Performed by: NURSE PRACTITIONER

## 2023-08-10 PROCEDURE — 3700000001 HC ADD 15 MINUTES (ANESTHESIA): Performed by: ORTHOPAEDIC SURGERY

## 2023-08-10 PROCEDURE — 1100000000 HC RM PRIVATE

## 2023-08-10 PROCEDURE — 6360000002 HC RX W HCPCS: Performed by: ORTHOPAEDIC SURGERY

## 2023-08-10 PROCEDURE — 63709 REPAIR SPINAL FLUID LEAKAGE: CPT | Performed by: PHYSICIAN ASSISTANT

## 2023-08-10 PROCEDURE — 87075 CULTR BACTERIA EXCEPT BLOOD: CPT

## 2023-08-10 PROCEDURE — 6370000000 HC RX 637 (ALT 250 FOR IP): Performed by: ANESTHESIOLOGY

## 2023-08-10 PROCEDURE — 7100000000 HC PACU RECOVERY - FIRST 15 MIN: Performed by: ORTHOPAEDIC SURGERY

## 2023-08-10 PROCEDURE — 63709 REPAIR SPINAL FLUID LEAKAGE: CPT | Performed by: ORTHOPAEDIC SURGERY

## 2023-08-10 PROCEDURE — 2580000003 HC RX 258: Performed by: ANESTHESIOLOGY

## 2023-08-10 PROCEDURE — 7100000001 HC PACU RECOVERY - ADDTL 15 MIN: Performed by: ORTHOPAEDIC SURGERY

## 2023-08-10 PROCEDURE — 6370000000 HC RX 637 (ALT 250 FOR IP): Performed by: PHYSICIAN ASSISTANT

## 2023-08-10 DEVICE — DURAGEN® PLUS DURAL REGENERATION MATRIX, 2 IN X 2 IN (5 CM X 5 CM)
Type: IMPLANTABLE DEVICE | Site: BACK | Status: FUNCTIONAL
Brand: DURAGEN® PLUS

## 2023-08-10 RX ORDER — FENTANYL CITRATE 50 UG/ML
25 INJECTION, SOLUTION INTRAMUSCULAR; INTRAVENOUS EVERY 5 MIN PRN
Status: DISCONTINUED | OUTPATIENT
Start: 2023-08-10 | End: 2023-08-10 | Stop reason: HOSPADM

## 2023-08-10 RX ORDER — ONDANSETRON 4 MG/1
4 TABLET, ORALLY DISINTEGRATING ORAL EVERY 8 HOURS PRN
OUTPATIENT
Start: 2023-08-10

## 2023-08-10 RX ORDER — LIDOCAINE HYDROCHLORIDE 20 MG/ML
INJECTION, SOLUTION EPIDURAL; INFILTRATION; INTRACAUDAL; PERINEURAL PRN
Status: DISCONTINUED | OUTPATIENT
Start: 2023-08-10 | End: 2023-08-10 | Stop reason: SDUPTHER

## 2023-08-10 RX ORDER — SODIUM CHLORIDE 9 MG/ML
INJECTION, SOLUTION INTRAVENOUS PRN
OUTPATIENT
Start: 2023-08-10

## 2023-08-10 RX ORDER — HYDROMORPHONE HYDROCHLORIDE 2 MG/ML
INJECTION, SOLUTION INTRAMUSCULAR; INTRAVENOUS; SUBCUTANEOUS PRN
Status: DISCONTINUED | OUTPATIENT
Start: 2023-08-10 | End: 2023-08-10 | Stop reason: SDUPTHER

## 2023-08-10 RX ORDER — HYDROXYZINE HYDROCHLORIDE 10 MG/1
10 TABLET, FILM COATED ORAL EVERY 8 HOURS PRN
OUTPATIENT
Start: 2023-08-10

## 2023-08-10 RX ORDER — GLYCOPYRROLATE 0.2 MG/ML
INJECTION INTRAMUSCULAR; INTRAVENOUS PRN
Status: DISCONTINUED | OUTPATIENT
Start: 2023-08-10 | End: 2023-08-10 | Stop reason: SDUPTHER

## 2023-08-10 RX ORDER — DEXAMETHASONE SODIUM PHOSPHATE 4 MG/ML
INJECTION, SOLUTION INTRA-ARTICULAR; INTRALESIONAL; INTRAMUSCULAR; INTRAVENOUS; SOFT TISSUE PRN
Status: DISCONTINUED | OUTPATIENT
Start: 2023-08-10 | End: 2023-08-10 | Stop reason: SDUPTHER

## 2023-08-10 RX ORDER — PROCHLORPERAZINE EDISYLATE 5 MG/ML
5 INJECTION INTRAMUSCULAR; INTRAVENOUS
Status: DISCONTINUED | OUTPATIENT
Start: 2023-08-10 | End: 2023-08-10 | Stop reason: HOSPADM

## 2023-08-10 RX ORDER — ONDANSETRON 2 MG/ML
4 INJECTION INTRAMUSCULAR; INTRAVENOUS
Status: DISCONTINUED | OUTPATIENT
Start: 2023-08-10 | End: 2023-08-10 | Stop reason: HOSPADM

## 2023-08-10 RX ORDER — MIDAZOLAM HYDROCHLORIDE 2 MG/2ML
2 INJECTION, SOLUTION INTRAMUSCULAR; INTRAVENOUS
Status: DISCONTINUED | OUTPATIENT
Start: 2023-08-10 | End: 2023-08-10 | Stop reason: HOSPADM

## 2023-08-10 RX ORDER — SODIUM CHLORIDE 0.9 % (FLUSH) 0.9 %
5-40 SYRINGE (ML) INJECTION PRN
Status: DISCONTINUED | OUTPATIENT
Start: 2023-08-10 | End: 2023-08-10 | Stop reason: HOSPADM

## 2023-08-10 RX ORDER — EPHEDRINE SULFATE 50 MG/ML
INJECTION INTRAVENOUS PRN
Status: DISCONTINUED | OUTPATIENT
Start: 2023-08-10 | End: 2023-08-10 | Stop reason: SDUPTHER

## 2023-08-10 RX ORDER — HYDROMORPHONE HYDROCHLORIDE 1 MG/ML
0.5 INJECTION, SOLUTION INTRAMUSCULAR; INTRAVENOUS; SUBCUTANEOUS EVERY 5 MIN PRN
Status: DISCONTINUED | OUTPATIENT
Start: 2023-08-10 | End: 2023-08-10 | Stop reason: HOSPADM

## 2023-08-10 RX ORDER — SODIUM CHLORIDE 9 MG/ML
INJECTION, SOLUTION INTRAVENOUS PRN
Status: DISCONTINUED | OUTPATIENT
Start: 2023-08-10 | End: 2023-08-10 | Stop reason: HOSPADM

## 2023-08-10 RX ORDER — SENNA AND DOCUSATE SODIUM 50; 8.6 MG/1; MG/1
1 TABLET, FILM COATED ORAL 2 TIMES DAILY
OUTPATIENT
Start: 2023-08-10

## 2023-08-10 RX ORDER — VANCOMYCIN HYDROCHLORIDE 1 G/20ML
INJECTION, POWDER, LYOPHILIZED, FOR SOLUTION INTRAVENOUS PRN
Status: DISCONTINUED | OUTPATIENT
Start: 2023-08-10 | End: 2023-08-10 | Stop reason: HOSPADM

## 2023-08-10 RX ORDER — SODIUM CHLORIDE 0.9 % (FLUSH) 0.9 %
5-40 SYRINGE (ML) INJECTION EVERY 12 HOURS SCHEDULED
Status: DISCONTINUED | OUTPATIENT
Start: 2023-08-10 | End: 2023-08-10 | Stop reason: HOSPADM

## 2023-08-10 RX ORDER — SODIUM CHLORIDE 9 MG/ML
INJECTION, SOLUTION INTRAVENOUS CONTINUOUS PRN
Status: DISCONTINUED | OUTPATIENT
Start: 2023-08-10 | End: 2023-08-10 | Stop reason: SDUPTHER

## 2023-08-10 RX ORDER — NEOSTIGMINE METHYLSULFATE 1 MG/ML
INJECTION, SOLUTION INTRAVENOUS PRN
Status: DISCONTINUED | OUTPATIENT
Start: 2023-08-10 | End: 2023-08-10 | Stop reason: SDUPTHER

## 2023-08-10 RX ORDER — ONDANSETRON 2 MG/ML
4 INJECTION INTRAMUSCULAR; INTRAVENOUS EVERY 6 HOURS PRN
OUTPATIENT
Start: 2023-08-10

## 2023-08-10 RX ORDER — OXYCODONE HYDROCHLORIDE 5 MG/1
5 TABLET ORAL EVERY 4 HOURS PRN
OUTPATIENT
Start: 2023-08-10

## 2023-08-10 RX ORDER — OXYCODONE HYDROCHLORIDE 5 MG/1
5 TABLET ORAL
Status: DISCONTINUED | OUTPATIENT
Start: 2023-08-10 | End: 2023-08-10 | Stop reason: HOSPADM

## 2023-08-10 RX ORDER — HYDRALAZINE HYDROCHLORIDE 20 MG/ML
10 INJECTION INTRAMUSCULAR; INTRAVENOUS
Status: DISCONTINUED | OUTPATIENT
Start: 2023-08-10 | End: 2023-08-10 | Stop reason: HOSPADM

## 2023-08-10 RX ORDER — FENTANYL CITRATE 50 UG/ML
100 INJECTION, SOLUTION INTRAMUSCULAR; INTRAVENOUS
Status: DISCONTINUED | OUTPATIENT
Start: 2023-08-10 | End: 2023-08-10 | Stop reason: HOSPADM

## 2023-08-10 RX ORDER — MIDAZOLAM HYDROCHLORIDE 1 MG/ML
INJECTION INTRAMUSCULAR; INTRAVENOUS PRN
Status: DISCONTINUED | OUTPATIENT
Start: 2023-08-10 | End: 2023-08-10 | Stop reason: SDUPTHER

## 2023-08-10 RX ORDER — ONDANSETRON 2 MG/ML
INJECTION INTRAMUSCULAR; INTRAVENOUS PRN
Status: DISCONTINUED | OUTPATIENT
Start: 2023-08-10 | End: 2023-08-10 | Stop reason: SDUPTHER

## 2023-08-10 RX ORDER — DIPHENHYDRAMINE HYDROCHLORIDE 50 MG/ML
25 INJECTION INTRAMUSCULAR; INTRAVENOUS EVERY 6 HOURS PRN
OUTPATIENT
Start: 2023-08-10

## 2023-08-10 RX ORDER — ACETAMINOPHEN 500 MG
1000 TABLET ORAL ONCE
Status: COMPLETED | OUTPATIENT
Start: 2023-08-10 | End: 2023-08-10

## 2023-08-10 RX ORDER — SCOLOPAMINE TRANSDERMAL SYSTEM 1 MG/1
1 PATCH, EXTENDED RELEASE TRANSDERMAL
Status: DISCONTINUED | OUTPATIENT
Start: 2023-08-10 | End: 2023-08-13 | Stop reason: HOSPADM

## 2023-08-10 RX ORDER — FAMOTIDINE 20 MG/1
20 TABLET, FILM COATED ORAL 2 TIMES DAILY
OUTPATIENT
Start: 2023-08-10

## 2023-08-10 RX ORDER — FENTANYL CITRATE 50 UG/ML
INJECTION, SOLUTION INTRAMUSCULAR; INTRAVENOUS PRN
Status: DISCONTINUED | OUTPATIENT
Start: 2023-08-10 | End: 2023-08-10 | Stop reason: SDUPTHER

## 2023-08-10 RX ORDER — ROCURONIUM BROMIDE 10 MG/ML
INJECTION, SOLUTION INTRAVENOUS PRN
Status: DISCONTINUED | OUTPATIENT
Start: 2023-08-10 | End: 2023-08-10 | Stop reason: SDUPTHER

## 2023-08-10 RX ORDER — LIDOCAINE HYDROCHLORIDE 10 MG/ML
1 INJECTION, SOLUTION EPIDURAL; INFILTRATION; INTRACAUDAL; PERINEURAL
Status: DISCONTINUED | OUTPATIENT
Start: 2023-08-10 | End: 2023-08-10 | Stop reason: HOSPADM

## 2023-08-10 RX ORDER — HYDROMORPHONE HYDROCHLORIDE 1 MG/ML
0.5 INJECTION, SOLUTION INTRAMUSCULAR; INTRAVENOUS; SUBCUTANEOUS
OUTPATIENT
Start: 2023-08-10

## 2023-08-10 RX ORDER — SODIUM CHLORIDE, SODIUM LACTATE, POTASSIUM CHLORIDE, CALCIUM CHLORIDE 600; 310; 30; 20 MG/100ML; MG/100ML; MG/100ML; MG/100ML
INJECTION, SOLUTION INTRAVENOUS CONTINUOUS
Status: DISCONTINUED | OUTPATIENT
Start: 2023-08-10 | End: 2023-08-10 | Stop reason: HOSPADM

## 2023-08-10 RX ADMIN — HYDROMORPHONE HYDROCHLORIDE 0.5 MG: 2 INJECTION, SOLUTION INTRAMUSCULAR; INTRAVENOUS; SUBCUTANEOUS at 15:05

## 2023-08-10 RX ADMIN — DIPHENHYDRAMINE HYDROCHLORIDE 25 MG: 50 INJECTION, SOLUTION INTRAMUSCULAR; INTRAVENOUS at 18:37

## 2023-08-10 RX ADMIN — HYDROCHLOROTHIAZIDE 12.5 MG: 25 TABLET ORAL at 09:31

## 2023-08-10 RX ADMIN — DEXAMETHASONE SODIUM PHOSPHATE 4 MG: 4 INJECTION, SOLUTION INTRAMUSCULAR; INTRAVENOUS at 14:10

## 2023-08-10 RX ADMIN — EPHEDRINE SULFATE 10 MG: 50 INJECTION INTRAVENOUS at 14:39

## 2023-08-10 RX ADMIN — Medication 3 MG: at 15:42

## 2023-08-10 RX ADMIN — FENTANYL CITRATE 50 MCG: 50 INJECTION, SOLUTION INTRAMUSCULAR; INTRAVENOUS at 13:54

## 2023-08-10 RX ADMIN — ACETAMINOPHEN 1000 MG: 500 TABLET ORAL at 13:11

## 2023-08-10 RX ADMIN — FENTANYL CITRATE 25 MCG: 50 INJECTION, SOLUTION INTRAMUSCULAR; INTRAVENOUS at 16:10

## 2023-08-10 RX ADMIN — LIDOCAINE HYDROCHLORIDE 100 MG: 20 INJECTION, SOLUTION EPIDURAL; INFILTRATION; INTRACAUDAL; PERINEURAL at 13:54

## 2023-08-10 RX ADMIN — ONDANSETRON HYDROCHLORIDE 4 MG: 2 INJECTION, SOLUTION INTRAMUSCULAR; INTRAVENOUS at 14:55

## 2023-08-10 RX ADMIN — HYDROMORPHONE HYDROCHLORIDE 0.5 MG: 1 INJECTION, SOLUTION INTRAMUSCULAR; INTRAVENOUS; SUBCUTANEOUS at 21:54

## 2023-08-10 RX ADMIN — LOSARTAN POTASSIUM 50 MG: 50 TABLET, FILM COATED ORAL at 09:31

## 2023-08-10 RX ADMIN — ROCURONIUM BROMIDE 10 MG: 10 SOLUTION INTRAVENOUS at 14:29

## 2023-08-10 RX ADMIN — SODIUM CHLORIDE: 9 INJECTION, SOLUTION INTRAVENOUS at 15:24

## 2023-08-10 RX ADMIN — SODIUM CHLORIDE, PRESERVATIVE FREE 10 ML: 5 INJECTION INTRAVENOUS at 21:58

## 2023-08-10 RX ADMIN — ROCURONIUM BROMIDE 40 MG: 10 SOLUTION INTRAVENOUS at 13:55

## 2023-08-10 RX ADMIN — ONDANSETRON 4 MG: 2 INJECTION INTRAMUSCULAR; INTRAVENOUS at 21:54

## 2023-08-10 RX ADMIN — GLYCOPYRROLATE 0.4 MG: 0.2 INJECTION INTRAMUSCULAR; INTRAVENOUS at 15:42

## 2023-08-10 RX ADMIN — WATER 2000 MG: 1 INJECTION INTRAMUSCULAR; INTRAVENOUS; SUBCUTANEOUS at 14:16

## 2023-08-10 RX ADMIN — MIDAZOLAM 2 MG: 1 INJECTION INTRAMUSCULAR; INTRAVENOUS at 13:49

## 2023-08-10 RX ADMIN — SODIUM CHLORIDE: 9 INJECTION, SOLUTION INTRAVENOUS at 21:59

## 2023-08-10 RX ADMIN — FENTANYL CITRATE 50 MCG: 50 INJECTION, SOLUTION INTRAMUSCULAR; INTRAVENOUS at 14:15

## 2023-08-10 RX ADMIN — PROPOFOL 150 MG: 10 INJECTION, EMULSION INTRAVENOUS at 13:54

## 2023-08-10 RX ADMIN — HYDROMORPHONE HYDROCHLORIDE 0.5 MG: 2 INJECTION, SOLUTION INTRAMUSCULAR; INTRAVENOUS; SUBCUTANEOUS at 15:45

## 2023-08-10 RX ADMIN — SODIUM CHLORIDE, POTASSIUM CHLORIDE, SODIUM LACTATE AND CALCIUM CHLORIDE: 600; 310; 30; 20 INJECTION, SOLUTION INTRAVENOUS at 12:53

## 2023-08-10 RX ADMIN — HYDROMORPHONE HYDROCHLORIDE 0.5 MG: 1 INJECTION, SOLUTION INTRAMUSCULAR; INTRAVENOUS; SUBCUTANEOUS at 16:20

## 2023-08-10 RX ADMIN — METOPROLOL TARTRATE 25 MG: 25 TABLET, FILM COATED ORAL at 09:31

## 2023-08-10 RX ADMIN — FENTANYL CITRATE 25 MCG: 50 INJECTION, SOLUTION INTRAMUSCULAR; INTRAVENOUS at 16:15

## 2023-08-10 RX ADMIN — EPHEDRINE SULFATE 10 MG: 50 INJECTION INTRAVENOUS at 14:24

## 2023-08-10 RX ADMIN — HYDROMORPHONE HYDROCHLORIDE 0.5 MG: 1 INJECTION, SOLUTION INTRAMUSCULAR; INTRAVENOUS; SUBCUTANEOUS at 18:35

## 2023-08-10 ASSESSMENT — PAIN DESCRIPTION - DESCRIPTORS
DESCRIPTORS: SORE
DESCRIPTORS: THROBBING

## 2023-08-10 ASSESSMENT — PAIN - FUNCTIONAL ASSESSMENT: PAIN_FUNCTIONAL_ASSESSMENT: 0-10

## 2023-08-10 ASSESSMENT — PAIN DESCRIPTION - LOCATION
LOCATION: BACK
LOCATION: HEAD

## 2023-08-10 ASSESSMENT — PAIN SCALES - GENERAL
PAINLEVEL_OUTOF10: 8
PAINLEVEL_OUTOF10: 7
PAINLEVEL_OUTOF10: 4
PAINLEVEL_OUTOF10: 6
PAINLEVEL_OUTOF10: 10
PAINLEVEL_OUTOF10: 8

## 2023-08-10 ASSESSMENT — ENCOUNTER SYMPTOMS: SHORTNESS OF BREATH: 1

## 2023-08-10 NOTE — PERIOP NOTE
TRANSFER - OUT REPORT:    Verbal report given to RN on Albaro Banegas  being transferred to [phone]  for routine post-op       Report consisted of patient's Situation, Background, Assessment and   Recommendations(SBAR). Information from the following report(s) Surgery Report and MAR was reviewed with the receiving nurse. Lines:   Peripheral IV 08/07/23 Left Forearm (Active)   Site Assessment Clean, dry & intact 08/10/23 1645   Line Status Normal saline locked 08/10/23 [place] Connections checked and tightened 08/10/23 1645   Phlebitis Assessment No symptoms 08/10/23 1645   Infiltration Assessment 0 08/10/23 1645   Alcohol Cap Used Yes 08/10/23 1645   Dressing Status Clean, dry & intact 08/10/23 1645   Dressing Type Transparent 08/10/23 1645   Dressing Intervention Security device changed 08/10/23 0800       Peripheral IV 08/10/23 Left Hand (Active)   Site Assessment Clean, dry & intact 08/10/23 1645   Line Status Infusing 08/10/23 [place] checked and tightened 08/10/23 1645   Phlebitis Assessment No symptoms 08/10/23 1645   Infiltration Assessment 0 08/10/23 1645   Alcohol Cap Used Yes 08/10/23 1645   Dressing Status Clean, dry & intact 08/10/23 1645   Dressing Type Transparent 08/10/23 1645        Opportunity for questions and clarification was provided. Patient transported with:  Tech    Pt family updated.

## 2023-08-10 NOTE — PLAN OF CARE
Problem: Discharge Planning  Goal: Discharge to home or other facility with appropriate resources  8/10/2023 1114 by Adriano Subramanian RN  Outcome: Progressing  Flowsheets (Taken 8/10/2023 0800)  Discharge to home or other facility with appropriate resources: Identify barriers to discharge with patient and caregiver  8/9/2023 2214 by Mayur Herman, RN  Outcome: Progressing

## 2023-08-10 NOTE — BRIEF OP NOTE
Brief Postoperative Note      Patient: Marco A Sams  YOB: 1950  MRN: [id number]    Date of Procedure: 8/10/2023    Pre-op Diagnosis: Lumbar fluid collection    Post-Op Diagnosis: Dura leak       Procedure(s):  EXPLORATION OF LUMBAR WOUND WITH POSSIBLE DURA REPAIR    Surgeon(s):  Vianey Pugh MD    Assistant:  Physician Assistant: Leny Gonzalez PA-C    Anesthesia: General    Estimated Blood Loss (mL): Minimal    Complications: None    Specimens:   ID Type Source Tests Collected by Time Destination   1 : Culture Back Wound Swab Back CULTURE, ANAEROBIC, CULTURE, WOUND, AEROBIC SUSCEPTIBILITY IDENTIFICATION Vianey Pugh MD 8/10/2023 1427        Implants:  Implant Name Type Inv.  Item Serial No.  Lot No. LRB No. Used Action   GRAFT DURA [id number] ULTRAPURE DURAGN+ 5PK/EA - SN/A  GRAFT DURA [id number] ULTRAPURE DURAGN+ 5PK/EA N/A [employer] GORDON-WD [id number] N/A 1 Implanted         Drains: * No LDAs found *    Findings: 1 mm dura leak      Electronically signed by Leny Gonzalez PA-C on 8/10/2023 at 3:47 PM

## 2023-08-10 NOTE — OP NOTE
identified the CSF leak. Small bony spicule had caused a small single pinhole directly in the lateral aspect of the takeoff of the L4 nerve root. No nerve roots involved. We carefully continued the laminectomy to create a wide enough repair and then we used a single 6-0 [place] suture stitch across the area to seal it. Wounds were irrigated again. A DuraGen patch placed to reinforce the closure. Wound was thoroughly irrigated. #1 Stratafix on the fascial layer, 2-0 Stratafix on the subcutaneous layer, and 3-0 Stratafix on skin. The patient returned to the PACU in stable condition. Green Call, PAC was present for the entirety of the procedure and vital for the performance of the procedure. Green Call, PAC assisted with positioning, prepping, draping of the patient before the procedure and instrument manipulation/placement, spinal repositioning and navigation/robotics/flouroscopic operation during the procedure as well as wound closure, dressing application and brace application after the procedure.  Please note that no intern, resident, or other hospital staff was available to assist during the surgery     Sandra Vazquez MD      [id number]  D:  08/10/2023 15:04  T:  08/10/2023 17:11  JOB #:  [id number]

## 2023-08-10 NOTE — ANESTHESIA PRE PROCEDURE
Department of Anesthesiology  Preprocedure Note       Name:  Sumit James   Age:  68 y.o.  :  1950                                          MRN:  [id number]         Date:  8/10/2023      Surgeon: Christine Lantigua):  Yunior Mcqueen MD    Procedure: Procedure(s):  EXPLORATION OF LUMBAR WOUND WITH POSSIBLE DURA REPAIR    Medications prior to admission:   Prior to Admission medications    Medication Sig Start Date End Date Taking?  Authorizing Provider   sennosides-docusate sodium (SENOKOT-S) 8.6-50 MG tablet Take 1 tablet by mouth 2 times daily  Patient not taking: Reported on 2023   Bairon Echevarria PA-C   naloxone 4 MG/0.1ML LIQD nasal spray 1 spray by Nasal route as needed for Opioid Reversal  Patient not taking: Reported on 2023   Bairon Echevarria PA-C   hydroCHLOROthiazide (MICROZIDE) 12.5 MG capsule Take 1 capsule by mouth daily  Patient not taking: Reported on 2023    Historical Provider, MD   Multiple Vitamins-Minerals (MULTIVITAMIN ADULTS PO) Take 1 tablet by mouth daily    Historical Provider, MD   Inulin (FIBER CHOICE PO) Take 2 capsules by mouth daily  Patient not taking: Reported on 2023    Historical Provider, MD   diphenhydrAMINE-APAP, sleep, (TYLENOL PM EXTRA STRENGTH PO) Take by mouth as needed    Historical Provider, MD   ketotifen (ZADITOR) 0.025 % ophthalmic solution Place 1 drop into both eyes as needed  Patient not taking: Reported on 2023    Historical Provider, MD   acetaminophen (TYLENOL) 500 MG tablet Take 2 tablets by mouth every 6 hours as needed    Ar Automatic Reconciliation   allopurinol (ZYLOPRIM) 100 MG tablet Take 2 tablets by mouth daily    Ar Automatic Reconciliation   aspirin 81 MG EC tablet Take 1 tablet by mouth at bedtime    Ar Automatic Reconciliation   baclofen (LIORESAL) 10 MG tablet Take 1 tablet by mouth at bedtime 3/3/18   Ar Automatic Reconciliation   benzonatate (TESSALON) 100 MG capsule Take 1 capsule by mouth 3 times daily as

## 2023-08-11 ENCOUNTER — APPOINTMENT (OUTPATIENT)
Facility: HOSPITAL | Age: 73
DRG: 029 | End: 2023-08-11
Attending: ORTHOPAEDIC SURGERY
Payer: MEDICARE

## 2023-08-11 PROCEDURE — 1100000000 HC RM PRIVATE

## 2023-08-11 PROCEDURE — 2580000003 HC RX 258: Performed by: PHYSICIAN ASSISTANT

## 2023-08-11 PROCEDURE — 6360000002 HC RX W HCPCS: Performed by: PHYSICIAN ASSISTANT

## 2023-08-11 PROCEDURE — 00UT0KZ SUPPLEMENT SPINAL MENINGES WITH NONAUTOLOGOUS TISSUE SUBSTITUTE, OPEN APPROACH: ICD-10-PCS | Performed by: ORTHOPAEDIC SURGERY

## 2023-08-11 PROCEDURE — 6370000000 HC RX 637 (ALT 250 FOR IP): Performed by: STUDENT IN AN ORGANIZED HEALTH CARE EDUCATION/TRAINING PROGRAM

## 2023-08-11 PROCEDURE — 6370000000 HC RX 637 (ALT 250 FOR IP): Performed by: NURSE PRACTITIONER

## 2023-08-11 PROCEDURE — 6370000000 HC RX 637 (ALT 250 FOR IP): Performed by: PHYSICIAN ASSISTANT

## 2023-08-11 RX ORDER — SODIUM CHLORIDE 0.9 % (FLUSH) 0.9 %
5-40 SYRINGE (ML) INJECTION PRN
Status: DISCONTINUED | OUTPATIENT
Start: 2023-08-11 | End: 2023-08-13 | Stop reason: HOSPADM

## 2023-08-11 RX ORDER — BISACODYL 10 MG
10 SUPPOSITORY, RECTAL RECTAL DAILY PRN
Status: DISCONTINUED | OUTPATIENT
Start: 2023-08-11 | End: 2023-08-13 | Stop reason: HOSPADM

## 2023-08-11 RX ORDER — HYDROCODONE BITARTRATE AND ACETAMINOPHEN 5; 325 MG/1; MG/1
1 TABLET ORAL EVERY 6 HOURS PRN
Qty: 28 TABLET | Refills: 0 | Status: SHIPPED | OUTPATIENT
Start: 2023-08-12 | End: 2023-08-19

## 2023-08-11 RX ORDER — MAGNESIUM HYDROXIDE/ALUMINUM HYDROXICE/SIMETHICONE 120; 1200; 1200 MG/30ML; MG/30ML; MG/30ML
15 SUSPENSION ORAL EVERY 6 HOURS PRN
Status: DISCONTINUED | OUTPATIENT
Start: 2023-08-11 | End: 2023-08-13 | Stop reason: HOSPADM

## 2023-08-11 RX ORDER — SODIUM CHLORIDE 0.9 % (FLUSH) 0.9 %
5-40 SYRINGE (ML) INJECTION EVERY 12 HOURS SCHEDULED
Status: DISCONTINUED | OUTPATIENT
Start: 2023-08-11 | End: 2023-08-13 | Stop reason: HOSPADM

## 2023-08-11 RX ORDER — POLYETHYLENE GLYCOL 3350 17 G/17G
17 POWDER, FOR SOLUTION ORAL DAILY
Status: DISCONTINUED | OUTPATIENT
Start: 2023-08-11 | End: 2023-08-13 | Stop reason: HOSPADM

## 2023-08-11 RX ORDER — CYCLOBENZAPRINE HCL 10 MG
10 TABLET ORAL EVERY 12 HOURS PRN
Status: DISCONTINUED | OUTPATIENT
Start: 2023-08-11 | End: 2023-08-13 | Stop reason: HOSPADM

## 2023-08-11 RX ORDER — ACETAMINOPHEN 325 MG/1
650 TABLET ORAL EVERY 6 HOURS
Status: DISCONTINUED | OUTPATIENT
Start: 2023-08-11 | End: 2023-08-13 | Stop reason: HOSPADM

## 2023-08-11 RX ORDER — SODIUM CHLORIDE 9 MG/ML
INJECTION, SOLUTION INTRAVENOUS CONTINUOUS
Status: DISCONTINUED | OUTPATIENT
Start: 2023-08-11 | End: 2023-08-11

## 2023-08-11 RX ADMIN — BACLOFEN 10 MG: 10 TABLET ORAL at 21:11

## 2023-08-11 RX ADMIN — ISOSORBIDE MONONITRATE 15 MG: 30 TABLET, EXTENDED RELEASE ORAL at 21:12

## 2023-08-11 RX ADMIN — TRAMADOL HYDROCHLORIDE 50 MG: 50 TABLET ORAL at 14:46

## 2023-08-11 RX ADMIN — ACETAMINOPHEN 650 MG: 325 TABLET ORAL at 10:18

## 2023-08-11 RX ADMIN — MONTELUKAST 10 MG: 10 TABLET, FILM COATED ORAL at 21:11

## 2023-08-11 RX ADMIN — CEFAZOLIN 2000 MG: 1 INJECTION, POWDER, FOR SOLUTION INTRAMUSCULAR; INTRAVENOUS at 02:25

## 2023-08-11 RX ADMIN — SODIUM CHLORIDE, PRESERVATIVE FREE 10 ML: 5 INJECTION INTRAVENOUS at 10:18

## 2023-08-11 RX ADMIN — BUTALBITAL, ACETAMINOPHEN, AND CAFFEINE 1 TABLET: 50; 325; 40 TABLET ORAL at 10:17

## 2023-08-11 RX ADMIN — METOPROLOL TARTRATE 25 MG: 25 TABLET, FILM COATED ORAL at 10:12

## 2023-08-11 RX ADMIN — HYDROCODONE BITARTRATE AND ACETAMINOPHEN 1 TABLET: 5; 325 TABLET ORAL at 23:12

## 2023-08-11 RX ADMIN — SODIUM CHLORIDE: 9 INJECTION, SOLUTION INTRAVENOUS at 02:30

## 2023-08-11 RX ADMIN — CEFAZOLIN 2000 MG: 1 INJECTION, POWDER, FOR SOLUTION INTRAMUSCULAR; INTRAVENOUS at 10:11

## 2023-08-11 RX ADMIN — PANTOPRAZOLE SODIUM 40 MG: 40 TABLET, DELAYED RELEASE ORAL at 10:11

## 2023-08-11 RX ADMIN — CALCIUM POLYCARBOPHIL 625 MG: 625 TABLET, FILM COATED ORAL at 10:12

## 2023-08-11 RX ADMIN — ACETAMINOPHEN 650 MG: 325 TABLET ORAL at 21:09

## 2023-08-11 RX ADMIN — METOPROLOL TARTRATE 25 MG: 25 TABLET, FILM COATED ORAL at 21:11

## 2023-08-11 RX ADMIN — ACETAMINOPHEN 650 MG: 325 TABLET ORAL at 14:42

## 2023-08-11 RX ADMIN — HYDROCHLOROTHIAZIDE 12.5 MG: 25 TABLET ORAL at 10:12

## 2023-08-11 RX ADMIN — LOSARTAN POTASSIUM 50 MG: 50 TABLET, FILM COATED ORAL at 10:12

## 2023-08-11 RX ADMIN — PRAVASTATIN SODIUM 40 MG: 40 TABLET ORAL at 21:11

## 2023-08-11 RX ADMIN — BUTALBITAL, ACETAMINOPHEN, AND CAFFEINE 1 TABLET: 50; 325; 40 TABLET ORAL at 14:46

## 2023-08-11 RX ADMIN — CETIRIZINE HYDROCHLORIDE 10 MG: 10 TABLET, FILM COATED ORAL at 10:11

## 2023-08-11 RX ADMIN — ALLOPURINOL 200 MG: 100 TABLET ORAL at 10:12

## 2023-08-11 ASSESSMENT — PAIN - FUNCTIONAL ASSESSMENT
PAIN_FUNCTIONAL_ASSESSMENT: ACTIVITIES ARE NOT PREVENTED

## 2023-08-11 ASSESSMENT — PAIN SCALES - GENERAL: PAINLEVEL_OUTOF10: 6

## 2023-08-11 ASSESSMENT — PAIN DESCRIPTION - LOCATION
LOCATION: BACK

## 2023-08-11 ASSESSMENT — PAIN DESCRIPTION - DESCRIPTORS
DESCRIPTORS: BURNING;THROBBING
DESCRIPTORS: THROBBING;SHARP
DESCRIPTORS: SHARP;BURNING

## 2023-08-11 ASSESSMENT — PAIN DESCRIPTION - ORIENTATION: ORIENTATION: LOWER

## 2023-08-11 NOTE — CARE COORDINATION
Transition of Care Plan:    RUR: 10%   Prior Level of Functioning: Independent    Disposition: Home with Family assistance \"Pending Therapies Recs\"   Follow up appointments: PCP   DME needed: None   Transportation at discharge: Family   IM/IMM Medicare/ letter given: 2nd IMM Letter Needed   Caregiver Contact: German Mcgraw (Spouse) [phone]    Care Conference needed?  No   Barriers to discharge: PT/OT eval, Medical  S/P D1: EXPLORATION OF LUMBAR WOUND WITH POSSIBLE DURA REPAIR

## 2023-08-11 NOTE — DISCHARGE INSTRUCTIONS
each day.  -Limit the amount of time you sit to 20-30 minute intervals. Sitting for prolonged periods of time will be uncomfortable for you following surgery.  -Do NOT lift anything over 5 pounds  -Do NOT do any straining, twisting or bending  -When you are in bed, you may lay on your back or on either side. Do NOT lie on your stomach    Brace  -If you have a back brace, you should wear your brace at all times when you are out of bed. Do not wear the brace while in bed or showering.  -Remember to always wear a cotton t-shirt underneath your brace.  -Do not bend or twist when your brace is off    Diet  -Resume usual diet; drink plenty of fluids; eat foods high in fiber  -It is important to have regular bowel movements. Pain medications may cause constipation. You may want to take a stool softener (such as Senokot-S or Colace) to prevent constipation.   -If constipation occurs, take a laxative (such as Dulcolax tablets, Milk of Magnesia, or a suppository). Laxatives should only be used if the above preventable measures have failed and you still have not had a bowel movement after three days    Driving  -You may not drive or return to work until instructed by your physician. However, you may ride in the car for short periods of time. Incision Care  Your incision has been closed with absorbable sutures and skin glue. An impervious plastic dressing has been placed over your wound and should stay in place for 7 days from your surgery. After 7 days, it can be gently removed and the incision left open to air if there is no drainage. If there is drainage, then cover the wound with a clean, dry dressing until there has been no drainage for 24 hours. Please make sure to wash your hands prior to touching your dressing. If you have an impervious dressing it may get wet in the shower if it remains well affixed to the skin. Do not rub or apply any lotions or ointments to your incision site.  Do not soak or scrub your

## 2023-08-12 LAB
ANION GAP SERPL CALC-SCNC: 8 MMOL/L (ref 5–15)
BUN SERPL-MCNC: 15 MG/DL (ref 6–20)
BUN/CREAT SERPL: 16 (ref 12–20)
CALCIUM SERPL-MCNC: 8.6 MG/DL (ref 8.5–10.1)
CHLORIDE SERPL-SCNC: 111 MMOL/L (ref 97–108)
CO2 SERPL-SCNC: 21 MMOL/L (ref 21–32)
CREAT SERPL-MCNC: 0.92 MG/DL (ref 0.55–1.02)
ERYTHROCYTE [DISTWIDTH] IN BLOOD BY AUTOMATED COUNT: 15.2 % (ref 11.5–14.5)
GLUCOSE SERPL-MCNC: 82 MG/DL (ref 65–100)
HCT VFR BLD AUTO: 31.1 % (ref 35–47)
HGB BLD-MCNC: 9.6 G/DL (ref 11.5–16)
MCH RBC QN AUTO: 29 PG (ref 26–34)
MCHC RBC AUTO-ENTMCNC: 30.9 G/DL (ref 30–36.5)
MCV RBC AUTO: 94 FL (ref 80–99)
NRBC # BLD: 0 K/UL (ref 0–0.01)
NRBC BLD-RTO: 0 PER 100 WBC
PLATELET # BLD AUTO: 193 K/UL (ref 150–400)
PMV BLD AUTO: 10.8 FL (ref 8.9–12.9)
POTASSIUM SERPL-SCNC: 4.2 MMOL/L (ref 3.5–5.1)
RBC # BLD AUTO: 3.31 M/UL (ref 3.8–5.2)
SODIUM SERPL-SCNC: 140 MMOL/L (ref 136–145)
WBC # BLD AUTO: 8 K/UL (ref 3.6–11)

## 2023-08-12 PROCEDURE — 6370000000 HC RX 637 (ALT 250 FOR IP): Performed by: PHYSICIAN ASSISTANT

## 2023-08-12 PROCEDURE — 97116 GAIT TRAINING THERAPY: CPT

## 2023-08-12 PROCEDURE — 6360000002 HC RX W HCPCS: Performed by: PHYSICIAN ASSISTANT

## 2023-08-12 PROCEDURE — 85027 COMPLETE CBC AUTOMATED: CPT

## 2023-08-12 PROCEDURE — 97530 THERAPEUTIC ACTIVITIES: CPT

## 2023-08-12 PROCEDURE — 97161 PT EVAL LOW COMPLEX 20 MIN: CPT

## 2023-08-12 PROCEDURE — 80048 BASIC METABOLIC PNL TOTAL CA: CPT

## 2023-08-12 PROCEDURE — 1100000000 HC RM PRIVATE

## 2023-08-12 PROCEDURE — 2580000003 HC RX 258: Performed by: PHYSICIAN ASSISTANT

## 2023-08-12 PROCEDURE — 36415 COLL VENOUS BLD VENIPUNCTURE: CPT

## 2023-08-12 PROCEDURE — 6370000000 HC RX 637 (ALT 250 FOR IP): Performed by: NURSE PRACTITIONER

## 2023-08-12 RX ADMIN — MONTELUKAST 10 MG: 10 TABLET, FILM COATED ORAL at 21:13

## 2023-08-12 RX ADMIN — CETIRIZINE HYDROCHLORIDE 10 MG: 10 TABLET, FILM COATED ORAL at 08:44

## 2023-08-12 RX ADMIN — SODIUM CHLORIDE, PRESERVATIVE FREE 10 ML: 5 INJECTION INTRAVENOUS at 09:00

## 2023-08-12 RX ADMIN — ACETAMINOPHEN 650 MG: 325 TABLET ORAL at 08:44

## 2023-08-12 RX ADMIN — METOPROLOL TARTRATE 25 MG: 25 TABLET, FILM COATED ORAL at 21:13

## 2023-08-12 RX ADMIN — ACETAMINOPHEN 650 MG: 325 TABLET ORAL at 21:13

## 2023-08-12 RX ADMIN — HYDROCHLOROTHIAZIDE 12.5 MG: 25 TABLET ORAL at 08:45

## 2023-08-12 RX ADMIN — DIPHENHYDRAMINE HYDROCHLORIDE 25 MG: 50 INJECTION, SOLUTION INTRAMUSCULAR; INTRAVENOUS at 18:13

## 2023-08-12 RX ADMIN — METOPROLOL TARTRATE 25 MG: 25 TABLET, FILM COATED ORAL at 08:45

## 2023-08-12 RX ADMIN — HYDROCODONE BITARTRATE AND ACETAMINOPHEN 1 TABLET: 5; 325 TABLET ORAL at 22:22

## 2023-08-12 RX ADMIN — ISOSORBIDE MONONITRATE 15 MG: 30 TABLET, EXTENDED RELEASE ORAL at 21:12

## 2023-08-12 RX ADMIN — BACLOFEN 10 MG: 10 TABLET ORAL at 21:13

## 2023-08-12 RX ADMIN — PANTOPRAZOLE SODIUM 40 MG: 40 TABLET, DELAYED RELEASE ORAL at 08:45

## 2023-08-12 RX ADMIN — DIPHENHYDRAMINE HYDROCHLORIDE 25 MG: 50 INJECTION, SOLUTION INTRAMUSCULAR; INTRAVENOUS at 11:25

## 2023-08-12 RX ADMIN — PRAVASTATIN SODIUM 40 MG: 40 TABLET ORAL at 21:12

## 2023-08-12 RX ADMIN — CALCIUM POLYCARBOPHIL 625 MG: 625 TABLET, FILM COATED ORAL at 08:43

## 2023-08-12 RX ADMIN — LOSARTAN POTASSIUM 50 MG: 50 TABLET, FILM COATED ORAL at 08:44

## 2023-08-12 RX ADMIN — ALLOPURINOL 200 MG: 100 TABLET ORAL at 08:44

## 2023-08-12 ASSESSMENT — PAIN SCALES - GENERAL
PAINLEVEL_OUTOF10: 6
PAINLEVEL_OUTOF10: 4

## 2023-08-13 VITALS
SYSTOLIC BLOOD PRESSURE: 121 MMHG | DIASTOLIC BLOOD PRESSURE: 60 MMHG | TEMPERATURE: 99.1 F | OXYGEN SATURATION: 97 % | BODY MASS INDEX: 32.14 KG/M2 | HEART RATE: 78 BPM | RESPIRATION RATE: 14 BRPM | WEIGHT: 199.96 LBS | HEIGHT: 66 IN

## 2023-08-13 PROCEDURE — 2580000003 HC RX 258: Performed by: PHYSICIAN ASSISTANT

## 2023-08-13 PROCEDURE — 6370000000 HC RX 637 (ALT 250 FOR IP): Performed by: PHYSICIAN ASSISTANT

## 2023-08-13 PROCEDURE — 6370000000 HC RX 637 (ALT 250 FOR IP): Performed by: NURSE PRACTITIONER

## 2023-08-13 RX ORDER — ISOSORBIDE MONONITRATE 30 MG/1
15 TABLET, EXTENDED RELEASE ORAL NIGHTLY
Status: DISCONTINUED | OUTPATIENT
Start: 2023-08-13 | End: 2023-08-13 | Stop reason: HOSPADM

## 2023-08-13 RX ADMIN — CETIRIZINE HYDROCHLORIDE 10 MG: 10 TABLET, FILM COATED ORAL at 09:00

## 2023-08-13 RX ADMIN — PANTOPRAZOLE SODIUM 40 MG: 40 TABLET, DELAYED RELEASE ORAL at 09:02

## 2023-08-13 RX ADMIN — POLYETHYLENE GLYCOL 3350 17 G: 17 POWDER, FOR SOLUTION ORAL at 09:00

## 2023-08-13 RX ADMIN — ALLOPURINOL 200 MG: 100 TABLET ORAL at 09:02

## 2023-08-13 RX ADMIN — CALCIUM POLYCARBOPHIL 625 MG: 625 TABLET, FILM COATED ORAL at 09:01

## 2023-08-13 RX ADMIN — ACETAMINOPHEN 650 MG: 325 TABLET ORAL at 09:00

## 2023-08-13 RX ADMIN — SODIUM CHLORIDE, PRESERVATIVE FREE 20 ML: 5 INJECTION INTRAVENOUS at 09:02

## 2023-08-13 RX ADMIN — HYDROCHLOROTHIAZIDE 12.5 MG: 25 TABLET ORAL at 09:01

## 2023-08-13 RX ADMIN — SENNOSIDES AND DOCUSATE SODIUM 1 TABLET: 50; 8.6 TABLET ORAL at 09:00

## 2023-08-13 RX ADMIN — METOPROLOL TARTRATE 25 MG: 25 TABLET, FILM COATED ORAL at 09:01

## 2023-08-13 RX ADMIN — SODIUM CHLORIDE, PRESERVATIVE FREE 10 ML: 5 INJECTION INTRAVENOUS at 09:02

## 2023-08-13 ASSESSMENT — PAIN SCALES - GENERAL: PAINLEVEL_OUTOF10: 3

## 2023-08-13 NOTE — DISCHARGE SUMMARY
Discharge Summary    Date: 8/13/2023  Patient Name: Magnolia Quintero    YOB: 1950     Age: 68 y.o. Admit Date: 8/6/2023  Discharge Date: 8/13/2023  Discharge Condition: Stable    Admission Diagnosis  Lumbar surgical wound fluid collection, initial encounter [T81.89XA]      Discharge Diagnosis  Principal Problem:    Lumbar surgical wound fluid collection, initial encounter  Resolved Problems:    * No resolved hospital problems. [facility] Stay  Narrative of Hospital Course:  exploration of lumbar wound and dura leak repair    Consultants:  IP CONSULT TO HOSPITALIST  IP CONSULT TO CASE MANAGEMENT    Surgeries/procedures Performed:      Treatments:            Discharge Plan/Disposition:  Home    Hospital/Incidental Findings Requiring Follow Up:    Patient Instructions:    Diet:    Activity:  For number of days (if applicable): Other Instructions:    Provider Follow-Up:   No follow-ups on file. Significant Diagnostic Studies:    Recent Labs:  Admission on 08/06/2023  INR                                           Date: 08/07/2023  Value: 1.1         Ref range: 0.9 - 1.1          Status: Final                Comment: A single therapeutic range for Vit K antagonists may not be optimal for all indications - see June, 2008 issue of Chest, American College of Chest Physicians Evidence-Based Clinical Practice Guidelines, 8th Edition.   Protime                                       Date: 08/07/2023  Value: 11.0        Ref range: 9.0 - 11.1 sec     Status: Final  WBC                                           Date: 08/07/2023  Value: 6.9         Ref range: 3.6 - 11.0 K/uL    Status: Final  RBC                                           Date: 08/07/2023  Value: 3.66 (L)    Ref range: 3.80 - 5.20 M/uL   Status: Final  Hemoglobin                                    Date: 08/07/2023  Value: 10.6 (L)    Ref range: 11.5 - 16.0 g/dL   Status: Final  Hematocrit                                    Date:

## 2023-08-14 LAB
BACTERIA SPEC CULT: NORMAL
BACTERIA SPEC CULT: NORMAL
GRAM STN SPEC: NORMAL
GRAM STN SPEC: NORMAL
SERVICE CMNT-IMP: NORMAL
SERVICE CMNT-IMP: NORMAL

## 2023-09-06 ENCOUNTER — TELEPHONE (OUTPATIENT)
Age: 73
End: 2023-09-06

## 2023-09-06 NOTE — TELEPHONE ENCOUNTER
Patient called inquiring about her and  Insurance coverage for services.  PT would like call back at either #    PT# [phone] (cell)

## 2024-01-03 ENCOUNTER — HOSPITAL ENCOUNTER (OUTPATIENT)
Facility: HOSPITAL | Age: 74
Discharge: HOME | End: 2024-01-06
Payer: MEDICARE

## 2024-01-03 DIAGNOSIS — M16.12 PRIMARY OSTEOARTHRITIS OF LEFT HIP: ICD-10-CM

## 2024-01-03 PROCEDURE — 2709999900 FL ARTHR/ASP/INJ MAJOR JT/BURSA LT WO US

## 2024-01-03 PROCEDURE — 6360000004 HC RX CONTRAST MEDICATION

## 2024-01-03 PROCEDURE — 6360000002 HC RX W HCPCS

## 2024-01-03 PROCEDURE — 20610 DRAIN/INJ JOINT/BURSA W/O US: CPT

## 2024-01-03 RX ORDER — BUPIVACAINE HYDROCHLORIDE 5 MG/ML
30 INJECTION, SOLUTION EPIDURAL; INTRACAUDAL ONCE
Status: COMPLETED | OUTPATIENT
Start: 2024-01-03 | End: 2024-01-03

## 2024-01-03 RX ORDER — TRIAMCINOLONE ACETONIDE 40 MG/ML
40 INJECTION, SUSPENSION INTRA-ARTICULAR; INTRAMUSCULAR ONCE
Status: COMPLETED | OUTPATIENT
Start: 2024-01-03 | End: 2024-01-03

## 2024-01-03 RX ADMIN — IOHEXOL 10 ML: 180 INJECTION INTRAVENOUS at 11:14

## 2024-01-03 RX ADMIN — BUPIVACAINE HYDROCHLORIDE 150 MG: 5 INJECTION, SOLUTION EPIDURAL; INTRACAUDAL; PERINEURAL at 11:14

## 2024-01-03 RX ADMIN — TRIAMCINOLONE ACETONIDE 40 MG: 40 INJECTION, SUSPENSION INTRA-ARTICULAR; INTRAMUSCULAR at 11:15

## 2024-02-22 ENCOUNTER — HOSPITAL ENCOUNTER (OUTPATIENT)
Facility: HOSPITAL | Age: 74
Discharge: HOME | End: 2024-02-22
Payer: MEDICARE

## 2024-02-22 DIAGNOSIS — K57.30 DIVERTICULOSIS OF COLON: ICD-10-CM

## 2024-02-22 DIAGNOSIS — R10.814 ABDOMINAL TENDERNESS OF LEFT LOWER QUADRANT, REBOUND TENDERNESS PRESENCE NOT SPECIFIED: ICD-10-CM

## 2024-02-22 LAB — CREAT BLD-MCNC: 0.9 MG/DL (ref 0.6–1.3)

## 2024-02-22 PROCEDURE — 6360000004 HC RX CONTRAST MEDICATION: Performed by: RADIOLOGY

## 2024-02-22 PROCEDURE — 82565 ASSAY OF CREATININE: CPT

## 2024-02-22 PROCEDURE — 74177 CT ABD & PELVIS W/CONTRAST: CPT

## 2024-02-22 RX ADMIN — IOPAMIDOL 100 ML: 755 INJECTION, SOLUTION INTRAVENOUS at 18:11

## 2024-03-25 ENCOUNTER — OFFICE VISIT (OUTPATIENT)
Age: 74
End: 2024-03-25
Payer: MEDICARE

## 2024-03-25 VITALS
SYSTOLIC BLOOD PRESSURE: 138 MMHG | WEIGHT: 208 LBS | OXYGEN SATURATION: 97 % | RESPIRATION RATE: 16 BRPM | DIASTOLIC BLOOD PRESSURE: 62 MMHG | HEIGHT: 66 IN | HEART RATE: 55 BPM | BODY MASS INDEX: 33.43 KG/M2

## 2024-03-25 DIAGNOSIS — I10 ESSENTIAL (PRIMARY) HYPERTENSION: ICD-10-CM

## 2024-03-25 DIAGNOSIS — E78.2 MIXED HYPERLIPIDEMIA: ICD-10-CM

## 2024-03-25 DIAGNOSIS — I25.10 ATHEROSCLEROSIS OF NATIVE CORONARY ARTERY OF NATIVE HEART WITHOUT ANGINA PECTORIS: Primary | ICD-10-CM

## 2024-03-25 DIAGNOSIS — R60.0 LOWER EXTREMITY EDEMA: ICD-10-CM

## 2024-03-25 DIAGNOSIS — G47.33 OBSTRUCTIVE SLEEP APNEA (ADULT) (PEDIATRIC): ICD-10-CM

## 2024-03-25 PROCEDURE — 3075F SYST BP GE 130 - 139MM HG: CPT | Performed by: INTERNAL MEDICINE

## 2024-03-25 PROCEDURE — 99214 OFFICE O/P EST MOD 30 MIN: CPT | Performed by: INTERNAL MEDICINE

## 2024-03-25 PROCEDURE — 93010 ELECTROCARDIOGRAM REPORT: CPT | Performed by: INTERNAL MEDICINE

## 2024-03-25 PROCEDURE — 1123F ACP DISCUSS/DSCN MKR DOCD: CPT | Performed by: INTERNAL MEDICINE

## 2024-03-25 PROCEDURE — 3078F DIAST BP <80 MM HG: CPT | Performed by: INTERNAL MEDICINE

## 2024-03-25 PROCEDURE — 93005 ELECTROCARDIOGRAM TRACING: CPT | Performed by: INTERNAL MEDICINE

## 2024-03-25 RX ORDER — MULTIVIT-MIN/IRON/FOLIC ACID/K 18-600-40
CAPSULE ORAL
COMMUNITY

## 2024-03-25 ASSESSMENT — PATIENT HEALTH QUESTIONNAIRE - PHQ9
SUM OF ALL RESPONSES TO PHQ QUESTIONS 1-9: 0
2. FEELING DOWN, DEPRESSED OR HOPELESS: NOT AT ALL
SUM OF ALL RESPONSES TO PHQ QUESTIONS 1-9: 0
1. LITTLE INTEREST OR PLEASURE IN DOING THINGS: NOT AT ALL
SUM OF ALL RESPONSES TO PHQ9 QUESTIONS 1 & 2: 0
SUM OF ALL RESPONSES TO PHQ QUESTIONS 1-9: 0
SUM OF ALL RESPONSES TO PHQ QUESTIONS 1-9: 0

## 2024-03-25 NOTE — PROGRESS NOTES
significant rise in pulmonary pressures and no new structural heart disease      Superficial venous reflux: s/p right GSV RF closure 10/2016. Left leg continues to do very well with conservative management. stable overall.  Advised sodium restriction elevation of legs when able, compression stockings as tolerated      Status post cervical spinal fusion 2020, also lumbar fusions in July and August 2023      Counseled on diet and exercise- eventual goal of 30-60 minutes 5-7 times a week as per AHA guidelines.        Continue current care and f/u in 1 year if echo is stable after gradual increase in exercise.          Graham Guerrero MD

## 2024-04-26 ENCOUNTER — ANCILLARY PROCEDURE (OUTPATIENT)
Age: 74
End: 2024-04-26
Payer: MEDICARE

## 2024-04-26 VITALS — WEIGHT: 208 LBS | HEIGHT: 66 IN | BODY MASS INDEX: 33.43 KG/M2

## 2024-04-26 DIAGNOSIS — R60.0 LOWER EXTREMITY EDEMA: ICD-10-CM

## 2024-04-26 PROCEDURE — 93306 TTE W/DOPPLER COMPLETE: CPT | Performed by: INTERNAL MEDICINE

## 2024-04-27 LAB
ECHO AO ASC DIAM: 2.9 CM
ECHO AO ASCENDING AORTA INDEX: 1.43 CM/M2
ECHO AO ROOT DIAM: 3.1 CM
ECHO AO ROOT INDEX: 1.53 CM/M2
ECHO AV AREA PEAK VELOCITY: 2.1 CM2
ECHO AV AREA VTI: 2.1 CM2
ECHO AV AREA/BSA PEAK VELOCITY: 1 CM2/M2
ECHO AV AREA/BSA VTI: 1 CM2/M2
ECHO AV MEAN GRADIENT: 4 MMHG
ECHO AV MEAN VELOCITY: 1 M/S
ECHO AV PEAK GRADIENT: 7 MMHG
ECHO AV PEAK VELOCITY: 1.3 M/S
ECHO AV VELOCITY RATIO: 0.77
ECHO AV VTI: 35.3 CM
ECHO BSA: 2.1 M2
ECHO LA DIAMETER INDEX: 2.02 CM/M2
ECHO LA DIAMETER: 4.1 CM
ECHO LA TO AORTIC ROOT RATIO: 1.32
ECHO LA VOL A-L A2C: 82 ML (ref 22–52)
ECHO LA VOL A-L A4C: 78 ML (ref 22–52)
ECHO LA VOL BP: 80 ML (ref 22–52)
ECHO LA VOL MOD A2C: 77 ML (ref 22–52)
ECHO LA VOL MOD A4C: 71 ML (ref 22–52)
ECHO LA VOL/BSA BIPLANE: 39 ML/M2 (ref 16–34)
ECHO LA VOLUME AREA LENGTH: 87 ML
ECHO LA VOLUME INDEX A-L A2C: 40 ML/M2 (ref 16–34)
ECHO LA VOLUME INDEX A-L A4C: 38 ML/M2 (ref 16–34)
ECHO LA VOLUME INDEX AREA LENGTH: 43 ML/M2 (ref 16–34)
ECHO LA VOLUME INDEX MOD A2C: 38 ML/M2 (ref 16–34)
ECHO LA VOLUME INDEX MOD A4C: 35 ML/M2 (ref 16–34)
ECHO LV E' LATERAL VELOCITY: 10 CM/S
ECHO LV E' SEPTAL VELOCITY: 8 CM/S
ECHO LV EDV A2C: 56 ML
ECHO LV EDV A4C: 62 ML
ECHO LV EDV BP: 60 ML (ref 56–104)
ECHO LV EDV INDEX A4C: 31 ML/M2
ECHO LV EDV INDEX BP: 30 ML/M2
ECHO LV EDV NDEX A2C: 28 ML/M2
ECHO LV EJECTION FRACTION A2C: 46 %
ECHO LV EJECTION FRACTION A4C: 67 %
ECHO LV EJECTION FRACTION BIPLANE: 57 % (ref 55–100)
ECHO LV ESV A2C: 30 ML
ECHO LV ESV A4C: 21 ML
ECHO LV ESV BP: 26 ML (ref 19–49)
ECHO LV ESV INDEX A2C: 15 ML/M2
ECHO LV ESV INDEX A4C: 10 ML/M2
ECHO LV ESV INDEX BP: 13 ML/M2
ECHO LV FRACTIONAL SHORTENING: 37 % (ref 28–44)
ECHO LV INTERNAL DIMENSION DIASTOLE INDEX: 2.27 CM/M2
ECHO LV INTERNAL DIMENSION DIASTOLIC: 4.6 CM (ref 3.9–5.3)
ECHO LV INTERNAL DIMENSION SYSTOLIC INDEX: 1.43 CM/M2
ECHO LV INTERNAL DIMENSION SYSTOLIC: 2.9 CM
ECHO LV IVSD: 1.1 CM (ref 0.6–0.9)
ECHO LV MASS 2D: 205 G (ref 67–162)
ECHO LV MASS INDEX 2D: 101 G/M2 (ref 43–95)
ECHO LV POSTERIOR WALL DIASTOLIC: 1.3 CM (ref 0.6–0.9)
ECHO LV RELATIVE WALL THICKNESS RATIO: 0.57
ECHO LVOT AREA: 2.8 CM2
ECHO LVOT AV VTI INDEX: 0.77
ECHO LVOT DIAM: 1.9 CM
ECHO LVOT MEAN GRADIENT: 3 MMHG
ECHO LVOT PEAK GRADIENT: 4 MMHG
ECHO LVOT PEAK VELOCITY: 1 M/S
ECHO LVOT STROKE VOLUME INDEX: 38.1 ML/M2
ECHO LVOT SV: 77.4 ML
ECHO LVOT VTI: 27.3 CM
ECHO MV A VELOCITY: 0.88 M/S
ECHO MV E DECELERATION TIME (DT): 181.2 MS
ECHO MV E VELOCITY: 0.97 M/S
ECHO MV E/A RATIO: 1.1
ECHO MV E/E' LATERAL: 9.7
ECHO MV E/E' RATIO (AVERAGED): 10.91
ECHO PV MAX VELOCITY: 1 M/S
ECHO PV PEAK GRADIENT: 4 MMHG
ECHO RA MINOR AXIS INDEX: 1.63 CM/M2
ECHO RA MINOR AXIS: 3.3 CM
ECHO RV INTERNAL DIMENSION: 3.1 CM
ECHO RV TAPSE: 2.2 CM (ref 1.7–?)
ECHO TV REGURGITANT MAX VELOCITY: 2.48 M/S
ECHO TV REGURGITANT PEAK GRADIENT: 25 MMHG

## 2024-04-27 PROCEDURE — 93306 TTE W/DOPPLER COMPLETE: CPT | Performed by: INTERNAL MEDICINE

## 2024-05-02 ENCOUNTER — TELEPHONE (OUTPATIENT)
Age: 74
End: 2024-05-02

## 2024-05-02 NOTE — RESULT ENCOUNTER NOTE
Please advise heart function normal with no significant valve problems.  Left atrium upper chamber the heart is little bit dilated and for that we just need to maintain tight blood pressure control-monitor and let us know if generally greater than 140/85.  Follow-up as scheduled.    Future Appointments  6/6/2024   8:20 AM    Devaughn Hutchinson MD [facility]                BS AMB  3/25/2025  10:20 AM   Graham Guerrero MD [facility]              BS AMB

## 2024-05-02 NOTE — TELEPHONE ENCOUNTER
----- Message from Graham Guerrero MD sent at 5/1/2024  8:31 PM EDT -----  Please advise heart function normal with no significant valve problems.  Left atrium upper chamber the heart is little bit dilated and for that we just need to maintain tight blood pressure control-monitor and let us know if generally greater than 140/85.  Follow-up as scheduled.  Spoke with patient , verified patient with two identifiers, regarding results and recommendations. Patient voiced understanding.

## 2024-07-10 ENCOUNTER — HOSPITAL ENCOUNTER (OUTPATIENT)
Facility: HOSPITAL | Age: 74
Discharge: HOME | End: 2024-07-13
Payer: MEDICARE

## 2024-07-10 ENCOUNTER — TRANSCRIBE ORDERS (OUTPATIENT)
Facility: HOSPITAL | Age: 74
End: 2024-07-10

## 2024-07-10 DIAGNOSIS — Z12.31 VISIT FOR SCREENING MAMMOGRAM: Primary | ICD-10-CM

## 2024-07-10 DIAGNOSIS — Z12.31 VISIT FOR SCREENING MAMMOGRAM: ICD-10-CM

## 2024-07-10 PROCEDURE — 77063 BREAST TOMOSYNTHESIS BI: CPT

## 2025-03-25 ENCOUNTER — TELEPHONE (OUTPATIENT)
Age: 75
End: 2025-03-25

## 2025-03-25 ENCOUNTER — ANCILLARY PROCEDURE (OUTPATIENT)
Age: 75
End: 2025-03-25
Payer: MEDICARE

## 2025-03-25 ENCOUNTER — OFFICE VISIT (OUTPATIENT)
Age: 75
End: 2025-03-25
Payer: MEDICARE

## 2025-03-25 VITALS
DIASTOLIC BLOOD PRESSURE: 70 MMHG | WEIGHT: 213 LBS | OXYGEN SATURATION: 98 % | HEART RATE: 56 BPM | HEIGHT: 66 IN | SYSTOLIC BLOOD PRESSURE: 170 MMHG | BODY MASS INDEX: 34.23 KG/M2

## 2025-03-25 VITALS
BODY MASS INDEX: 34.23 KG/M2 | DIASTOLIC BLOOD PRESSURE: 80 MMHG | SYSTOLIC BLOOD PRESSURE: 137 MMHG | HEIGHT: 66 IN | WEIGHT: 213 LBS

## 2025-03-25 DIAGNOSIS — E78.2 MIXED HYPERLIPIDEMIA: ICD-10-CM

## 2025-03-25 DIAGNOSIS — I25.10 CAD (CORONARY ARTERY DISEASE): ICD-10-CM

## 2025-03-25 DIAGNOSIS — R00.2 HEART PALPITATIONS: Primary | ICD-10-CM

## 2025-03-25 DIAGNOSIS — I25.10 ATHEROSCLEROSIS OF NATIVE CORONARY ARTERY OF NATIVE HEART WITHOUT ANGINA PECTORIS: ICD-10-CM

## 2025-03-25 DIAGNOSIS — I20.9 ANGINA, CLASS III: ICD-10-CM

## 2025-03-25 DIAGNOSIS — G47.33 OBSTRUCTIVE SLEEP APNEA (ADULT) (PEDIATRIC): ICD-10-CM

## 2025-03-25 DIAGNOSIS — I10 ESSENTIAL (PRIMARY) HYPERTENSION: ICD-10-CM

## 2025-03-25 DIAGNOSIS — R60.0 LOWER EXTREMITY EDEMA: ICD-10-CM

## 2025-03-25 LAB
ECHO AO ASC DIAM: 3.1 CM
ECHO AO ASCENDING AORTA INDEX: 1.51 CM/M2
ECHO AO ROOT DIAM: 2.7 CM
ECHO AO ROOT INDEX: 1.32 CM/M2
ECHO AV AREA PEAK VELOCITY: 2.2 CM2
ECHO AV AREA VTI: 2.1 CM2
ECHO AV AREA/BSA PEAK VELOCITY: 1.1 CM2/M2
ECHO AV AREA/BSA VTI: 1 CM2/M2
ECHO AV MEAN GRADIENT: 4 MMHG
ECHO AV MEAN VELOCITY: 0.9 M/S
ECHO AV PEAK GRADIENT: 7 MMHG
ECHO AV PEAK VELOCITY: 1.3 M/S
ECHO AV VELOCITY RATIO: 0.69
ECHO AV VTI: 36.1 CM
ECHO BSA: 2.12 M2
ECHO EST RA PRESSURE: 3 MMHG
ECHO LA DIAMETER INDEX: 2 CM/M2
ECHO LA DIAMETER: 4.1 CM
ECHO LA TO AORTIC ROOT RATIO: 1.52
ECHO LA VOL A-L A2C: 92 ML (ref 22–52)
ECHO LA VOL A-L A4C: 62 ML (ref 22–52)
ECHO LA VOL BP: 71 ML (ref 22–52)
ECHO LA VOL MOD A2C: 88 ML (ref 22–52)
ECHO LA VOL MOD A4C: 58 ML (ref 22–52)
ECHO LA VOL/BSA BIPLANE: 35 ML/M2 (ref 16–34)
ECHO LA VOLUME AREA LENGTH: 76 ML
ECHO LA VOLUME INDEX A-L A2C: 45 ML/M2 (ref 16–34)
ECHO LA VOLUME INDEX A-L A4C: 30 ML/M2 (ref 16–34)
ECHO LA VOLUME INDEX AREA LENGTH: 37 ML/M2 (ref 16–34)
ECHO LA VOLUME INDEX MOD A2C: 43 ML/M2 (ref 16–34)
ECHO LA VOLUME INDEX MOD A4C: 28 ML/M2 (ref 16–34)
ECHO LV E' LATERAL VELOCITY: 9.13 CM/S
ECHO LV E' SEPTAL VELOCITY: 14.97 CM/S
ECHO LV EDV A2C: 74 ML
ECHO LV EDV A4C: 76 ML
ECHO LV EDV BP: 76 ML (ref 56–104)
ECHO LV EDV INDEX A4C: 37 ML/M2
ECHO LV EDV INDEX BP: 37 ML/M2
ECHO LV EDV NDEX A2C: 36 ML/M2
ECHO LV EF PHYSICIAN: 55 %
ECHO LV EJECTION FRACTION A2C: 53 %
ECHO LV EJECTION FRACTION A4C: 65 %
ECHO LV EJECTION FRACTION BIPLANE: 60 % (ref 55–100)
ECHO LV ESV A2C: 35 ML
ECHO LV ESV A4C: 27 ML
ECHO LV ESV BP: 31 ML (ref 19–49)
ECHO LV ESV INDEX A2C: 17 ML/M2
ECHO LV ESV INDEX A4C: 13 ML/M2
ECHO LV ESV INDEX BP: 15 ML/M2
ECHO LV FRACTIONAL SHORTENING: 36 % (ref 28–44)
ECHO LV INTERNAL DIMENSION DIASTOLE INDEX: 2.05 CM/M2
ECHO LV INTERNAL DIMENSION DIASTOLIC: 4.2 CM (ref 3.9–5.3)
ECHO LV INTERNAL DIMENSION SYSTOLIC INDEX: 1.32 CM/M2
ECHO LV INTERNAL DIMENSION SYSTOLIC: 2.7 CM
ECHO LV IVSD: 1.1 CM (ref 0.6–0.9)
ECHO LV IVSS: 1.2 CM
ECHO LV MASS 2D: 167.4 G (ref 67–162)
ECHO LV MASS INDEX 2D: 81.7 G/M2 (ref 43–95)
ECHO LV POSTERIOR WALL DIASTOLIC: 1.2 CM (ref 0.6–0.9)
ECHO LV POSTERIOR WALL SYSTOLIC: 1.6 CM
ECHO LV RELATIVE WALL THICKNESS RATIO: 0.57
ECHO LVOT AREA: 3.1 CM2
ECHO LVOT AV VTI INDEX: 0.69
ECHO LVOT DIAM: 2 CM
ECHO LVOT MEAN GRADIENT: 2 MMHG
ECHO LVOT PEAK GRADIENT: 3 MMHG
ECHO LVOT PEAK VELOCITY: 0.9 M/S
ECHO LVOT STROKE VOLUME INDEX: 38.1 ML/M2
ECHO LVOT SV: 78.2 ML
ECHO LVOT VTI: 24.9 CM
ECHO MV A VELOCITY: 0.62 M/S
ECHO MV AREA PHT: 3.9 CM2
ECHO MV AREA VTI: 2.9 CM2
ECHO MV E DECELERATION TIME (DT): 196.1 MS
ECHO MV E VELOCITY: 0.97 M/S
ECHO MV E/A RATIO: 1.56
ECHO MV E/E' LATERAL: 10.62
ECHO MV E/E' RATIO (AVERAGED): 8.55
ECHO MV E/E' SEPTAL: 6.48
ECHO MV LVOT VTI INDEX: 1.07
ECHO MV MAX VELOCITY: 1 M/S
ECHO MV MEAN GRADIENT: 1 MMHG
ECHO MV MEAN VELOCITY: 0.5 M/S
ECHO MV PEAK GRADIENT: 4 MMHG
ECHO MV PRESSURE HALF TIME (PHT): 56.9 MS
ECHO MV VTI: 26.7 CM
ECHO PV MAX VELOCITY: 0.9 M/S
ECHO PV PEAK GRADIENT: 3 MMHG
ECHO RIGHT VENTRICULAR SYSTOLIC PRESSURE (RVSP): 24 MMHG
ECHO RV FREE WALL PEAK S': 10.5 CM/S
ECHO RV INTERNAL DIMENSION: 4 CM
ECHO RV TAPSE: 3.2 CM (ref 1.7–?)
ECHO RVOT PEAK GRADIENT: 0 MMHG
ECHO RVOT PEAK VELOCITY: 0.3 M/S
ECHO TV REGURGITANT MAX VELOCITY: 2.31 M/S
ECHO TV REGURGITANT PEAK GRADIENT: 21 MMHG

## 2025-03-25 PROCEDURE — 93005 ELECTROCARDIOGRAM TRACING: CPT | Performed by: INTERNAL MEDICINE

## 2025-03-25 PROCEDURE — 1159F MED LIST DOCD IN RCRD: CPT | Performed by: INTERNAL MEDICINE

## 2025-03-25 PROCEDURE — 93010 ELECTROCARDIOGRAM REPORT: CPT | Performed by: INTERNAL MEDICINE

## 2025-03-25 PROCEDURE — 99214 OFFICE O/P EST MOD 30 MIN: CPT | Performed by: INTERNAL MEDICINE

## 2025-03-25 PROCEDURE — 3078F DIAST BP <80 MM HG: CPT | Performed by: INTERNAL MEDICINE

## 2025-03-25 PROCEDURE — 93306 TTE W/DOPPLER COMPLETE: CPT | Performed by: INTERNAL MEDICINE

## 2025-03-25 PROCEDURE — G2211 COMPLEX E/M VISIT ADD ON: HCPCS | Performed by: INTERNAL MEDICINE

## 2025-03-25 PROCEDURE — 1123F ACP DISCUSS/DSCN MKR DOCD: CPT | Performed by: INTERNAL MEDICINE

## 2025-03-25 PROCEDURE — 1126F AMNT PAIN NOTED NONE PRSNT: CPT | Performed by: INTERNAL MEDICINE

## 2025-03-25 PROCEDURE — 3077F SYST BP >= 140 MM HG: CPT | Performed by: INTERNAL MEDICINE

## 2025-03-25 RX ORDER — ATENOLOL 25 MG/1
TABLET ORAL
Qty: 3 TABLET | Refills: 0 | Status: SHIPPED | OUTPATIENT
Start: 2025-03-25

## 2025-03-25 RX ORDER — RANOLAZINE 500 MG/1
500 TABLET, EXTENDED RELEASE ORAL 2 TIMES DAILY
Qty: 180 TABLET | Refills: 3 | Status: SHIPPED | OUTPATIENT
Start: 2025-03-25

## 2025-03-25 RX ORDER — NITROGLYCERIN 0.4 MG/1
0.4 TABLET SUBLINGUAL EVERY 5 MIN PRN
Qty: 25 TABLET | Refills: 0 | Status: SHIPPED | OUTPATIENT
Start: 2025-03-25

## 2025-03-25 ASSESSMENT — PATIENT HEALTH QUESTIONNAIRE - PHQ9
SUM OF ALL RESPONSES TO PHQ QUESTIONS 1-9: 0
SUM OF ALL RESPONSES TO PHQ QUESTIONS 1-9: 0
1. LITTLE INTEREST OR PLEASURE IN DOING THINGS: NOT AT ALL
SUM OF ALL RESPONSES TO PHQ QUESTIONS 1-9: 0
SUM OF ALL RESPONSES TO PHQ QUESTIONS 1-9: 0
2. FEELING DOWN, DEPRESSED OR HOPELESS: NOT AT ALL

## 2025-03-25 NOTE — PROGRESS NOTES
1. Have you been to the ER, urgent care clinic since your last visit?  Hospitalized since your last visit?  No    2. Have you seen or consulted any other health care providers outside of the [facility] since your last visit?  Include any pap smears or colon screening.   PCP Eric Millard MD  
neck/head movement limitation says patient    Claustrophobia     Color blind     CAN'T DISTINGUISH BETWEEN BLACK AND BLUE    CREST (calcinosis, Raynaud's phenomenon, esophageal dysfunction, sclerodactyly, telangiectasia) ([facility]) 2018    [facility], Dr. 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 Hernandez    Seasonal asthma     allergy    Shortness of breath 2016    Pulmonary Dr. Sahra Demarco    Unspecified adverse effect of anesthesia     wakes up for anesthesia early      Past Surgical History:   Procedure Laterality Date    APPENDECTOMY  1955    BACK SURGERY N/A 8/10/2023    EXPLORATION OF LUMBAR WOUND WITH POSSIBLE DURA REPAIR performed by Devaughn Hutchinson MD at [facility] MAIN OR    CATARACT REMOVAL Bilateral 2018    CERVICAL FUSION  2014    c4-5     CHOLECYSTECTOMY      COLONOSCOPY  04/20/2011    negative    COLONOSCOPY N/A 9/28/2018    COLONOSCOPY performed by Graham Macario MD at [facility] AMBULATORY OR    GI  11/19/13    Rectocele repair with enterocele repair    HYSTERECTOMY (CERVIX STATUS UNKNOWN)      TOOK LEFT OVARY    IR INJ INTERLAMINAR EPI/SUBARACHNOID LUMB/SAC  12/19/2019    KNEE ARTHROSCOPY Right 1990's    right knee partial meniscus     LUMBAR SPINE SURGERY N/A 7/12/2023    REVISION LUMBAR LAMINECTOMY L4/5, REVISION FUSION L4-S1 (E.R.A.S.) performed by Devaughn Hutchinson MD at [facility] MAIN OR    ORTHOPEDIC SURGERY  1973    ganglion cyst removed rt wrist    ORTHOPEDIC SURGERY  2013, 1992    bilateral CTR, and had ulna nerve release    ORTHOPEDIC SURGERY Right 2018    thumb and ring finger trigger release    ORTHOPEDIC SURGERY  2019    cervical fusion x2

## 2025-03-25 NOTE — TELEPHONE ENCOUNTER
Enrolled with [employer] - Ordered and being shipped to patient's home address on file.  ETA within 5-7 business days.         Message  Received: Today  Mouna Shell, Ashley Guadarrama  Please order a 3 days E. H. For palpitation and chest pain per dr. Guerrero  Thanks  Mouna

## 2025-03-25 NOTE — PATIENT INSTRUCTIONS
Please call central scheduling at [phone] to schedule your Cardiac CTA.  Atenolol 25 mg. Take 2 tabs ( 50 mg) the night before Cardiac CTA and 1 tab (25 mg) the morning of the test. Do Not take your Metoprolol the night before or the morning of the test.

## 2025-03-30 ENCOUNTER — RESULTS FOLLOW-UP (OUTPATIENT)
Age: 75
End: 2025-03-30

## 2025-03-30 NOTE — RESULT ENCOUNTER NOTE
Please advise echo shows normal heart function with no significant valve problems.  Follow-up as scheduled.     Future Appointments  4/17/2025  9:00 AM    [facility] CT 1                  MRMRCT              [facility]  6/27/2025  10:20 AM   Etta De La Vega APRN - NP [facility] BS AMB

## 2025-03-31 NOTE — TELEPHONE ENCOUNTER
----- Message from Dr. Graham Guerrero MD sent at 3/30/2025  5:32 PM EDT -----  Please advise echo shows normal heart function with no significant valve problems.  Follow-up as scheduled.     Future Appointments  4/17/2025  9:00 AM    [facility] CT 1                  MRMRCT              [facility]  6/27/2025  10:20 AM   Etta De La Vega APRN - [facility] AMB

## 2025-04-17 ENCOUNTER — HOSPITAL ENCOUNTER (OUTPATIENT)
Facility: HOSPITAL | Age: 75
Discharge: HOME | End: 2025-04-20
Attending: INTERNAL MEDICINE
Payer: MEDICARE

## 2025-04-17 VITALS
RESPIRATION RATE: 20 BRPM | OXYGEN SATURATION: 98 % | HEART RATE: 52 BPM | SYSTOLIC BLOOD PRESSURE: 188 MMHG | DIASTOLIC BLOOD PRESSURE: 59 MMHG

## 2025-04-17 DIAGNOSIS — I10 ESSENTIAL (PRIMARY) HYPERTENSION: ICD-10-CM

## 2025-04-17 DIAGNOSIS — G47.33 OBSTRUCTIVE SLEEP APNEA (ADULT) (PEDIATRIC): ICD-10-CM

## 2025-04-17 DIAGNOSIS — I20.9 ANGINA, CLASS III: ICD-10-CM

## 2025-04-17 DIAGNOSIS — R60.0 LOWER EXTREMITY EDEMA: ICD-10-CM

## 2025-04-17 DIAGNOSIS — R00.2 HEART PALPITATIONS: ICD-10-CM

## 2025-04-17 DIAGNOSIS — E78.2 MIXED HYPERLIPIDEMIA: ICD-10-CM

## 2025-04-17 DIAGNOSIS — I25.10 ATHEROSCLEROSIS OF NATIVE CORONARY ARTERY OF NATIVE HEART WITHOUT ANGINA PECTORIS: ICD-10-CM

## 2025-04-17 LAB — CREAT BLD-MCNC: 1.1 MG/DL (ref 0.6–1.3)

## 2025-04-17 PROCEDURE — 6360000004 HC RX CONTRAST MEDICATION: Performed by: INTERNAL MEDICINE

## 2025-04-17 PROCEDURE — 82565 ASSAY OF CREATININE: CPT

## 2025-04-17 PROCEDURE — 75574 CT ANGIO HRT W/3D IMAGE: CPT

## 2025-04-17 PROCEDURE — 6370000000 HC RX 637 (ALT 250 FOR IP): Performed by: INTERNAL MEDICINE

## 2025-04-17 PROCEDURE — 75574 CT ANGIO HRT W/3D IMAGE: CPT | Performed by: INTERNAL MEDICINE

## 2025-04-17 RX ORDER — IOPAMIDOL 755 MG/ML
100 INJECTION, SOLUTION INTRAVASCULAR
Status: COMPLETED | OUTPATIENT
Start: 2025-04-17 | End: 2025-04-17

## 2025-04-17 RX ORDER — NITROGLYCERIN 0.4 MG/1
0.8 TABLET SUBLINGUAL ONCE
Status: COMPLETED | OUTPATIENT
Start: 2025-04-17 | End: 2025-04-17

## 2025-04-17 RX ADMIN — NITROGLYCERIN 0.8 MG: 0.4 TABLET SUBLINGUAL at 09:05

## 2025-04-17 RX ADMIN — IOPAMIDOL 100 ML: 755 INJECTION, SOLUTION INTRAVENOUS at 09:12

## 2025-04-17 ASSESSMENT — PAIN - FUNCTIONAL ASSESSMENT: PAIN_FUNCTIONAL_ASSESSMENT: NONE - DENIES PAIN

## 2025-04-17 NOTE — DISCHARGE INSTRUCTIONS
[facility]  Department of Interventional Radiology  [address] [place] [address] [phone]    CARDIAC CTA DISCHARGE EDUCATION    Information:   You had a Cardiac CTA scan today, where you received medication to lower your heart rate and dilate the blood vessel around your heart. During the scan you also received IV contrast.     What should I expect after the Cardiac CTA?   The medicine may make you feel dizzy because it lowers blood pressure rapidly. You may have been given IV Metoprolol and Nitroglycerin tabs.    Precautions  Limit the amount of alcohol you drink. Too much alcohol can cause dizziness or fainting.  Don't take phosphodiesterase inhibitors for next 48 hours, such as sildenafil (Viagra) or tadalafil (Cialis) at any time if you are on nitroglycerin treatment. These are medicines used to treat sexual dysfunction in men. The combination of nitroglycerin with these medicines can cause a severe drop in blood pressure. This can lead to dizziness, fainting, heart attack, or stroke.  Possible side effects of nitroglycerin. Mild side effects include:  Headache  Dizziness or lightheadedness  Mouth tingling or burning  Edema  Abdominal pain  When to call your healthcare provider   Call your healthcare provider right away if any of the following occur:  Signs of an allergic reaction, such as trouble breathing, tightness in the chest or throat, wheezing, rash, hives, swelling of the mouth, face, or throat.  Severe headache  Severe dizziness, or fainting  Nausea or vomiting  Fast heartbeat (higher than 100 beats per minute), slow heartbeat, or irregular heartbeat  Flushing (redness of the face, neck, or chest)  Blurred eyesight  Dry mouth  Sweating a lot  Pale skin  Restlessness  Swollen ankles  Weakness or tiredness      Follow-up visit information:    Follow up with ordering Physician for result.      If you have any questions or concerns, please call

## 2025-04-17 NOTE — PROGRESS NOTES
0932:     Pt d/c. I reviewed all d/c instructions. Pt stated understanding. Removed all IV/tele. Pt left with all personal belongings.

## 2025-04-18 ENCOUNTER — RESULTS FOLLOW-UP (OUTPATIENT)
Age: 75
End: 2025-04-18

## 2025-04-18 NOTE — RESULT ENCOUNTER NOTE
Please advise he has some intermittent fast heartbeat called supraventricular tachycardia.  Lets try increasing the metoprolol to 50 mg twice a day.  If she has bothersome palpitations after that, we can refer to electrophysiology to consider whether she would benefit from an ablation procedure for her SVT.    follow-up as scheduled.     Future Appointments  6/27/2025  10:20 AM   Etta De La Vega APRN - NP [facility] AM[facility]

## 2025-04-19 ENCOUNTER — RESULTS FOLLOW-UP (OUTPATIENT)
Age: 75
End: 2025-04-19

## 2025-04-19 NOTE — RESULT ENCOUNTER NOTE
Please advise coronary CTA shows only mild cholesterol plaque in the heart arteries, no tight blockages.  Continue with medical management.  Including aspirin and statin.  Please have her check with her PCP that her LDL is less than 70 to help prevent progression.  Follow-up as scheduled.    Future Appointments  6/27/2025  10:20 AM   Etta De La Vega APRN - NP [facility] AM[facility]

## 2025-04-22 ENCOUNTER — TELEPHONE (OUTPATIENT)
Age: 75
End: 2025-04-22

## 2025-04-22 NOTE — TELEPHONE ENCOUNTER
Based on the fact that she had no significant CAD by cardiac catheterization and by cardiac CTA in the past, my suspicion for new blockages in the arteries of the heart is low.  I think she may be having pressure in the middle of her chest because of the supraventricular tachycardia or the fast heartbeat which I am hoping will improve with the increased dose of the metoprolol.    She should check her blood pressure and pulse during the episodes.  If her heart rate is very fast during the episodes it confirms it is probably with the supraventricular tachycardia.    Addendum 4/26/2025: I previously had recommended Lexiscan Cardiolite but had forgotten that the patient just had a coronary CTA that did not show any significant blockages.  Probably the pressure in the chest is due to the SVT which should do better on the higher dose of metoprolol and at upcoming follow-up they can discuss referral to EP to consider possible ablation.

## 2025-04-22 NOTE — TELEPHONE ENCOUNTER
- Message from Dr. Graham Guerrero MD sent at 4/18/2025  4:37 PM EDT -----  Please advise he has some intermittent fast heartbeat called supraventricular tachycardia.  Lets try increasing the metoprolol to 50 mg twice a day.  If she has bothersome palpitations after that, we can refer to electrophysiology to consider whether she would benefit from an ablation procedure for her SVT.  follow-up as scheduled.      Spoke with patient , verified patient with two identifiers, regarding results and recommendations. Ms Tatum concern about her SOB at rest and on exertion, as well as the pressure in the middle of her chest.  Noted patients has recent testing, came back normal. Also patient provided latest BP readings  04-22        101/49        56  04-21        97/49          59  04-20        108/54        59  04-19        124/56       70.   This nurse recommended to hydrate well everyday with at least 8 glasses of water a day. Patient drinking that amount of water daily, stated.  Please advise.

## 2025-04-23 NOTE — TELEPHONE ENCOUNTER
Spoke with patient , verified patient with two identifiers, regarding recommendations. Ms Tatum stated that she does not want to have any test at this moment will keep monitoring symptoms and let us know in July when she sees the nurse practitioner.

## 2025-06-11 ENCOUNTER — TRANSCRIBE ORDERS (OUTPATIENT)
Facility: HOSPITAL | Age: 75
End: 2025-06-11

## 2025-06-11 DIAGNOSIS — Z12.31 VISIT FOR SCREENING MAMMOGRAM: Primary | ICD-10-CM

## 2025-06-27 ENCOUNTER — OFFICE VISIT (OUTPATIENT)
Age: 75
End: 2025-06-27
Payer: MEDICARE

## 2025-06-27 VITALS
SYSTOLIC BLOOD PRESSURE: 138 MMHG | OXYGEN SATURATION: 97 % | HEART RATE: 56 BPM | BODY MASS INDEX: 34.07 KG/M2 | HEIGHT: 66 IN | WEIGHT: 212 LBS | DIASTOLIC BLOOD PRESSURE: 88 MMHG

## 2025-06-27 DIAGNOSIS — R00.2 PALPITATIONS: Primary | ICD-10-CM

## 2025-06-27 DIAGNOSIS — G47.33 OSA ON CPAP: ICD-10-CM

## 2025-06-27 DIAGNOSIS — R00.2 PALPITATIONS: ICD-10-CM

## 2025-06-27 DIAGNOSIS — I10 ESSENTIAL HYPERTENSION: ICD-10-CM

## 2025-06-27 DIAGNOSIS — I87.2 VENOUS INSUFFICIENCY OF LEFT LEG: ICD-10-CM

## 2025-06-27 DIAGNOSIS — E78.2 MIXED HYPERLIPIDEMIA: ICD-10-CM

## 2025-06-27 PROCEDURE — 99214 OFFICE O/P EST MOD 30 MIN: CPT

## 2025-06-27 PROCEDURE — 3075F SYST BP GE 130 - 139MM HG: CPT

## 2025-06-27 PROCEDURE — 1159F MED LIST DOCD IN RCRD: CPT

## 2025-06-27 PROCEDURE — 3079F DIAST BP 80-89 MM HG: CPT

## 2025-06-27 PROCEDURE — 1126F AMNT PAIN NOTED NONE PRSNT: CPT

## 2025-06-27 PROCEDURE — 1123F ACP DISCUSS/DSCN MKR DOCD: CPT

## 2025-06-27 RX ORDER — CARBOXYMETHYLCELLULOSE SODIUM 2.5 MG/ML
2 SOLUTION/ DROPS OPHTHALMIC 3 TIMES DAILY PRN
COMMUNITY

## 2025-06-27 ASSESSMENT — PATIENT HEALTH QUESTIONNAIRE - PHQ9
2. FEELING DOWN, DEPRESSED OR HOPELESS: NOT AT ALL
SUM OF ALL RESPONSES TO PHQ QUESTIONS 1-9: 0
1. LITTLE INTEREST OR PLEASURE IN DOING THINGS: NOT AT ALL

## 2025-06-27 NOTE — PROGRESS NOTES
STUDIES:     Lab Results   Component Value Date/Time     08/12/2023 05:02 AM    K 4.2 08/12/2023 05:02 AM     08/12/2023 05:02 AM    CO2 21 08/12/2023 05:02 AM    BUN 15 08/12/2023 05:02 AM    GFRAA >60 03/03/2022 03:59 PM    GLOB 4.0 08/04/2023 11:45 AM    ALT 18 08/04/2023 11:45 AM    AST 19 08/04/2023 11:45 AM       No results found for: \"CHOL\", \"CHOLPOCT\", \"CHLST\", \"CHOLV\", \"TOTCHOLEXT\", \"HDL\", \"HDLPOC\", \"HDLEXT\", \"HDLC\", \"LDL\", \"LDLCEXT\", \"LDLC\", \"VLDLC\", \"VLDL\", \"TRIGLYCEXT\"    No results found for: \"CHOL\", \"TRIG\", \"HDL\", \"SMALLLDLP\", \"LDLNMR\", \"HDLNMR\", \"TRIGLYNRM\"      CARDIAC DIAGNOSTICS:     Cardiac Evaluation Includes:  I reviewed the test results below.  No specialty comments available.    03/25/25    ECHO (TTE) COMPLETE (PRN CONTRAST/BUBBLE/STRAIN/3D) 03/25/2025  5:46 PM (Final)    Interpretation Summary    Left Ventricle: Normal left ventricular systolic function with a visually estimated EF of 55 - 60%. Left ventricle size is normal. Mildly increased wall thickness. Normal wall motion. Normal diastolic function.    Left Atrium: Left atrium is mildly dilated. LA Vol Index is  35 ml/m2 mL/m2.    Image quality is good. Technically difficult study due to patient's body habitus.    Signed by: Graham Guerrero MD on 3/25/2025  5:46 PM        08/30/22    NM STRESS TEST WITH MYOCARDIAL PERFUSION 08/30/2022  1:10 PM, 08/30/2022 12:00 AM (Final)    Narrative  This is a summary report. The complete report is available in the patient's medical record. If you cannot access the medical record, please contact the sending organization for a detailed fax or copy.    Formatting of this result is different from the original.      Stress Test: A pharmacological stress test was performed using lexiscan. Blood pressure demonstrated a normal response and heart rate demonstrated a normal response to stress. The patient's heart rate recovery was normal.    ECG: Resting ECG demonstrates normal sinus rhythm.

## 2025-06-27 NOTE — PATIENT INSTRUCTIONS
Please check your blood pressure daily (at least one hour after your morning blood pressure medications.)  Keep a written record of your blood pressure readings and bring it to each appointment.  If your systolic blood pressure is consistently greater than 140mmHg or less than 100mmHg then please call the office at [phone].

## 2025-06-29 LAB
ANION GAP SERPL CALC-SCNC: 1 MMOL/L (ref 2–12)
BUN SERPL-MCNC: 15 MG/DL (ref 6–20)
BUN/CREAT SERPL: 13 (ref 12–20)
CALCIUM SERPL-MCNC: 9.2 MG/DL (ref 8.5–10.1)
CHLORIDE SERPL-SCNC: 110 MMOL/L (ref 97–108)
CO2 SERPL-SCNC: 30 MMOL/L (ref 21–32)
CREAT SERPL-MCNC: 1.12 MG/DL (ref 0.55–1.02)
ERYTHROCYTE [DISTWIDTH] IN BLOOD BY AUTOMATED COUNT: 15.1 % (ref 11.5–14.5)
GLUCOSE SERPL-MCNC: 108 MG/DL (ref 65–100)
HCT VFR BLD AUTO: 36.1 % (ref 35–47)
HGB BLD-MCNC: 11.1 G/DL (ref 11.5–16)
MAGNESIUM SERPL-MCNC: 1.8 MG/DL (ref 1.6–2.4)
MCH RBC QN AUTO: 30.7 PG (ref 26–34)
MCHC RBC AUTO-ENTMCNC: 30.7 G/DL (ref 30–36.5)
MCV RBC AUTO: 99.7 FL (ref 80–99)
NRBC # BLD: 0 K/UL (ref 0–0.01)
NRBC BLD-RTO: 0 PER 100 WBC
PLATELET # BLD AUTO: 191 K/UL (ref 150–400)
PMV BLD AUTO: 10.7 FL (ref 8.9–12.9)
POTASSIUM SERPL-SCNC: 4.6 MMOL/L (ref 3.5–5.1)
RBC # BLD AUTO: 3.62 M/UL (ref 3.8–5.2)
SODIUM SERPL-SCNC: 141 MMOL/L (ref 136–145)
TSH SERPL DL<=0.05 MIU/L-ACNC: 1.8 UIU/ML (ref 0.36–3.74)
WBC # BLD AUTO: 5.7 K/UL (ref 3.6–11)

## 2025-06-30 DIAGNOSIS — I10 ESSENTIAL HYPERTENSION: Primary | ICD-10-CM

## 2025-07-15 NOTE — PERIOP NOTES
Pt discharged via wheelchair, accompanied by RN. Pt discharged awake and alert, respirations equal and unlabored, skin warm, dry, and intact. Pt and family members' questions and concerns addressed prior to discharge. No

## 2025-07-28 PROBLEM — M19.041 OSTEOARTHRITIS OF RIGHT HAND: Status: ACTIVE | Noted: 2025-07-28

## 2025-07-28 PROBLEM — M18.11 PRIMARY OSTEOARTHRITIS OF FIRST CARPOMETACARPAL JOINT OF RIGHT HAND: Status: ACTIVE | Noted: 2025-07-28

## 2025-07-28 PROBLEM — M25.341: Status: ACTIVE | Noted: 2025-07-28

## 2025-07-31 ENCOUNTER — HOSPITAL ENCOUNTER (OUTPATIENT)
Facility: HOSPITAL | Age: 75
Discharge: HOME | End: 2025-07-31
Attending: SPECIALIST
Payer: MEDICARE

## 2025-07-31 DIAGNOSIS — Z12.31 VISIT FOR SCREENING MAMMOGRAM: ICD-10-CM

## 2025-07-31 PROCEDURE — 77063 BREAST TOMOSYNTHESIS BI: CPT

## 2025-08-05 ENCOUNTER — OFFICE VISIT (OUTPATIENT)
Age: 75
End: 2025-08-05
Payer: MEDICARE

## 2025-08-05 VITALS
HEART RATE: 57 BPM | DIASTOLIC BLOOD PRESSURE: 60 MMHG | SYSTOLIC BLOOD PRESSURE: 138 MMHG | OXYGEN SATURATION: 99 % | WEIGHT: 216 LBS | BODY MASS INDEX: 34.72 KG/M2 | HEIGHT: 66 IN

## 2025-08-05 DIAGNOSIS — I87.2 VENOUS INSUFFICIENCY OF LEFT LEG: ICD-10-CM

## 2025-08-05 DIAGNOSIS — I47.10 SVT (SUPRAVENTRICULAR TACHYCARDIA): ICD-10-CM

## 2025-08-05 DIAGNOSIS — G47.33 OSA ON CPAP: ICD-10-CM

## 2025-08-05 DIAGNOSIS — I10 ESSENTIAL HYPERTENSION: ICD-10-CM

## 2025-08-05 DIAGNOSIS — E78.2 MIXED HYPERLIPIDEMIA: ICD-10-CM

## 2025-08-05 DIAGNOSIS — R00.2 PALPITATIONS: Primary | ICD-10-CM

## 2025-08-05 DIAGNOSIS — R00.2 HEART PALPITATIONS: ICD-10-CM

## 2025-08-05 PROCEDURE — 93010 ELECTROCARDIOGRAM REPORT: CPT | Performed by: HOSPITALIST

## 2025-08-05 PROCEDURE — 1126F AMNT PAIN NOTED NONE PRSNT: CPT | Performed by: HOSPITALIST

## 2025-08-05 PROCEDURE — 1159F MED LIST DOCD IN RCRD: CPT | Performed by: HOSPITALIST

## 2025-08-05 PROCEDURE — 99205 OFFICE O/P NEW HI 60 MIN: CPT | Performed by: HOSPITALIST

## 2025-08-05 PROCEDURE — 3075F SYST BP GE 130 - 139MM HG: CPT | Performed by: HOSPITALIST

## 2025-08-05 PROCEDURE — 93005 ELECTROCARDIOGRAM TRACING: CPT | Performed by: HOSPITALIST

## 2025-08-05 PROCEDURE — 3078F DIAST BP <80 MM HG: CPT | Performed by: HOSPITALIST

## 2025-08-05 PROCEDURE — 1123F ACP DISCUSS/DSCN MKR DOCD: CPT | Performed by: HOSPITALIST

## 2025-08-05 ASSESSMENT — PATIENT HEALTH QUESTIONNAIRE - PHQ9
SUM OF ALL RESPONSES TO PHQ QUESTIONS 1-9: 0
2. FEELING DOWN, DEPRESSED OR HOPELESS: NOT AT ALL
SUM OF ALL RESPONSES TO PHQ QUESTIONS 1-9: 0
1. LITTLE INTEREST OR PLEASURE IN DOING THINGS: NOT AT ALL

## 2025-08-11 ENCOUNTER — TELEPHONE (OUTPATIENT)
Age: 75
End: 2025-08-11

## 2025-08-11 ENCOUNTER — ANESTHESIA EVENT (OUTPATIENT)
Facility: HOSPITAL | Age: 75
End: 2025-08-11
Payer: MEDICARE

## 2025-08-11 RX ORDER — FENTANYL CITRATE 50 UG/ML
25 INJECTION, SOLUTION INTRAMUSCULAR; INTRAVENOUS EVERY 5 MIN PRN
Refills: 0 | Status: CANCELLED | OUTPATIENT
Start: 2025-08-11

## 2025-08-11 RX ORDER — HYDROMORPHONE HYDROCHLORIDE 1 MG/ML
0.5 INJECTION, SOLUTION INTRAMUSCULAR; INTRAVENOUS; SUBCUTANEOUS EVERY 5 MIN PRN
Refills: 0 | Status: CANCELLED | OUTPATIENT
Start: 2025-08-11

## 2025-08-11 RX ORDER — SODIUM CHLORIDE 0.9 % (FLUSH) 0.9 %
5-40 SYRINGE (ML) INJECTION EVERY 12 HOURS SCHEDULED
Status: CANCELLED | OUTPATIENT
Start: 2025-08-11

## 2025-08-11 RX ORDER — SODIUM CHLORIDE 0.9 % (FLUSH) 0.9 %
5-40 SYRINGE (ML) INJECTION PRN
Status: CANCELLED | OUTPATIENT
Start: 2025-08-11

## 2025-08-11 RX ORDER — PROCHLORPERAZINE EDISYLATE 5 MG/ML
5 INJECTION INTRAMUSCULAR; INTRAVENOUS
Status: CANCELLED | OUTPATIENT
Start: 2025-08-11

## 2025-08-11 RX ORDER — ONDANSETRON 2 MG/ML
4 INJECTION INTRAMUSCULAR; INTRAVENOUS
Status: CANCELLED | OUTPATIENT
Start: 2025-08-11

## 2025-08-11 RX ORDER — SODIUM CHLORIDE 9 MG/ML
INJECTION, SOLUTION INTRAVENOUS PRN
Status: CANCELLED | OUTPATIENT
Start: 2025-08-11

## 2025-08-11 ASSESSMENT — ENCOUNTER SYMPTOMS: SHORTNESS OF BREATH: 1

## 2025-08-13 ENCOUNTER — HOSPITAL ENCOUNTER (OUTPATIENT)
Facility: HOSPITAL | Age: 75
Setting detail: OUTPATIENT SURGERY
Discharge: HOME | End: 2025-08-13
Attending: ORTHOPAEDIC SURGERY | Admitting: ORTHOPAEDIC SURGERY
Payer: MEDICARE

## 2025-08-13 ENCOUNTER — ANESTHESIA (OUTPATIENT)
Facility: HOSPITAL | Age: 75
End: 2025-08-13
Payer: MEDICARE

## 2025-08-13 VITALS
RESPIRATION RATE: 11 BRPM | WEIGHT: 208 LBS | OXYGEN SATURATION: 100 % | BODY MASS INDEX: 33.43 KG/M2 | HEIGHT: 66 IN | HEART RATE: 63 BPM | TEMPERATURE: 97.3 F | DIASTOLIC BLOOD PRESSURE: 67 MMHG | SYSTOLIC BLOOD PRESSURE: 190 MMHG

## 2025-08-13 PROCEDURE — 3600000004 HC SURGERY LEVEL 4 BASE: Performed by: ORTHOPAEDIC SURGERY

## 2025-08-13 PROCEDURE — 3600000014 HC SURGERY LEVEL 4 ADDTL 15MIN: Performed by: ORTHOPAEDIC SURGERY

## 2025-08-13 PROCEDURE — 3700000001 HC ADD 15 MINUTES (ANESTHESIA): Performed by: ORTHOPAEDIC SURGERY

## 2025-08-13 PROCEDURE — 6360000002 HC RX W HCPCS

## 2025-08-13 PROCEDURE — 3700000000 HC ANESTHESIA ATTENDED CARE: Performed by: ORTHOPAEDIC SURGERY

## 2025-08-13 PROCEDURE — 7100000000 HC PACU RECOVERY - FIRST 15 MIN: Performed by: ORTHOPAEDIC SURGERY

## 2025-08-13 PROCEDURE — C1769 GUIDE WIRE: HCPCS | Performed by: ORTHOPAEDIC SURGERY

## 2025-08-13 PROCEDURE — 2580000003 HC RX 258: Performed by: ANESTHESIOLOGY

## 2025-08-13 PROCEDURE — C1713 ANCHOR/SCREW BN/BN,TIS/BN: HCPCS | Performed by: ORTHOPAEDIC SURGERY

## 2025-08-13 PROCEDURE — 2580000003 HC RX 258

## 2025-08-13 PROCEDURE — 7100000010 HC PHASE II RECOVERY - FIRST 15 MIN: Performed by: ORTHOPAEDIC SURGERY

## 2025-08-13 PROCEDURE — 2500000003 HC RX 250 WO HCPCS: Performed by: ORTHOPAEDIC SURGERY

## 2025-08-13 PROCEDURE — 6360000002 HC RX W HCPCS: Performed by: ANESTHESIOLOGY

## 2025-08-13 PROCEDURE — 2709999900 HC NON-CHARGEABLE SUPPLY: Performed by: ORTHOPAEDIC SURGERY

## 2025-08-13 PROCEDURE — 7100000001 HC PACU RECOVERY - ADDTL 15 MIN: Performed by: ORTHOPAEDIC SURGERY

## 2025-08-13 PROCEDURE — 64415 NJX AA&/STRD BRCH PLXS IMG: CPT | Performed by: ANESTHESIOLOGY

## 2025-08-13 PROCEDURE — 6360000002 HC RX W HCPCS: Performed by: ORTHOPAEDIC SURGERY

## 2025-08-13 DEVICE — IMPLANTABLE DEVICE: Type: IMPLANTABLE DEVICE | Site: ARM | Status: FUNCTIONAL

## 2025-08-13 DEVICE — KIT IMPL DIA1.1MM CMC S STL REP MINI TIGHTROPE: Type: IMPLANTABLE DEVICE | Site: THUMB | Status: FUNCTIONAL

## 2025-08-13 RX ORDER — SODIUM CHLORIDE 0.9 % (FLUSH) 0.9 %
5-40 SYRINGE (ML) INJECTION EVERY 12 HOURS SCHEDULED
Status: DISCONTINUED | OUTPATIENT
Start: 2025-08-13 | End: 2025-08-13 | Stop reason: HOSPADM

## 2025-08-13 RX ORDER — PROPOFOL 10 MG/ML
INJECTION, EMULSION INTRAVENOUS
Status: COMPLETED
Start: 2025-08-13 | End: 2025-08-13

## 2025-08-13 RX ORDER — WATER 10 ML/10ML
INJECTION INTRAMUSCULAR; INTRAVENOUS; SUBCUTANEOUS
Status: DISCONTINUED
Start: 2025-08-13 | End: 2025-08-13 | Stop reason: HOSPADM

## 2025-08-13 RX ORDER — ROPIVACAINE HYDROCHLORIDE 5 MG/ML
INJECTION, SOLUTION EPIDURAL; INFILTRATION; PERINEURAL
Status: DISCONTINUED | OUTPATIENT
Start: 2025-08-13 | End: 2025-08-13 | Stop reason: SDUPTHER

## 2025-08-13 RX ORDER — PROPOFOL 10 MG/ML
INJECTION, EMULSION INTRAVENOUS
Status: DISCONTINUED | OUTPATIENT
Start: 2025-08-13 | End: 2025-08-13 | Stop reason: SDUPTHER

## 2025-08-13 RX ORDER — SODIUM CHLORIDE 9 MG/ML
INJECTION, SOLUTION INTRAVENOUS PRN
Status: DISCONTINUED | OUTPATIENT
Start: 2025-08-13 | End: 2025-08-13 | Stop reason: HOSPADM

## 2025-08-13 RX ORDER — ACETAMINOPHEN 500 MG
1000 TABLET ORAL ONCE
Status: DISCONTINUED | OUTPATIENT
Start: 2025-08-13 | End: 2025-08-13 | Stop reason: HOSPADM

## 2025-08-13 RX ORDER — MIDAZOLAM HYDROCHLORIDE 1 MG/ML
INJECTION, SOLUTION INTRAMUSCULAR; INTRAVENOUS
Status: DISCONTINUED
Start: 2025-08-13 | End: 2025-08-13 | Stop reason: HOSPADM

## 2025-08-13 RX ORDER — ROPIVACAINE HYDROCHLORIDE 5 MG/ML
INJECTION, SOLUTION EPIDURAL; INFILTRATION; PERINEURAL
Status: COMPLETED
Start: 2025-08-13 | End: 2025-08-13

## 2025-08-13 RX ORDER — LIDOCAINE HYDROCHLORIDE 20 MG/ML
INJECTION, SOLUTION EPIDURAL; INFILTRATION; INTRACAUDAL; PERINEURAL
Status: DISCONTINUED | OUTPATIENT
Start: 2025-08-13 | End: 2025-08-13 | Stop reason: SDUPTHER

## 2025-08-13 RX ORDER — FENTANYL CITRATE 50 UG/ML
INJECTION, SOLUTION INTRAMUSCULAR; INTRAVENOUS
Status: DISCONTINUED | OUTPATIENT
Start: 2025-08-13 | End: 2025-08-13 | Stop reason: SDUPTHER

## 2025-08-13 RX ORDER — SODIUM CHLORIDE, SODIUM LACTATE, POTASSIUM CHLORIDE, CALCIUM CHLORIDE 600; 310; 30; 20 MG/100ML; MG/100ML; MG/100ML; MG/100ML
INJECTION, SOLUTION INTRAVENOUS CONTINUOUS
Status: DISCONTINUED | OUTPATIENT
Start: 2025-08-13 | End: 2025-08-13 | Stop reason: HOSPADM

## 2025-08-13 RX ORDER — GLYCOPYRROLATE 0.2 MG/ML
INJECTION INTRAMUSCULAR; INTRAVENOUS
Status: DISCONTINUED | OUTPATIENT
Start: 2025-08-13 | End: 2025-08-13 | Stop reason: SDUPTHER

## 2025-08-13 RX ORDER — SODIUM CHLORIDE 0.9 % (FLUSH) 0.9 %
5-40 SYRINGE (ML) INJECTION PRN
Status: DISCONTINUED | OUTPATIENT
Start: 2025-08-13 | End: 2025-08-13 | Stop reason: HOSPADM

## 2025-08-13 RX ORDER — LIDOCAINE HYDROCHLORIDE 10 MG/ML
1 INJECTION, SOLUTION EPIDURAL; INFILTRATION; INTRACAUDAL; PERINEURAL
Status: DISCONTINUED | OUTPATIENT
Start: 2025-08-13 | End: 2025-08-13 | Stop reason: HOSPADM

## 2025-08-13 RX ORDER — CEFAZOLIN SODIUM 1 G/3ML
INJECTION, POWDER, FOR SOLUTION INTRAMUSCULAR; INTRAVENOUS
Status: DISCONTINUED
Start: 2025-08-13 | End: 2025-08-13 | Stop reason: HOSPADM

## 2025-08-13 RX ADMIN — FENTANYL CITRATE 12.5 MCG: 50 INJECTION, SOLUTION INTRAMUSCULAR; INTRAVENOUS at 13:30

## 2025-08-13 RX ADMIN — FENTANYL CITRATE 12.5 MCG: 50 INJECTION, SOLUTION INTRAMUSCULAR; INTRAVENOUS at 13:24

## 2025-08-13 RX ADMIN — ROPIVACAINE HYDROCHLORIDE 25 ML: 5 INJECTION, SOLUTION EPIDURAL; INFILTRATION; PERINEURAL at 12:23

## 2025-08-13 RX ADMIN — PROPOFOL 20 MG: 10 INJECTION, EMULSION INTRAVENOUS at 12:16

## 2025-08-13 RX ADMIN — PROPOFOL 40 MG: 10 INJECTION, EMULSION INTRAVENOUS at 12:53

## 2025-08-13 RX ADMIN — WATER 2000 MG: 1 INJECTION INTRAMUSCULAR; INTRAVENOUS; SUBCUTANEOUS at 13:05

## 2025-08-13 RX ADMIN — PHENYLEPHRINE HYDROCHLORIDE 20 MCG/MIN: 10 INJECTION INTRAVENOUS at 13:13

## 2025-08-13 RX ADMIN — GLYCOPYRROLATE 0.1 MG: 0.2 INJECTION INTRAMUSCULAR; INTRAVENOUS at 13:47

## 2025-08-13 RX ADMIN — FENTANYL CITRATE 25 MCG: 50 INJECTION, SOLUTION INTRAMUSCULAR; INTRAVENOUS at 15:06

## 2025-08-13 RX ADMIN — PROPOFOL 60 MCG/KG/MIN: 10 INJECTION, EMULSION INTRAVENOUS at 12:55

## 2025-08-13 RX ADMIN — LIDOCAINE HYDROCHLORIDE 50 MG: 20 INJECTION, SOLUTION EPIDURAL; INFILTRATION; INTRACAUDAL; PERINEURAL at 12:53

## 2025-08-13 RX ADMIN — GLYCOPYRROLATE 0.1 MG: 0.2 INJECTION INTRAMUSCULAR; INTRAVENOUS at 13:50

## 2025-08-13 RX ADMIN — SODIUM CHLORIDE, POTASSIUM CHLORIDE, SODIUM LACTATE AND CALCIUM CHLORIDE: 600; 310; 30; 20 INJECTION, SOLUTION INTRAVENOUS at 12:48

## 2025-08-13 ASSESSMENT — PAIN SCALES - GENERAL
PAINLEVEL_OUTOF10: 0

## 2025-08-13 ASSESSMENT — PAIN - FUNCTIONAL ASSESSMENT: PAIN_FUNCTIONAL_ASSESSMENT: 0-10

## 2025-08-13 ASSESSMENT — PAIN DESCRIPTION - DESCRIPTORS: DESCRIPTORS: ACHING;NUMBNESS;TINGLING;SHARP

## (undated) DEVICE — 3M™ TEGADERM™ TRANSPARENT FILM DRESSING FRAME STYLE, 1624W, 2-3/8 IN X 2-3/4 IN (6 CM X 7 CM), 100/CT 4CT/CASE: Brand: 3M™ TEGADERM™

## (undated) DEVICE — SYR 3ML LL TIP 1/10ML GRAD --

## (undated) DEVICE — DRAPE SURG W41XL74IN CLR FULL SZ C ARM 3 ADH POLY STRP E

## (undated) DEVICE — NDL FLTR TIP 5 MIC 18GX1.5IN --

## (undated) DEVICE — BLADE OPHTH GRN ROUNDED TIP 1 SIDE SHRP GRINDLESS MINI-BLDE

## (undated) DEVICE — SOLUTION IV STRL H2O 500 ML AQUALITE POUR BTL

## (undated) DEVICE — GLOVE ORANGE PI 8   MSG9080

## (undated) DEVICE — SOLUTION IRRIG 1000ML 0.9% SOD CHL USP POUR PLAS BTL

## (undated) DEVICE — TOWEL,OR,DSP,ST,BLUE,STD,4/PK,20PK/CS: Brand: MEDLINE

## (undated) DEVICE — BANDAGE GZ W2.25INXL3YD 6 PLY COT OPN WV STRETCHABLE BULKEE

## (undated) DEVICE — BASIN EMSIS 16OZ GRAPHITE PLAS KID SHP MOLD GRAD FOR ORAL

## (undated) DEVICE — Device

## (undated) DEVICE — SET ADMIN 16ML TBNG L100IN 2 Y INJ SITE IV PIGGY BK DISP

## (undated) DEVICE — NEEDLE HYPO 18GA L1.5IN PNK S STL HUB POLYPR SHLD REG BVL

## (undated) DEVICE — MASTISOL ADHESIVE LIQ 2/3ML

## (undated) DEVICE — SYRINGE 50ML E/T

## (undated) DEVICE — BONE WAX WHITE: Brand: BONE WAX WHITE

## (undated) DEVICE — TOWEL SURG W17XL27IN STD BLU COT NONFENESTRATED PREWASHED

## (undated) DEVICE — SYR 10ML LUER LOK 1/5ML GRAD --

## (undated) DEVICE — ELECTRODE,RADIOTRANSLUCENT,FOAM,5PK: Brand: MEDLINE

## (undated) DEVICE — DEVICE TRNSF SPIK STL 2008S] MICROTEK MEDICAL INC]

## (undated) DEVICE — KENDALL RADIOLUCENT FOAM MONITORING ELECTRODE RECTANGULAR SHAPE: Brand: KENDALL

## (undated) DEVICE — ZIMMER® STERILE DISPOSABLE TOURNIQUET CUFF WITH PLC, DUAL PORT, SINGLE BLADDER, 18 IN. (46 CM)

## (undated) DEVICE — SYR 5ML 1/5 GRAD LL NSAF LF --

## (undated) DEVICE — KENDALL DL ECG CABLE AND LEAD WIRE SYSTEM, 3-LEAD, SINGLE PATIENT USE: Brand: KENDALL

## (undated) DEVICE — COVER LT HNDL PLAS RIG 1 PER PK

## (undated) DEVICE — TOWEL 4 PLY TISS 19X30 SUE WHT

## (undated) DEVICE — LAMINECTOMY-SMH: Brand: MEDLINE INDUSTRIES, INC.

## (undated) DEVICE — SOLIDIFIER MEDC 1200ML -- CONVERT TO 356117

## (undated) DEVICE — COVER,MAYO STAND,STERILE: Brand: MEDLINE

## (undated) DEVICE — TOOL 14MH30 LEGEND 14CM 3MM: Brand: MIDAS REX ™

## (undated) DEVICE — BLOCK BITE ENDOSCP AD 21 MM W/ DIL BLU LF DISP

## (undated) DEVICE — PREP SKN PREVAIL 40ML APPL --

## (undated) DEVICE — STERILE POLYISOPRENE POWDER-FREE SURGICAL GLOVES WITH EMOLLIENT COATING: Brand: PROTEXIS

## (undated) DEVICE — APPLICATOR MEDICATED 10.5 CC SOLUTION HI LT ORNG CHLORAPREP

## (undated) DEVICE — PAD,ABDOMINAL,5"X9",ST,LF,25/BX: Brand: MEDLINE INDUSTRIES, INC.

## (undated) DEVICE — CONTAINER SPEC 20 ML LID NEUT BUFF FORMALIN 10 % POLYPR STS

## (undated) DEVICE — SUTURE ABSRB BRAID COAT UD OS-6 NO 1 27IN VCRL J535H

## (undated) DEVICE — SUTURE MONOCRYL SZ 4-0 L27IN ABSRB UD L19MM PS-2 1/2 CIR PRIM Y426H

## (undated) DEVICE — PACK,LAPAROTOMY,2 REINFORCED GOWNS: Brand: MEDLINE

## (undated) DEVICE — 3M™ MEDIPORE™ H SOFT CLOTH SURGICAL TAPE 2864, 4 INCH X 10 YARD (10CM X 9,14M), 12 ROLLS/CASE: Brand: 3M™ MEDIPORE™

## (undated) DEVICE — PROBE STIM 3 MM FOR PEDCL SCREW DISP

## (undated) DEVICE — BASIN EMESIS 500CC ROSE 250/CS 60/PLT: Brand: MEDEGEN MEDICAL PRODUCTS, LLC

## (undated) DEVICE — THE MONARCH® "D" CARTRIDGE IS A SINGLE-USE POLYPROPYLENE CARTRIDGE FOR POSTERIOR CHAMBER IOL DELIVERY: Brand: MONARCH® III

## (undated) DEVICE — SYRINGE,EAR/ULCER, 2 OZ, STERILE: Brand: MEDLINE

## (undated) DEVICE — SUTURE N ABSRB L 18 IN SZ 4-0 NDL L 19 MM NYL MONOFILAMENT

## (undated) DEVICE — DERMABOND SKIN ADH 0.7ML -- DERMABOND ADVANCED 12/BX

## (undated) DEVICE — BASIN ST MAJOR-NO CAUTERY: Brand: MEDLINE INDUSTRIES, INC.

## (undated) DEVICE — DRAIN KT WND 10FR RND 400ML --

## (undated) DEVICE — KIT JACK TBL PT CARE

## (undated) DEVICE — SUTURE VCRL 2-0 L27IN ABSRB UD CP-2 L26MM 1/2 CIR REV CUT J869H

## (undated) DEVICE — SOLUTION IV 250ML 0.9% SOD CHL CLR INJ FLX BG CONT PRT CLSR

## (undated) DEVICE — MAJOR LAPAROTOMY-MRMC: Brand: MEDLINE INDUSTRIES, INC.

## (undated) DEVICE — STERILE POLYISOPRENE POWDER-FREE SURGICAL GLOVES: Brand: PROTEXIS

## (undated) DEVICE — SUTURE PDS II SZ 4-0 L27IN ABSRB VLT L17MM RB-1 1/2 CIR Z304H

## (undated) DEVICE — INFECTION CONTROL KIT SYS

## (undated) DEVICE — MEDI-VAC YANK SUCT HNDL W/TPRD BULBOUS TIP: Brand: CARDINAL HEALTH

## (undated) DEVICE — NEONATAL-ADULT SPO2 SENSOR: Brand: NELLCOR

## (undated) DEVICE — BAG SPEC BIOHZRD 10 X 10 IN --

## (undated) DEVICE — SURGICAL PROCEDURE PACK CATRCT CUST

## (undated) DEVICE — BIPOLAR IRRIGATOR INTEGRATED TUBING AND BIPOLAR CORD SET, DISPOSABLE

## (undated) DEVICE — LAMINECTOMY RICHMOND-LF: Brand: MEDLINE INDUSTRIES, INC.

## (undated) DEVICE — BLADE,CARBON-STEEL,10,STRL,DISPOSABLE,TB: Brand: MEDLINE

## (undated) DEVICE — BNDG ELAS HK LOOP 3X5YD NS -- MATRIX

## (undated) DEVICE — SYR LR LCK 1ML GRAD NSAF 30ML --

## (undated) DEVICE — SUTURE ETHBND EXCEL SZ 2 L30IN NONABSORBABLE GRN V-5 L17MM X937H

## (undated) DEVICE — SPONGE: SPECIALTY PEANUT XR 100/CS: Brand: MEDICAL ACTION INDUSTRIES

## (undated) DEVICE — SOLUTION LACTATED RINGERS INJECTION USP

## (undated) DEVICE — SUTURE MCRYL SZ 4-0 L27IN ABSRB UD L19MM PS-2 1/2 CIR PRIM Y426H

## (undated) DEVICE — CATH IV AUTOGRD BC GRN 18GA 30 -- INSYTE

## (undated) DEVICE — CUFF TRNQT W4XL18IN 2 PRT SGL BLDR CYL DISP

## (undated) DEVICE — DRAPE,LAPAROTOMY,T,PEDI,STERILE: Brand: MEDLINE

## (undated) DEVICE — TOWEL SURG W17XL27IN BLU COT STD PREWASHED DELINTED 4 PER STRL PK

## (undated) DEVICE — ELECTRODE PT RET AD L9FT HI MOIST COND ADH HYDRGEL CORDED

## (undated) DEVICE — SURGIFOAM SPNG SZ 100

## (undated) DEVICE — SOLUTION SURG PREP 26 CC PURPREP

## (undated) DEVICE — BLADE,CARBON-STEEL,15,STRL,DISPOSABLE,TB: Brand: MEDLINE

## (undated) DEVICE — COVER,TABLE,HEAVY DUTY,60"X90",STRL: Brand: MEDLINE

## (undated) DEVICE — DRAPE EQUIP CARM MINI 85X54 IN W/ELASTIC OPENING

## (undated) DEVICE — INSULATED BLADE ELECTRODE: Brand: EDGE

## (undated) DEVICE — GARMENT,MEDLINE,DVT,INT,CALF,MED, GEN2: Brand: MEDLINE

## (undated) DEVICE — 1200 GUARD II KIT W/5MM TUBE W/O VAC TUBE: Brand: GUARDIAN

## (undated) DEVICE — SUTURE VCRL SZ 4-0 L27IN ABSRB UD L19MM PS-2 3/8 CIR PRIM J426H

## (undated) DEVICE — SUTURE VCRL SZ 3-0 L27IN ABSRB UD CP-2 L26MM 1/2 CIR REV J868H

## (undated) DEVICE — 450 ML BOTTLE OF 0.05% CHLORHEXIDINE GLUCONATE IN 99.95% STERILE WATER FOR IRRIGATION, USP AND APPLICATOR.: Brand: IRRISEPT ANTIMICROBIAL WOUND LAVAGE

## (undated) DEVICE — GLOVE SURG SZ 75 L12IN FNGR THK94MIL STD WHT LTX FREE

## (undated) DEVICE — SUT ETHLN 4-0 18IN PS2 BLK --

## (undated) DEVICE — Z DISCONTINUED PER MEDLINE LINE GAS SAMPLING O2/CO2 LNG AD 13 FT NSL W/ TBNG FILTERLINE

## (undated) DEVICE — COTTON TIPPED APPLICATORS: Brand: DEROYAL

## (undated) DEVICE — SUTURE VCRL SZ 2-0 L27IN ABSRB UD L26MM SH 1/2 CIR J417H

## (undated) DEVICE — SPONGE GZ W4XL4IN COT 12 PLY TYP VII WVN C FLD DSGN

## (undated) DEVICE — MARKER,SKIN,WI/RULER AND LABELS: Brand: MEDLINE

## (undated) DEVICE — HANDPIECE SET WITH COAXIAL HIGH FLOW TIP AND SUCTION TUBE: Brand: INTERPULSE

## (undated) DEVICE — 30MM ACUTWIST® ACUTRAK® TAP: Brand: ACUMED

## (undated) DEVICE — SURGICAL PROCEDURE PACK EYE CUST DR CHNDLR

## (undated) DEVICE — SUTURE STRATAFIX SPRL MCRYL + SZ 3-0 L24IN ABSRB UD PS-2 SXMP1B108

## (undated) DEVICE — .045" X 6" ST GUIDE WIRE: Brand: ACUMED

## (undated) DEVICE — ALCOHOL RUBBING ISO 16OZ 70%

## (undated) DEVICE — 3M™ TEGADERM™ TRANSPARENT FILM DRESSING FRAME STYLE, 1626W, 4 IN X 4-3/4 IN (10 CM X 12 CM), 50/CT 4CT/CASE: Brand: 3M™ TEGADERM™

## (undated) DEVICE — BIPOLAR FORCEPS CORD: Brand: VALLEYLAB

## (undated) DEVICE — HAND I-LF: Brand: MEDLINE INDUSTRIES, INC.

## (undated) DEVICE — STRIP SKIN CLSR W0.5XL4IN WHT SPUNBOUND FBR NYL STERIL HI ADH

## (undated) DEVICE — Z DISCONTINUED PER MEDLINE (LOW STOCK)  USE 2422770 DRAPE C ARM W54XL78IN FOR FLROSCN

## (undated) DEVICE — SOLIDIFIER FLD 2OZ 1500CC N DISINF IN BTL DISP SAFESORB

## (undated) DEVICE — HIGH FLOW RATE EXTENSION SET, LUER LOCK ADAPTERS

## (undated) DEVICE — SPLINT ORTH W4XL15IN PLSTR OF PARIS LO EXOTHERM SMOOTH

## (undated) DEVICE — SUTURE ETHLN SZ 4-0 L18IN NONABSORBABLE BLK L19MM PS-2 3/8 1667H

## (undated) DEVICE — CONTINU-FLO SOLUTION SET, 2 INJECTION SITES, MALE LUER LOCK ADAPTER WITH RETRACTABLE COLLAR, LARGE BORE STOPCOCK WITH ROTATING MALE LUER LOCK EXTENSION SET, 2 INJECTION SITES, MALE LUER LOCK ADAPTER WITH RETRACTABLE COLLAR: Brand: INTERLINK/CONTINU-FLO

## (undated) DEVICE — CORD ES L12FT BPLR FRCP

## (undated) DEVICE — C-ARMOR C-ARM EQUIPMENT COVERS CLEAR STERILE UNIVERSAL FIT 12 PER CASE: Brand: C-ARMOR

## (undated) DEVICE — SYR 50ML LR LCK 1ML GRAD NSAF --

## (undated) DEVICE — PADDING CAST W3INXL4YD POLY POR SPUN DACRON SYN VERSATILE

## (undated) DEVICE — HALTER TRACTION HD W/ TRI COTTON LINING FOAM LTX

## (undated) DEVICE — SUTURE PERMA-HAND SZ 3-0 L18IN NONABSORBABLE BLK L24MM PS-1 1684G

## (undated) DEVICE — FORCEPS BX L160CM DIA8MM GRSP DISECT CUP TIP NONLOCKING ROT

## (undated) DEVICE — Device: Brand: JELCO

## (undated) DEVICE — BANDAGE,GAUZE,BULKEE II,2.25"X3YD,STRL: Brand: MEDLINE

## (undated) DEVICE — C-ARM: Brand: UNBRANDED

## (undated) DEVICE — SUTURE PROL SZ 2-0 L36IN NONABSORBABLE BLU RB-1 L17MM 1/2 8559H

## (undated) DEVICE — DRILL BIT 7080510 11 MM DRILL BIT S

## (undated) DEVICE — PROGRAMMER COMMUNICATION W/HANDSET F/SACRAL NERVE STIMULATORS

## (undated) DEVICE — SUTURE ABSORBABLE BRAIDED 2-0 CT-1 27 IN UD VICRYL J259H

## (undated) DEVICE — CATH IV AUTOGRD BC PNK 20GA 25 -- INSYTE

## (undated) DEVICE — SOL IRR SOD CL 0.9% 1000ML BTL --

## (undated) DEVICE — SUTURE VCRL SZ 3-0 L27IN ABSRB UD L26MM SH 1/2 CIR J416H

## (undated) DEVICE — CATH REFLX PH Z IMPED 1CH 6.4F -- VERSAFLEX

## (undated) DEVICE — SOLUTION IV 1000ML 0.9% SOD CHL

## (undated) DEVICE — SYR ASSEMB INFL BLLN 60ML --

## (undated) DEVICE — NDL SPNE QNCKE 18GX3.5IN LF --

## (undated) DEVICE — PADDING CAST 3 INX4 YD STRL

## (undated) DEVICE — HANDPIECE SET WITH BONE CLEANING TIP AND SUCTION TUBE: Brand: INTERPULSE

## (undated) DEVICE — COLLAR CERV AD H2 1/4IN FOR 13-21IN NK SHT BLU PLAS HK AND

## (undated) DEVICE — SEALANT SURG CORNEA PROPHYLACTIC STRL RESURE

## (undated) DEVICE — PACK,BASIC,SIRUS,V: Brand: MEDLINE

## (undated) DEVICE — SLING ARM LIFETEC ORTH UNIV --

## (undated) DEVICE — STRIP,CLOSURE,WOUND,MEDI-STRIP,1/2X4: Brand: MEDLINE

## (undated) DEVICE — HAND-MRMCASU: Brand: MEDLINE INDUSTRIES, INC.

## (undated) DEVICE — KENDALL DL ECG CABLE AND LEAD WIRE SYSTEM, 5-LEAD, SINGLE PATIENT USE: Brand: KENDALL

## (undated) DEVICE — STERILE-Z SURGICAL PATIENT COVERS CLEAR POLYETHYLENE STERILE UNIVERSAL FIT 10 PER CASE: Brand: STERILE-Z

## (undated) DEVICE — GLOVE SURG SZ 8 L12IN FNGR THK79MIL GRN LTX FREE

## (undated) DEVICE — PAD,NON-ADHERENT,W/AD,3X4,ST,LF,1/PK: Brand: MEDLINE

## (undated) DEVICE — REM POLYHESIVE ADULT PATIENT RETURN ELECTRODE: Brand: VALLEYLAB

## (undated) DEVICE — YANKAUER,TAPERED BULBOUS TIP,W/O VENT: Brand: MEDLINE

## (undated) DEVICE — 4-PORT MANIFOLD: Brand: NEPTUNE 2

## (undated) DEVICE — HANDLE LT SNAP ON ULT DURABLE LENS FOR TRUMPF ALC DISPOSABLE

## (undated) DEVICE — SOLUTION IRRIG 1000ML 09% SOD CHL USP PIC PLAS CONTAINER

## (undated) DEVICE — SOLUTION IRRIG 3000ML 0.9% SOD CHL USP UROMATIC PLAS CONT

## (undated) DEVICE — SUTURE VCRL SZ 3-0 L18IN ABSRB UD PS-2 L19MM 1/2 CIR J497G

## (undated) DEVICE — DURASEAL

## (undated) DEVICE — DRAPE XR C ARM 41X74IN LF --

## (undated) DEVICE — BLADE OPHTH MINI BEAV SHRP --

## (undated) DEVICE — SOLUTION IRRIG 1000ML H2O STRL BLT

## (undated) DEVICE — ADHESIVE LIQ 2OZ ADJUNCT FOR DSG MASTISOL

## (undated) DEVICE — NEUROSTIMULATOR EXT SM LTWT SGL BTTN H2O RESIST WIRELESS

## (undated) DEVICE — POSITIONER HD REST FOAM CMFRT TCH

## (undated) DEVICE — SPONGE GZ W4XL4IN COT 12 PLY TYP VII WVN C FLD DSGN STERILE

## (undated) DEVICE — PENCIL ES CRD L10FT HND SWCHING ROCK SWCH W/ EDGE COAT BLDE

## (undated) DEVICE — TAPE,CLOTH/SILK,CURAD,3"X10YD,LF,40/CS: Brand: CURAD

## (undated) DEVICE — SUT PLN 5-0 18IN P3 YEL --

## (undated) DEVICE — NEEDLE HYPO 22GA L1.5IN BLK S STL HUB POLYPR SHLD REG BVL

## (undated) DEVICE — FLOSEAL HEMOSTATIC MATRIX, 10ML: Brand: FLOSEAL HEMOSTATIC MATRIX

## (undated) DEVICE — BANDAGE COMPR W3INXL5YD WHT BGE POLY COT M E WRP WV HK AND

## (undated) DEVICE — SOLUTION IRRIG 3000ML 0.9% SOD CHL FLX CONT 0797208] ICU MEDICAL INC]

## (undated) DEVICE — PADDING UNDERCAST W3INXL12FT RAYON POLY SYN NONADHESIVE

## (undated) DEVICE — DURASTAT IS INDICATED FOR USE IN GENERAL SOFT TISSUE APPROXIMATION AND/OR LIGATION, INCLUDING: CARDIOVASCULAR, DENTAL, GENERAL SURGICAL PROCEDURES AND REPAIR OF THE DURA MATER. DURASTAT PTFE SUTURES ARE NOT INDICATED FOR USE IN MICROSURGERY, OPHTHALMIC PROCEDURES, OR PERIPHERAL NEURAL TISSUES. DURASTAT IS PROVIDED STERILE AS A SINGLE-USE DEVICE.: Brand: DURASTAT